# Patient Record
Sex: FEMALE | Race: WHITE | Employment: OTHER | URBAN - METROPOLITAN AREA
[De-identification: names, ages, dates, MRNs, and addresses within clinical notes are randomized per-mention and may not be internally consistent; named-entity substitution may affect disease eponyms.]

---

## 2017-11-01 ENCOUNTER — HOSPITAL ENCOUNTER (OUTPATIENT)
Dept: OTHER | Age: 72
Discharge: HOME | End: 2017-11-30
Attending: ORTHOPAEDIC SURGERY | Admitting: ORTHOPAEDIC SURGERY

## 2017-12-01 ENCOUNTER — HOSPITAL ENCOUNTER (OUTPATIENT)
Dept: OTHER | Age: 72
Discharge: HOME | End: 2017-12-30
Attending: ORTHOPAEDIC SURGERY | Admitting: ORTHOPAEDIC SURGERY

## 2019-09-06 ENCOUNTER — HOSPITAL ENCOUNTER (OUTPATIENT)
Dept: PHYSICAL THERAPY | Age: 74
Setting detail: THERAPIES SERIES
Discharge: HOME | End: 2019-09-06
Payer: MEDICARE

## 2019-09-06 PROCEDURE — 97161 PT EVAL LOW COMPLEX 20 MIN: CPT

## 2019-09-06 PROCEDURE — 97110 THERAPEUTIC EXERCISES: CPT

## 2019-09-06 PROCEDURE — 97530 THERAPEUTIC ACTIVITIES: CPT

## 2019-09-06 NOTE — PLAN OF CARE
strength in B hips and put off replacement. 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     OBJECTIVE:   Posture: good    Functional Mobility/Transfers: independent    Inspection: unremarkable     Palpation: +1 L glute med    Bandages/Dressings/Incisions:     Gait: (include devices/WB status) increased L toe out, trendelenburg B, decreased R hip extension in terminal stance    Dermatomes Normal Abnormal Comments   inguinal area (L1)  X     anterior mid-thigh (L2) X     distal ant thigh/med knee (L3) X     medial lower leg and foot (L4) X     lateral lower leg and foot (L5) X     posterior calf (S1) X     medial calcaneus (S2) X       Myotomes Normal Abnormal Comments   Hip flexion (L1-L2) X     Knee extension (L2-L4) X     Dorsiflexion (L4-L5) X     Great Toe Ext (L5) X     Ankle Eversion (S1-S2) X     Ankle PF(S1-S2) X       Reflexes Normal Abnormal Comments   S1-2 Seated achilles X     S1-2 Prone knee bend      L3-4 Patellar tendon      Clonus      Babinski         PROM AROM    L R L R   Hip Flexion FULL FULL FULL FULL *mild pinch   Hip Abduction FULL FULL FULL FULL (65DEG)   Hip ER WNL WNL WNL WNL   Hip IR 35 35 40 30   Knee Flexion [facility]   Knee Extension [facility] WFL   Dorsiflexion     decreased   Plantarflexion     decreased   Inversion     decreased   Eversion     decreased     Strength (0-5) Left Right   Hip Flexion - supine     Hip Flexion - seated 4+ 4   Hip in sport/recreation activities. Classification :    []Signs/symptoms consistent with post-surgical status including decreased ROM, strength and function. []Signs/symptoms consistent with joint sprain/strain   []Signs/symptoms consistent with patella-femoral syndrome   [x]Signs/symptoms consistent with knee OA/hip OA   []Signs/symptoms consistent with internal derangement of knee/Hip   []Signs/symptoms consistent with functional hip weakness/NMR control      []Signs/symptoms consistent with tendinitis/tendinosis    []signs/symptoms consistent with pathology which may benefit from Dry needling      []other:      Prognosis/Rehab Potential:      [x]Excellent   []Good    []Fair   []Poor    Tolerance of evaluation/treatment:    [x]Excellent   []Good    []Fair   []Poor    Physical Therapy Evaluation Complexity Justification  [x] A history of present problem with:  [] no personal factors and/or comorbidities that impact the plan of care;  [x]1-2 personal factors and/or comorbidities that impact the plan of care  []3 personal factors and/or comorbidities that impact the plan of care  [x] An examination of body systems using standardized tests and measures addressing any of the following: body structures and functions (impairments), activity limitations, and/or participation restrictions;:  [] a total of 1-2 or more elements   [] a total of 3 or more elements   [] a total of 4 or more elements   [x] A clinical presentation with:  [x] stable and/or uncomplicated characteristics   [] evolving clinical presentation with changing characteristics  [] unstable and unpredictable characteristics;   [x] Clinical decision making of [x] low , [] moderate, [] high complexity using standardized patient assessment instrument and/or measurable assessment of functional outcome.     [x] EVAL (LOW) 62392 (typically 15 minutes face-to-face)  [] EVAL (MOD) 68160 (typically 30 minutes face-to-face)  [] EVAL (HIGH) 80412 (typically 45 minutes face-to-face)  [] RE-EVAL     PLAN:   Frequency/Duration:  1 days per week for 3 Weeks:  Interventions:  [x]  Therapeutic exercise including: strength training, ROM, for Lower extremity and core   [x]  NMR activation and proprioception for LE, Glutes and Core   [x]  Manual therapy as indicated for LE, Hip and spine to include: Dry Needling/IASTM, STM, PROM, Gr I-IV mobilizations, manipulation. [x] Modalities as needed that may include: thermal agents, E-stim, Biofeedback, US, iontophoresis as indicated  [x] Patient education on joint protection, postural re-education, activity modification, progression of HEP. HEP instruction: The following exercises were performed and added to the patient's home exercise program. (SLR abd, SLR flex in sidelying, prone quad strap stretch, standing HSS, lateral band walk.) Additionally, the patient was educated on appropriate frequency, intensity and duration. Yellow band provided. GOALS:  Patient stated goal: to have less pain  [] Progressing: [] Met: [] Not Met: [] Adjusted    Therapist goals for Patient:   Short Term Goals: To be achieved in: 2 weeks  1. Independent in HEP and progression per patient tolerance, in order to prevent re-injury. [] Progressing: [] Met: [] Not Met: [] Adjusted  2. Patient will have a decrease in pain to 1/10 on average to facilitate improvement in movement, function, and ADLs as indicated by Functional Deficits. [] Progressing: [] Met: [] Not Met: [] Adjusted    Long Term Goals: To be achieved in: 3 weeks  1. Disability index score of 23%% or less for the LEFS to assist with reaching prior level of function. [] Progressing: [] Met: [] Not Met: [] Adjusted  3. Patient will demonstrate an increase in Strength to at least 4+/5 L hip as well as good proximal hip strength and control to allow for proper functional mobility as indicated by patients Functional Deficits. [] Progressing: [] Met: [] Not Met: [] Adjusted  4.  Patient will return to

## 2019-09-09 ENCOUNTER — HOSPITAL ENCOUNTER (OUTPATIENT)
Dept: PHYSICAL THERAPY | Age: 74
Setting detail: THERAPIES SERIES
Discharge: HOME | End: 2019-09-09
Payer: MEDICARE

## 2019-09-09 PROCEDURE — 97110 THERAPEUTIC EXERCISES: CPT

## 2019-09-09 PROCEDURE — 97112 NEUROMUSCULAR REEDUCATION: CPT

## 2019-09-09 NOTE — FLOWSHEET NOTE
Total Treatment Minutes: 30     [] EVAL - LOW (49730)   [] EVAL - MOD (25325)  [] EVAL - HIGH (98983)  [] RE-EVAL (10462)  [x] TE (62216) x 1      [] Ionto  [x] NMR (27640) x 1      [] Vaso  [] Manual (98123) x      [] Ultrasound  [] TA x      [] Mech Traction (55058)  [] Gait Training x     [] ES (un) (51117):   [] Aquatic therapy x   [] Other:   [] Group:     GOALS: Patient stated goal: to have less pain  [] Progressing: [] Met: [] Not Met: [] Adjusted     Therapist goals for Patient:   Short Term Goals: To be achieved in: 2 weeks  1. Independent in HEP and progression per patient tolerance, in order to prevent re-injury. [] Progressing: [] Met: [] Not Met: [] Adjusted  2. Patient will have a decrease in pain to 1/10 on average to facilitate improvement in movement, function, and ADLs as indicated by Functional Deficits. [] Progressing: [] Met: [] Not Met: [] Adjusted     Long Term Goals: To be achieved in: 3 weeks  1. Disability index score of 23%% or less for the LEFS to assist with reaching prior level of function. [] Progressing: [] Met: [] Not Met: [] Adjusted  3. Patient will demonstrate an increase in Strength to at least 4+/5 L hip as well as good proximal hip strength and control to allow for proper functional mobility as indicated by patients Functional Deficits. [] Progressing: [] Met: [] Not Met: [] Adjusted  4. Patient will return to functional activities including prolonged walking, squatting >60deg 15x without increased symptoms or restriction. [] Progressing: [] Met: [] Not Met: [] Adjusted  5. The patient will transfer up into [facility] without R hip pain. [] Progressing: [] Met: [] Not Met: [] Adjusted             Progression Towards Functional goals:  [x] Patient is progressing as expected towards functional goals listed. [] Progression is slowed due to complexities listed. [] Progression has been slowed due to co-morbidities.   [] Plan just implemented, too soon to assess goals

## 2019-09-11 ENCOUNTER — HOSPITAL ENCOUNTER (EMERGENCY)
Age: 74
Discharge: HOME | End: 2019-09-11
Payer: MEDICARE

## 2019-09-11 ENCOUNTER — APPOINTMENT (OUTPATIENT)
Dept: GENERAL RADIOLOGY | Age: 74
End: 2019-09-11
Payer: MEDICARE

## 2019-09-11 VITALS
RESPIRATION RATE: 16 BRPM | TEMPERATURE: 98.2 F | HEART RATE: 62 BPM | OXYGEN SATURATION: 97 % | BODY MASS INDEX: 24.25 KG/M2 | DIASTOLIC BLOOD PRESSURE: 72 MMHG | SYSTOLIC BLOOD PRESSURE: 132 MMHG | WEIGHT: 148 LBS

## 2019-09-11 DIAGNOSIS — S60.459A FOREIGN BODY OF FINGER OF LEFT HAND, INITIAL ENCOUNTER: Primary | ICD-10-CM

## 2019-09-11 PROCEDURE — 6360000002 HC RX W HCPCS: Performed by: PHYSICIAN ASSISTANT

## 2019-09-11 PROCEDURE — 73140 X-RAY EXAM OF FINGER(S): CPT

## 2019-09-11 PROCEDURE — 99283 EMERGENCY DEPT VISIT LOW MDM: CPT

## 2019-09-11 PROCEDURE — 90471 IMMUNIZATION ADMIN: CPT | Performed by: PHYSICIAN ASSISTANT

## 2019-09-11 PROCEDURE — 90715 TDAP VACCINE 7 YRS/> IM: CPT | Performed by: PHYSICIAN ASSISTANT

## 2019-09-11 RX ORDER — BACITRACIN ZINC AND POLYMYXIN B SULFATE 500; 10000 [USP'U]/G; [USP'U]/G
OINTMENT OPHTHALMIC
Status: DISCONTINUED
Start: 2019-09-11 | End: 2019-09-11 | Stop reason: HOSPADM

## 2019-09-11 RX ORDER — BUPIVACAINE HYDROCHLORIDE 5 MG/ML
INJECTION, SOLUTION EPIDURAL; INTRACAUDAL
Status: DISCONTINUED
Start: 2019-09-11 | End: 2019-09-11 | Stop reason: WASHOUT

## 2019-09-11 RX ORDER — BACITRACIN ZINC AND POLYMYXIN B SULFATE 500; 1000 [USP'U]/G; [USP'U]/G
OINTMENT TOPICAL
Status: DISCONTINUED
Start: 2019-09-11 | End: 2019-09-11 | Stop reason: HOSPADM

## 2019-09-11 RX ORDER — CEPHALEXIN 500 MG/1
500 CAPSULE ORAL 4 TIMES DAILY
Qty: 28 CAPSULE | Refills: 0 | Status: SHIPPED | OUTPATIENT
Start: 2019-09-11 | End: 2019-09-18

## 2019-09-11 RX ADMIN — TETANUS TOXOID, REDUCED DIPHTHERIA TOXOID AND ACELLULAR PERTUSSIS VACCINE, ADSORBED 0.5 ML: 5; 2.5; 8; 8; 2.5 SUSPENSION INTRAMUSCULAR at 18:10

## 2019-09-11 ASSESSMENT — PAIN DESCRIPTION - LOCATION: LOCATION: FINGER (COMMENT WHICH ONE)

## 2019-09-11 ASSESSMENT — PAIN DESCRIPTION - PAIN TYPE: TYPE: ACUTE PAIN

## 2019-09-11 ASSESSMENT — ENCOUNTER SYMPTOMS
CONSTIPATION: 0
ABDOMINAL DISTENTION: 0
SHORTNESS OF BREATH: 0
COUGH: 0
ABDOMINAL PAIN: 0
STRIDOR: 0
WHEEZING: 0
VOMITING: 0
BACK PAIN: 0
DIARRHEA: 0
COLOR CHANGE: 0
NAUSEA: 0

## 2019-09-11 ASSESSMENT — PAIN SCALES - GENERAL: PAINLEVEL_OUTOF10: 2

## 2019-09-11 NOTE — ED NOTES
Bed: 22  Expected date:   Expected time:   Means of arrival:   Comments:  [place] Shirl Sever, RN  09/11/19 6732

## 2019-09-11 NOTE — ED PROVIDER NOTES
evaluated by independent SHAHEEN       [facility]        Pt Name: Amaya Swanson  MRN: [id number]  [place] 1945  Date of evaluation: 9/11/2019  Provider: Barbara Ruiz PA-C  PCP: Braulio Bay MD          CHIEF COMPLAINT       Chief Complaint   Patient presents with    Other     patient with part of sewing needle in left ring finger. HISTORY OF PRESENT ILLNESS   (Location/Symptom, Timing/Onset, Context/Setting, Quality, Duration, Modifying Factors, Severity)  Note limiting factors. Amaya Swanson is a 68 y.o. female who presents to the emergency department with foreign body in left fourth finger status post injury which occurred this evening. She was using her sewing machine and a part of a sewing needle broke off into her left distal finger. She is unsure of her tetanus status. She rates her pain to be a 2 out of 10 on pain scale. She took some Advil prior to arrival and states that her pain is significantly improved. Denies any active bleeding or drainage. Nursing Notes were all reviewed and agreed with or any disagreements were addressed  in the HPI. REVIEW OF SYSTEMS    (2-9 systems for level 4, 10 or more for level 5)     Review of Systems   Constitutional: Negative for chills and fever. HENT: Negative. Eyes: Negative for visual disturbance. Respiratory: Negative for cough, shortness of breath, wheezing and stridor. Cardiovascular: Negative for chest pain, palpitations and leg swelling. Gastrointestinal: Negative for abdominal distention, abdominal pain, constipation, diarrhea, nausea and vomiting. Genitourinary: Negative. Musculoskeletal: Positive for myalgias. Negative for back pain, neck pain and neck stiffness. Skin: Positive for wound. Negative for color change, pallor and rash.    Neurological: Negative for dizziness, tremors, seizures, syncope, facial asymmetry, speech orthopedist.  Mya Prudent stable here, and patient is stable for discharge. My suspicion is low for subungual hematoma, paronychia, eponychia, felon, flexor tenosynovitis, ACS, PE, thoracic aortic dissection, thoracic outlet obstruction, SVC syndrome, tendon rupture, compartment syndrome, acute fracture, dislocation, DVT, arterial compromise or occlusion, limb ischemia, gout, septic joint, abscess, cellulitis, osteomyelitis, or other concerning pathology. We have addressed concerns and expectations. FINAL IMPRESSION      1. Foreign body of finger of left hand, initial encounter          DISPOSITION/PLAN   DISPOSITION Decision To Discharge 09/11/2019 06:23:36 PM      PATIENT REFERREDTO:  Nader Lees MD  [address] [place] [address], [address]  [address] [place], [place] [id number]      come to his office at 9 am. do not eat or drink anything after midnight.     [facility] Emergency Department  [address] [facility]  [phone]    If symptoms worsen      DISCHARGE MEDICATIONS:  New Prescriptions    CEPHALEXIN (KEFLEX) 500 MG CAPSULE    Take 1 capsule by mouth 4 times daily for 7 days       DISCONTINUED MEDICATIONS:  Discontinued Medications    No medications on file              (Please note that portions ofthis note were completed with a voice recognition program.  Efforts were made to edit the dictations but occasionally words are mis-transcribed.)    Dhara Talamantes PA-C (electronically signed)          Dhara Talamantes PA-C  09/11/19 6979

## 2019-09-12 ENCOUNTER — HOSPITAL ENCOUNTER (OUTPATIENT)
Age: 74
Setting detail: OUTPATIENT SURGERY
Discharge: HOME | End: 2019-09-12
Attending: ORTHOPAEDIC SURGERY | Admitting: ORTHOPAEDIC SURGERY
Payer: MEDICARE

## 2019-09-12 VITALS
DIASTOLIC BLOOD PRESSURE: 55 MMHG | RESPIRATION RATE: 16 BRPM | HEART RATE: 54 BPM | SYSTOLIC BLOOD PRESSURE: 114 MMHG | TEMPERATURE: 97.7 F | BODY MASS INDEX: 23.78 KG/M2 | OXYGEN SATURATION: 100 % | HEIGHT: 66 IN | WEIGHT: 148 LBS

## 2019-09-12 PROCEDURE — 99204 OFFICE O/P NEW MOD 45 MIN: CPT | Performed by: ORTHOPAEDIC SURGERY

## 2019-09-12 PROCEDURE — 3600000015 HC SURGERY LEVEL 5 ADDTL 15MIN: Performed by: ORTHOPAEDIC SURGERY

## 2019-09-12 PROCEDURE — 20525 RMVL FB MUSC/TDN DEEP/COMP: CPT | Performed by: ORTHOPAEDIC SURGERY

## 2019-09-12 PROCEDURE — 7100000011 HC PHASE II RECOVERY - ADDTL 15 MIN: Performed by: ORTHOPAEDIC SURGERY

## 2019-09-12 PROCEDURE — 3600000005 HC SURGERY LEVEL 5 BASE: Performed by: ORTHOPAEDIC SURGERY

## 2019-09-12 PROCEDURE — 2500000003 HC RX 250 WO HCPCS: Performed by: ORTHOPAEDIC SURGERY

## 2019-09-12 PROCEDURE — 7100000010 HC PHASE II RECOVERY - FIRST 15 MIN: Performed by: ORTHOPAEDIC SURGERY

## 2019-09-12 PROCEDURE — 2709999900 HC NON-CHARGEABLE SUPPLY: Performed by: ORTHOPAEDIC SURGERY

## 2019-09-12 RX ORDER — LIDOCAINE HYDROCHLORIDE 10 MG/ML
INJECTION, SOLUTION EPIDURAL; INFILTRATION; INTRACAUDAL; PERINEURAL
Status: COMPLETED | OUTPATIENT
Start: 2019-09-12 | End: 2019-09-12

## 2019-09-12 RX ORDER — MAGNESIUM OXIDE 400 MG/1
400 TABLET ORAL DAILY
COMMUNITY
End: 2020-05-11

## 2019-09-12 ASSESSMENT — PAIN - FUNCTIONAL ASSESSMENT
PAIN_FUNCTIONAL_ASSESSMENT: PREVENTS OR INTERFERES SOME ACTIVE ACTIVITIES AND ADLS
PAIN_FUNCTIONAL_ASSESSMENT: 0-10

## 2019-09-12 ASSESSMENT — PAIN DESCRIPTION - DESCRIPTORS: DESCRIPTORS: SORE

## 2019-09-12 ASSESSMENT — PAIN SCALES - GENERAL: PAINLEVEL_OUTOF10: 0

## 2019-09-12 NOTE — H&P
Preoperative H&P Update    The patient's History and Physical in the medical record from 9/12/2019 was reviewed by me today. Past Medical History:   Diagnosis Date    Arthritis     Blood transfusion 2002    AUTOLOGOUS    Blood transfusion 1968    Diverticulosis     Hyperlipidemia     Osteopenia     Restless leg syndrome     Seasonal allergies      Past Surgical History:   Procedure Laterality Date    APPENDECTOMY      CARPAL TUNNEL RELEASE      LEFT    COLONOSCOPY      FINGER TRIGGER RELEASE      RIGHT - 2 FINGERS    FINGER TRIGGER RELEASE  6-    trigger finger release left long and ring fingers    FRACTURE SURGERY      BILATERAL FEMURS     FRACTURE SURGERY      RIGHT ANKLE/TALUS/TIBIA    HARDWARE REMOVAL      HEMORRHOID SURGERY      JOINT REPLACEMENT      RIGHT KNEE    KNEE ARTHROSCOPY      RIGHT AND LEFT MULTIPLE     No current facility-administered medications on file prior to encounter. Current Outpatient Medications on File Prior to Encounter   Medication Sig Dispense Refill    magnesium oxide (MAG-OX) 400 MG tablet Take 400 mg by mouth daily      cephALEXin (KEFLEX) 500 MG capsule Take 1 capsule by mouth 4 times daily for 7 days 28 capsule 0    simvastatin (ZOCOR) 20 MG tablet Take 20 mg by mouth nightly.  ropinirole (REQUIP) 0.5 MG tablet Take 0.5 mg by mouth daily. 1-2 TABS       diclofenac (VOLTAREN) 75 MG EC tablet Take 75 mg by mouth 2 times daily as needed Takes 1-2 per week, only takes on really \"achy\" days      Calcium Carbonate-Vitamin D (CALCIUM PLUS VITAMIN D) 600-100 MG-UNIT CAPS Take 1 tablet by mouth daily.  Omega-3 Fatty Acids (FISH OIL) 1200 MG CAPS Take 1 capsule by mouth daily.  Multiple Vitamin (MULTIVITAMIN PO) Take 1 tablet by mouth daily.            Allergies   Allergen Reactions    Sulfa Antibiotics      Face red,flushed      I reviewed the HPI, medications, allergies, reason for surgery, diagnosis and treatment plan and

## 2019-09-12 NOTE — PROGRESS NOTES
Admitted from [address] [place] [address] . Awake and alert. Respirations easy . Dressing dry and intact.

## 2019-09-18 ENCOUNTER — HOSPITAL ENCOUNTER (OUTPATIENT)
Dept: PHYSICAL THERAPY | Age: 74
Setting detail: THERAPIES SERIES
Discharge: HOME | End: 2019-09-18
Payer: MEDICARE

## 2019-09-18 PROCEDURE — 97110 THERAPEUTIC EXERCISES: CPT

## 2019-09-18 PROCEDURE — 97112 NEUROMUSCULAR REEDUCATION: CPT

## 2019-09-18 NOTE — FLOWSHEET NOTE
[facility] - Outpatient Physical Therapy    Physical Therapy Daily Treatment Note  Date:  2019    Patient Name:  Mary Pascual    :  1945  MRN: [id number]  Medical/Treatment Diagnosis Information:  · Diagnosis: M70.61 (ICD-10-CM) - Trochanteric bursitis, right hip  · Treatment Diagnosis: difficulty walking, hip pain  Insurance/Certification information:  PT Insurance Information: [address] - visits PMN - 96/4%   Physician Information:  Referring Practitioner: Hank Worthington MD  Plan of care signed (Y/N): N    Date of Patient follow up with Physician: JOSELINE     Progress Note: []  Yes  [x]  No  Next due by: Visit #4       Latex Allergy:  [x]NO      []YES  Preferred Language for Healthcare:   [x]English       []other:    Visit # Insurance Allowable Date Range (if applicable)   3 PMN 0868     Pain level:  0/10 @ rest     SUBJECTIVE:  Lateral band walk is the only HEP exercise that reproduces sxs. She finds that therapy routine, HEP and compliance are facilitating results.       OBJECTIVE:     RESTRICTIONS/PRECAUTIONS: NA    Exercises/Interventions:     Therapeutic Exercises  Resistance Sets / Reps Notes   SLR abd     SLR Flex/ext in S/L     HSS on step  Cued heavily for neutral spine   Lateral Band WALK   laces   Prone Quad Strap S      R Bike    SLR Ext  Hip Ext w/ Bent Knee 2#  2# 2 x 10 B  2 x 10 B   TC for proper knee flexion position   Reverse Clamshells    Leg Press 100# 2 x 10 B L4, B2   Piriformis S  2 x 20'' B          Neuromuscular Re-ed / Therapeutic Activities      Lateral Heel Taps    SLS Airex R (intervals of 10'')   SLR Ext & Abd lime 1 x 10 each R/L each, cued to avoid touching floor for moving leg (stability exercise)   TRX Squats: Neutral, Staggered R/L   10x each Cued for depth, heel crush at bottom of motion, foot position   Staggered Stance HR  6'' step 12x R/L each          Manual Intervention                                            Patient Education & HEP:   9/18 - The following exercises were performed and added to the patient's home exercise program. (squats, SLR ext/abd in standing, SLR ext w/ knee bent, piriformis S). Additionally, the patient was educated on appropriate frequency, intensity and duration. Handout provided. 9/9 - The following exercises were performed and added to the patient's home exercise program. (SLR ext, SLS, clams, reverse clams). Additionally, the patient was educated on appropriate frequency, intensity and duration. Lime loop provided. 9/6 - - Patient educated on the focus of skilled physical therapy services and plan of care, including: diagnosis, prognosis, treatment goals & options. The patient was scheduled with PT by PT. - 12'  9/6 - The following exercises were performed and added to the patient's home exercise program. (SLR abd, SLR flex in sidelying, prone quad strap stretch, standing HSS, lateral band walk.) Additionally, the patient was educated on appropriate frequency, intensity and duration. Yellow band provided. - All patient questions were answered    Therapeutic Exercise and NMR EXR  [x] (25117) Provided verbal/tactile cueing for activities related to strengthening, flexibility, endurance, ROM for improvements in  [x] LE / Lumbar: LE, proximal hip, and core control with self care, mobility, lifting, ambulation. [] UE / Cervical: cervical, postural, scapular, scapulothoracic and UE control with self care, reaching, carrying, lifting, house/yardwork, driving, computer work. [x] (16245) Provided verbal/tactile cueing for activities related to improving balance, coordination, kinesthetic sense, posture, motor skill, proprioception to assist with   [x] LE / lumbar: LE, proximal hip, and core control in self care, mobility, lifting, ambulation and eccentric single leg control.    [] UE / cervical: cervical, scapular, scapulothoracic and UE control with self care, reaching, carrying, lifting, house/yardwork, Met: [] Adjusted  4. Patient will return to functional activities including prolonged walking, squatting >60deg 15x without increased symptoms or restriction. [] Progressing: [] Met: [] Not Met: [] Adjusted  5. The patient will transfer up into [facility] without R hip pain. [] Progressing: [] Met: [] Not Met: [] Adjusted             Progression Towards Functional goals:  [x] Patient is progressing as expected towards functional goals listed. [] Progression is slowed due to complexities listed. [] Progression has been slowed due to co-morbidities. [] Plan just implemented, too soon to assess goals progression  [] Other:     Persisting Functional Limitations/Impairments:  []Sleeping []Sitting               []Standing []Transfers        [x]Walking [x]Kneeling               [x]Stairs [x]Squatting / bending   []ADLs []Reaching  []Lifting  [x]Housework  []Driving [x]Job related tasks  []Sports/Recreation []Other:      ASSESSMENT:    Treatment/Activity Tolerance:  [x] Patient tolerated treatment well [] Patient limited by fatigue  [] Patient limited by pain  [] Patient limited by other medical complications  [x] Other: The patient continues to progress in strength, flexibility, activity tolerance and all with reducing symptoms. Prognosis: [x] Good [] Fair  [] Poor    Patient Requires Follow-up: [x] Yes  [] No    PLAN: POC: PT 1x / week for 3 weeks. Assess response to HEP.    [] Continue per plan of care [] Alter current plan (see comments)  [x] Plan of care initiated [] Hold pending MD visit [] Discharge    Electronically signed by: Aren Castillo PT, DPT

## 2019-09-25 ENCOUNTER — TELEPHONE (OUTPATIENT)
Dept: ORTHOPEDIC SURGERY | Age: 74
End: 2019-09-25

## 2019-09-25 ENCOUNTER — HOSPITAL ENCOUNTER (OUTPATIENT)
Dept: PHYSICAL THERAPY | Age: 74
Setting detail: THERAPIES SERIES
Discharge: HOME | End: 2019-09-25
Payer: MEDICARE

## 2019-09-25 PROCEDURE — 97112 NEUROMUSCULAR REEDUCATION: CPT

## 2019-09-25 PROCEDURE — 97110 THERAPEUTIC EXERCISES: CPT

## 2019-09-25 NOTE — TELEPHONE ENCOUNTER
Called and spoke to Blane Soto. Per SMA okay to cancel PO appt. Advised patient to continue working ROM of fingers and look for signs and symptoms of infection and to call if there is any questions or concerns to make follow up appt.  Patient in agreement to plan

## 2019-09-25 NOTE — FLOWSHEET NOTE
for proper ROM for normal function with   [] LE / lumbar: self care, mobility, lifting and ambulation. [] UE / Cervical: self care, reaching, carrying, lifting, house/yardwork, driving, computer work. Modalities:  [] (84424) Vasopneumatic compression: Utilized vasopneumatic compression to decrease edema / swelling for the purpose of improving mobility and quad tone / recruitment which will allow for increased overall function including but not limited to self-care, transfers, ambulation, and ascending / descending stairs. Modalities: Consider MOC    Charges:  Timed Code Treatment Minutes: 28   Total Treatment Minutes: 28     [] EVAL - LOW (70183)   [] EVAL - MOD (88609)  [] EVAL - HIGH (03688)  [] RE-EVAL (86829)  [x] TE (25185) x 1      [] Ionto  [x] NMR (00148) x 1      [] Vaso  [] Manual (11380) x      [] Ultrasound  [] TA x      [] Mech Traction (48452)  [] Gait Training x     [] ES (un) (75325):   [] Aquatic therapy x   [] Other:   [] Group:     GOALS: Patient stated goal: to have less pain  [x] Progressing: [] Met: [] Not Met: [] Adjusted     Therapist goals for Patient:   Short Term Goals: To be achieved in: 2 weeks  1. Independent in HEP and progression per patient tolerance, in order to prevent re-injury. [x] Progressing: [] Met: [] Not Met: [] Adjusted  2. Patient will have a decrease in pain to 1/10 on average to facilitate improvement in movement, function, and ADLs as indicated by Functional Deficits. [x] Progressing: [] Met: [] Not Met: [] Adjusted     Long Term Goals: To be achieved in: 3 weeks  1. Disability index score of 23%% or less for the LEFS to assist with reaching prior level of function. [x] Progressing: [] Met: [] Not Met: [] Adjusted  3. Patient will demonstrate an increase in Strength to at least 4+/5 L hip as well as good proximal hip strength and control to allow for proper functional mobility as indicated by patients Functional Deficits.    [x] Progressing: [] Met: []

## 2019-10-02 ENCOUNTER — HOSPITAL ENCOUNTER (OUTPATIENT)
Dept: PHYSICAL THERAPY | Age: 74
Setting detail: THERAPIES SERIES
Discharge: HOME | End: 2019-10-02
Payer: MEDICARE

## 2019-10-02 PROCEDURE — 97530 THERAPEUTIC ACTIVITIES: CPT

## 2020-05-11 ENCOUNTER — APPOINTMENT (OUTPATIENT)
Dept: CT IMAGING | Age: 75
End: 2020-05-11
Payer: MEDICARE

## 2020-05-11 ENCOUNTER — HOSPITAL ENCOUNTER (EMERGENCY)
Age: 75
Discharge: HOME | End: 2020-05-11
Attending: EMERGENCY MEDICINE
Payer: MEDICARE

## 2020-05-11 VITALS
BODY MASS INDEX: 24.49 KG/M2 | HEIGHT: 65 IN | SYSTOLIC BLOOD PRESSURE: 115 MMHG | DIASTOLIC BLOOD PRESSURE: 72 MMHG | WEIGHT: 147 LBS | RESPIRATION RATE: 16 BRPM | OXYGEN SATURATION: 99 % | HEART RATE: 55 BPM | TEMPERATURE: 98.4 F

## 2020-05-11 PROCEDURE — 99284 EMERGENCY DEPT VISIT MOD MDM: CPT

## 2020-05-11 PROCEDURE — 73700 CT LOWER EXTREMITY W/O DYE: CPT

## 2020-05-11 PROCEDURE — 6370000000 HC RX 637 (ALT 250 FOR IP): Performed by: EMERGENCY MEDICINE

## 2020-05-11 RX ORDER — MULTIVIT-MIN/IRON/FOLIC ACID/K 18-600-40
1 CAPSULE ORAL DAILY
COMMUNITY

## 2020-05-11 RX ORDER — OXYCODONE HYDROCHLORIDE AND ACETAMINOPHEN 5; 325 MG/1; MG/1
1 TABLET ORAL EVERY 6 HOURS PRN
Qty: 12 TABLET | Refills: 0 | Status: SHIPPED | OUTPATIENT
Start: 2020-05-11 | End: 2020-05-14

## 2020-05-11 RX ORDER — OXYCODONE HYDROCHLORIDE AND ACETAMINOPHEN 5; 325 MG/1; MG/1
2 TABLET ORAL ONCE
Status: COMPLETED | OUTPATIENT
Start: 2020-05-11 | End: 2020-05-11

## 2020-05-11 RX ORDER — ROPINIROLE 0.5 MG/1
0.5 TABLET, FILM COATED ORAL 3 TIMES DAILY PRN
Status: ON HOLD | COMMUNITY
End: 2022-05-03

## 2020-05-11 RX ADMIN — OXYCODONE HYDROCHLORIDE AND ACETAMINOPHEN 2 TABLET: 5; 325 TABLET ORAL at 02:03

## 2020-05-11 ASSESSMENT — PAIN SCALES - GENERAL
PAINLEVEL_OUTOF10: 9
PAINLEVEL_OUTOF10: 9

## 2020-05-11 ASSESSMENT — PAIN DESCRIPTION - ORIENTATION: ORIENTATION: LEFT

## 2020-05-11 ASSESSMENT — PAIN DESCRIPTION - LOCATION: LOCATION: HIP

## 2020-05-11 NOTE — ED PROVIDER NOTES
eMERGENCY dEPARTMENT eNCOUnter      [facility]    Chief Complaint   Patient presents with    Hip Pain     c/o falling onto left hip after tripping over ironing board and caught foot in the cord tonight @ [phone]; has been told she needs both hips replaced due to arthritis       HPI    Lasha Cheema is a 69 y.o. female who presents with left hip pain after falling prior to arrival.  He states that she is having difficulty walking secondary to pain but she took a pain medicine before leaving the house. Moving it makes it worse and no other associated signs or symptoms. She did not strike her head.     PAST MEDICAL HISTORY    Past Medical History:   Diagnosis Date    Arthritis     Blood transfusion 2002    AUTOLOGOUS    Blood transfusion 1968    Diverticulosis     H/O irritable bowel syndrome     Hyperlipidemia     Osteopenia     Restless leg syndrome     Seasonal allergies        SURGICAL HISTORY    Past Surgical History:   Procedure Laterality Date    APPENDECTOMY      CARPAL TUNNEL RELEASE      LEFT    COLONOSCOPY      FINGER TRIGGER RELEASE      RIGHT - 2 FINGERS    FINGER TRIGGER RELEASE  6-    trigger finger release left long and ring fingers    FRACTURE SURGERY      BILATERAL FEMURS     FRACTURE SURGERY      RIGHT ANKLE/TALUS/TIBIA    HAND SURGERY Left 9/12/2019    LEFT RING FINGER  DEBRIDEMENT INCISION AND DRAINAGE, REMOVAL OF FOREIGN BODY performed by Antoni Espino MD at [facility]      JOINT REPLACEMENT      RIGHT KNEE    KNEE ARTHROSCOPY      RIGHT AND LEFT MULTIPLE       CURRENT MEDICATIONS    Current Outpatient Rx   Medication Sig Dispense Refill    rOPINIRole (REQUIP) 0.5 MG tablet Take 0.5 mg by mouth 3 times daily as needed      Cholecalciferol (VITAMIN D) 50 MCG (2000 UT) CAPS capsule Take 1 capsule by mouth daily      Probiotic Product (PROBIOTIC-10 PO) Take 1 capsule by mouth daily      Melatonin 5 MG CAPS Take 1 capsule by mouth nightly      simvastatin (ZOCOR) 20 MG tablet Take 20 mg by mouth nightly.  ropinirole (REQUIP) 0.5 MG tablet Take 1.5 mg by mouth nightly       diclofenac (VOLTAREN) 75 MG EC tablet Take 75 mg by mouth 2 times daily as needed Takes 1-2 per week, only takes on really \"achy\" days      Calcium Carbonate-Vitamin D (CALCIUM PLUS VITAMIN D) 600-100 MG-UNIT CAPS Take 1 tablet by mouth nightly       Multiple Vitamin (MULTIVITAMIN PO) Take 1 tablet by mouth daily.            ALLERGIES    Allergies   Allergen Reactions    Sulfa Antibiotics      Face red,flushed       FAMILY HISTORY    Family History   Problem Relation Age of Onset    Cancer Father         PROSTATE    Heart Disease Mother         CHF       SOCIAL HISTORY    Social History     Socioeconomic History    Marital status:      Spouse name: None    Number of children: None    Years of education: None    Highest education level: None   Occupational History    None   Social Needs    Financial resource strain: None    Food insecurity     Worry: None     Inability: None    Transportation needs     Medical: None     Non-medical: None   Tobacco Use    Smoking status: Never Smoker    Smokeless tobacco: Never Used   Substance and Sexual Activity    Alcohol use: Yes     Comment: occasional glass of wine with dinner    Drug use: No    Sexual activity: None   Lifestyle    Physical activity     Days per week: None     Minutes per session: None    Stress: None   Relationships    Social connections     Talks on phone: None     Gets together: None     Attends Judaism service: None     Active member of club or organization: None     Attends meetings of clubs or organizations: None     Relationship status: None    Intimate partner violence     Fear of current or ex partner: None     Emotionally abused: None     Physically abused: None     Forced sexual activity: None   Other Topics Concern    None   Social History Narrative

## 2020-06-17 ENCOUNTER — HOSPITAL ENCOUNTER (OUTPATIENT)
Dept: PHYSICAL THERAPY | Age: 75
Setting detail: THERAPIES SERIES
Discharge: HOME | End: 2020-06-17
Payer: MEDICARE

## 2020-06-17 PROCEDURE — 97110 THERAPEUTIC EXERCISES: CPT

## 2020-06-17 PROCEDURE — 97161 PT EVAL LOW COMPLEX 20 MIN: CPT

## 2020-06-17 NOTE — FLOWSHEET NOTE
goals & options. Includes HEP edu & instruction.  - HEP instruction: Access Code: [email or web address]   URL: [email or web address]   Date: 06/17/2020   Prepared by: Pravin Dolin     Exercises   · Supine Pelvic Tilt - 10 reps - 1 sets - 1 hold - 2x daily - 7x weekly   · Supine Bridge - 15 reps - 2 sets - 2 hold - 1x daily - 5x weekly   · Supine Active Straight Leg Raise - 10 reps - 2 sets - 1x daily - 5x weekly   · Supine Hip Adduction Isometric with Ball - 10 reps - 2 sets - 5-10 hold - 2x daily - 5x weekly   · Hooklying Isometric Clamshell - 10 reps - 2 sets - 5-10 hold - 2x daily - 5x weekly   - All patient questions were answered    Therapeutic Exercise and NMR EXR  [x] (70637) Provided verbal/tactile cueing for activities related to strengthening, flexibility, endurance, ROM for improvements in  [x] LE / Lumbar: LE, proximal hip, and core control with self care, mobility, lifting, ambulation. [] UE / Cervical: cervical, postural, scapular, scapulothoracic and UE control with self care, reaching, carrying, lifting, house/yardwork, driving, computer work.  [] (81067) Provided verbal/tactile cueing for activities related to improving balance, coordination, kinesthetic sense, posture, motor skill, proprioception to assist with   [] LE / lumbar: LE, proximal hip, and core control in self care, mobility, lifting, ambulation and eccentric single leg control. [] UE / cervical: cervical, scapular, scapulothoracic and UE control with self care, reaching, carrying, lifting, house/yardwork, driving, computer work.      NMR and Therapeutic Activities:    [] (78328 or 61392) Provided verbal/tactile cueing for activities related to improving balance, coordination, kinesthetic sense, posture, motor skill, proprioception and motor activation to allow for proper function of   [] LE: / Lumbar core, proximal hip and LE with self care and ADLs  [] UE / Cervical: cervical, postural, scapular, scapulothoracic and UE control with self Not Met: []? Adjusted  5. Don/doff pants in standing for independent self-care. []? Progressing: []? Met: []? Not Met: []? Adjusted         Overall Progression Towards Functional goals/ Treatment Progress Update:  [] Patient is progressing as expected towards functional goals listed. [] Progression is slowed due to complexities/Impairments listed. [] Progression has been slowed due to co-morbidities. [x] Plan just implemented, too soon to assess goals progression <30days   [] Goals require adjustment due to lack of progress  [] Patient is not progressing as expected and requires additional follow up with physician  [] Other:     Persisting Functional Limitations/Impairments:  [x]Sleeping [x]Sitting               [x]Standing [x]Transfers        [x]Walking [x]Kneeling               [x]Stairs [x]Squatting / bending   [x]ADLs []Reaching  [x]Lifting  [x]Housework  [x]Driving []Job related tasks  []Sports/Recreation []Other:      ASSESSMENT:  SEE EVAL  Treatment/Activity Tolerance:  [x] Patient tolerated treatment well [] Patient limited by fatigue  [] Patient limited by pain  [] Patient limited by other medical complications  [] Other:     Prognosis: [x] Good [] Fair  [] Poor    Patient Requires Follow-up: [x] Yes  [] No    PLAN: POC: PT 2x / week for 5 weeks. Assess response to HEP. Progress core activation exercises, simple SLRs, adductor/abductor activation in supine/sitting/standing, gait tx & progression with LRAD (SPC at eval), balance  [] Continue per plan of care [] Alter current plan (see comments)  [x] Plan of care initiated [] Hold pending MD visit [] Discharge    Electronically signed by: Tiffanie Mccabe PT, DPT    Note: If patient does not return for scheduled/ recommended follow up visits, this note will serve as a discharge from care along with most recent update on progress.

## 2020-06-17 NOTE — PLAN OF CARE
[place], [facility], [address]  Phone: [phone]   Fax: [phone]                                                       Physical Therapy Certification    Dear Referring Practitioner: Jerman Hernandez MD,    We had the pleasure of evaluating the following patient for physical therapy services at [address] [place] [address]. A summary of our findings can be found in the initial assessment below. This includes our plan of care. If you have any questions or concerns regarding these findings, please do not hesitate to contact me at the office phone number checked above. Thank you for the referral.       Physician Signature:_______________________________Date:__________________  By signing above (or electronic signature), therapists plan is approved by physician      Patient: Abby Mackey   : 1945   MRN: [id number]  Referring Physician: Referring Practitioner: Jerman Hernandez MD      Evaluation Date: 2020      Medical Diagnosis Information:  Diagnosis: E91.603B (ICD-10-CM) - Other specified fracture of left pubis, initial encounter for closed fracture   Treatment Diagnosis: difficulty walking, pelvic pain, imbalance, decreased LE strength, decreased ADL status                                         Insurance information: PT Insurance Information: [address] - visits PMN     Precautions/ Contra-indications: N/A  Latex Allergy:  [x]NO      []YES   Preferred Language for Healthcare:   [x]English       []other:    SUBJECTIVE: Patient stated complaint: On Mother's Day2020 - the patient was sewing on quilt block and stepped to left side put item up on wall, and her right foot got tangled and she fell hard onto her left hip/buttocks. She went to ED right away. MRI was taken.  Xray was at MD's office 2 days later and revealed multiple pubic fractures. No surgery recommended. Approximately, per MD, 4-6 weeks with pain and 3-6 months to heal. Sleep is disrupted by pain; and she has tried pillows under knees/legs. She had great difficulty getting in/out of bed, and this is improving slowly. She is using SW at this time, successfully, and demonstrates gait pattern that indicates she may be ready for cane. She later demonstrates fair gait with SPC in PT. She will have dexascan again this Friday. She uses calcium and vitamins regularly. Relevant Medical History: arthritis, Osteopenia; Diverticulosis; Seasonal allergies; H/O irritable bowel syndrome, hyperlipidemia  Functional Outcome: LEFS: raw score =  0; dysfunction = 100%    Pain Scale: 0-10/10 seated; 10/10 with prolonged standing  Easing factors: tylenol, sitting  Provocative factors: sleeping postures, walking, prolonged standing. Type: []Constant   [x]Intermittent  [x]Radiating (aching down left leg) [x]Localized []other:     Numbness/Tingling: reports numbness over SIJ    Occupation/School: retired teacher    Living Status/Prior Level of Function:Prior to this injury / incident, pt was independent with ADLs and IADLs, she cannot cook, carrying, driving or walk long distances at this time.  She is starting to shower, dress on her own and is much more confident    OBJECTIVE:   Palpation: pubic bone 0 TTP     Functional Mobility/Transfers: slow, guarded; squats w/o pain    Posture: good    Bandages/Dressings/Incisions: N/A    Gait: (include devices/WB status): decreased L hip extension, increased L toe out throughout gait cycle, capable of using SW & SPC    Dermatomes Normal Abnormal Comments   inguinal area (L1)  X     anterior mid-thigh (L2) X     distal ant thigh/med knee (L3) X     medial lower leg and foot (L4) X     lateral lower leg and foot (L5) X     posterior calf (S1) X     medial calcaneus (S2) X       Reflexes Normal Abnormal Comments   S1-2 Seated achilles X     S1-2 Prone knee bend (M81.8)  [x]Osteopenia (M85.8)   Endocrine conditions   []Hypothyroid (E03.9)  []Hyperthyroid Gastrointestinal conditions   []Constipation (U62.80)   Metabolic conditions   []Morbid obesity (E66.01)  []Diabetes type 1(E10.65) or 2 (E11.65)   []Neuropathy (G60.9)     Pulmonary conditions   []Asthma (J45)  []Coughing   []COPD (J44.9)   Psychological Disorders  []Anxiety (F41.9)  []Depression (F32.9)   []Other:   []Other:          Barriers to/and or personal factors that will affect rehab potential:              [x]Age  []Sex    []Smoker              [x]Motivation/Lack of Motivation                        []Co-Morbidities              []Cognitive Function, education/learning barriers              []Environmental, home barriers              []profession/work barriers  [x]past PT/medical experience  []other:  Justification: positive influence    Falls Risk Assessment (30 days): DESPITE HALLIE (IN MAY)  [x] Falls Risk assessed and no intervention required. [] Falls Risk assessed and Patient requires intervention due to being higher risk   TUG score (>12s at risk):     [] Falls education provided, including      ASSESSMENT: The patient is a 69year old female who presents with signs and symptoms consistent with multiple left pubic bone fractures. She has decreased hip strength, hip ROM, activity tolerance, balance and endurance to ADLs/iADLs at this time. The patient will benefit from skilled PT services to restore return to PLOF with normal pelvic, hip mobility and full functional strength.     Functional Impairments:     []Noted lumbar/proximal hip/LE joint hypomobility   [x]Decreased LE functional ROM   [x]Decreased core/proximal hip strength and neuromuscular control   [x]Decreased LE functional strength   [x]Reduced balance/proprioceptive control   []other:      Functional Activity Limitations (from functional questionnaire and intake)   [x]Reduced ability to tolerate prolonged functional positions   [x]Reduced ability

## 2020-06-22 ENCOUNTER — HOSPITAL ENCOUNTER (OUTPATIENT)
Dept: PHYSICAL THERAPY | Age: 75
Setting detail: THERAPIES SERIES
Discharge: HOME | End: 2020-06-22
Payer: MEDICARE

## 2020-06-22 PROCEDURE — 97112 NEUROMUSCULAR REEDUCATION: CPT

## 2020-06-22 PROCEDURE — 97530 THERAPEUTIC ACTIVITIES: CPT

## 2020-06-22 PROCEDURE — 97110 THERAPEUTIC EXERCISES: CPT

## 2020-06-22 NOTE — FLOWSHEET NOTE
Clam shells  2 x 10 R/L Added 6/22   Reverse Clamshells  2 x 10 R/L Added 6/22   LAQ 3# / 4# x10 / 2x10 Added 6/22   Standing hip abd NO WEIGHT X 10 R/L Added 6/22   Standing hip ext NO WEIGHT X 10 R/L Added 6/22                           Neuromuscular Re-ed (14776) Therapeutic Activities (89084)      Side stepping at plinth (NO UE A)  X 4 lengths Added 6/22   Airex neutral ARCELIA  20\" no UE A Added 6/22   Airex Narrow ARCELIA  20\" no UE A Added 6/22   Airex staggered stance  20\" each foot position NO UE A Added 6/22   Tandem stance on ground  15\" each foot position x 2    Forward step ups lead with L 4 inch x10 Added 6/22   Lateral step ups 4 inch X 10 Added 6/22                           Manual Intervention (42707)                                            Patient Education & HEP:  - Patient educated on the focus of skilled physical therapy services and plan of care, including: diagnosis, prognosis, treatment goals & options. Includes HEP edu & instruction. Access Code: [email or web address]   URL: [email or web address]   Date: 06/22/2020   Prepared by: Sarai Thomson     Exercises  Clamshell - 10 reps - 3 sets - 1x daily - 7x weekly  Sidelying Reverse Clamshell - 10 reps - 3 sets - 1x daily - 7x weekly  Standing Hip Abduction with Counter Support - 10 reps - 3 sets - 1x daily - 7x weekly  Standing Hip Extension with Counter Support - 10 reps - 3 sets - 1x daily - 7x weekly    - HEP instruction: Access Code: [email or web address]   URL: [email or web address]   Date: 06/17/2020   Prepared by: Stacey Greenwood     Exercises   · Supine Pelvic Tilt - 10 reps - 1 sets - 1 hold - 2x daily - 7x weekly   · Supine Bridge - 15 reps - 2 sets - 2 hold - 1x daily - 5x weekly   · Supine Active Straight Leg Raise - 10 reps - 2 sets - 1x daily - 5x weekly   · Supine Hip Adduction Isometric with Ball - 10 reps - 2 sets - 5-10 hold - 2x daily - 5x weekly   · Hooklying Isometric Clamshell - 10 reps - 2 sets - 5-10 hold - 2x daily - 5x weekly   - All and UE control with self care, reaching, carrying, lifting, house/yardwork, driving, computer work  [] (41754)Reviewed/Progressed HEP activities related to improving balance, coordination, kinesthetic sense, posture, motor skill, proprioception of   [] LE: core, proximal hip and LE for self care, mobility, lifting, and ambulation/stair navigation    [] UE / Cervical: cervical, postural,  scapular, scapulothoracic and UE control with self care, reaching, carrying, lifting, house/yardwork, driving, computer work    Manual Treatments:  PROM / STM / Oscillations-Mobs:  G-I, II, III, IV (PA's, Inf., Post.)  [] (80982) Provided manual therapy to mobilize LE, proximal hip and/or LS spine soft tissue/joints for the purpose of modulating pain, promoting relaxation,  increasing ROM, reducing/eliminating soft tissue swelling/inflammation/restriction, improving soft tissue extensibility and allowing for proper ROM for normal function with   [] LE / lumbar: self care, mobility, lifting and ambulation. [] UE / Cervical: self care, reaching, carrying, lifting, house/yardwork, driving, computer work. Modalities:  [] (64698) Vasopneumatic compression: Utilized vasopneumatic compression to decrease edema / swelling for the purpose of improving mobility and quad tone / recruitment which will allow for increased overall function including but not limited to self-care, transfers, ambulation, and ascending / descending stairs.      Modalities: 6/17 - Consider [facility]    Charges:  Timed Code Treatment Minutes: 57   Total Treatment Minutes: 57     [] EVAL - LOW (61600)   [] EVAL - MOD (92056)  [] EVAL - HIGH (99579)  [] RE-EVAL (31710)  [x] TE (07312) x 2     [] Ionto  [x] NMR (19528) x 1     [] Vaso  [] Manual (61060) x      [] Ultrasound  [x] TA x   1    [] Mech Traction (69605)  [] Gait Training x     [] ES (un) (97755):   [] Aquatic therapy x   [] Other:   [] Group:     GOALS: Patient stated goal: to don/doff pants while standing; return to \"normal\" function & walking  []? Progressing: []? Met: []? Not Met: []? Adjusted     Therapist goals for Patient:   Short Term Goals: To be achieved in: 2 weeks  1. Independent in HEP and progression per patient tolerance, in order to prevent re-injury. []? Progressing: []? Met: []? Not Met: []? Adjusted  2. Patient will have a decrease in pain to <1/10 on average facilitate improvement in movement, function, and ADLs as indicated by Functional Deficits. []? Progressing: []? Met: []? Not Met: []? Adjusted     Long Term Goals: To be achieved in: 5 weeks  1. Disability index score of 30% or less for the LEFS to assist with reaching prior level of function. []? Progressing: []? Met: []? Not Met: []? Adjusted  2. Patient will demonstrate increased AROM to symmetric L hip to allow for proper joint functioning as indicated by patients Functional Deficits. []? Progressing: []? Met: []? Not Met: []? Adjusted  3. Patient will demonstrate an increase in Strength to at least 4+/5 as well as good proximal hip strength and control to allow for proper functional mobility as indicated by patients Functional Deficits. []? Progressing: []? Met: []? Not Met: []? Adjusted  4. Patient will return to functional activities including community walking without increased symptoms or restriction. []? Progressing: []? Met: []? Not Met: []? Adjusted  5. Don/doff pants in standing for independent self-care. []? Progressing: []? Met: []? Not Met: []? Adjusted         Overall Progression Towards Functional goals/ Treatment Progress Update:  [] Patient is progressing as expected towards functional goals listed. [] Progression is slowed due to complexities/Impairments listed. [] Progression has been slowed due to co-morbidities.   [x] Plan just implemented, too soon to assess goals progression <30days   [] Goals require adjustment due to lack of progress  [] Patient is not progressing as expected and requires additional

## 2020-06-24 ENCOUNTER — HOSPITAL ENCOUNTER (OUTPATIENT)
Dept: PHYSICAL THERAPY | Age: 75
Setting detail: THERAPIES SERIES
Discharge: HOME | End: 2020-06-24
Payer: MEDICARE

## 2020-06-24 PROCEDURE — 97110 THERAPEUTIC EXERCISES: CPT

## 2020-06-24 PROCEDURE — 97112 NEUROMUSCULAR REEDUCATION: CPT

## 2020-06-24 NOTE — FLOWSHEET NOTE
[place] - Outpatient Physical Therapy    Physical Therapy Daily Treatment Note  Date:  2020    Patient Name:  Mandeep Plaza    :  1945  MRN: [id number]  Medical/Treatment Diagnosis Information:  · Diagnosis: S32.592A (ICD-10-CM) - Other specified fracture of left pubis, initial encounter for closed fracture  · Treatment Diagnosis: difficulty walking, pelvic pain, imbalance, decreased LE strength, decreased ADL status  Insurance/Certification information:  PT Insurance Information: [address] - visits PMN  Physician Information:  Referring Practitioner: Lj Sheets MD  Plan of care signed (Y/N): []  Yes [x]  No     Progress Report: []  Yes  [x]  No     Date Range for reporting period:  Beginning 20  Ending TBD    Progress report due (10 Rx/or 30 days whichever is less): 10     Recertification due (POC duration/ or 90 days whichever is less): POC     Visit # Insurance Allowable Date Range (if applicable)    PMN 7800     Latex Allergy:  [x]NO      []YES  Preferred Language for Healthcare:   [x]English       []other:    Functional Scale: LEFS: 0 = 100% DIS  Date assessed: 20    Pain level:  1-2/10     SUBJECTIVE:  Pt reports after last visit she was really sore that day however the next day and into today, not really having much issues with pain and overall moving well.      OBJECTIVE:     RESTRICTIONS/PRECAUTIONS: OA, low bone density; 2 left pubic fractures    Exercises/Interventions:     Therapeutic Exercises (06059) - 23'  Resistance Sets / Reps Notes   SLR Flexion  Hooklying isometrics:  Abduction  adduction  bridges  AP Pelvic tilts  Hooklying hip abd with band Lime 3 x 10 Added  pain free ROM   Bridge with band on knees Lime 2 x 10 Added    PVT with march  2 x 10 Added    PVT with low ab progressions   2 x 10 Added    Clam shells  2 x 10 R/L Added    Reverse Clamshells  2 x 10 R/L Added    LAQ 5# 3x10 Added ; ^ and UE control with self care, reaching, carrying, lifting, house/yardwork, driving, computer work  [] (32331)Reviewed/Progressed HEP activities related to improving balance, coordination, kinesthetic sense, posture, motor skill, proprioception of   [] LE: core, proximal hip and LE for self care, mobility, lifting, and ambulation/stair navigation    [] UE / Cervical: cervical, postural,  scapular, scapulothoracic and UE control with self care, reaching, carrying, lifting, house/yardwork, driving, computer work    Manual Treatments:  PROM / STM / Oscillations-Mobs:  G-I, II, III, IV (PA's, Inf., Post.)  [] (66270) Provided manual therapy to mobilize LE, proximal hip and/or LS spine soft tissue/joints for the purpose of modulating pain, promoting relaxation,  increasing ROM, reducing/eliminating soft tissue swelling/inflammation/restriction, improving soft tissue extensibility and allowing for proper ROM for normal function with   [] LE / lumbar: self care, mobility, lifting and ambulation. [] UE / Cervical: self care, reaching, carrying, lifting, house/yardwork, driving, computer work. Modalities:  [] (78636) Vasopneumatic compression: Utilized vasopneumatic compression to decrease edema / swelling for the purpose of improving mobility and quad tone / recruitment which will allow for increased overall function including but not limited to self-care, transfers, ambulation, and ascending / descending stairs.      Modalities: 6/17 - Consider [place]    Charges:  Timed Code Treatment Minutes: 48   Total Treatment Minutes: 48     [] EVAL - LOW (60692)   [] EVAL - MOD (60523)  [] EVAL - HIGH (48106)  [] RE-EVAL (08265)  [x] TE (23628) x 2     [] Ionto  [x] NMR (03129) x 1     [] Vaso  [] Manual (05857) x      [] Ultrasound  [] TA x       [] Mech Traction (37941)  [] Gait Training x     [] ES (un) (91647):   [] Aquatic therapy x   [] Other:   [] Group:     GOALS: Patient stated goal: to don/doff pants while standing; return to \"normal\" function & walking  []? Progressing: []? Met: []? Not Met: []? Adjusted     Therapist goals for Patient:   Short Term Goals: To be achieved in: 2 weeks  1. Independent in HEP and progression per patient tolerance, in order to prevent re-injury. []? Progressing: []? Met: []? Not Met: []? Adjusted  2. Patient will have a decrease in pain to <1/10 on average facilitate improvement in movement, function, and ADLs as indicated by Functional Deficits. []? Progressing: []? Met: []? Not Met: []? Adjusted     Long Term Goals: To be achieved in: 5 weeks  1. Disability index score of 30% or less for the LEFS to assist with reaching prior level of function. []? Progressing: []? Met: []? Not Met: []? Adjusted  2. Patient will demonstrate increased AROM to symmetric L hip to allow for proper joint functioning as indicated by patients Functional Deficits. []? Progressing: []? Met: []? Not Met: []? Adjusted  3. Patient will demonstrate an increase in Strength to at least 4+/5 as well as good proximal hip strength and control to allow for proper functional mobility as indicated by patients Functional Deficits. []? Progressing: []? Met: []? Not Met: []? Adjusted  4. Patient will return to functional activities including community walking without increased symptoms or restriction. []? Progressing: []? Met: []? Not Met: []? Adjusted  5. Don/doff pants in standing for independent self-care. []? Progressing: []? Met: []? Not Met: []? Adjusted         Overall Progression Towards Functional goals/ Treatment Progress Update:  [] Patient is progressing as expected towards functional goals listed. [] Progression is slowed due to complexities/Impairments listed. [] Progression has been slowed due to co-morbidities.   [x] Plan just implemented, too soon to assess goals progression <30days   [] Goals require adjustment due to lack of progress  [] Patient is not progressing as expected and requires additional

## 2020-06-30 ENCOUNTER — HOSPITAL ENCOUNTER (OUTPATIENT)
Dept: PHYSICAL THERAPY | Age: 75
Setting detail: THERAPIES SERIES
Discharge: HOME | End: 2020-06-30
Payer: MEDICARE

## 2020-06-30 PROCEDURE — 97530 THERAPEUTIC ACTIVITIES: CPT

## 2020-06-30 PROCEDURE — 97110 THERAPEUTIC EXERCISES: CPT

## 2020-06-30 NOTE — FLOWSHEET NOTE
[facility] - Outpatient Physical Therapy    Physical Therapy Daily Treatment Note  Date:  2020    Patient Name:  Scott Cruz    :  1945  MRN: [id number]  Medical/Treatment Diagnosis Information:  · Diagnosis: S32.592A (ICD-10-CM) - Other specified fracture of left pubis, initial encounter for closed fracture  · Treatment Diagnosis: difficulty walking, pelvic pain, imbalance, decreased LE strength, decreased ADL status  Insurance/Certification information:  PT Insurance Information: [address] [place] - visits PMN  Physician Information:  Referring Practitioner:  / Rayne Gallo MD  Plan of care signed (Y/N): []  Yes [x]  No     Progress Report: []  Yes  [x]  No     Date Range for reporting period:  Beginning 20  Ending TBD    Progress report due (10 Rx/or 30 days whichever is less): 10     Recertification due (POC duration/ or 90 days whichever is less): POC     Visit # Insurance Allowable Date Range (if applicable)    PMN 8833     Latex Allergy:  [x]NO      []YES  Preferred Language for Healthcare:   [x]English       []other:    Functional Scale: LEFS: 0 = 100% DIS  Date assessed: 20    Pain level:  0.5/10     SUBJECTIVE:  The patient reports since last visit she has confirmed osteopenia, and will be trying to get injection of Prolia (pending insurance approval). She reports less groin pain overall. Walking/standing for too long increases this pelvic pain.     OBJECTIVE:  - patient able to balance 20 seconds, EC, narrowed ARCELIA     RESTRICTIONS/PRECAUTIONS: OA, low bone density; 2 left pubic fractures    Exercises/Interventions:     Therapeutic Exercises (80724) - 30'  Resistance Sets / Reps Notes   SLR Flexion  Hooklying isometrics:  Abduction  adduction  bridges  20x 6/30 - added repsAP Pelvic tilts  Hooklying hip abd with band Added  pain free ROM   Bridge with band on knees PVT with march/ walk out  1 x 10 ;  - added LE walk out to march   PVT with low ab progressions   Clam shells  Reverse Clamshells  LAQ 7.5# 3 x 10 Added 6/22; ^6/24 weight and sets; ^6/30 - added resistance   Standing hip abd lime 2 x 10 on L/R Added 6/22, ^6/24 sets; 6/30 - added resistance & sets bilaterally   Standing hip ext lime 2 x 10 on R/L Added 6/22; ^6/24 sets; 6/30 - added resistance & sets bilaterally   Upright bike L2 5 min Added 6/24; 6/30 - added resistance   Seated Bilateral Hip IR + Adductor Squeeze lime 15x 6/30 - added   Prone Hip Extension  2 x 10, B 6/30 - added         Neuromuscular Re-ed (16428) Therapeutic Activities (20053) - 25'      Side stepping at plinth (NO UE A)  Airex neutral ARCELIA  Head Turns/Pitches  20\" no UE A  10x ea  6/30 - increased   Airex Narrow ARCELIA  EC  20\" no UE A  20'' EC  6/30 - increased   Airex staggered stance  Added 6/22   Tandem stance on ground     Forward step ups lead with L Lateral step ups  \"L\" Step Ups 4''X 10 6/30 - added to combine step up movements   Biodex Postural stabiltiy Biodex limits of Stability L 11 X 1 Added 6/24; 6/30 - LUE only   Biodex Random control L 11 2 min Added 6/24   TRX Squats  3 x 10 6/30 - added   Table Top Position: Jaime Hip Abd  2 x 10 6/30 - added         Manual Intervention (35726)                                            Patient Education & HEP:  - Patient educated on the focus of skilled physical therapy services and plan of care, including: diagnosis, prognosis, treatment goals & options. Includes HEP edu & instruction. Access Code: [email or web address]   URL: [employer]/   Date: 06/22/2020   Prepared by: Craige Grave     Exercises  Clamshell - 10 reps - 3 sets - 1x daily - 7x weekly  Sidelying Reverse Clamshell - 10 reps - 3 sets - 1x daily - 7x weekly  Standing Hip Abduction with Counter Support - 10 reps - 3 sets - 1x daily - 7x weekly  Standing Hip Extension with Counter Support - 10 reps - 3 sets - 1x daily - 7x weekly    - HEP instruction: Access Code: [email or web address]   URL: [email or web address]   Date: 06/17/2020   Prepared by: Rebecca Culver     Exercises   · Supine Pelvic Tilt - 10 reps - 1 sets - 1 hold - 2x daily - 7x weekly   · Supine Bridge - 15 reps - 2 sets - 2 hold - 1x daily - 5x weekly   · Supine Active Straight Leg Raise - 10 reps - 2 sets - 1x daily - 5x weekly   · Supine Hip Adduction Isometric with Ball - 10 reps - 2 sets - 5-10 hold - 2x daily - 5x weekly   · Hooklying Isometric Clamshell - 10 reps - 2 sets - 5-10 hold - 2x daily - 5x weekly   - All patient questions were answered    Therapeutic Exercise and NMR EXR  [x] (76360) Provided verbal/tactile cueing for activities related to strengthening, flexibility, endurance, ROM for improvements in  [x] LE / Lumbar: LE, proximal hip, and core control with self care, mobility, lifting, ambulation. [] UE / Cervical: cervical, postural, scapular, scapulothoracic and UE control with self care, reaching, carrying, lifting, house/yardwork, driving, computer work.  [] (39477) Provided verbal/tactile cueing for activities related to improving balance, coordination, kinesthetic sense, posture, motor skill, proprioception to assist with   [] LE / lumbar: LE, proximal hip, and core control in self care, mobility, lifting, ambulation and eccentric single leg control. [] UE / cervical: cervical, scapular, scapulothoracic and UE control with self care, reaching, carrying, lifting, house/yardwork, driving, computer work.      NMR and Therapeutic Activities:    [x] (97507 or 43764) Provided verbal/tactile cueing for activities related to improving balance, coordination, kinesthetic sense, posture, motor skill, proprioception and motor activation to allow for proper function of   [x] LE: / Lumbar core, proximal hip and LE with self care and ADLs  [] UE / Cervical: cervical, postural, scapular, scapulothoracic and UE control with self care, carrying, lifting, driving, computer work.   [] (96581) Gait Re-education- Provided training Modalities: 6/17 - Consider [facility] [place]    Charges:  Timed Code Treatment Minutes: 55   Total Treatment Minutes: 55     [] EVAL - LOW (99026)   [] EVAL - MOD (08258)  [] EVAL - HIGH (44959)  [] RE-EVAL (44511)  [x] TE (33002) x 2     [] Ionto  [] NMR (40328) x      [] Vaso  [] Manual (26742) x      [] Ultrasound  [x] TA x 2       [] Mech Traction (44836)  [] Gait Training x     [] ES (un) (46404):   [] Aquatic therapy x   [] Other:   [] Group:     GOALS: Patient stated goal: to don/doff pants while standing; return to \"normal\" function & walking  [x]? Progressing: []? Met: []? Not Met: []? Adjusted     Therapist goals for Patient:   Short Term Goals: To be achieved in: 2 weeks  1. Independent in HEP and progression per patient tolerance, in order to prevent re-injury. [x]? Progressing: []? Met: []? Not Met: []? Adjusted  2. Patient will have a decrease in pain to <1/10 on average facilitate improvement in movement, function, and ADLs as indicated by Functional Deficits. []? Progressing: [x]? Met: []? Not Met: []? Adjusted     Long Term Goals: To be achieved in: 5 weeks  1. Disability index score of 30% or less for the LEFS to assist with reaching prior level of function. []? Progressing: []? Met: []? Not Met: []? Adjusted  2. Patient will demonstrate increased AROM to symmetric L hip to allow for proper joint functioning as indicated by patients Functional Deficits. []? Progressing: []? Met: []? Not Met: []? Adjusted  3. Patient will demonstrate an increase in Strength to at least 4+/5 as well as good proximal hip strength and control to allow for proper functional mobility as indicated by patients Functional Deficits. []? Progressing: []? Met: []? Not Met: []? Adjusted  4. Patient will return to functional activities including community walking without increased symptoms or restriction. [x]? Progressing: []? Met: []? Not Met: []? Adjusted  5. Don/doff pants in standing for independent self-care.   []? Progressing: []? Met: []? Not Met: []? Adjusted         Overall Progression Towards Functional goals/ Treatment Progress Update:  [x] Patient is progressing as expected towards functional goals listed. [] Progression is slowed due to complexities/Impairments listed. [] Progression has been slowed due to co-morbidities. [] Plan just implemented, too soon to assess goals progression <30days   [] Goals require adjustment due to lack of progress  [] Patient is not progressing as expected and requires additional follow up with physician  [] Other:     Persisting Functional Limitations/Impairments:  [x]Sleeping [x]Sitting               [x]Standing [x]Transfers        [x]Walking [x]Kneeling               [x]Stairs [x]Squatting / bending   [x]ADLs []Reaching  [x]Lifting  [x]Housework  [x]Driving []Job related tasks  []Sports/Recreation []Other:      ASSESSMENT:  No notable hip soreness or limitations to participation this date. The patient progressed with resistance in standing, as well as a few other assorted pelvic/abdominal exercises and functional movements with steps and squats. The patient is steadily progressing, but must be reminded not to overdo activities - and to be mindful of balance limitations. Treatment/Activity Tolerance:  [x] Patient tolerated treatment well [] Patient limited by fatigue  [] Patient limited by pain  [] Patient limited by other medical complications  [] Other:     Prognosis: [x] Good [] Fair  [] Poor    Patient Requires Follow-up: [x] Yes  [] No    PLAN: POC: PT 2x / week for 5 weeks. Assess response to HEP.  Progress core activation exercises, simple SLRs, adductor/abductor activation in supine/sitting/standing, gait tx & progression with LRAD ([facility] at [facility]), balance  [x] Continue per plan of care [] Alter current plan (see comments)  [] Plan of care initiated [] Hold pending MD visit [] Discharge    Electronically signed by: Ora Mcdaniel PT, DPT    Note: If patient does not return

## 2020-07-02 ENCOUNTER — HOSPITAL ENCOUNTER (OUTPATIENT)
Dept: PHYSICAL THERAPY | Age: 75
Setting detail: THERAPIES SERIES
Discharge: HOME | End: 2020-07-02
Payer: MEDICARE

## 2020-07-02 PROCEDURE — 97110 THERAPEUTIC EXERCISES: CPT

## 2020-07-02 PROCEDURE — 97530 THERAPEUTIC ACTIVITIES: CPT

## 2020-07-02 NOTE — FLOWSHEET NOTE
[facility] - Outpatient Physical Therapy    Physical Therapy Daily Treatment Note  Date:  2020    Patient Name:  Jean Paul Quijano    :  1945  MRN: [id number]  Medical/Treatment Diagnosis Information:  · Diagnosis: S32.592A (ICD-10-CM) - Other specified fracture of left pubis, initial encounter for closed fracture  · Treatment Diagnosis: difficulty walking, pelvic pain, imbalance, decreased LE strength, decreased ADL status  Insurance/Certification information:  PT Insurance Information: [address] - visits PMN  Physician Information:  Referring Practitioner:  / Jack Hathaway MD  Plan of care signed (Y/N): []  Yes [x]  No     Progress Report: []  Yes  [x]  No     Date Range for reporting period:  Beginning 20  Ending TBD    Progress report due (10 Rx/or 30 days whichever is less): 10     Recertification due (POC duration/ or 90 days whichever is less): POC     Visit # Insurance Allowable Date Range (if applicable)    PMN [id number]     Latex Allergy:  [x]NO      []YES  Preferred Language for Healthcare:   [x]English       []other:    Functional Scale: LEFS: 0 = 100% DIS  Date assessed: 20    Pain level:  2-3/10 With activity 0/10 at rest    SUBJECTIVE:  Pt reports yesterday she had a bone scan looking at her hip and knee and feels that lying completely flat in the position they wanted her in caused increased soreness and some pain in her hip and back area which today is still sore but not as bad    OBJECTIVE:  - patient able to balance 20 seconds, EC, narrowed ARCELIA     RESTRICTIONS/PRECAUTIONS: OA, low bone density; 2 left pubic fractures    Exercises/Interventions:     Therapeutic Exercises (70176) - 30'  Resistance Sets / Reps Notes   SLR Flexion  Hooklying isometrics:  Abduction  adduction  bridges  20x 6/30 - added repsAP Pelvic tilts  Hooklying hip abd with band Added  pain free ROM   Bridge with band on knees PVT with march/ walk out  2 x 10 ;  - added LE walk out to march   PVT with low ab progressions   Clam shells  Reverse Clamshells  LAQ 7.5# 3 x 10 Added 6/22; ^6/24 weight and sets; ^6/30 - added resistance   Standing hip abd lime 2 x 10 on L/R Added 6/22, ^6/24 sets; 6/30 - added resistance & sets bilaterally   Standing hip ext lime 2 x 10 on R/L Added 6/22; ^6/24 sets; 6/30 - added resistance & sets bilaterally   Upright bike L2 5 min Added 6/24; 6/30 - added resistance   Seated Bilateral Hip IR + Adductor Squeeze lime 15x 6/30 - added   Prone Hip Extension  2 x 10, B 6/30 - added         Neuromuscular Re-ed (97044) Therapeutic Activities (83488) - 25'      Side stepping at [facility] (NO UE A)  Airex neutral ARCELIA  Head Turns/Pitches  20\" no UE A  10x ea  6/30 - increased   Airex Narrow ARCELIA  EC  20\" no UE A  20'' EC  6/30 - increased   Airex staggered stance  Added 6/22   Tandem stance on ground     Forward step ups lead with L 4 inch 2 x10 Lateral step ups  4 inch 2 X 10 Added 6/22   \"L\" Step Ups Biodex Postural stabiltiy Biodex limits of Stability L 11; L10 X 1 each Added 6/24; 6/30 - LUE only   Biodex Random control L 11 2 min Added 6/24   TRX Squats  3 x 10 6/30 - added   Table Top Position: Jaime Hip Abd  L LE planted with R LE forward guru step overs  x10 Added 7/1 cues for NO UE A and stable pelvis   L LE planted with R LE lateral step overs  x10 Added 7/1 intermittent light UE A   SLS on L with R LE ball rolls  x10 CW/CCW Added 7/1                     Manual Intervention (53791)                                            Patient Education & HEP:  - Patient educated on the focus of skilled physical therapy services and plan of care, including: diagnosis, prognosis, treatment goals & options. Includes HEP edu & instruction. Access Code: [email or web address]   URL: [email or web address]   Date: 06/22/2020   Prepared by: Chance Cagle     Exercises  Clamshell - 10 reps - 3 sets - 1x daily - 7x weekly  Sidelying Reverse Clamshell - 10 reps - 3 sets - 1x daily - 7x weekly  Standing Hip Abduction with Counter Support - 10 reps - 3 sets - 1x daily - 7x weekly  Standing Hip Extension with Counter Support - 10 reps - 3 sets - 1x daily - 7x weekly    - HEP instruction: Access Code: [email or web address]   URL: [email or web address]   Date: 06/17/2020   Prepared by: Aleta Wilcox     Exercises   · Supine Pelvic Tilt - 10 reps - 1 sets - 1 hold - 2x daily - 7x weekly   · Supine Bridge - 15 reps - 2 sets - 2 hold - 1x daily - 5x weekly   · Supine Active Straight Leg Raise - 10 reps - 2 sets - 1x daily - 5x weekly   · Supine Hip Adduction Isometric with Ball - 10 reps - 2 sets - 5-10 hold - 2x daily - 5x weekly   · Hooklying Isometric Clamshell - 10 reps - 2 sets - 5-10 hold - 2x daily - 5x weekly   - All patient questions were answered    Therapeutic Exercise and NMR EXR  [x] (85981) Provided verbal/tactile cueing for activities related to strengthening, flexibility, endurance, ROM for improvements in  [x] LE / Lumbar: LE, proximal hip, and core control with self care, mobility, lifting, ambulation. [] UE / Cervical: cervical, postural, scapular, scapulothoracic and UE control with self care, reaching, carrying, lifting, house/yardwork, driving, computer work.  [] (23803) Provided verbal/tactile cueing for activities related to improving balance, coordination, kinesthetic sense, posture, motor skill, proprioception to assist with   [] LE / lumbar: LE, proximal hip, and core control in self care, mobility, lifting, ambulation and eccentric single leg control. [] UE / cervical: cervical, scapular, scapulothoracic and UE control with self care, reaching, carrying, lifting, house/yardwork, driving, computer work.      NMR and Therapeutic Activities:    [x] (29657 or 44069) Provided verbal/tactile cueing for activities related to improving balance, coordination, kinesthetic sense, posture, motor skill, proprioception and motor activation to allow for proper function of   [x] LE: / to decrease edema / swelling for the purpose of improving mobility and quad tone / recruitment which will allow for increased overall function including but not limited to self-care, transfers, ambulation, and ascending / descending stairs. Modalities: 6/17 - [place] [facility]    Charges:  Timed Code Treatment Minutes: 57   Total Treatment Minutes: 57     [] EVAL - LOW (82208)   [] EVAL - MOD (20024)  [] EVAL - HIGH (07682)  [] RE-EVAL (48285)  [x] TE (30342) x 2     [] Ionto  [] NMR (65028) x      [] Vaso  [] Manual (06419) x      [] Ultrasound  [x] TA x 2       [] Mech Traction (64303)  [] Gait Training x     [] ES (un) (65630):   [] Aquatic therapy x   [] Other:   [] Group:     GOALS: Patient stated goal: to don/doff pants while standing; return to \"normal\" function & walking  [x]? Progressing: []? Met: []? Not Met: []? Adjusted     Therapist goals for Patient:   Short Term Goals: To be achieved in: 2 weeks  1. Independent in HEP and progression per patient tolerance, in order to prevent re-injury. [x]? Progressing: []? Met: []? Not Met: []? Adjusted  2. Patient will have a decrease in pain to <1/10 on average facilitate improvement in movement, function, and ADLs as indicated by Functional Deficits. []? Progressing: [x]? Met: []? Not Met: []? Adjusted     Long Term Goals: To be achieved in: 5 weeks  1. Disability index score of 30% or less for the LEFS to assist with reaching prior level of function. []? Progressing: []? Met: []? Not Met: []? Adjusted  2. Patient will demonstrate increased AROM to symmetric L hip to allow for proper joint functioning as indicated by patients Functional Deficits. []? Progressing: []? Met: []? Not Met: []? Adjusted  3. Patient will demonstrate an increase in Strength to at least 4+/5 as well as good proximal hip strength and control to allow for proper functional mobility as indicated by patients Functional Deficits. []? Progressing: []? Met: []? Not Met: []? Adjusted  4. Patient will return to functional activities including community walking without increased symptoms or restriction. [x]? Progressing: []? Met: []? Not Met: []? Adjusted  5. Don/doff pants in standing for independent self-care. []? Progressing: []? Met: []? Not Met: []? Adjusted         Overall Progression Towards Functional goals/ Treatment Progress Update:  [x] Patient is progressing as expected towards functional goals listed. [] Progression is slowed due to complexities/Impairments listed. [] Progression has been slowed due to co-morbidities. [] Plan just implemented, too soon to assess goals progression <30days   [] Goals require adjustment due to lack of progress  [] Patient is not progressing as expected and requires additional follow up with physician  [] Other:     Persisting Functional Limitations/Impairments:  [x]Sleeping [x]Sitting               [x]Standing [x]Transfers        [x]Walking [x]Kneeling               [x]Stairs [x]Squatting / bending   [x]ADLs []Reaching  [x]Lifting  [x]Housework  [x]Driving []Job related tasks  []Sports/Recreation []Other:      ASSESSMENT:  Increased reports of pain/soreness today due to prolonged positioning from imaging study yesterday however pt did not report any increase in symptoms with any intervention as well as progressions today. Pt tolerating increased advancements with progressions in weightbearing with weight shifting and acceptance on L LE with less need for UE A and appropriate technique and muscle recruitment maintaining neutral pelvis and trunk position. Pt does however fatigue quicker and will benefit from continuation of PT to work on strength and activity tolerance as progressions continue.      Treatment/Activity Tolerance:  [x] Patient tolerated treatment well [] Patient limited by fatigue  [] Patient limited by pain  [] Patient limited by other medical complications  [] Other:     Prognosis: [x] Good [] Fair  [] Poor    Patient Requires Follow-up: [x] Yes  [] No    PLAN: POC: PT 2x / week for 5 weeks. Assess response to HEP. Progress core activation exercises, simple SLRs, adductor/abductor activation in supine/sitting/standing, gait tx & progression with LRAD (SPC at eval), balance  [x] Continue per plan of care [] Alter current plan (see comments)  [] Plan of care initiated [] Hold pending MD visit [] Discharge    Electronically signed by: Ervin Eller PT, DPT    Note: If patient does not return for scheduled/ recommended follow up visits, this note will serve as a discharge from care along with most recent update on progress.

## 2020-07-06 ENCOUNTER — HOSPITAL ENCOUNTER (OUTPATIENT)
Dept: PHYSICAL THERAPY | Age: 75
Setting detail: THERAPIES SERIES
Discharge: HOME | End: 2020-07-06
Payer: MEDICARE

## 2020-07-06 PROCEDURE — 97530 THERAPEUTIC ACTIVITIES: CPT

## 2020-07-06 PROCEDURE — 97110 THERAPEUTIC EXERCISES: CPT

## 2020-07-06 NOTE — FLOWSHEET NOTE
[facility] - Outpatient Physical Therapy    Physical Therapy Daily Treatment Note  Date:  2020    Patient Name:  Jean Paul Quijano    :  1945  MRN: [id number]  Medical/Treatment Diagnosis Information:  · Diagnosis: S32.592A (ICD-10-CM) - Other specified fracture of left pubis, initial encounter for closed fracture  · Treatment Diagnosis: difficulty walking, pelvic pain, imbalance, decreased LE strength, decreased ADL status  Insurance/Certification information:  PT Insurance Information: [address] - visits PMN  Physician Information:  Referring Practitioner:  / Jack Hathaway MD  Plan of care signed (Y/N): []  Yes [x]  No     Progress Report: []  Yes  [x]  No     Date Range for reporting period:  Beginning 20  Ending TBD    Progress report due (10 Rx/or 30 days whichever is less): 10     Recertification due (POC duration/ or 90 days whichever is less): POC     Visit # Insurance Allowable Date Range (if applicable)    PMN 5984     Latex Allergy:  [x]NO      []YES  Preferred Language for Healthcare:   [x]English       []other:    Functional Scale: LEFS: 0 = 100% DIS  Date assessed: 20    Pain level:  2-3/10 With activity 0/10 at rest    SUBJECTIVE:  Pt reports after last visit her L knee was really sore and painful for 2 days after her session. Yesterday and today the pain in the knee has resolved but feels at home with stairs she is moving more slowly due to her L knee pain and feels that is more of an issue as of recent. Would like to hold off on bike for today.      OBJECTIVE:  - patient able to balance 20 seconds, EC, narrowed ARCELIA     RESTRICTIONS/PRECAUTIONS: OA, low bone density; 2 left pubic fractures    Exercises/Interventions:     Therapeutic Exercises (82358) - 30'  Resistance Sets / Reps Notes   SLR Flexion  Hooklying isometrics:  Abduction  adduction  bridges  20x 6/30 - added repsAP Pelvic tilts  Hooklying hip abd with band Added  pain free ROM   Bridge with band on knees PVT with march/LE walk out  2 x 10 ; 6/30 - added LE walk out to march   PVT with low ab progressions   Clam shells  Reverse Clamshells  LAQ 7.5# 3 x 10 Added 6/22; ^6/24 weight and sets; ^6/30 - added resistance   Standing hip abd lime 2 x 10 on L/R Added 6/22, ^6/24 sets; 6/30 - added resistance & sets bilaterally   Standing hip ext lime 2 x 10 on R/L Added 6/22; ^6/24 sets; 6/30 - added resistance & sets bilaterally   Upright bike L2 5 min Added 6/24; 6/30 - added resistance   Seated Bilateral Hip IR + Adductor Squeeze lime 15x 6/30 - added   Prone Hip Extension  2 x 10, B 6/30 - added   HS curls on L  lime 3x10 Added 7/6   Hip ER with band lime 2x10 L Added 7/6                           Neuromuscular Re-ed (09195) Therapeutic Activities (21215) - 25'      Side stepping at [facility] (NO UE A)  Airex neutral ARCELIA  Head Turns/Pitches  20\" no UE A  10x ea  6/30 - increased   Airex Narrow ARCELIA  EC  20\" no UE A  20'' EC  6/30 - increased   Airex staggered stance  Added 6/22   Tandem stance on ground     Forward step ups lead with L 4 inch 2 x10 ; 7/6 heldLateral step ups  4 inch 2 X 10 Added 6/22; 7/6 held   \"L\" Step Ups Biodex Postural stabiltiy Biodex limits of Stability L 11; L10 X 1 each Added 6/24; 6/30 - LUE only   Biodex Random control L 10 2 min Added 6/24; ^7/6 decreased stability    Biodex Maze Control L 10 x1 Added 7/6   TRX Squats  3 x 10 6/30 - added   Table Top Position: Jaime Hip Abd  L LE planted with R LE forward guru step overs  x10 Added 7/1 cues for NO UE A and stable pelvis   L LE planted with R LE lateral step overs  x10 Added 7/1 intermittent light UE A   SLS on L with R LE ball rolls  x10 CW/CCW Added 7/1                     Manual Intervention (67640)      Hip flexor/quad stretch on L  20\" holds x 3 each on L    Piriformis stretch on L  20\" holds x 3 on L                              Patient Education & HEP:  - Patient educated on the focus of skilled physical therapy services and plan of care, including: diagnosis, prognosis, treatment goals & options. Includes HEP edu & instruction. Access Code: [email or web address]   URL: "[email or web address]/   Date: 06/22/2020   Prepared by: Javan Ghosh     Exercises  Clamshell - 10 reps - 3 sets - 1x daily - 7x weekly  Sidelying Reverse Clamshell - 10 reps - 3 sets - 1x daily - 7x weekly  Standing Hip Abduction with Counter Support - 10 reps - 3 sets - 1x daily - 7x weekly  Standing Hip Extension with Counter Support - 10 reps - 3 sets - 1x daily - 7x weekly    - HEP instruction: Access Code: [email or web address]   URL: [email or web address]   Date: 06/17/2020   Prepared by: Ellen Figures     Exercises   · Supine Pelvic Tilt - 10 reps - 1 sets - 1 hold - 2x daily - 7x weekly   · Supine Bridge - 15 reps - 2 sets - 2 hold - 1x daily - 5x weekly   · Supine Active Straight Leg Raise - 10 reps - 2 sets - 1x daily - 5x weekly   · Supine Hip Adduction Isometric with Ball - 10 reps - 2 sets - 5-10 hold - 2x daily - 5x weekly   · Hooklying Isometric Clamshell - 10 reps - 2 sets - 5-10 hold - 2x daily - 5x weekly   - All patient questions were answered    Therapeutic Exercise and NMR EXR  [x] (05976) Provided verbal/tactile cueing for activities related to strengthening, flexibility, endurance, ROM for improvements in  [x] LE / Lumbar: LE, proximal hip, and core control with self care, mobility, lifting, ambulation. [] UE / Cervical: cervical, postural, scapular, scapulothoracic and UE control with self care, reaching, carrying, lifting, house/yardwork, driving, computer work.  [] (74390) Provided verbal/tactile cueing for activities related to improving balance, coordination, kinesthetic sense, posture, motor skill, proprioception to assist with   [] LE / lumbar: LE, proximal hip, and core control in self care, mobility, lifting, ambulation and eccentric single leg control.    [] UE / cervical: cervical, scapular, scapulothoracic and UE control with self care, reaching, carrying, lifting, house/yardwork, driving, computer work.      NMR and Therapeutic Activities:    [x] (12790 or 56227) Provided verbal/tactile cueing for activities related to improving balance, coordination, kinesthetic sense, posture, motor skill, proprioception and motor activation to allow for proper function of   [x] LE: / Lumbar core, proximal hip and LE with self care and ADLs  [] UE / Cervical: cervical, postural, scapular, scapulothoracic and UE control with self care, carrying, lifting, driving, computer work.   [] (79634) Gait Re-education- Provided training and instruction to the patient for proper LE, core and proximal hip recruitment and positioning and eccentric body weight control with ambulation re-education including up and down stairs     Home Exercise Program:    [] (79094) Reviewed/Progressed HEP activities related to strengthening, flexibility, endurance, ROM of   [] LE / Lumbar: core, proximal hip and LE for functional self-care, mobility, lifting and ambulation/stair navigation   [] UE / Cervical: cervical, postural, scapular, scapulothoracic and UE control with self care, reaching, carrying, lifting, house/yardwork, driving, computer work  [] (54092)Reviewed/Progressed HEP activities related to improving balance, coordination, kinesthetic sense, posture, motor skill, proprioception of   [] LE: core, proximal hip and LE for self care, mobility, lifting, and ambulation/stair navigation    [] UE / Cervical: cervical, postural,  scapular, scapulothoracic and UE control with self care, reaching, carrying, lifting, house/yardwork, driving, computer work    Manual Treatments:  PROM / STM / Oscillations-Mobs:  G-I, II, III, IV (PA's, Inf., Post.)  [] (75891) Provided manual therapy to mobilize LE, proximal hip and/or LS spine soft tissue/joints for the purpose of modulating pain, promoting relaxation,  increasing ROM, reducing/eliminating soft tissue swelling/inflammation/restriction, improving soft tissue extensibility and allowing for proper ROM for normal function with   [] LE / lumbar: self care, mobility, lifting and ambulation. [] UE / Cervical: self care, reaching, carrying, lifting, house/yardwork, driving, computer work. Modalities:  [] (15306) Vasopneumatic compression: Utilized vasopneumatic compression to decrease edema / swelling for the purpose of improving mobility and quad tone / recruitment which will allow for increased overall function including but not limited to self-care, transfers, ambulation, and ascending / descending stairs. Modalities: 6/17 - Consider [facility]    Charges:  Timed Code Treatment Minutes: 55   Total Treatment Minutes: 55     [] EVAL - LOW (81596)   [] EVAL - MOD (35541)  [] EVAL - HIGH (83978)  [] RE-EVAL (05640)  [x] TE (29211) x 2     [] Ionto  [] NMR (19068) x      [] Vaso  [] Manual (09986) x      [] Ultrasound  [x] TA x 2       [] Mech Traction (49430)  [] Gait Training x     [] ES (un) (34028):   [] Aquatic therapy x   [] Other:   [] Group:     GOALS: Patient stated goal: to don/doff pants while standing; return to \"normal\" function & walking  [x]? Progressing: []? Met: []? Not Met: []? Adjusted     Therapist goals for Patient:   Short Term Goals: To be achieved in: 2 weeks  1. Independent in HEP and progression per patient tolerance, in order to prevent re-injury. [x]? Progressing: []? Met: []? Not Met: []? Adjusted  2. Patient will have a decrease in pain to <1/10 on average facilitate improvement in movement, function, and ADLs as indicated by Functional Deficits. []? Progressing: [x]? Met: []? Not Met: []? Adjusted     Long Term Goals: To be achieved in: 5 weeks  1. Disability index score of 30% or less for the LEFS to assist with reaching prior level of function. []? Progressing: []? Met: []? Not Met: []? Adjusted  2.  Patient will demonstrate increased AROM to symmetric L hip to allow for proper stability. Treatment/Activity Tolerance:  [x] Patient tolerated treatment well [] Patient limited by fatigue  [] Patient limited by pain  [] Patient limited by other medical complications  [] Other:     Prognosis: [x] Good [] Fair  [] Poor    Patient Requires Follow-up: [x] Yes  [] No    PLAN: POC: PT 2x / week for 5 weeks. Assess response to HEP. Progress core activation exercises, simple SLRs, adductor/abductor activation in supine/sitting/standing, gait tx & progression with LRAD (SPC at eval), balance  [x] Continue per plan of care [] Alter current plan (see comments)  [] Plan of care initiated [] Hold pending MD visit [] Discharge    Electronically signed by: Wolf Kurtz PT, DPT    Note: If patient does not return for scheduled/ recommended follow up visits, this note will serve as a discharge from care along with most recent update on progress.

## 2020-07-09 ENCOUNTER — HOSPITAL ENCOUNTER (OUTPATIENT)
Dept: PHYSICAL THERAPY | Age: 75
Setting detail: THERAPIES SERIES
Discharge: HOME | End: 2020-07-09
Payer: MEDICARE

## 2020-07-09 PROCEDURE — 97112 NEUROMUSCULAR REEDUCATION: CPT

## 2020-07-09 PROCEDURE — 97110 THERAPEUTIC EXERCISES: CPT

## 2020-07-09 PROCEDURE — 97530 THERAPEUTIC ACTIVITIES: CPT

## 2020-07-09 NOTE — FLOWSHEET NOTE
[facility] - Outpatient Physical Therapy    Physical Therapy Daily Treatment Note  Date:  2020    Patient Name:  Lei Cantrell    :  1945  MRN: [id number]  Medical/Treatment Diagnosis Information:  · Diagnosis: S32.592A (ICD-10-CM) - Other specified fracture of left pubis, initial encounter for closed fracture  · Treatment Diagnosis: difficulty walking, pelvic pain, imbalance, decreased LE strength, decreased ADL status  Insurance/Certification information:  PT Insurance Information: [address] - visits PMN  Physician Information:  Referring Practitioner:  / Manisha Hathaway MD  Plan of care signed (Y/N): []  Yes [x]  No     Progress Report: []  Yes  [x]  No     Date Range for reporting period:  Beginning 20  Ending TBD    Progress report due (10 Rx/or 30 days whichever is less): 10     Recertification due (POC duration/ or 90 days whichever is less): POC     Visit # Insurance Allowable Date Range (if applicable)   3/92 PMN 7972     Latex Allergy:  [x]NO      []YES  Preferred Language for Healthcare:   [x]English       []other:    Functional Scale: LEFS: 0 = 100% DIS  Date assessed: 20    Pain level:  0-2/10 With activity 0/10 at rest    SUBJECTIVE:   Pt reports after last visit with the new stretching she has been doing really well and not having any issues with her knee since.  Pt also reports trying to do more walking with NO AD and feels with short distances she is improving and pain is not really that much different compared to using the cane and she is able to walk with stability and no leaning or compensation    OBJECTIVE:  - patient able to balance 20 seconds, EC, narrowed ARCELIA     RESTRICTIONS/PRECAUTIONS: OA, low bone density; 2 left pubic fractures    Exercises/Interventions:     Therapeutic Exercises (24433) - 30'  Resistance Sets / Reps Notes   SLR Flexion  Hooklying isometrics:  Abduction  adduction  bridges  20x 6/30 - added repsAP Pelvic tilts Hooklying hip abd with band Added 6/22 pain free ROM   Bridge with band on knees PVT with march/LE walk out  2 x 10 ; 6/30 - added LE walk out to march   PVT with low ab progressions   Clam shells  Reverse Clamshells  LAQ 7.5# 3 x 10 Added 6/22; ^6/24 weight and sets; ^6/30 - added resistance   Standing hip abd lime 2 x 10 on L/R Added 6/22, ^6/24 sets; 6/30 - added resistance & sets bilaterally   Standing hip ext lime 2 x 10 on R/L Added 6/22; ^6/24 sets; 6/30 - added resistance & sets bilaterally   Upright bike L2 5 min Added 6/24; 6/30 - added resistance   Seated Bilateral Hip IR + Adductor Squeeze lime 15x 6/30 - added   Prone Hip Extension  HS curls on L  lime 3x10 Added 7/6   Hip ER with band lime 2x10 L Added 7/6                           Neuromuscular Re-ed (36395) Therapeutic Activities (24370) - 25'      Side stepping at [facility] (NO UE A)  Airex neutral ARCELIA  Head Turns/Pitches    6/30 - increased   Airex Narrow ARCELIA  EC    6/30 - increased   Airex staggered stance  Added 6/22   Tandem stance on airex  20\" each foot position x 2 7/9 Added airex today   Forward step ups lead with L 4 inch 2 x10 ; 7/6 held; 7/9 resumedLateral step ups  4 inch 2 X 10 Added 6/22; 7/6 held; 7/9 resumed   \"L\" Step Ups Biodex Postural stabiltiy Biodex limits of Stability L 11; L10 X 1 each Added 6/24; 6/30 - LUE only   Biodex Random control L 10 2 min Added 6/24; ^7/6 decreased stability    Biodex Maze Control L 10 x1 Added 7/6   TRX Squats  3 x 10 6/30 - added   TRX mini squat and hold with 3 point touch of R LE  [id number] 7/9Table Top Position: Jaime Hip Abd  SLS on L with R LE ball rolls  x10 CW/CCW Added 7/1; NO UE A 7/9   Forward step up to contralateral LE toe tap on step in front  4 inch to 8 inch x10 Added 7/9 with light intermittent UE A   Side stepping  15' x 3 laps Added 7/9 cues to keep pelvis stable and no compensation   Hip hikes 4 inch x15 Added 7/9 minimal cues needed for knee ext and pure hip motion Manual Intervention (53622)      Hip flexor/quad stretch on L  20\" holds x 3 each on L    Piriformis stretch on L  20\" holds x 3 on L                              Patient Education & HEP:  - Patient educated on the focus of skilled physical therapy services and plan of care, including: diagnosis, prognosis, treatment goals & options. Includes HEP edu & instruction. Access Code: [email or web address]   URL: [employer]/   Date: 06/22/2020   Prepared by: Scottie Murphy     Exercises  Clamshell - 10 reps - 3 sets - 1x daily - 7x weekly  Sidelying Reverse Clamshell - 10 reps - 3 sets - 1x daily - 7x weekly  Standing Hip Abduction with Counter Support - 10 reps - 3 sets - 1x daily - 7x weekly  Standing Hip Extension with Counter Support - 10 reps - 3 sets - 1x daily - 7x weekly    - HEP instruction: Access Code: [email or web address]   URL: [employer]/   Date: 06/17/2020   Prepared by: Daniel Pickett     Exercises   · Supine Pelvic Tilt - 10 reps - 1 sets - 1 hold - 2x daily - 7x weekly   · Supine Bridge - 15 reps - 2 sets - 2 hold - 1x daily - 5x weekly   · Supine Active Straight Leg Raise - 10 reps - 2 sets - 1x daily - 5x weekly   · Supine Hip Adduction Isometric with Ball - 10 reps - 2 sets - 5-10 hold - 2x daily - 5x weekly   · Hooklying Isometric Clamshell - 10 reps - 2 sets - 5-10 hold - 2x daily - 5x weekly   - All patient questions were answered    Therapeutic Exercise and NMR EXR  [x] (82445) Provided verbal/tactile cueing for activities related to strengthening, flexibility, endurance, ROM for improvements in  [x] LE / Lumbar: LE, proximal hip, and core control with self care, mobility, lifting, ambulation.   [] UE / Cervical: cervical, postural, scapular, scapulothoracic and UE control with self care, reaching, carrying, lifting, house/yardwork, driving, computer work.  [] (76207) Provided verbal/tactile cueing for activities related to improving balance, coordination, kinesthetic sense, posture, motor skill, proprioception to assist with   [] LE / lumbar: LE, proximal hip, and core control in self care, mobility, lifting, ambulation and eccentric single leg control. [] UE / cervical: cervical, scapular, scapulothoracic and UE control with self care, reaching, carrying, lifting, house/yardwork, driving, computer work.      NMR and Therapeutic Activities:    [x] (11200 or 51267) Provided verbal/tactile cueing for activities related to improving balance, coordination, kinesthetic sense, posture, motor skill, proprioception and motor activation to allow for proper function of   [x] LE: / Lumbar core, proximal hip and LE with self care and ADLs  [] UE / Cervical: cervical, postural, scapular, scapulothoracic and UE control with self care, carrying, lifting, driving, computer work.   [] (62048) Gait Re-education- Provided training and instruction to the patient for proper LE, core and proximal hip recruitment and positioning and eccentric body weight control with ambulation re-education including up and down stairs     Home Exercise Program:    [] (10962) Reviewed/Progressed HEP activities related to strengthening, flexibility, endurance, ROM of   [] LE / Lumbar: core, proximal hip and LE for functional self-care, mobility, lifting and ambulation/stair navigation   [] UE / Cervical: cervical, postural, scapular, scapulothoracic and UE control with self care, reaching, carrying, lifting, house/yardwork, driving, computer work  [] (67004)Reviewed/Progressed HEP activities related to improving balance, coordination, kinesthetic sense, posture, motor skill, proprioception of   [] LE: core, proximal hip and LE for self care, mobility, lifting, and ambulation/stair navigation    [] UE / Cervical: cervical, postural,  scapular, scapulothoracic and UE control with self care, reaching, carrying, lifting, house/yardwork, driving, computer work    Manual Treatments:  PROM / STM / Oscillations-Mobs:  G-I, II, III, IV (Sarina, Inf., Post.)  [] (14488) Provided manual therapy to mobilize LE, proximal hip and/or LS spine soft tissue/joints for the purpose of modulating pain, promoting relaxation,  increasing ROM, reducing/eliminating soft tissue swelling/inflammation/restriction, improving soft tissue extensibility and allowing for proper ROM for normal function with   [] LE / lumbar: self care, mobility, lifting and ambulation. [] UE / Cervical: self care, reaching, carrying, lifting, house/yardwork, driving, computer work. Modalities:  [] (01612) Vasopneumatic compression: Utilized vasopneumatic compression to decrease edema / swelling for the purpose of improving mobility and quad tone / recruitment which will allow for increased overall function including but not limited to self-care, transfers, ambulation, and ascending / descending stairs. Modalities: 6/17 - Consider [facility]    Charges:  Timed Code Treatment Minutes: 57   Total Treatment Minutes: 57     [] EVAL - LOW (05212)   [] EVAL - MOD (60510)  [] EVAL - HIGH (96922)  [] RE-EVAL (68143)  [x] TE (31524) x 2     [] Ionto  [x] NMR (65255) x 1     [] Vaso  [] Manual (32536) x      [] Ultrasound  [x] TA x 1       [] Mech Traction (65886)  [] Gait Training x     [] ES (un) (96984):   [] Aquatic therapy x   [] Other:   [] Group:     GOALS: Patient stated goal: to don/doff pants while standing; return to \"normal\" function & walking  [x]? Progressing: []? Met: []? Not Met: []? Adjusted     Therapist goals for Patient:   Short Term Goals: To be achieved in: 2 weeks  1. Independent in HEP and progression per patient tolerance, in order to prevent re-injury. [x]? Progressing: []? Met: []? Not Met: []? Adjusted  2. Patient will have a decrease in pain to <1/10 on average facilitate improvement in movement, function, and ADLs as indicated by Functional Deficits. []? Progressing: [x]? Met: []? Not Met: []? Adjusted     Long Term Goals: To be achieved in: 5 weeks  1. Disability index score of 30% or less for the LEFS to assist with reaching prior level of function. []? Progressing: []? Met: []? Not Met: []? Adjusted  2. Patient will demonstrate increased AROM to symmetric L hip to allow for proper joint functioning as indicated by patients Functional Deficits. []? Progressing: []? Met: []? Not Met: []? Adjusted  3. Patient will demonstrate an increase in Strength to at least 4+/5 as well as good proximal hip strength and control to allow for proper functional mobility as indicated by patients Functional Deficits. []? Progressing: []? Met: []? Not Met: []? Adjusted  4. Patient will return to functional activities including community walking without increased symptoms or restriction. [x]? Progressing: []? Met: []? Not Met: []? Adjusted  5. Don/doff pants in standing for independent self-care. []? Progressing: []? Met: []? Not Met: []? Adjusted         Overall Progression Towards Functional goals/ Treatment Progress Update:  [x] Patient is progressing as expected towards functional goals listed. [] Progression is slowed due to complexities/Impairments listed. [] Progression has been slowed due to co-morbidities.   [] Plan just implemented, too soon to assess goals progression <30days   [] Goals require adjustment due to lack of progress  [] Patient is not progressing as expected and requires additional follow up with physician  [] Other:     Persisting Functional Limitations/Impairments:  [x]Sleeping [x]Sitting               [x]Standing [x]Transfers        [x]Walking [x]Kneeling               [x]Stairs [x]Squatting / bending   [x]ADLs []Reaching  [x]Lifting  [x]Housework  [x]Driving []Job related tasks  []Sports/Recreation []Other:      ASSESSMENT:  Good carryover and relief of symptoms after last visit with no reports of any issues with L knee and has also significantly improved with ambulation tolerance with less reliance on [facility] and ability to demonstrate ambulation in clinic today with very minimal gait deviations that were more present towards session with increase in fatigue however still mild and easily corrected with verbal cues. Pt able to progress with increased L LE weight bearing demands with interventions with no significant change in symptoms and good technique and stability and overall progressing really well with PT POC. Treatment/Activity Tolerance:  [x] Patient tolerated treatment well [] Patient limited by fatigue  [] Patient limited by pain  [] Patient limited by other medical complications  [] Other:     Prognosis: [x] Good [] Fair  [] Poor    Patient Requires Follow-up: [x] Yes  [] No    PLAN: POC: PT 2x / week for 5 weeks. Assess response to HEP. Progress core activation exercises, simple SLRs, adductor/abductor activation in supine/sitting/standing, gait tx & progression with LRAD (SPC at [facility]), balance  [x] Continue per plan of care [] Alter current plan (see comments)  [] Plan of care initiated [] Hold pending MD visit [] Discharge    Electronically signed by: Arnel Altman PT, DPT    Note: If patient does not return for scheduled/ recommended follow up visits, this note will serve as a discharge from care along with most recent update on progress.

## 2020-07-13 ENCOUNTER — HOSPITAL ENCOUNTER (OUTPATIENT)
Dept: PHYSICAL THERAPY | Age: 75
Setting detail: THERAPIES SERIES
Discharge: HOME | End: 2020-07-13
Payer: MEDICARE

## 2020-07-13 PROCEDURE — 97110 THERAPEUTIC EXERCISES: CPT

## 2020-07-13 PROCEDURE — 97140 MANUAL THERAPY 1/> REGIONS: CPT

## 2020-07-13 NOTE — FLOWSHEET NOTE
[facility] - Outpatient Physical Therapy    Physical Therapy Daily Treatment Note  Date:  2020    Patient Name:  Shruthi Dalal    :  1945  MRN: [id number]  Medical/Treatment Diagnosis Information:  · Diagnosis: S32.592A (ICD-10-CM) - Other specified fracture of left pubis, initial encounter for closed fracture  · Treatment Diagnosis: difficulty walking, pelvic pain, imbalance, decreased LE strength, decreased ADL status  Insurance/Certification information:  PT Insurance Information: [employer] - visits PMN  Physician Information:  Referring Practitioner:  / Blaire Sotelo MD  Plan of care signed (Y/N): []  Yes [x]  No     Progress Report: []  Yes  [x]  No     Date Range for reporting period:   Beginning 20  Ending TBD    Progress report due (10 Rx/or 30 days whichever is less): 10     Recertification due (POC duration/ or 90 days whichever is less): POC     Visit # Insurance Allowable Date Range (if applicable)    PMN 9970     Latex Allergy:  [x]NO      []YES  Preferred Language for Healthcare:   [x]English       []other:    Functional Scale: LEFS: 0 = 100% DIS  Date assessed: 20     Pain level:  0/10, 1/10 w/ weight shift to L     SUBJECTIVE:   The patient denies any knee pain this date; hip flexor stretching provided relief. She reports when shifting weight to L leg alone, she feels slight discomfort in groin.     OBJECTIVE:    - supine to sit + R; prone knee flexion + L, gaenslen's (neg), Sacral spring + ; LLE stays \"short\" after manual interventions    - patient able to balance 20 seconds, EC, narrowed ARCELIA     RESTRICTIONS/PRECAUTIONS: OA, low bone density; 2 left pubic fractures    Exercises/Interventions:     Therapeutic Exercises (15081) - 20'  Resistance Sets / Reps Notes   SLR Flexion  Hooklying isometrics:  Abduction  adduction  bridges  AP Pelvic tilts  Hooklying hip abd with band Bridge with band on knees PVT with march/ walk out PVT with low ab progressions   Clam shells  Reverse Clamshells  LAQ - QUANTUM 25# 2 X 10 Added 6/22; ^6/24 weight and sets; ^6/30 - added resistance  7/13 - PROGRESSED TO MACHINE   Standing hip abd Added 6/22, ^6/24 sets; 6/30 - added resistance & sets bilaterally   Standing hip ext           0 resistance this date           2 x 10 Added 6/22; ^6/24 sets; 6/30 - added resistance & sets bilaterally    7/13 - PT hands on PSIS, encouraged maximal extension without lumbar extension   Upright bike Added 6/24; 6/30 - added resistance   Seated Bilateral Hip IR + Adductor Squeeze 6/30 - added   Prone Hip Extension  2 x 15, BHS curls on L  Added 7/6   Hip ER with band Added 7/6   H rocks  5x 7/13 - added   Qped Glute Kick  2 X 15 B 7/13 - added; cues for neutral spine   Standing RDL (DL) - w/ earthquake bar  2 x 15 7/13 - added; cues for soft knees, neutral spine, glute activation         Neuromuscular Re-ed (13534) Therapeutic Activities (20998)       Side stepping at [facility] (NO UE A)  Airex neutral ARCELIA  Head Turns/Pitches    6/30 - increased   Airex Narrow ARCELIA  EC    6/30 - increased   Airex staggered stance  Added 6/22   Tandem stance on airex  Forward step ups lead with L Lateral step ups  \"L\" Step Ups Biodex Postural stabiltiy Biodex limits of Stability Biodex Random control Biodex Maze Control TRX Squats TRX mini squat and hold with 3 point touch of R LE Table Top Position: Jaime Hip Abd  SLS on L with R LE ball rolls Forward step up to contralateral LE toe tap on step in front  Side stepping Hip hikes                               Manual Intervention (38494) - 24'      Hip flexor/quad stretch on L     Piriformis stretch on L     RLE SIJ LAD  G2-5 1x ea    HF Contract/Relax + Stretch  5 x 8'' ea 7/13 - added   Pelvic/Sacral Assessment & Check  8' ea    L Sacral Direct mobilization - Ext/SB/Rot L  3x total (25%/50%/75%)      Patient Education & HEP:  - Patient educated on the focus of skilled physical therapy services and sense, posture, motor skill, proprioception to assist with   [] LE / lumbar: LE, proximal hip, and core control in self care, mobility, lifting, ambulation and eccentric single leg control. [] UE / cervical: cervical, scapular, scapulothoracic and UE control with self care, reaching, carrying, lifting, house/yardwork, driving, computer work.      NMR and Therapeutic Activities:    [] (16041 or 42499) Provided verbal/tactile cueing for activities related to improving balance, coordination, kinesthetic sense, posture, motor skill, proprioception and motor activation to allow for proper function of   [] LE: / Lumbar core, proximal hip and LE with self care and ADLs  [] UE / Cervical: cervical, postural, scapular, scapulothoracic and UE control with self care, carrying, lifting, driving, computer work.   [] (96966) Gait Re-education- Provided training and instruction to the patient for proper LE, core and proximal hip recruitment and positioning and eccentric body weight control with ambulation re-education including up and down stairs     Home Exercise Program:    [] (96695) Reviewed/Progressed HEP activities related to strengthening, flexibility, endurance, ROM of   [] LE / Lumbar: core, proximal hip and LE for functional self-care, mobility, lifting and ambulation/stair navigation   [] UE / Cervical: cervical, postural, scapular, scapulothoracic and UE control with self care, reaching, carrying, lifting, house/yardwork, driving, computer work  [] (46814)Reviewed/Progressed HEP activities related to improving balance, coordination, kinesthetic sense, posture, motor skill, proprioception of   [] LE: core, proximal hip and LE for self care, mobility, lifting, and ambulation/stair navigation    [] UE / Cervical: cervical, postural,  scapular, scapulothoracic and UE control with self care, reaching, carrying, lifting, house/yardwork, driving, computer work    Manual Treatments:  PROM / STM / Oscillations-Mobs:  G-I, II, III, IV (Sarina, Inf., Post.)  [x] (48103) Provided manual therapy to mobilize LE, proximal hip and/or LS spine soft tissue/joints for the purpose of modulating pain, promoting relaxation,  increasing ROM, reducing/eliminating soft tissue swelling/inflammation/restriction, improving soft tissue extensibility and allowing for proper ROM for normal function with   [x] LE / lumbar: self care, mobility, lifting and ambulation. [] UE / Cervical: self care, reaching, carrying, lifting, house/yardwork, driving, computer work. Modalities:  [] (61097) Vasopneumatic compression: Utilized vasopneumatic compression to decrease edema / swelling for the purpose of improving mobility and quad tone / recruitment which will allow for increased overall function including but not limited to self-care, transfers, ambulation, and ascending / descending stairs. Modalities: 6/17 - Consider [facility]    Charges:  Timed Code Treatment Minutes: 45   Total Treatment Minutes: 45     [] EVAL - LOW (01627)   [] EVAL - MOD (85603)  [] EVAL - HIGH (37283)  [] RE-EVAL (45867)  [x] TE (46802) x 1     [] Ionto  [] NMR (59575) x    [] Vaso  [x] Manual (89205) x 2     [] Ultrasound  [] TA x       [] Mech Traction (39023)  [] Gait Training x     [] ES (un) (57867):   [] Aquatic therapy x   [] Other:   [] Group:     GOALS: Patient stated goal: to don/doff pants while standing; return to \"normal\" function & walking  [x]? Progressing: []? Met: []? Not Met: []? Adjusted     Therapist goals for Patient:   Short Term Goals: To be achieved in: 2 weeks  1. Independent in HEP and progression per patient tolerance, in order to prevent re-injury. []? Progressing: [x]? Met: []? Not Met: []? Adjusted  2. Patient will have a decrease in pain to <1/10 on average facilitate improvement in movement, function, and ADLs as indicated by Functional Deficits. []? Progressing: [x]? Met: []? Not Met: []? Adjusted     Long Term Goals: To be achieved in: 5 weeks  1. Disability index score of 30% or less for the LEFS to assist with reaching prior level of function. [x]? Progressing: []? Met: []? Not Met: []? Adjusted  2. Patient will demonstrate increased AROM to symmetric L hip to allow for proper joint functioning as indicated by patients Functional Deficits. [x]? Progressing: []? Met: []? Not Met: []? Adjusted  3. Patient will demonstrate an increase in Strength to at least 4+/5 as well as good proximal hip strength and control to allow for proper functional mobility as indicated by patients Functional Deficits. [x]? Progressing: []? Met: []? Not Met: []? Adjusted  4. Patient will return to functional activities including community walking without increased symptoms or restriction. [x]? Progressing: []? Met: []? Not Met: []? Adjusted  5. Don/doff pants in standing for independent self-care. [x]? Progressing: []? Met: []? Not Met: []? Adjusted         Overall Progression Towards Functional goals/ Treatment Progress Update:  [x] Patient is progressing as expected towards functional goals listed. [] Progression is slowed due to complexities/Impairments listed. [] Progression has been slowed due to co-morbidities. [] Plan just implemented, too soon to assess goals progression <30days   [] Goals require adjustment due to lack of progress  [] Patient is not progressing as expected and requires additional follow up with physician  [] Other:     Persisting Functional Limitations/Impairments:  [x]Sleeping [x]Sitting               [x]Standing [x]Transfers        [x]Walking [x]Kneeling               [x]Stairs [x]Squatting / bending   [x]ADLs []Reaching  [x]Lifting  [x]Housework  [x]Driving []Job related tasks  []Sports/Recreation []Other:      ASSESSMENT:  Hip flexor stretching provides relief from discomfort in anterior aspect of L hip, as well as improved active hip extension on LLE.  The patient is improving without complication through progressive strengthening of hips, gluteals at this time; posterior chain musculature (as well as quads) in need of improvement for strength. Treatment/Activity Tolerance:  [x] Patient tolerated treatment well [] Patient limited by fatigue  [] Patient limited by pain  [] Patient limited by other medical complications  [] Other:     Prognosis: [x] Good [] Fair  [] Poor    Patient Requires Follow-up: [x] Yes  [] No    PLAN: POC: PT 2x / week for 5 weeks. Assess response to HEP. Progress core/hip strengthening, adductor/abductor activation in supine/sitting/standing, gait tx & progression with LRAD (SPC at eval), balance  [x] Continue per plan of care [] Alter current plan (see comments)  [] Plan of care initiated [] Hold pending MD visit [] Discharge    Electronically signed by: Ifeoma Oshea PT, DPT    Note: If patient does not return for scheduled/ recommended follow up visits, this note will serve as a discharge from care along with most recent update on progress.

## 2020-07-16 ENCOUNTER — HOSPITAL ENCOUNTER (OUTPATIENT)
Dept: PHYSICAL THERAPY | Age: 75
Setting detail: THERAPIES SERIES
Discharge: HOME | End: 2020-07-16
Payer: MEDICARE

## 2020-07-16 PROCEDURE — 97140 MANUAL THERAPY 1/> REGIONS: CPT

## 2020-07-16 PROCEDURE — 97110 THERAPEUTIC EXERCISES: CPT

## 2020-07-16 NOTE — FLOWSHEET NOTE
[facility] - Outpatient Physical Therapy    Physical Therapy Daily Treatment Note  Date:  2020    Patient Name:  Helene Jason    :  1945  MRN: [id number]  Medical/Treatment Diagnosis Information:  · Diagnosis: S32.592A (ICD-10-CM) - Other specified fracture of left pubis, initial encounter for closed fracture  · Treatment Diagnosis: difficulty walking, pelvic pain, imbalance, decreased LE strength, decreased ADL status  Insurance/Certification information:  PT Insurance Information: [address] - visits PMN  Physician Information:  Referring Practitioner:  / Jerald Favre, MD  Plan of care signed (Y/N): []  Yes [x]  No     Progress Report: []  Yes  [x]  No     Date Range for reporting period:   Beginning 20  Ending TBD    Progress report due (10 Rx/or 30 days whichever is less): 10     Recertification due (POC duration/ or 90 days whichever is less): POC     Visit # Insurance Allowable Date Range (if applicable)    PMN 0887     Latex Allergy:  [x]NO      []YES  Preferred Language for Healthcare:   [x]English       []other:    Functional Scale: LEFS: 0 = 100% DIS  Date assessed: 20     Pain level:  0/10, 1/10 w/ weight shift to L     SUBJECTIVE:   Pt reports she was a little sore after the workout the other day but not bad, mostly just muscle soreness for new exercises. Pt reports today she is back to \"baseline\" feeling, slight tenderness in her L knee but not bad like it was several weeks ago.    OBJECTIVE:    - supine to sit + R; prone knee flexion + L, gaenslen's (neg), Sacral spring + ; LLE stays \"short\" after manual interventions    - patient able to balance 20 seconds, EC, narrowed ARCELIA     RESTRICTIONS/PRECAUTIONS: OA, low bone density; 2 left pubic fractures    Exercises/Interventions:     Therapeutic Exercises (79561) - 20'  Resistance Sets / Reps Notes   SLR Flexion  Hooklying isometrics:  Abduction  adduction  bridges  AP Pelvic tilts Hooklying hip abd with band Bridge with band on knees PVT with march/LE walk out  PVT with low ab progressions   Clam shells  Reverse Clamshells  Leg press Double leg 80# 3 x 10 Added 7/16   LAQ - QUANTUM 25# 3 X 10 Added 6/22; ^6/24 weight and sets; ^6/30 - added resistance  7/13 - PROGRESSED TO MACHINE   Standing hip abd Added 6/22, ^6/24 sets; 6/30 - added resistance & sets bilaterally   Standing hip ext           0 resistance this date           2 x 10 Added 6/22; ^6/24 sets; 6/30 - added resistance & sets bilaterally    7/13 - PT hands on PSIS, encouraged maximal extension without lumbar extension   Upright bike Added 6/24; 6/30 - added resistance   Seated Bilateral Hip IR + Adductor Squeeze 6/30 - added   Prone Hip Extension  2 x 15, BHS curls on L  Added 7/6   Hip ER with band Added 7/6   H rocks  5x 7/13 - added   Qped Glute Kick  2 X 15 B 7/13 - added; cues for neutral spine   Standing RDL (DL) - w/ earthquake bar  2 x 15 7/13 - added; cues for soft knees, neutral spine, glute activation         Neuromuscular Re-ed (00205) Therapeutic Activities (97504)       Side stepping at [facility] (NO UE A)  Airex neutral ARCELIA  Head Turns/Pitches    6/30 - increased   Airex Narrow ARCELIA  EC    6/30 - increased   Airex staggered stance  Added 6/22   Tandem stance on airex  Forward step ups lead with L Lateral step ups  \"L\" Step Ups Biodex Postural stabiltiy Biodex limits of Stability Biodex Random control Biodex Maze Control TRX Squats TRX mini squat and hold with 3 point touch of R LE Table Top Position: Jaime Hip Abd  SLS on L with R LE ball rolls Forward step up to contralateral LE toe tap on step in front  Side stepping with sport cord 1 cord 15' x 3 laps ^7/16 progression with sport cord.  Hip hikes Lateral step up with contralateral LE hip abd kick 4 inch x10 R/L (1 kick each rep)                            Manual Intervention (42350) - 24'      Hip flexor/quad stretch on L  20\" holds x 3 each on L   Piriformis stretch on L     RLE SIJ LAD  G2-5 1x ea    HF Contract/Relax + Stretch     L Sacral Direct mobilization - Ext/SB/Rot L  3x total (25%/50%/75%)      Patient Education & HEP:  - Patient educated on the focus of skilled physical therapy services and plan of care, including: diagnosis, prognosis, treatment goals & options. Includes HEP edu & instruction. Access Code: [email or web address]   URL: [employer]/   Date: 07/13/2020   Prepared by: Amy Paez     Exercises   Quadruped Alternating Leg Extensions - 10 reps - 3 sets - 1x daily - 5x weekly   Quadruped Rock Back into [employer] Up - 5 reps - 2 sets - 1x daily - 5x weekly     Access Code: [email or web address]   URL: [employer]/   Date: 06/22/2020   Prepared by: Ronni Patel     Exercises  Clamshell - 10 reps - 3 sets - 1x daily - 7x weekly  Sidelying Reverse Clamshell - 10 reps - 3 sets - 1x daily - 7x weekly  Standing Hip Abduction with Counter Support - 10 reps - 3 sets - 1x daily - 7x weekly  Standing Hip Extension with Counter Support - 10 reps - 3 sets - 1x daily - 7x weekly    - HEP instruction: Access Code: [email or web address]   URL: [employer]/   Date: 06/17/2020   Prepared by: Amy Paez     Exercises   · Supine Pelvic Tilt - 10 reps - 1 sets - 1 hold - 2x daily - 7x weekly   · Supine Bridge - 15 reps - 2 sets - 2 hold - 1x daily - 5x weekly   · Supine Active Straight Leg Raise - 10 reps - 2 sets - 1x daily - 5x weekly   · Supine Hip Adduction Isometric with Ball - 10 reps - 2 sets - 5-10 hold - 2x daily - 5x weekly   · Hooklying Isometric Clamshell - 10 reps - 2 sets - 5-10 hold - 2x daily - 5x weekly   - All patient questions were answered    Therapeutic Exercise and NMR EXR  [x] (62145) Provided verbal/tactile cueing for activities related to strengthening, flexibility, endurance, ROM for improvements in  [x] LE / Lumbar: LE, proximal hip, and core control with self care, mobility, lifting, ambulation.   [] UE / Cervical: cervical, postural, scapular, scapulothoracic and UE control with self care, reaching, carrying, lifting, house/yardwork, driving, computer work.  [] (96572) Provided verbal/tactile cueing for activities related to improving balance, coordination, kinesthetic sense, posture, motor skill, proprioception to assist with   [] LE / lumbar: LE, proximal hip, and core control in self care, mobility, lifting, ambulation and eccentric single leg control. [] UE / cervical: cervical, scapular, scapulothoracic and UE control with self care, reaching, carrying, lifting, house/yardwork, driving, computer work.      NMR and Therapeutic Activities:    [] (82930 or 55905) Provided verbal/tactile cueing for activities related to improving balance, coordination, kinesthetic sense, posture, motor skill, proprioception and motor activation to allow for proper function of   [] LE: / Lumbar core, proximal hip and LE with self care and ADLs  [] UE / Cervical: cervical, postural, scapular, scapulothoracic and UE control with self care, carrying, lifting, driving, computer work.   [] (38316) Gait Re-education- Provided training and instruction to the patient for proper LE, core and proximal hip recruitment and positioning and eccentric body weight control with ambulation re-education including up and down stairs     Home Exercise Program:    [] (72923) Reviewed/Progressed HEP activities related to strengthening, flexibility, endurance, ROM of   [] LE / Lumbar: core, proximal hip and LE for functional self-care, mobility, lifting and ambulation/stair navigation   [] UE / Cervical: cervical, postural, scapular, scapulothoracic and UE control with self care, reaching, carrying, lifting, house/yardwork, driving, computer work  [] (56979)Reviewed/Progressed HEP activities related to improving balance, coordination, kinesthetic sense, posture, motor skill, proprioception of   [] LE: core, proximal hip and LE for self care, mobility, lifting, and ambulation/stair navigation    [] UE / Cervical: cervical, postural,  scapular, scapulothoracic and UE control with self care, reaching, carrying, lifting, house/yardwork, driving, computer work    Manual Treatments:  PROM / STM / Oscillations-Mobs:  G-I, II, III, IV (PA's, Inf., Post.)  [x] (46318) Provided manual therapy to mobilize LE, proximal hip and/or LS spine soft tissue/joints for the purpose of modulating pain, promoting relaxation,  increasing ROM, reducing/eliminating soft tissue swelling/inflammation/restriction, improving soft tissue extensibility and allowing for proper ROM for normal function with   [x] LE / lumbar: self care, mobility, lifting and ambulation. [] UE / Cervical: self care, reaching, carrying, lifting, house/yardwork, driving, computer work. Modalities:  [] (03225) Vasopneumatic compression: Utilized vasopneumatic compression to decrease edema / swelling for the purpose of improving mobility and quad tone / recruitment which will allow for increased overall function including but not limited to self-care, transfers, ambulation, and ascending / descending stairs. Modalities: 6/17 - Consider MO    Charges:  Timed Code Treatment Minutes: 45   Total Treatment Minutes: 45     [] EVAL - LOW (27012)   [] EVAL - MOD (71919)  [] EVAL - HIGH (11772)  [] RE-EVAL (61699)  [x] TE (81022) x 2     [] Ionto  [] NMR (60023) x    [] Vaso  [x] Manual (70855) x 1     [] Ultrasound  [] TA x       [] Mech Traction (82640)  [] Gait Training x     [] ES (un) (16565):   [] Aquatic therapy x   [] Other:   [] Group:     GOALS: Patient stated goal: to don/doff pants while standing; return to \"normal\" function & walking  [x]? Progressing: []? Met: []? Not Met: []? Adjusted     Therapist goals for Patient:   Short Term Goals: To be achieved in: 2 weeks  1. Independent in HEP and progression per patient tolerance, in order to prevent re-injury. []? Progressing: [x]? Met: []? Not Met: []? Adjusted  2.  Patient will have a decrease in pain to <1/10 on average facilitate improvement in movement, function, and ADLs as indicated by Functional Deficits. []? Progressing: [x]? Met: []? Not Met: []? Adjusted     Long Term Goals: To be achieved in: 5 weeks  1. Disability index score of 30% or less for the LEFS to assist with reaching prior level of function. [x]? Progressing: []? Met: []? Not Met: []? Adjusted  2. Patient will demonstrate increased AROM to symmetric L hip to allow for proper joint functioning as indicated by patients Functional Deficits. [x]? Progressing: []? Met: []? Not Met: []? Adjusted  3. Patient will demonstrate an increase in Strength to at least 4+/5 as well as good proximal hip strength and control to allow for proper functional mobility as indicated by patients Functional Deficits. [x]? Progressing: []? Met: []? Not Met: []? Adjusted  4. Patient will return to functional activities including community walking without increased symptoms or restriction. [x]? Progressing: []? Met: []? Not Met: []? Adjusted  5. Don/doff pants in standing for independent self-care. [x]? Progressing: []? Met: []? Not Met: []? Adjusted         Overall Progression Towards Functional goals/ Treatment Progress Update:  [x] Patient is progressing as expected towards functional goals listed. [] Progression is slowed due to complexities/Impairments listed. [] Progression has been slowed due to co-morbidities.   [] Plan just implemented, too soon to assess goals progression <30days   [] Goals require adjustment due to lack of progress  [] Patient is not progressing as expected and requires additional follow up with physician  [] Other:     Persisting Functional Limitations/Impairments:  [x]Sleeping [x]Sitting               [x]Standing [x]Transfers        [x]Walking [x]Kneeling               [x]Stairs [x]Squatting / bending   [x]ADLs []Reaching  [x]Lifting  [x]Housework  [x]Driving []Job related tasks  []Sports/Recreation []Other:      ASSESSMENT:  Continues to progress well with PT POC, mild reports of soreness with new exercises as expected however nothing significant lasting. Pt was able to tolerate progressions per log with leg press machine and increased focus on lateral hip muscle strength. Pt does report of increased groin/anterior high intermittent tightness but NOT pain post session that pt reports is comparable to feeling at the end of the day when she is really tired. Will continue to benefit from PT to work on continued progressions of gluteal and quad strength for long term benefits. Treatment/Activity Tolerance:  [x] Patient tolerated treatment well [] Patient limited by fatigue  [] Patient limited by pain  [] Patient limited by other medical complications  [] Other:     Prognosis: [x] Good [] Fair  [] Poor    Patient Requires Follow-up: [x] Yes  [] No    PLAN: POC: PT 2x / week for 5 weeks. Assess response to HEP. Progress core/hip strengthening, adductor/abductor activation in supine/sitting/standing, gait tx & progression with LRAD ([facility] at [facility]), balance  [x] Continue per plan of care [] Alter current plan (see comments)  [] Plan of care initiated [] Hold pending MD visit [] Discharge    Electronically signed by: Hunter Chandler PT, DPT    Note: If patient does not return for scheduled/ recommended follow up visits, this note will serve as a discharge from care along with most recent update on progress.

## 2020-07-20 ENCOUNTER — HOSPITAL ENCOUNTER (OUTPATIENT)
Dept: PHYSICAL THERAPY | Age: 75
Setting detail: THERAPIES SERIES
Discharge: HOME | End: 2020-07-20
Payer: MEDICARE

## 2020-07-20 PROCEDURE — 97140 MANUAL THERAPY 1/> REGIONS: CPT

## 2020-07-20 PROCEDURE — 97530 THERAPEUTIC ACTIVITIES: CPT

## 2020-07-20 PROCEDURE — 97110 THERAPEUTIC EXERCISES: CPT

## 2020-07-20 NOTE — PLAN OF CARE
[facility] - Outpatient Physical Therapy   Physical Therapy Re-Certification Plan of Care    Dear  Gordon Gomez MD,    We had the pleasure of treating the following patient for physical therapy services at [address] [place] [address]. A summary of our findings can be found in the updated assessment below. This includes our plan of care. If you have any questions or concerns regarding these findings, please do not hesitate to contact me at the office phone number checked above. Thank you for the referral.     Physician Signature:________________________________Date:__________________  By signing above (or electronic signature), therapists plan is approved by physician    Functional Outcome: Functional Scale: LEFS: 61 = 24% DIS  Date assessed: 7/20/20    Overall Response to Treatment:   [x]Patient is responding well to treatment and improvement is noted with regards to goals and should continue with skilled services to normalize gait, facilitate strength and endurance gains. The patient's hip ROM (IR specific) has normalized compared to R. The patient's strength continues to improve at this time, and pain is reducing. The patient is limited in high level stair ambulation, single leg sit to stands and prolonged standing/walking, as well as dressing herself in standing.    []Patient should continue to improve in reasonable time if they continue HEP   []Patient has plateaued and is no longer responding to skilled PT intervention    []Patient is getting worse and would benefit from return to referring MD   []Patient unable to adhere to initial POC   []Other:     Date range of Visits: 6/17/20 - 7/20/20   Total Visits: 10    Recommendation:    [x]Continue PT 2x / wk for 4 weeks (8v)    []Hold PT, pending MD visit       Left MMT:  L PROM:  Hip Flexion:  4+  Hip aBduction: 4  Hip Extension: 4-  Hip IR:   4  32deg (R = L)  Hip ER:  4+  Knee Extension: 5  Knee Flexion:  4+    Special Testing:  BEATRICE + L (muscle)  Jeferson + L (muscle)  FADIR - L  supine to sit negative  standing flexion negative  prone knee flexion + L (long to short) --> corrected after L prone quad stretch, HF MET and anterior innominate mobs    Balance:   Tandem R = 15 sec  Tandem L = 15 sec  SLS R = 12 sec  SLS L = 12 sec    Gait: Improving/normalizing L hip extension (marginal decrease), L hip drop in RLE stance    GOALS: Patient stated goal: to don/doff pants while standing; return to \"normal\" function & walking  [x]? Progressing: []? Met: []? Not Met: []? Adjusted     Therapist goals for Patient:   Short Term Goals: To be achieved in: 2 weeks  1. Independent in HEP and progression per patient tolerance, in order to prevent re-injury. []? Progressing: [x]? Met: []? Not Met: []? Adjusted  2. Patient will have a decrease in pain to <1/10 on average facilitate improvement in movement, function, and ADLs as indicated by Functional Deficits. []? Progressing: [x]? Met: []? Not Met: []? Adjusted     Long Term Goals: To be achieved in: 5 weeks  1. Disability index score of 30% or less for the LEFS to assist with reaching prior level of function. []? Progressing: [x]? Met: []? Not Met: []? Adjusted  2. Patient will demonstrate increased AROM to symmetric L hip to allow for proper joint functioning as indicated by patients Functional Deficits. []? Progressing: [x]? Met: []? Not Met: []? Adjusted  3. Patient will demonstrate an increase in Strength to at least 4+/5 as well as good proximal hip strength and control to allow for proper functional mobility as indicated by patients Functional Deficits. [x]? Progressing: []? Met: []? Not Met: []? Adjusted  4. Patient will return to functional activities including community walking without increased symptoms or restriction. [x]? Progressing: []? Met: []? Not Met: []? Adjusted  5. Don/doff pants in standing for independent self-care. [x]? Progressing: []? Met: []?  Not Met: []? Adjusted         Physical Therapy Daily Treatment Note  Date:  2020    Patient Name:  Joanne Long    :  1945  MRN: [id number]  Medical/Treatment Diagnosis Information:  · Diagnosis: S32.592A (ICD-10-CM) - Other specified fracture of left pubis, initial encounter for closed fracture  · Treatment Diagnosis: difficulty walking, pelvic pain, imbalance, decreased LE strength, decreased ADL status  Insurance/Certification information:  PT Insurance Information: [address] - visits PMN  Physician Information:  Referring Practitioner:  / Anuradha Stuart MD  Plan of care signed (Y/N): []  Yes [x]  No     Progress Report: [x]  Yes  []  No     Date Range for reporting period:   Beginning 20   Ending TBD    Progress report due (10 Rx/or 30 days whichever is less): 10     Recertification due (POC duration/ or 90 days whichever is less): POC     Visit # Insurance Allowable Date Range (if applicable)        Latex Allergy:  [x]NO      []YES  Preferred Language for Healthcare:   [x]English       []other:    Functional Scale: LEFS: 0 = 100% DIS  Date assessed: 20  Functional Scale: LEFS: 61 = 24% DIS  Date assessed: 20     Pain level:  0/10      SUBJECTIVE: SEE PN    OBJECTIVE:    - SEE PN; supine to sit negative, standing flexion negative, prone knee flexion + L (long to short) --> corrected after L prone quad stretch, HF MET and anterior innominate mobs   - supine to sit + R; prone knee flexion + L, gaenslen's (neg), Sacral spring + ; LLE stays \"short\" after manual interventions    - patient able to balance 20 seconds, EC, narrowed ARCELIA     RESTRICTIONS/PRECAUTIONS: OA, low bone density; 2 left pubic fractures    Exercises/Interventions:     Therapeutic Exercises (22433)   Resistance Sets / Reps Notes   SLR Flexion  Hooklying isometrics:  Abduction  adduction  bridges  AP Pelvic tilts  Hooklying hip abd with band Bridge with band on knees PVT with march/ walk out computer work.  [] (47119) Provided verbal/tactile cueing for activities related to improving balance, coordination, kinesthetic sense, posture, motor skill, proprioception to assist with   [] LE / lumbar: LE, proximal hip, and core control in self care, mobility, lifting, ambulation and eccentric single leg control. [] UE / cervical: cervical, scapular, scapulothoracic and UE control with self care, reaching, carrying, lifting, house/yardwork, driving, computer work.      NMR and Therapeutic Activities:    [x] (31674 or 50626) Provided verbal/tactile cueing for activities related to improving balance, coordination, kinesthetic sense, posture, motor skill, proprioception and motor activation to allow for proper function of   [x] LE: / Lumbar core, proximal hip and LE with self care and ADLs  [] UE / Cervical: cervical, postural, scapular, scapulothoracic and UE control with self care, carrying, lifting, driving, computer work.   [] (89918) Gait Re-education- Provided training and instruction to the patient for proper LE, core and proximal hip recruitment and positioning and eccentric body weight control with ambulation re-education including up and down stairs     Home Exercise Program:    [] (75613) Reviewed/Progressed HEP activities related to strengthening, flexibility, endurance, ROM of   [] LE / Lumbar: core, proximal hip and LE for functional self-care, mobility, lifting and ambulation/stair navigation   [] UE / Cervical: cervical, postural, scapular, scapulothoracic and UE control with self care, reaching, carrying, lifting, house/yardwork, driving, computer work  [] (75464)Reviewed/Progressed HEP activities related to improving balance, coordination, kinesthetic sense, posture, motor skill, proprioception of   [] LE: core, proximal hip and LE for self care, mobility, lifting, and ambulation/stair navigation    [] UE / Cervical: cervical, postural,  scapular, scapulothoracic and UE control with self care, by Functional Deficits. []? Progressing: [x]? Met: []? Not Met: []? Adjusted     Long Term Goals: To be achieved in: 5 weeks  1. Disability index score of 30% or less for the LEFS to assist with reaching prior level of function. []? Progressing: [x]? Met: []? Not Met: []? Adjusted  2. Patient will demonstrate increased AROM to symmetric L hip to allow for proper joint functioning as indicated by patients Functional Deficits. []? Progressing: [x]? Met: []? Not Met: []? Adjusted  3. Patient will demonstrate an increase in Strength to at least 4+/5 as well as good proximal hip strength and control to allow for proper functional mobility as indicated by patients Functional Deficits. [x]? Progressing: []? Met: []? Not Met: []? Adjusted  4. Patient will return to functional activities including community walking without increased symptoms or restriction. [x]? Progressing: []? Met: []? Not Met: []? Adjusted  5. Don/doff pants in standing for independent self-care. [x]? Progressing: []? Met: []? Not Met: []? Adjusted         Overall Progression Towards Functional goals/ Treatment Progress Update:  [x] Patient is progressing as expected towards functional goals listed. [] Progression is slowed due to complexities/Impairments listed. [] Progression has been slowed due to co-morbidities.   [] Plan just implemented, too soon to assess goals progression <30days   [] Goals require adjustment due to lack of progress  [] Patient is not progressing as expected and requires additional follow up with physician  [] Other:     Persisting Functional Limitations/Impairments:  [x]Sleeping []Sitting               [x]Standing [x]Transfers        [x]Walking [x]Kneeling               [x]Stairs [x]Squatting / bending   [x]ADLs []Reaching  [x]Lifting  [x]Housework  [x]Driving []Job related tasks  []Sports/Recreation []Other:      ASSESSMENT:  SEE PN ABOVE    Treatment/Activity Tolerance:  [x] Patient tolerated treatment well [] Patient limited by fatigue  [] Patient limited by pain  [] Patient limited by other medical complications  [] Other:     Prognosis: [x] Good [] Fair  [] Poor    Patient Requires Follow-up: [x] Yes  [] No    PLAN: POC: PT 2x / week for 4 weeks. Progress core/hip strengthening (abd/ext especially), normalize gait w/o AD, balance  [x] Continue per plan of care [] Alter current plan (see comments)  [] Plan of care initiated [] Hold pending MD visit [] Discharge    Electronically signed by: Ifeoma Oshea PT, DPT    Note: If patient does not return for scheduled/ recommended follow up visits, this note will serve as a discharge from care along with most recent update on progress.

## 2020-07-24 ENCOUNTER — HOSPITAL ENCOUNTER (OUTPATIENT)
Dept: PHYSICAL THERAPY | Age: 75
Setting detail: THERAPIES SERIES
Discharge: HOME | End: 2020-07-24
Payer: MEDICARE

## 2020-07-24 PROCEDURE — 97140 MANUAL THERAPY 1/> REGIONS: CPT

## 2020-07-24 PROCEDURE — 97110 THERAPEUTIC EXERCISES: CPT

## 2020-07-24 PROCEDURE — 97112 NEUROMUSCULAR REEDUCATION: CPT

## 2020-07-24 NOTE — PLAN OF CARE
Therapeutic Exercises (70082)   Resistance Sets / Reps Notes   SLR Flexion  Hooklying isometrics:  Abduction  adduction  bridges  AP Pelvic tilts  Hooklying hip abd with band Bridge with band on knees PVT with march/LE walk out  PVT with low ab progressions   Clam shells  Reverse Clamshells  Leg press Double leg  80# 2 x 15 Added 7/16; 7/20 - B1, K4 - cued to allow plates to lower   LAQ - QUANTUM Added 6/22; ^6/24 weight and sets; ^6/30 - added resistance  7/13 - PROGRESSED TO MACHINE   Standing hip abd (4'' step w/ airex on top) Standing hip ext (4'' step w/ airex on top) Upright bike Added 6/24; 6/30 - added resistance   Seated Bilateral Hip IR + Adductor Squeeze 6/30 - added   Prone Hip Extension  2 x 15, BHS curls on L  Added 7/6   Hip ER with band Added 7/6   H rocks  5x 7/13 - added   Qped Glute Kick Standing RDL (DL) - w/ earthquake bar Sidelying SLR Flexion  2 x 10 7/24 - added   Sidelying Hamstring Curls  2 x 10 7/24 - added   Sidelying Clamhell Pulses  3 x 10 7/24 - added   Adductor S  6 x 10'' L 7/24 - added         Neuromuscular Re-ed (75307) Therapeutic Activities (22988)       Side stepping at [facility] (NO UE A)  Airex neutral ARCELIA  Head Turns/Pitches    6/30 - increased   Airex Narrow ARCELIA  EC    6/30 - increased   Airex staggered stance  Added 6/22   Tandem stance on airex  Forward step ups lead with L 10'' 1 x 12 7/24 - progressed depthLateral step ups  \"L\" Step Ups Biodex Postural stabiltiy Biodex limits of Stability Biodex Random control Biodex Maze Control TRX Squats TRX Single Leg Squat TRX mini squat and hold with 3 point touch of R LE Table Top Position: Jaime Hip Abd  SLS on L with R LE ball rolls Forward step up to contralateral LE toe tap on step in front  Side stepping with sport cord ^7/16 progression with sport cord.  Hip hikes Lateral step up with contralateral LE hip abd kick    SLS   4 x 10'' L 7/24 -                      Manual Intervention (45378)      Hip flexor/quad stretch on L  20\" holds x 4 each on L   Piriformis stretch on L  20\" holds x 4 on L   ITB L stretch  20'' hold, x 3 L          LLE Hip LAD  10 x 5''     Patient Education & HEP:  - Patient educated on the focus of skilled physical therapy services and plan of care, including: diagnosis, prognosis, treatment goals & options. Includes HEP edu & instruction. Access Code: [email or web address]    URL: [employer]/    Date: 07/13/2020    Prepared by: Della Hodges     Exercises   Quadruped Alternating Leg Extensions - 10 reps - 3 sets - 1x daily - 5x weekly   Quadruped Rock Back into [employer] Up - 5 reps - 2 sets - 1x daily - 5x weekly     Access Code: [email or web address]   URL: [employer]/   Date: 06/22/2020   Prepared by: Elesa Venanciots     Exercises  Clamshell - 10 reps - 3 sets - 1x daily - 7x weekly  Sidelying Reverse Clamshell - 10 reps - 3 sets - 1x daily - 7x weekly  Standing Hip Abduction with Counter Support - 10 reps - 3 sets - 1x daily - 7x weekly  Standing Hip Extension with Counter Support - 10 reps - 3 sets - 1x daily - 7x weekly    - HEP instruction: Access Code: [email or web address]   URL: [employer]/   Date: 06/17/2020   Prepared by: Della Hodges     Exercises   · Supine Pelvic Tilt - 10 reps - 1 sets - 1 hold - 2x daily - 7x weekly   · Supine Bridge - 15 reps - 2 sets - 2 hold - 1x daily - 5x weekly   · Supine Active Straight Leg Raise - 10 reps - 2 sets - 1x daily - 5x weekly   · Supine Hip Adduction Isometric with Ball - 10 reps - 2 sets - 5-10 hold - 2x daily - 5x weekly   · Hooklying Isometric Clamshell - 10 reps - 2 sets - 5-10 hold - 2x daily - 5x weekly   - All patient questions were answered    Therapeutic Exercise and NMR EXR  [x] (29755) Provided verbal/tactile cueing for activities related to strengthening, flexibility, endurance, ROM for improvements in  [x] LE / Lumbar: LE, proximal hip, and core control with self care, mobility, lifting, ambulation.   [] UE / Cervical: cervical, postural, scapular, scapulothoracic and UE control with self care, reaching, carrying, lifting, house/yardwork, driving, computer work. [x] (68520) Provided verbal/tactile cueing for activities related to improving balance, coordination, kinesthetic sense, posture, motor skill, proprioception to assist with   [x] LE / lumbar: LE, proximal hip, and core control in self care, mobility, lifting, ambulation and eccentric single leg control. [] UE / cervical: cervical, scapular, scapulothoracic and UE control with self care, reaching, carrying, lifting, house/yardwork, driving, computer work.      NMR and Therapeutic Activities:    [x] (88466 or 98654) Provided verbal/tactile cueing for activities related to improving balance, coordination, kinesthetic sense, posture, motor skill, proprioception and motor activation to allow for proper function of   [x] LE: / Lumbar core, proximal hip and LE with self care and ADLs  [] UE / Cervical: cervical, postural, scapular, scapulothoracic and UE control with self care, carrying, lifting, driving, computer work.   [] (22585) Gait Re-education- Provided training and instruction to the patient for proper LE, core and proximal hip recruitment and positioning and eccentric body weight control with ambulation re-education including up and down stairs     Home Exercise Program:    [] (89966) Reviewed/Progressed HEP activities related to strengthening, flexibility, endurance, ROM of   [] LE / Lumbar: core, proximal hip and LE for functional self-care, mobility, lifting and ambulation/stair navigation   [] UE / Cervical: cervical, postural, scapular, scapulothoracic and UE control with self care, reaching, carrying, lifting, house/yardwork, driving, computer work  [] (63110)Reviewed/Progressed HEP activities related to improving balance, coordination, kinesthetic sense, posture, motor skill, proprioception of   [] LE: core, proximal hip and LE for self care, mobility, lifting, and ambulation/stair navigation    [] UE / Cervical: cervical, postural,  scapular, scapulothoracic and UE control with self care, reaching, carrying, lifting, house/yardwork, driving, computer work    Manual Treatments:  PROM / STM / Oscillations-Mobs:  G-I, II, III, IV (PA's, Inf., Post.)  [x] (37536) Provided manual therapy to mobilize LE, proximal hip and/or LS spine soft tissue/joints for the purpose of modulating pain, promoting relaxation,  increasing ROM, reducing/eliminating soft tissue swelling/inflammation/restriction, improving soft tissue extensibility and allowing for proper ROM for normal function with   [x] LE / lumbar: self care, mobility, lifting and ambulation. [] UE / Cervical: self care, reaching, carrying, lifting, house/yardwork, driving, computer work. Modalities:  [] (13049) Vasopneumatic compression: Utilized vasopneumatic compression to decrease edema / swelling for the purpose of improving mobility and quad tone / recruitment which will allow for increased overall function including but not limited to self-care, transfers, ambulation, and ascending / descending stairs. Modalities: 6/17 - [place] [facility] [place]arges:  Timed Code Treatment Minutes: 42   Total Treatment Minutes: 42     [] EVAL - LOW (40237)   [] EVAL - MOD (17490)  [] EVAL - HIGH (40336)  [] RE-EVAL (65181)  [x] TE (13170) x 1     [] Ionto  [x] NMR (41193) x 1   [] Vaso  [x] Manual (18512) x 1     [] Ultrasound  [] TA x        [] Mech Traction (13680)  [] Gait Training x     [] ES (un) (79112):   [] Aquatic therapy x   [] Other:   [] Group:     GOALS: Patient stated goal: to don/doff pants while standing; return to \"normal\" function & walking  [x]? Progressing: []? Met: []? Not Met: []? Adjusted     Therapist goals for Patient:   Short Term Goals: To be achieved in: 2 weeks  1. Independent in HEP and progression per patient tolerance, in order to prevent re-injury. []? Progressing: [x]? Met: []? Not Met: []? Adjusted  2.  Patient will have a decrease in pain to <1/10 on average facilitate improvement in movement, function, and ADLs as indicated by Functional Deficits. []? Progressing: [x]? Met: []? Not Met: []? Adjusted     Long Term Goals: To be achieved in: 5 weeks  1. Disability index score of 30% or less for the LEFS to assist with reaching prior level of function. []? Progressing: [x]? Met: []? Not Met: []? Adjusted  2. Patient will demonstrate increased AROM to symmetric L hip to allow for proper joint functioning as indicated by patients Functional Deficits. []? Progressing: [x]? Met: []? Not Met: []? Adjusted  3. Patient will demonstrate an increase in Strength to at least 4+/5 as well as good proximal hip strength and control to allow for proper functional mobility as indicated by patients Functional Deficits. [x]? Progressing: []? Met: []? Not Met: []? Adjusted  4. Patient will return to functional activities including community walking without increased symptoms or restriction. [x]? Progressing: []? Met: []? Not Met: []? Adjusted  5. Don/doff pants in standing for independent self-care. [x]? Progressing: []? Met: []? Not Met: []? Adjusted         Overall Progression Towards Functional goals/ Treatment Progress Update:  [x] Patient is progressing as expected towards functional goals listed. [] Progression is slowed due to complexities/Impairments listed. [] Progression has been slowed due to co-morbidities.   [] Plan just implemented, too soon to assess goals progression <30days   [] Goals require adjustment due to lack of progress  [] Patient is not progressing as expected and requires additional follow up with physician  [] Other:     Persisting Functional Limitations/Impairments:  [x]Sleeping []Sitting               [x]Standing [x]Transfers        [x]Walking [x]Kneeling               [x]Stairs [x]Squatting / bending   [x]ADLs []Reaching  [x]Lifting  [x]Housework  [x]Driving []Job related tasks  []Sports/Recreation []Other:      ASSESSMENT:  The patient's hip was more uncomfortable this date and prevented progression of resisted exercises, so table exercises and increased manual were performed this date. THe patient was able to progress depth of step up with LLE, and indicated progression at home with donning pants more easily. The patient willbenefit from ongoing PT services to build strength, endurance, tolerance to prolonged weight bearing, and balance/support on LLE. Treatment/Activity Tolerance:  [x] Patient tolerated treatment well [] Patient limited by fatigue  [] Patient limited by pain  [] Patient limited by other medical complications  [] Other:     Prognosis: [x] Good [] Fair  [] Poor    Patient Requires Follow-up: [x] Yes  [] No    PLAN: POC: PT 2x / week for 4 weeks. Progress core/hip strengthening (abd/ext especially), normalize gait w/o AD, balance  [x] Continue per plan of care [] Alter current plan (see comments)  [] Plan of care initiated [] Hold pending MD visit [] Discharge    Electronically signed by: Ileana Tillman PT, DPT  Therapist was present, directed the patient's care, made skilled judgement, and was responsible for assessment and treatment of the patient. Concepcion Nolen, SPT    Note: If patient does not return for scheduled/ recommended follow up visits, this note will serve as a discharge from care along with most recent update on progress.

## 2020-07-27 ENCOUNTER — HOSPITAL ENCOUNTER (OUTPATIENT)
Dept: PHYSICAL THERAPY | Age: 75
Setting detail: THERAPIES SERIES
Discharge: HOME | End: 2020-07-27
Payer: MEDICARE

## 2020-07-27 PROCEDURE — 97112 NEUROMUSCULAR REEDUCATION: CPT

## 2020-07-27 PROCEDURE — 97110 THERAPEUTIC EXERCISES: CPT

## 2020-07-27 PROCEDURE — 97530 THERAPEUTIC ACTIVITIES: CPT

## 2020-07-27 NOTE — FLOWSHEET NOTE
[facility] - Outpatient Physical Therapy    Physical Therapy Daily Treatment Note  Date:  2020    Patient Name:  Koffi Sadler    :  1945  MRN: [id number]  Medical/Treatment Diagnosis Information:  · Diagnosis: S32.592A (ICD-10-CM) - Other specified fracture of left pubis, initial encounter for closed fracture  · Treatment Diagnosis: difficulty walking, pelvic pain, imbalance, decreased LE strength, decreased ADL status  Insurance/Certification information:  PT Insurance Information: [address] - visits PMN  Physician Information:  Referring Practitioner:  / Estrellita Amezquita MD  Plan of care signed (Y/N): [x]  Yes []  No (see below)    Telephone Encounter - Jerlene Angelucci., PA-C - 2020 12:56 PM EDT  \"Ok to have order for 4 more weeks of PT.\"    Electronically signed by Jerlene Angelucci., PA-C at 2020 12:56 PM EDT      Progress Report: []  Yes  [x]  No     Date Range for reporting period:   Beginning 20    Ending TBD    Progress report due (10 Rx/or 30 days whichever is less): 18     Recertification due (POC duration/ or 90 days whichever is less): POC     Visit # Insurance Allowable Date Range (if applicable)   15/15 +      Latex Allergy:  [x]NO      []YES  Preferred Language for Healthcare:   [x]English       []other:    Functional Scale: LEFS: 0 = 100% DIS  Date assessed: 20  Functional Scale: LEFS: 61 = 24% DIS  Date assessed: 20     Pain level:  0/10 @ rest, 1/10 achiness      SUBJECTIVE: The patient reports some discomfort in left ischial tuberosity, no sxs when at rest. Achiness may increase as day gets longer. Trying to stand after prolonged sitting is also stiff. She is not using AD at all. She reports \"I'm working up a sweat\" during PT session this date.     OBJECTIVE:    - TU sec (no hands)   - SEE PN; supine to sit negative, standing flexion negative, prone knee flexion + L (long to short) --> corrected after L prone quad stretch, HF MET and anterior innominate mobs  7/13 - supine to sit + R; prone knee flexion + L, gaenslen's (neg), Sacral spring + ; LLE stays \"short\" after manual interventions   6/30 - patient able to balance 20 seconds, EC, narrowed ARCELIA     RESTRICTIONS/PRECAUTIONS: OA, low bone density; 2 left pubic fractures    Exercises/Interventions:     Therapeutic Exercises (20102)   Resistance Sets / Reps Notes   IB - HSS  30'' R/L 7/27 - added   SLR Flexion  Hooklying isometrics:  Abduction  adduction  bridges  AP Pelvic tilts  Hooklying hip abd with band Bridge with band on knees PVT with march/LE walk out  PVT with low ab progressions   Clam shells  Reverse Clamshells  Leg press - Double leg (B1, K4) 80# 2 x 15 Added 7/16; 7/20 - B1, K4 - cued to allow plates to lower   HS Curls - QUANTUM (Knee 2, Ankle 3) 35# 2 x 15 7/27 - added   LAQ - QUANTUM (Knee D, Ankle 3) 35# 2 X 15   7/27 - progressed resistance   Standing hip abd (4'' step w/ airex on top) Standing hip ext (4'' step w/ airex on top) Upright bike Added 6/24; 6/30 - added resistance   Seated Bilateral Hip IR + Adductor Squeeze 6/30 - added   Prone Hip Extension  2 x 15, BHS curls on L  Added 7/6   Hip ER with band Added 7/6   H rocks  Qped Glute Kick Standing RDL (DL) - w/ earthquake bar Sidelying SLR Flexion  Sidelying Hamstring Curls  Sidelying Clamhell Pulses  Adductor S        Neuromuscular Re-ed (10501) Therapeutic Activities (19969)       Side stepping at [facility] (NO UE A)  Airex neutral ARCELIA  Head Turns/Pitches    6/30 - increased   Airex Narrow ARCELIA  EC    6/30 - increased   Airex staggered stance  Added 6/22   Tandem stance on airex  Forward step ups lead with L 10'' 1 x 12 Lateral step ups  \"L\" Step Ups Biodex Postural stabiltiy Biodex limits of Stability Biodex Random control Biodex Maze Control TRX Squats TRX Single Leg Squat TRX mini squat and hold with 3 point touch of R LE Table Top Position: Jaime Hip Abd  SLS on L with R LE ball rolls Fwd/Retro Step Up & Over 6'' 12x 7/27 - progressed; R foot never touches step. No cues for ankle/foot. Side stepping with sport cord ^7/16 progression with sport cord. Hip hikes 6 inch 2 x15 7/27- added 2nd setSLS (lifting front foot from step)  Tandem R/L      Airex Narrowed ARCELIA (head pitches/turns) airex  airex 3 x 10'' R/L  2 x 20'' R/L      2 x 10 ea 7/24 - progressed w/ airex / added    7/24 - added   Rockerboard AP/ML Taps/Balance  10x taps  60'' balance, EACH 7/27 - added no taps for A/P               Manual Intervention (63694)      Hip flexor/quad stretch on L     Piriformis stretch on L     ITB L stretch          LLE Hip LAD       Patient Education & HEP:  - Patient educated on the focus of skilled physical therapy services and plan of care, including: diagnosis, prognosis, treatment goals & options. Includes HEP edu & instruction. Access Code: [email or web address]    URL: [employer]/    Date: 07/13/2020    Prepared by: Juanita Rodriguez     Exercises   Quadruped Alternating Leg Extensions - 10 reps - 3 sets - 1x daily - 5x weekly   Quadruped Rock Back into [employer] Up - 5 reps - 2 sets - 1x daily - 5x weekly     Access Code: [email or web address]   URL: [employer]/   Date: 06/22/2020   Prepared by: Severa Copper     Exercises  Clamshell - 10 reps - 3 sets - 1x daily - 7x weekly  Sidelying Reverse Clamshell - 10 reps - 3 sets - 1x daily - 7x weekly  Standing Hip Abduction with Counter Support - 10 reps - 3 sets - 1x daily - 7x weekly  Standing Hip Extension with Counter Support - 10 reps - 3 sets - 1x daily - 7x weekly    - HEP instruction: Access Code: [email or web address]   URL: [employer]/   Date: 06/17/2020   Prepared by: Juanita Shake     Exercises   · Supine Pelvic Tilt - 10 reps - 1 sets - 1 hold - 2x daily - 7x weekly   · Supine Bridge - 15 reps - 2 sets - 2 hold - 1x daily - 5x weekly   · Supine Active Straight Leg Raise - 10 reps - 2 sets - 1x daily - 5x weekly   · Supine Hip [] LE / Lumbar: core, proximal hip and LE for functional self-care, mobility, lifting and ambulation/stair navigation   [] UE / Cervical: cervical, postural, scapular, scapulothoracic and UE control with self care, reaching, carrying, lifting, house/yardwork, driving, computer work  [] (97439)Reviewed/Progressed HEP activities related to improving balance, coordination, kinesthetic sense, posture, motor skill, proprioception of   [] LE: core, proximal hip and LE for self care, mobility, lifting, and ambulation/stair navigation    [] UE / Cervical: cervical, postural,  scapular, scapulothoracic and UE control with self care, reaching, carrying, lifting, house/yardwork, driving, computer work    Manual Treatments:  PROM / STM / Oscillations-Mobs:  G-I, II, III, IV (PA's, Inf., Post.)  [] (41624) Provided manual therapy to mobilize LE, proximal hip and/or LS spine soft tissue/joints for the purpose of modulating pain, promoting relaxation,  increasing ROM, reducing/eliminating soft tissue swelling/inflammation/restriction, improving soft tissue extensibility and allowing for proper ROM for normal function with   [] LE / lumbar: self care, mobility, lifting and ambulation. [] UE / Cervical: self care, reaching, carrying, lifting, house/yardwork, driving, computer work. Modalities:  [] (76501) Vasopneumatic compression: Utilized vasopneumatic compression to decrease edema / swelling for the purpose of improving mobility and quad tone / recruitment which will allow for increased overall function including but not limited to self-care, transfers, ambulation, and ascending / descending stairs.      Modalities: 6/17 - Consider MOC    Charges:  Timed Code Treatment Minutes: 45   Total Treatment Minutes: 45     [] EVAL - LOW (32692)   [] EVAL - MOD (71485)  [] EVAL - HIGH (80689)  [] RE-EVAL (20477)  [x] TE (68930) x 1     [] Ionto  [x] NMR (11760) x 1   [] Vaso  [] Manual (88827) x      [] Ultrasound  [x] TA x 1 [] [facility] Traction (73771)  [] Gait Training x     [] ES (un) (57815):   [] Aquatic therapy x   [] Other:   [] Group:     GOALS: Patient stated goal: to don/doff pants while standing; return to \"normal\" function & walking  [x]? Progressing: []? Met: []? Not Met: []? Adjusted     Therapist goals for Patient:   Short Term Goals: To be achieved in: 2 weeks  1. Independent in HEP and progression per patient tolerance, in order to prevent re-injury. []? Progressing: [x]? Met: []? Not Met: []? Adjusted  2. Patient will have a decrease in pain to <1/10 on average facilitate improvement in movement, function, and ADLs as indicated by Functional Deficits. []? Progressing: [x]? Met: []? Not Met: []? Adjusted     Long Term Goals: To be achieved in: 5 weeks  1. Disability index score of 30% or less for the LEFS to assist with reaching prior level of function. []? Progressing: [x]? Met: []? Not Met: []? Adjusted  2. Patient will demonstrate increased AROM to symmetric L hip to allow for proper joint functioning as indicated by patients Functional Deficits. []? Progressing: [x]? Met: []? Not Met: []? Adjusted  3. Patient will demonstrate an increase in Strength to at least 4+/5 as well as good proximal hip strength and control to allow for proper functional mobility as indicated by patients Functional Deficits. [x]? Progressing: []? Met: []? Not Met: []? Adjusted  4. Patient will return to functional activities including community walking without increased symptoms or restriction. [x]? Progressing: []? Met: []? Not Met: []? Adjusted  5. Don/doff pants in standing for independent self-care. [x]? Progressing: []? Met: []? Not Met: []? Adjusted         Overall Progression Towards Functional goals/ Treatment Progress Update:  [x] Patient is progressing as expected towards functional goals listed. [] Progression is slowed due to complexities/Impairments listed.   [] Progression has been slowed due to

## 2020-07-29 ENCOUNTER — HOSPITAL ENCOUNTER (OUTPATIENT)
Dept: PHYSICAL THERAPY | Age: 75
Setting detail: THERAPIES SERIES
Discharge: HOME | End: 2020-07-29
Payer: MEDICARE

## 2020-07-29 PROCEDURE — 97110 THERAPEUTIC EXERCISES: CPT

## 2020-07-29 PROCEDURE — 97112 NEUROMUSCULAR REEDUCATION: CPT

## 2020-07-29 NOTE — FLOWSHEET NOTE
[facility] - Outpatient Physical Therapy    Physical Therapy Daily Treatment Note  Date:  2020    Patient Name:  Alissa Medina    :  1945  MRN: [id number]  Medical/Treatment Diagnosis Information:  · Diagnosis: S32.592A (ICD-10-CM) - Other specified fracture of left pubis, initial encounter for closed fracture  · Treatment Diagnosis: difficulty walking, pelvic pain, imbalance, decreased LE strength, decreased ADL status  Insurance/Certification information:  PT Insurance Information: [address] - visits PMN  Physician Information:  Referring Practitioner:  / Venessa Alpers, MD  Plan of care signed (Y/N): [x]  Yes []  No (see below)    Progress Report: []  Yes  [x]  No     Date Range for reporting period:   Beginning 20    Ending TBD    Progress report due (10 Rx/or 30 days whichever is less):      Recertification due (POC duration/ or 90 days whichever is less): POC     Visit # Insurance Allowable Date Range (if applicable)    + 3/8 PMN 2020     Latex Allergy:  [x]NO      []YES  Preferred Language for Healthcare:   [x]English       []other:    Functional Scale: LEFS: 0 = 100% DIS  Date assessed: 20  Functional Scale: LEFS: 61 = 24% DIS  Date assessed: 20     Pain level:  0/10 @ rest, 1/10 achiness      SUBJECTIVE: The patient reports soreness in left sacral base region, especially in morning.      OBJECTIVE:    - TU sec (no hands)   - SEE PN; supine to sit negative, standing flexion negative, prone knee flexion + L (long to short) --> corrected after L prone quad stretch, HF MET and anterior innominate mobs   - supine to sit + R; prone knee flexion + L, gaenslen's (neg), Sacral spring + ; LLE stays \"short\" after manual interventions    - patient able to balance 20 seconds, EC, narrowed ARCELIA     RESTRICTIONS/PRECAUTIONS: OA, low bone density; 2 left pubic fractures    Exercises/Interventions:     Therapeutic Exercises (11206) balance,  7/27 - added no taps for A/P  7/29 - no A/P   Hip Hinges  10x 7/29 - added   Spine Neutral Squats  10x 7/29 - added         Manual Intervention (47749)      Hip flexor/quad stretch on L     Piriformis stretch on L     ITB L stretch          LLE Hip LAD       Patient Education & HEP:  - Patient educated on the focus of skilled physical therapy services and plan of care, including: diagnosis, prognosis, treatment goals & options. Includes HEP edu & instruction. Access Code: [email or web address]    URL: [employer]/    Date: 07/13/2020    Prepared by: Amalia Walls     Exercises   Quadruped Alternating Leg Extensions - 10 reps - 3 sets - 1x daily - 5x weekly   Quadruped Rock Back into [employer] Up - 5 reps - 2 sets - 1x daily - 5x weekly     Access Code: [email or web address]   URL: [employer]/   Date: 06/22/2020   Prepared by: Sowmya Lopez     Exercises  Clamshell - 10 reps - 3 sets - 1x daily - 7x weekly  Sidelying Reverse Clamshell - 10 reps - 3 sets - 1x daily - 7x weekly  Standing Hip Abduction with Counter Support - 10 reps - 3 sets - 1x daily - 7x weekly  Standing Hip Extension with Counter Support - 10 reps - 3 sets - 1x daily - 7x weekly    - HEP instruction: Access Code: [email or web address]   URL: [employer]/   Date: 06/17/2020   Prepared by: Amalia Walls     Exercises   · Supine Pelvic Tilt - 10 reps - 1 sets - 1 hold - 2x daily - 7x weekly   · Supine Bridge - 15 reps - 2 sets - 2 hold - 1x daily - 5x weekly   · Supine Active Straight Leg Raise - 10 reps - 2 sets - 1x daily - 5x weekly   · Supine Hip Adduction Isometric with Ball - 10 reps - 2 sets - 5-10 hold - 2x daily - 5x weekly   · Hooklying Isometric Clamshell - 10 reps - 2 sets - 5-10 hold - 2x daily - 5x weekly   - All patient questions were answered    Therapeutic Exercise and NMR EXR  [x] (64604) Provided verbal/tactile cueing for activities related to strengthening, flexibility, endurance, ROM for improvements in  [x] LE / Lumbar: LE, proximal hip, and core control with self care, mobility, lifting, ambulation. [] UE / Cervical: cervical, postural, scapular, scapulothoracic and UE control with self care, reaching, carrying, lifting, house/yardwork, driving, computer work. [x] (05450) Provided verbal/tactile cueing for activities related to improving balance, coordination, kinesthetic sense, posture, motor skill, proprioception to assist with   [x] LE / lumbar: LE, proximal hip, and core control in self care, mobility, lifting, ambulation and eccentric single leg control. [] UE / cervical: cervical, scapular, scapulothoracic and UE control with self care, reaching, carrying, lifting, house/yardwork, driving, computer work.      NMR and Therapeutic Activities:    [x] (57886 or 91178) Provided verbal/tactile cueing for activities related to improving balance, coordination, kinesthetic sense, posture, motor skill, proprioception and motor activation to allow for proper function of   [x] LE: / Lumbar core, proximal hip and LE with self care and ADLs  [] UE / Cervical: cervical, postural, scapular, scapulothoracic and UE control with self care, carrying, lifting, driving, computer work.   [] (36593) Gait Re-education- Provided training and instruction to the patient for proper LE, core and proximal hip recruitment and positioning and eccentric body weight control with ambulation re-education including up and down stairs     Home Exercise Program:    [] (92484) Reviewed/Progressed HEP activities related to strengthening, flexibility, endurance, ROM of   [] LE / Lumbar: core, proximal hip and LE for functional self-care, mobility, lifting and ambulation/stair navigation   [] UE / Cervical: cervical, postural, scapular, scapulothoracic and UE control with self care, reaching, carrying, lifting, house/yardwork, driving, computer work  [] (11358)Reviewed/Progressed HEP activities related to improving balance, coordination, kinesthetic sense, patient tolerance, in order to prevent re-injury. []? Progressing: [x]? Met: []? Not Met: []? Adjusted  2. Patient will have a decrease in pain to <1/10 on average facilitate improvement in movement, function, and ADLs as indicated by Functional Deficits. []? Progressing: [x]? Met: []? Not Met: []? Adjusted     Long Term Goals: To be achieved in: 5 weeks  1. Disability index score of 30% or less for the LEFS to assist with reaching prior level of function. []? Progressing: [x]? Met: []? Not Met: []? Adjusted  2. Patient will demonstrate increased AROM to symmetric L hip to allow for proper joint functioning as indicated by patients Functional Deficits. []? Progressing: [x]? Met: []? Not Met: []? Adjusted  3. Patient will demonstrate an increase in Strength to at least 4+/5 as well as good proximal hip strength and control to allow for proper functional mobility as indicated by patients Functional Deficits. [x]? Progressing: []? Met: []? Not Met: []? Adjusted  4. Patient will return to functional activities including community walking without increased symptoms or restriction. [x]? Progressing: []? Met: []? Not Met: []? Adjusted  5. Don/doff pants in standing for independent self-care. [x]? Progressing: []? Met: []? Not Met: []? Adjusted         Overall Progression Towards Functional goals/ Treatment Progress Update:  [x] Patient is progressing as expected towards functional goals listed. [] Progression is slowed due to complexities/Impairments listed. [] Progression has been slowed due to co-morbidities.   [] Plan just implemented, too soon to assess goals progression <30days   [] Goals require adjustment due to lack of progress  [] Patient is not progressing as expected and requires additional follow up with physician  [] Other:     Persisting Functional Limitations/Impairments:  [x]Sleeping []Sitting               [x]Standing [x]Transfers        [x]Walking [x]Kneeling (knees)               [x]Stairs []Squatting / bending   []ADLs []Reaching  [x]Lifting  []Housework  []Driving []Job related tasks  []Sports/Recreation []Other:      ASSESSMENT:  The patient performed all exercises with proper form and control, and without pain. The patient's strength is progressing at this time, as well as tolerance to eccentrics and stepping. The patient's gait is still limited by trunk compensation due to weak hip abductors/extensions and decreased sacral nutation. Treatment/Activity Tolerance:  [x] Patient tolerated treatment well [] Patient limited by fatigue  [] Patient limited by pain  [] Patient limited by other medical complications  [] Other:     Prognosis: [x] Good [] Fair  [] Poor    Patient Requires Follow-up: [x] Yes  [] No    PLAN: POC: PT 2x / week for 4 weeks. Progress core/hip strengthening (abd/ext especially), normalize gait w/o AD, balance  [x] Continue per plan of care [] Alter current plan (see comments)  [] Plan of care initiated [] Hold pending MD visit [] Discharge    Electronically signed by: Smith Velazco PT, DPT, OMT-C    Note: If patient does not return for scheduled/ recommended follow up visits, this note will serve as a discharge from care along with most recent update on progress.

## 2020-08-03 ENCOUNTER — HOSPITAL ENCOUNTER (OUTPATIENT)
Dept: PHYSICAL THERAPY | Age: 75
Setting detail: THERAPIES SERIES
Discharge: HOME | End: 2020-08-03
Payer: MEDICARE

## 2020-08-03 PROCEDURE — 97110 THERAPEUTIC EXERCISES: CPT

## 2020-08-03 PROCEDURE — 97530 THERAPEUTIC ACTIVITIES: CPT

## 2020-08-03 NOTE — FLOWSHEET NOTE
[facility] - Outpatient Physical Therapy    Physical Therapy Daily Treatment Note  Date:  8/3/2020    Patient Name:  Anitha Henry    :  1945  MRN: [id number]  Medical/Treatment Diagnosis Information:  · Diagnosis: S32.592A (ICD-10-CM) - Other specified fracture of left pubis, initial encounter for closed fracture  · Treatment Diagnosis: difficulty walking, pelvic pain, imbalance, decreased LE strength, decreased ADL status  Insurance/Certification information:  PT Insurance Information: [address] - visits PMN  Physician Information:  Referring Practitioner:  / Katarzyna Jenkins MD  Plan of care signed (Y/N): [x]  Yes []  No (see below)    Progress Report: []  Yes  [x]  No     Date Range for reporting period:   Beginning 20    Ending TBD    Progress report due (10 Rx/or 30 days whichever is less): 18     Recertification due (POC duration/ or 90 days whichever is less): POC     Visit # Insurance Allowable Date Range (if applicable)   65/53 +  PMN      Latex Allergy:  [x]NO      []YES  Preferred Language for Healthcare:   [x]English       []other:    Functional Scale: LEFS: 0 = 100% DIS  Date assessed: 20  Functional Scale: LEFS: 61 = 24% DIS  Date assessed: 20     Pain level:  0/10 @ rest, 1/10 achiness      SUBJECTIVE: The patient reports she tolerated ride up to [place] without symptoms after last PT visit. The patient reports some low level achiness still present, especially in gluteals on L. The patient reports improvement overall in regards to strength and HEP ease.      OBJECTIVE:    - TU sec (no hands)   - SEE PN; supine to sit negative, standing flexion negative, prone knee flexion + L (long to short) --> corrected after L prone quad stretch, HF MET and anterior innominate mobs   - supine to sit + R; prone knee flexion + L, gaenslen's (neg), Sacral spring + ; LLE stays \"short\" after manual interventions    - patient able to balance 20 seconds, EC, narrowed ARCELIA     RESTRICTIONS/PRECAUTIONS: OA, low bone density; 2 left pubic fractures    Exercises/Interventions:     Therapeutic Exercises (08347)   Resistance Sets / Reps Notes   Warm Up:  Dynamic HSS  Open [place]  Closed [place]  Standing Quad Stretch  Adductor Stretch     10x R/L  10x  10x  2 x 20'' R/L  3 x 10'' R/L     8/3 - added       IB - HSS  SLR Flexion  Hooklying isometrics:  Abduction  adduction  bridges  AP Pelvic tilts  Hooklying hip abd with band Bridge with band on knees PVT with march/LE walk out  PVT with low ab progressions   Clam shells  Reverse Clamshells  Leg press - Double leg (B1, K4) 95# 2 x 15   8/3 - added resistance   Leg Press - Single Leg (B1, K4) 50# 2 x 10, R/L 8/3 - added   HS Curls - QUANTUM (Knee 2, Ankle 3) 35# 3 x 15 7/27 - added  8/3 - progressed sets   LAQ - QUANTUM (Knee D, Ankle 3) 35# 1 x 30  1 x 15 8/3 - progressed sets/reps   Standing hip abd (4'' step w/ airex on top) Standing hip ext (4'' step w/ airex on top) Upright bike Seated Bilateral Hip IR + Adductor Squeeze 6/30 - added   Prone Hip Extension  HS curls on L  Added 7/6   Hip ER with band Added 7/6   H rocks  Qped Glute Kick Standing RDL (DL) - w/ earthquake bar Sidelying SLR Flexion  Sidelying Hamstring Curls  Sidelying Clamhell Pulses  Adductor S  Wall Sit Standing Hip Abd Wall Slide Standing SLR Abd/Ext Lateral Band Walk Green loop 2 laps 8/3 - added; 1 lap straight, 1 lap squatted   Neuromuscular Re-ed (44523) Therapeutic Activities (09346)       Side stepping at [facility] (NO UE A)  Airex neutral ARCELIA  Head Turns/Pitches    6/30 - increased   Airex Narrow ARCELIA  EC    6/30 - increased   Airex staggered stance  Added 6/22   Tandem stance on airex  Forward step ups / retro step down 13'' 2 x 5, R/L ea 8/3 - progressed depth, increased reps, added R   Fwd/Retro Walking Alternating Lunges (down retro ramp / up fwd ramp)  2x 8/3 - added; cues for depth of back knee   Lateral step ups  \"L\" Step Ups Biodex Postural stabiltiy Biodex limits of Stability Biodex Random control Biodex Maze Control TRX Squats TRX Single Leg Squat TRX mini squat and hold with 3 point touch of R LE Table Top Position: Jaime Hip Abd  SLS on L with R LE ball rolls Fwd/Retro Step Up & Over Side stepping with sport cord Hip hikes SLS (lifting front foot from step)  Tandem R/L      Airex Narrowed ARCELIA (head pitches/turns) Rockerboard /ML Taps/Balance  Hip Hinges  Spine Neutral Squats        Manual Intervention (05948)      Hip flexor/quad stretch on L     Piriformis stretch on L     ITB L stretch          LLE Hip LAD       Patient Education & HEP:  - Patient educated on the focus of skilled physical therapy services and plan of care, including: diagnosis, prognosis, treatment goals & options. Includes HEP edu & instruction. 8/3 - Access Code: [email or web address]   URL: "[employer]/   Date: 08/03/2020   Prepared by: Lilliana Tineo     Exercises   Standing Quadriceps Stretch - 3 reps - 1 sets - 20 hold - 1x daily - 5x weekly   Side Lunge Adductor Stretch - 3 reps - 1 sets - 10 hold - 1x daily - 5x weekly   Side Stepping with Resistance at Ankles - 3 reps - 1 sets - 10 feet - 1x daily - 5x weekly        Access Code: [email or web address]    URL: "[employer]/    Date: 07/13/2020    Prepared by: Lilliana Tineo     Exercises   Quadruped Alternating Leg Extensions - 10 reps - 3 sets - 1x daily - 5x weekly   Quadruped Rock Back into [employer] Up - 5 reps - 2 sets - 1x daily - 5x weekly     Access Code: [email or web address]   URL: "[employer]/   Date: 06/22/2020   Prepared by: Lucho Miller     Exercises  Clamshell - 10 reps - 3 sets - 1x daily - 7x weekly  Sidelying Reverse Clamshell - 10 reps - 3 sets - 1x daily - 7x weekly  Standing Hip Abduction with Counter Support - 10 reps - 3 sets - 1x daily - 7x weekly  Standing Hip Extension with Counter Support - 10 reps - 3 sets - 1x daily - 7x weekly    - HEP instruction: Access Code: Pietro Echevarria URL: [email or web address]/   Date: 06/17/2020   Prepared by: Melanie Barnes     Exercises   · Supine Pelvic Tilt - 10 reps - 1 sets - 1 hold - 2x daily - 7x weekly   · Supine Bridge - 15 reps - 2 sets - 2 hold - 1x daily - 5x weekly   · Supine Active Straight Leg Raise - 10 reps - 2 sets - 1x daily - 5x weekly   · Supine Hip Adduction Isometric with Ball - 10 reps - 2 sets - 5-10 hold - 2x daily - 5x weekly   · Hooklying Isometric Clamshell - 10 reps - 2 sets - 5-10 hold - 2x daily - 5x weekly   - All patient questions were answered    Therapeutic Exercise and NMR EXR  [x] (49403) Provided verbal/tactile cueing for activities related to strengthening, flexibility, endurance, ROM for improvements in  [x] LE / Lumbar: LE, proximal hip, and core control with self care, mobility, lifting, ambulation. [] UE / Cervical: cervical, postural, scapular, scapulothoracic and UE control with self care, reaching, carrying, lifting, house/yardwork, driving, computer work.  [] (80303) Provided verbal/tactile cueing for activities related to improving balance, coordination, kinesthetic sense, posture, motor skill, proprioception to assist with   [] LE / lumbar: LE, proximal hip, and core control in self care, mobility, lifting, ambulation and eccentric single leg control. [] UE / cervical: cervical, scapular, scapulothoracic and UE control with self care, reaching, carrying, lifting, house/yardwork, driving, computer work.      NMR and Therapeutic Activities:    [x] (27355 or 12081) Provided verbal/tactile cueing for activities related to improving balance, coordination, kinesthetic sense, posture, motor skill, proprioception and motor activation to allow for proper function of   [x] LE: / Lumbar core, proximal hip and LE with self care and ADLs  [] UE / Cervical: cervical, postural, scapular, scapulothoracic and UE control with self care, carrying, lifting, driving, computer work.   [] (45823) Gait Re-education- Provided stairs. Modalities: 6/17 - Consider [facility]    Charges:  Timed Code Treatment Minutes: 40   Total Treatment Minutes: 40     [] EVAL - LOW (72316)   [] EVAL - MOD (87709)  [] EVAL - HIGH (30337)  [] RE-EVAL (34575)  [x] TE (67343) x 2     [] Ionto  [] NMR (54287) x   [] Vaso  [] Manual (35645) x      [] Ultrasound  [x] TA x 1      [] Mech Traction (09130)  [] Gait Training x     [] ES (un) (72476):   [] Aquatic therapy x   [] Other:   [] Group:     GOALS: Patient stated goal: to don/doff pants while standing; return to \"normal\" function & walking  [x]? Progressing: []? Met: []? Not Met: []? Adjusted     Therapist goals for Patient:   Short Term Goals: To be achieved in: 2 weeks  1. Independent in HEP and progression per patient tolerance, in order to prevent re-injury. []? Progressing: [x]? Met: []? Not Met: []? Adjusted  2. Patient will have a decrease in pain to <1/10 on average facilitate improvement in movement, function, and ADLs as indicated by Functional Deficits. []? Progressing: [x]? Met: []? Not Met: []? Adjusted     Long Term Goals: To be achieved in: 5 weeks  1. Disability index score of 30% or less for the LEFS to assist with reaching prior level of function. []? Progressing: [x]? Met: []? Not Met: []? Adjusted  2. Patient will demonstrate increased AROM to symmetric L hip to allow for proper joint functioning as indicated by patients Functional Deficits. []? Progressing: [x]? Met: []? Not Met: []? Adjusted  3. Patient will demonstrate an increase in Strength to at least 4+/5 as well as good proximal hip strength and control to allow for proper functional mobility as indicated by patients Functional Deficits. [x]? Progressing: []? Met: []? Not Met: []? Adjusted  4. Patient will return to functional activities including community walking without increased symptoms or restriction. [x]? Progressing: []? Met: []? Not Met: []? Adjusted  5. Don/doff pants in standing for independent self-care. [x]? Progressing: []? Met: []? Not Met: []? Adjusted         Overall Progression Towards Functional goals/ Treatment Progress Update:  [x] Patient is progressing as expected towards functional goals listed. [] Progression is slowed due to complexities/Impairments listed. [] Progression has been slowed due to co-morbidities. [] Plan just implemented, too soon to assess goals progression <30days   [] Goals require adjustment due to lack of progress  [] Patient is not progressing as expected and requires additional follow up with physician  [] Other:     Persisting Functional Limitations/Impairments:  [x]Sleeping []Sitting               [x]Standing [x]Transfers        [x]Walking [x]Kneeling (knees)               [x]Stairs []Squatting / bending   []ADLs []Reaching  [x]Lifting  []Housework  []Driving []Job related tasks  []Sports/Recreation []Other:      ASSESSMENT:  The patient progressed reps with LE resisted machines this date, as well as TA with lunging - all without pain. The patient was challenged by intensity and pace of today's session. The patient still lacks gluteal and quad strength, but she is working towards LTG status with each week/visit. Treatment/Activity Tolerance:  [x] Patient tolerated treatment well [] Patient limited by fatigue  [] Patient limited by pain  [] Patient limited by other medical complications  [] Other:     Prognosis: [x] Good [] Fair  [] Poor    Patient Requires Follow-up: [x] Yes  [] No    PLAN: POC: PT 2x / week for 4 weeks. Progress core/hip strengthening (abd/ext especially), normalize gait w/o AD, balance  [x] Continue per plan of care [] Alter current plan (see comments)  [] Plan of care initiated [] Hold pending MD visit [] Discharge    Electronically signed by: Demi Vergara PT, DPT, OMT-C    Note: If patient does not return for scheduled/ recommended follow up visits, this note will serve as a discharge from care along with most recent update on progress.

## 2020-08-05 ENCOUNTER — HOSPITAL ENCOUNTER (OUTPATIENT)
Dept: PHYSICAL THERAPY | Age: 75
Setting detail: THERAPIES SERIES
Discharge: HOME | End: 2020-08-05
Payer: MEDICARE

## 2020-08-05 PROCEDURE — 97112 NEUROMUSCULAR REEDUCATION: CPT

## 2020-08-05 PROCEDURE — 97140 MANUAL THERAPY 1/> REGIONS: CPT

## 2020-08-05 PROCEDURE — 97110 THERAPEUTIC EXERCISES: CPT

## 2020-08-05 NOTE — FLOWSHEET NOTE
[facility] - Outpatient Physical Therapy    Physical Therapy Daily Treatment Note  Date:  2020    Patient Name:  Jean Paul Quijano    :  1945  MRN: [id number]  Medical/Treatment Diagnosis Information:  · Diagnosis: S32.592A (ICD-10-CM) - Other specified fracture of left pubis, initial encounter for closed fracture  · Treatment Diagnosis: difficulty walking, pelvic pain, imbalance, decreased LE strength, decreased ADL status  Insurance/Certification information:  PT Insurance Information: Jun[employer] Gipson - visits PMN  Physician Information:  Referring Practitioner:  / Jack Hathaway MD  Plan of care signed (Y/N): [x]  Yes []  No (see below)    Progress Report: []  Yes  [x]  No     Date Range for reporting period:   Beginning 20    Ending TBD    Progress report due (10 Rx/or 30 days whichever is less):      Recertification due (POC duration/ or 90 days whichever is less): POC     Visit # Insurance Allowable Date Range (if applicable)    +      Latex Allergy:  [x]NO      []YES  Preferred Language for Healthcare:   [x]English       []other:    Functional Scale: LEFS: 0 = 100% DIS  Date assessed: 20  Functional Scale: LEFS: 61 = 24% DIS  Date assessed: 20     Pain level:  0/10 @ rest, 2/10 achiness with movement      SUBJECTIVE: The patient reports soreness is most prevalent. She wishes she could use diclofenac, but sees MD Thursday about this. She cannot tolerate left sidesleeping and supine for too long due to discomfort.       OBJECTIVE:      - 30deg AROM IR R hip, 28deg AROM IR L hip   - TU sec (no hands)   - SEE PN; supine to sit negative, standing flexion negative, prone knee flexion + L (long to short) --> corrected after L prone quad stretch, HF MET and anterior innominate mobs   - supine to sit + R; prone knee flexion + L, gaenslen's (neg), Sacral spring + ; LLE stays \"short\" after manual interventions    - patient able to balance 20 seconds, EC, narrowed ARCELIA     RESTRICTIONS/PRECAUTIONS: OA, low bone density; 2 left pubic fractures    Exercises/Interventions:     Therapeutic Exercises (93512)   Resistance Sets / Reps Notes   Warm Up:  Dynamic HSS  Open [place]  Closed [place]  Standing Quad Stretch  Adductor Stretch     Deep Seated Lumbar Flexion  8 x 5'' 8/5 - added   IB - HSS  SLR Flexion  Hooklying isometrics:  Abduction  adduction  AP Pelvic tilts  Hooklying hip abd with band Bridge with band on knees PVT with march/LE walk out  PVT with low ab progressions   Clamshells blue 2 x 15 R/L 8/5 - progressed resistance   Reverse Clamshells 3# 2 x 15 R/L 8/5 - progressed resistance/reps   Leg press - Double leg (B1, K4) Leg Press - Single Leg (B1, K4) HS Curls - QUANTUM (Knee 2, Ankle 3) LAQ - QUANTUM (Knee D, Ankle 3) Standing hip abd (4'' step w/ airex on top) Standing hip ext (4'' step w/ airex on top) Upright bike Seated Bilateral Hip IR + Adductor Squeeze 6/30 - added   Prone Hip Extension  HS curls on L  Added 7/6   Hip ER with band Added 7/6   H rocks  Qped Glute Kick Standing RDL (DL) - w/ earthquake bar Sidelying SLR Flexion  Sidelying Hamstring Curls  Sidelying Clamhell Pulses  Adductor S  Wall Sit Standing Hip Abd Wall Slide Standing SLR Abd/Ext Lateral Band Walk (ankles) Green loop 3 laps 8/5 - length of table         Neuromuscular Re-ed (60571) Therapeutic Activities (48779)       SB Bridges:  Bent Knees  Straight Legs    2 x 10  2 x 10   8/5 - added  8/5 - added   SB Isometric Crunches  2 x 10 8/5 - added   Reverse Crunches w/ plyoball adductor squeeze 4# plyo 2 x 10 8/5 - added   Single Leg Bridge  1 x 10, R/L 8/5 - added   Bilateral Hip IR (seated @ EOT w/ plyoball between knees) 4# plyo 2 x 10 8/5 - added   Side stepping at plint (NO UE A)  Airex neutral ARCELIA  Head Turns/Pitches    6/30 - increased   Airex Narrow ARCELIA  EC    6/30 - increased   Airex staggered stance  Added 6/22   Tandem stance on airex  Forward step ups / retro step down Fwd/Retro Walking Alternating Lunges (down retro ramp / up fwd ramp) Lateral step ups  \"L\" Step Ups Biodex Postural stabiltiy Biodex limits of Stability Biodex Random control Biodex Maze Control TRX Squats TRX Single Leg Squat TRX mini squat and hold with 3 point touch of R LE Table Top Position: Jaime Hip Abd  SLS on L with R LE ball rolls Fwd/Retro Step Up & Over Side stepping with sport cord Hip hikes SLS (lifting front foot from step)  Tandem R/L      Airex Narrowed ARCELIA (head pitches/turns) Rockerboard /ML Taps/Balance  Hip Hinges  Spine Neutral Squats        Manual Intervention (80041)      Hip flexor/quad stretch on L     Piriformis stretch on L     ITB L stretch          LLE Hip LAD     Sticking R ITB, ITB stretch  8'    Patient Education & HEP:  - Patient educated on the focus of skilled physical therapy services and plan of care, including: diagnosis, prognosis, treatment goals & options. Includes HEP edu & instruction. 8/5 - Access Code: [email or web address]   URL: [email or web address]   Date: 08/05/2020   Prepared by: Marilyn Ross     Exercises   Seated Lumbar Flexion Stretch - 5-6 reps - 1 sets - 5-10 hold - 1-2x daily - 5x weekly     8/3 - Access Code: [email or web address]   URL: [employer]/   Date: 08/03/2020   Prepared by: Marilyn Ross     Exercises   Standing Quadriceps Stretch - 3 reps - 1 sets - 20 hold - 1x daily - 5x weekly   Side Lunge Adductor Stretch - 3 reps - 1 sets - 10 hold - 1x daily - 5x weekly   Side Stepping with Resistance at Ankles - 3 reps - 1 sets - 10 feet - 1x daily - 5x weekly        Access Code: [email or web address]    URL: [employer]/    Date: 07/13/2020    Prepared by: Marilyn Ross     Exercises   Quadruped Alternating Leg Extensions - 10 reps - 3 sets - 1x daily - 5x weekly   Quadruped Rock Back into [employer] Up - 5 reps - 2 sets - 1x daily - 5x weekly     Access Code: [email or web address]   URL: [employer]/   Date: 06/22/2020   Prepared by: Ulus Nipper     Exercises  Clamshell - 10 reps - 3 sets - 1x daily - 7x weekly  Sidelying Reverse Clamshell - 10 reps - 3 sets - 1x daily - 7x weekly  Standing Hip Abduction with Counter Support - 10 reps - 3 sets - 1x daily - 7x weekly  Standing Hip Extension with Counter Support - 10 reps - 3 sets - 1x daily - 7x weekly    - HEP instruction: Access Code: [email or web address]   URL: [employer]/   Date: 06/17/2020   Prepared by: Herminio Iniguez     Exercises   · Supine Pelvic Tilt - 10 reps - 1 sets - 1 hold - 2x daily - 7x weekly   · Supine Bridge - 15 reps - 2 sets - 2 hold - 1x daily - 5x weekly   · Supine Active Straight Leg Raise - 10 reps - 2 sets - 1x daily - 5x weekly   · Supine Hip Adduction Isometric with Ball - 10 reps - 2 sets - 5-10 hold - 2x daily - 5x weekly   · Hooklying Isometric Clamshell - 10 reps - 2 sets - 5-10 hold - 2x daily - 5x weekly   - All patient questions were answered    Therapeutic Exercise and NMR EXR  [x] (12443) Provided verbal/tactile cueing for activities related to strengthening, flexibility, endurance, ROM for improvements in  [x] LE / Lumbar: LE, proximal hip, and core control with self care, mobility, lifting, ambulation. [] UE / Cervical: cervical, postural, scapular, scapulothoracic and UE control with self care, reaching, carrying, lifting, house/yardwork, driving, computer work. [x] (16119) Provided verbal/tactile cueing for activities related to improving balance, coordination, kinesthetic sense, posture, motor skill, proprioception to assist with   [x] LE / lumbar: LE, proximal hip, and core control in self care, mobility, lifting, ambulation and eccentric single leg control. [] UE / cervical: cervical, scapular, scapulothoracic and UE control with self care, reaching, carrying, lifting, house/yardwork, driving, computer work.      NMR and Therapeutic Activities:    [x] (10469 or 74567) Provided verbal/tactile cueing for activities related to improving balance, allow for proper functional mobility as indicated by patients Functional Deficits. [x]? Progressing: []? Met: []? Not Met: []? Adjusted  4. Patient will return to functional activities including community walking without increased symptoms or restriction. [x]? Progressing: []? Met: []? Not Met: []? Adjusted  5. Don/doff pants in standing for independent self-care. [x]? Progressing: []? Met: []? Not Met: []? Adjusted         Overall Progression Towards Functional goals/ Treatment Progress Update:  [x] Patient is progressing as expected towards functional goals listed. [] Progression is slowed due to complexities/Impairments listed. [] Progression has been slowed due to co-morbidities. [] Plan just implemented, too soon to assess goals progression <30days   [] Goals require adjustment due to lack of progress  [] Patient is not progressing as expected and requires additional follow up with physician  [] Other:     Persisting Functional Limitations/Impairments:  [x]Sleeping []Sitting               [x]Standing [x]Transfers        [x]Walking [x]Kneeling (knees)               [x]Stairs []Squatting / bending   []ADLs []Reaching  [x]Lifting  []Housework  []Driving []Job related tasks  []Sports/Recreation []Other:      ASSESSMENT:  Patient was challenged by pelvic, core and hip strengthening this date, but performed well. The patient responded favorably to deep lumbar flexion with improved pain levels. Treatment/Activity Tolerance:  [x] Patient tolerated treatment well [] Patient limited by fatigue  [] Patient limited by pain  [] Patient limited by other medical complications   [] Other:     Prognosis: [x] Good [] Fair  [] Poor    Patient Requires Follow-up: [x] Yes  [] No    PLAN: POC: PT 2x / week for 4 weeks.  Progress core/hip strengthening (abd/ext especially), normalize gait w/o AD, balance  [x] Continue per plan of care [] Alter current plan (see comments)  [] Plan of care initiated [] Hold pending MD visit [] Discharge    Electronically signed by: Ashely Garcia PT, DPT, OMT-C    Note: If patient does not return for scheduled/ recommended follow up visits, this note will serve as a discharge from care along with most recent update on progress.

## 2020-08-10 ENCOUNTER — HOSPITAL ENCOUNTER (OUTPATIENT)
Dept: PHYSICAL THERAPY | Age: 75
Setting detail: THERAPIES SERIES
Discharge: HOME | End: 2020-08-10
Payer: MEDICARE

## 2020-08-10 PROCEDURE — 97110 THERAPEUTIC EXERCISES: CPT

## 2020-08-10 PROCEDURE — 97140 MANUAL THERAPY 1/> REGIONS: CPT

## 2020-08-10 NOTE — FLOWSHEET NOTE
[facility] - Outpatient Physical Therapy    Physical Therapy Daily Treatment Note  Date:  8/10/2020    Patient Name:  Shruthi Dalal    :  1945  MRN: [id number]  Medical/Treatment Diagnosis Information:  · Diagnosis: S32.592A (ICD-10-CM) - Other specified fracture of left pubis, initial encounter for closed fracture  · Treatment Diagnosis: difficulty walking, pelvic pain, imbalance, decreased LE strength, decreased ADL status  Insurance/Certification information:  PT Insurance Information: [address] - visits PMN  Physician Information:  Referring Practitioner:  / Blaire Sotelo MD  Plan of care signed (Y/N): [x]  Yes []  No (see below)    Progress Report: []  Yes  [x]  No     Date Range for reporting period:   Beginning 20    Ending TBD    Progress report due (10 Rx/or 30 days whichever is less):      Recertification due (POC duration/ or 90 days whichever is less): POC    Visit # Insurance Allowable Date Range (if applicable)    +      Latex Allergy:  [x]NO      []YES  Preferred Language for Healthcare:   [x]English       []other:    Functional Scale: LEFS: 0 = 100% DIS  Date assessed: 20  Functional Scale: LEFS: 61 = 24% DIS  Date assessed: 20     Pain level:  0/10 @ rest, 1-2/10 achiness with movement; 6-7/10 upon waking      SUBJECTIVE: The patient does not have to follow up with MD again at this time unless there are other issues. The patient was cleared and OK'ed to begin using diclofenac again, which should help her other aches and pains tremendously. She has been using it once a day, she is prescribed up to 2x/day. She began using the medication Thursday.      OBJECTIVE:      - 30deg AROM IR R hip, 28deg AROM IR L hip   - TU sec (no hands)   - SEE PN; supine to sit negative, standing flexion negative, prone knee flexion + L (long to short) --> corrected after L prone quad stretch, HF MET and anterior innominate mobs  7/13 - supine to sit + R; prone knee flexion + L, gaenslen's (neg), Sacral spring + ; LLE stays \"short\" after manual interventions   6/30 - patient able to balance 20 seconds, EC, narrowed ARCELIA     RESTRICTIONS/PRECAUTIONS: OA, low bone density; 2 left pubic fractures    Exercises/Interventions:     Therapeutic Exercises (62062)   Resistance Sets / Reps Notes   Warm Up:  Dynamic HSS  Open [place]  Closed [place]  Standing Quad Stretch  Adductor Stretch   H rocks  8 reps 8/10 - added   Deep Seated Lumbar Flexion  IB - HSS  SLR Flexion  Hooklying isometrics:  Abduction  adduction  AP Pelvic tilts  Hooklying hip abd with band Bridge with band on knees PVT with march/LE walk out  PVT with low ab progressions   Clamshells Reverse Clamshells Leg press - Double leg (B1, K4) 95# 2 x 15 Leg Press - Single Leg (B1, K4) HS Curls - QUANTUM (Knee 2, Ankle 3) LAQ - QUANTUM (Knee D, Ankle 3) Standing hip abd (4'' step w/ airex on top) Standing hip ext (4'' step w/ airex on top) Upright bike Seated Bilateral Hip IR + Adductor Squeeze 6/30 - added   Prone Hip Extension  Prone Hip Extension w/ Knee Bent  15x, R/L 8/10 - added   Qped [place]  2 x 10, R/L 8/10 - added   HS curls on L  Added 7/6   Hip ER with band Added 7/6   H rocks  Qped Glute Kick Standing RDL (DL) - w/ earthquake bar Sidelying SLR Flexion  Sidelying Hamstring Curls  Sidelying Clamhell Pulses  Adductor S  Wall Sit Standing Hip Abd Wall Slide Standing SLR Abd/Ext Lateral Band Walk (ankles) Resisted Walkout Double slastix Fwd/retro, 5x ea 8/10 - added         Neuromuscular Re-ed (12926) Therapeutic Activities (99407)       SB Bridges:  Bent Knees  Straight Legs SB Isometric Crunches Reverse Crunches w/ plyoball adductor squeeze Single Leg Bridge Bilateral Hip IR (seated @ EOT w/ plyoball between knees) Side stepping at plinth (NO UE A)  Airex neutral ARCELIA  Head Turns/Pitches    6/30 - increased   Airex Narrow ARCELIA  EC    6/30 - increased   Airex staggered stance  Added 6/22   Tandem stance on airex  Forward step ups / retro step down Fwd/Retro Walking Alternating Lunges (down retro ramp / up fwd ramp) Lateral step ups  \"L\" Step Ups Biodex Postural stabiltiy Biodex limits of Stability Biodex Random control Biodex Maze Control TRX Squats TRX Single Leg Squat TRX mini squat and hold with 3 point touch of R LE Table Top Position: Jaime Hip Abd  SLS on L with R LE ball rolls Fwd/Retro Step Up & Over Side stepping with sport cord Hip hikes SLS (lifting front foot from step)  Tandem R/L      Airex Narrowed ARCELIA (head pitches/turns) Rockerboard /ML Taps/Balance  Hip Hinges  Spine Neutral Squats        Manual Intervention (46153)      Hip flexor/quad stretch on L     Piriformis stretch on L     ITB L stretch          LLE Hip LAD     Sticking R ITB  ITB stretch  Prone L5 Unilateral Mob R  Prone L4 Unilateral Mob L  Prone Sacral Nutation Mobs G2-3  Sidelying HF Stretch  R ITB stretch  2'  2'  60''  60''  2'  1 min R/L  [address] R   Patient Education & HEP:  - Patient educated on the focus of skilled physical therapy services and plan of care, including: diagnosis, prognosis, treatment goals & options. Includes HEP edu & instruction. 8/5 - Access Code: [email or web address]   URL: [email or web address]   Date: 08/05/2020   Prepared by: Vernel Riff     Exercises   Seated Lumbar Flexion Stretch - 5-6 reps - 1 sets - 5-10 hold - 1-2x daily - 5x weekly     8/3 - Access Code: [email or web address]   URL: [email or web address]   Date: 08/03/2020   Prepared by: Vernel Riff     Exercises   Standing Quadriceps Stretch - 3 reps - 1 sets - 20 hold - 1x daily - 5x weekly   Side Lunge Adductor Stretch - 3 reps - 1 sets - 10 hold - 1x daily - 5x weekly   Side Stepping with Resistance at Ankles - 3 reps - 1 sets - 10 feet - 1x daily - 5x weekly        Access Code: [email or web address]    URL: [email or web address]    Date: 07/13/2020    Prepared by: Vernel Riff     Exercises   Quadruped Alternating Leg Extensions - 10 reps - 3 sets - 1x daily - 5x weekly   Quadruped Rock Back into [employer] Up - 5 reps - 2 sets - 1x daily - 5x weekly     Access Code: [email or web address]   URL: [email or web address]   Date: 06/22/2020   Prepared by: Ángela Side     Exercises  Clamshell - 10 reps - 3 sets - 1x daily - 7x weekly  Sidelying Reverse Clamshell - 10 reps - 3 sets - 1x daily - 7x weekly  Standing Hip Abduction with Counter Support - 10 reps - 3 sets - 1x daily - 7x weekly  Standing Hip Extension with Counter Support - 10 reps - 3 sets - 1x daily - 7x weekly    - HEP instruction: Access Code: [email or web address]   URL: [email or web address]   Date: 06/17/2020   Prepared by: Derek Milligan     Exercises   · Supine Pelvic Tilt - 10 reps - 1 sets - 1 hold - 2x daily - 7x weekly   · Supine Bridge - 15 reps - 2 sets - 2 hold - 1x daily - 5x weekly   · Supine Active Straight Leg Raise - 10 reps - 2 sets - 1x daily - 5x weekly   · Supine Hip Adduction Isometric with Ball - 10 reps - 2 sets - 5-10 hold - 2x daily - 5x weekly   · Hooklying Isometric Clamshell - 10 reps - 2 sets - 5-10 hold - 2x daily - 5x weekly   - All patient questions were answered    Therapeutic Exercise and NMR EXR  [x] (14519) Provided verbal/tactile cueing for activities related to strengthening, flexibility, endurance, ROM for improvements in  [x] LE / Lumbar: LE, proximal hip, and core control with self care, mobility, lifting, ambulation. [] UE / Cervical: cervical, postural, scapular, scapulothoracic and UE control with self care, reaching, carrying, lifting, house/yardwork, driving, computer work.  [] (18096) Provided verbal/tactile cueing for activities related to improving balance, coordination, kinesthetic sense, posture, motor skill, proprioception to assist with   [] LE / lumbar: LE, proximal hip, and core control in self care, mobility, lifting, ambulation and eccentric single leg control.    [] UE / cervical: cervical, scapular, scapulothoracic and UE control with self care, reaching, carrying, lifting, house/yardwork, driving, computer work.      NMR and Therapeutic Activities:    [] (44555 or 99722) Provided verbal/tactile cueing for activities related to improving balance, coordination, kinesthetic sense, posture, motor skill, proprioception and motor activation to allow for proper function of   [] LE: / Lumbar core, proximal hip and LE with self care and ADLs  [] UE / Cervical: cervical, postural, scapular, scapulothoracic and UE control with self care, carrying, lifting, driving, computer work.   [] (54965) Gait Re-education- Provided training and instruction to the patient for proper LE, core and proximal hip recruitment and positioning and eccentric body weight control with ambulation re-education including up and down stairs     Home Exercise Program:    [] (77929) Reviewed/Progressed HEP activities related to strengthening, flexibility, endurance, ROM of   [] LE / Lumbar: core, proximal hip and LE for functional self-care, mobility, lifting and ambulation/stair navigation   [] UE / Cervical: cervical, postural, scapular, scapulothoracic and UE control with self care, reaching, carrying, lifting, house/yardwork, driving, computer work  [] (84259)Reviewed/Progressed HEP activities related to improving balance, coordination, kinesthetic sense, posture, motor skill, proprioception of   [] LE: core, proximal hip and LE for self care, mobility, lifting, and ambulation/stair navigation    [] UE / Cervical: cervical, postural,  scapular, scapulothoracic and UE control with self care, reaching, carrying, lifting, house/yardwork, driving, computer work    Manual Treatments:  PROM / STM / Oscillations-Mobs:  G-I, II, III, IV (PA's, Inf., Post.)  [x] (72909) Provided manual therapy to mobilize LE, proximal hip and/or LS spine soft tissue/joints for the purpose of modulating pain, promoting relaxation,  increasing ROM, reducing/eliminating soft tissue swelling/inflammation/restriction, improving soft tissue extensibility and allowing for proper ROM for normal function with   [x] LE / lumbar: self care, mobility, lifting and ambulation. [] UE / Cervical: self care, reaching, carrying, lifting, house/yardwork, driving, computer work. Modalities:  [] (02262) Vasopneumatic compression: Utilized vasopneumatic compression to decrease edema / swelling for the purpose of improving mobility and quad tone / recruitment which will allow for increased overall function including but not limited to self-care, transfers, ambulation, and ascending / descending stairs. Modalities: 6/17 - Consider MO    Charges:  Timed Code Treatment Minutes: 40   Total Treatment Minutes: 40     [] EVAL - LOW (17103)   [] EVAL - MOD (78952)  [] EVAL - HIGH (00416)  [] RE-EVAL (20124)  [x] TE (78367) x 2     [] Ionto  [] NMR (42002) x    [] Vaso  [x] Manual (24553) x 1      [] Ultrasound  [] TA x       [] Mech Traction (76253)  [] Gait Training x     [] ES (un) (33578):   [] Aquatic therapy x   [] Other:   [] Group:     GOALS: Patient stated goal: to don/doff pants while standing; return to \"normal\" function & walking  [x]? Progressing: []? Met: []? Not Met: []? Adjusted     Therapist goals for Patient:   Short Term Goals: To be achieved in: 2 weeks  1. Independent in HEP and progression per patient tolerance, in order to prevent re-injury. []? Progressing: [x]? Met: []? Not Met: []? Adjusted  2. Patient will have a decrease in pain to <1/10 on average facilitate improvement in movement, function, and ADLs as indicated by Functional Deficits. []? Progressing: [x]? Met: []? Not Met: []? Adjusted     Long Term Goals: To be achieved in: 5 weeks  1. Disability index score of 30% or less for the LEFS to assist with reaching prior level of function. []? Progressing: [x]? Met: []? Not Met: []? Adjusted  2.  Patient will demonstrate increased AROM to symmetric L hip to allow for proper joint functioning as indicated by patients Functional Deficits. []? Progressing: [x]? Met: []? Not Met: []? Adjusted  3. Patient will demonstrate an increase in Strength to at least 4+/5 as well as good proximal hip strength and control to allow for proper functional mobility as indicated by patients Functional Deficits. [x]? Progressing: []? Met: []? Not Met: []? Adjusted  4. Patient will return to functional activities including community walking without increased symptoms or restriction. [x]? Progressing: []? Met: []? Not Met: []? Adjusted  5. Don/doff pants in standing for independent self-care. [x]? Progressing: []? Met: []? Not Met: []? Adjusted         Overall Progression Towards Functional goals/ Treatment Progress Update:  [x] Patient is progressing as expected towards functional goals listed. [] Progression is slowed due to complexities/Impairments listed. [] Progression has been slowed due to co-morbidities. [] Plan just implemented, too soon to assess goals progression <30days   [] Goals require adjustment due to lack of progress  [] Patient is not progressing as expected and requires additional follow up with physician  [] Other:     Persisting Functional Limitations/Impairments:  [x]Sleeping []Sitting               [x]Standing [x]Transfers        [x]Walking [x]Kneeling (knees)               [x]Stairs []Squatting / bending   []ADLs []Reaching  [x]Lifting  []Housework  []Driving []Job related tasks  []Sports/Recreation []Other:      ASSESSMENT:  The patient continues to progress resisted exercises, table exercises for hip, pelvis and leg strength/stability - all without significant increase in pain. The patient responds favorably to manual interventions with subjective reports of reduced stiffness, improved mobility, decreased pain levels. Continued therapy for strength and stability, as well as endurance, recommended at this time.     Treatment/Activity Tolerance:  [x] Patient tolerated treatment well [] Patient limited by fatigue  [] Patient limited by pain  [] Patient limited by other medical complications   [] Other:     Prognosis: [x] Good [] Fair  [] Poor    Patient Requires Follow-up: [x] Yes  [] No    PLAN: POC: PT 2x / week for 4 weeks. Progress core/hip strengthening (abd/ext especially)  [x] Continue per plan of care [] Alter current plan (see comments)  [] Plan of care initiated [] Hold pending MD visit [] Discharge    Electronically signed by: Jaynie Paget PT, DPT, OMT-C    Therapist was present, directed the patient's care, made skilled judgement, and was responsible for assessment and treatment of the patient. Nadja Lawler, SPT    Note: If patient does not return for scheduled/ recommended follow up visits, this note will serve as a discharge from care along with most recent update on progress.

## 2020-08-14 ENCOUNTER — HOSPITAL ENCOUNTER (OUTPATIENT)
Dept: PHYSICAL THERAPY | Age: 75
Setting detail: THERAPIES SERIES
Discharge: HOME | End: 2020-08-14
Payer: MEDICARE

## 2020-08-14 PROCEDURE — 97112 NEUROMUSCULAR REEDUCATION: CPT

## 2020-08-14 PROCEDURE — 97110 THERAPEUTIC EXERCISES: CPT

## 2020-08-14 NOTE — FLOWSHEET NOTE
[facility] - Outpatient Physical Therapy    Physical Therapy Daily Treatment Note  Date:  2020    Patient Name:  Jules Mills    :  1945  MRN: [id number]  Medical/Treatment Diagnosis Information:  · Diagnosis: S32.592A (ICD-10-CM) - Other specified fracture of left pubis, initial encounter for closed fracture  · Treatment Diagnosis: difficulty walking, pelvic pain, imbalance, decreased LE strength, decreased ADL status  Insurance/Certification information:  PT Insurance Information: [employer]ieves Hamlin - visits PMN  Physician Information:  Referring Practitioner:  / Francisco Quiñonez MD  Plan of care signed (Y/N): [x]  Yes []  No (see below)    Progress Report: []  Yes  [x]  No     Date Range for reporting period:   Beginning 20    Ending TBD    Progress report due (10 Rx/or 30 days whichever is less): 18     Recertification due (POC duration/ or 90 days whichever is less): POC    Visit # Insurance Allowable Date Range (if applicable)   81/50 +      Latex Allergy:  [x]NO      []YES  Preferred Language for Healthcare:   [x]English       []other:    Functional Scale: LEFS: 0 = 100% DIS  Date assessed: 20  Functional Scale: LEFS: 61 = 24% DIS  Date assessed: 20     Pain level:  0/10       SUBJECTIVE: The patient reports she feels stiffness in morning but once moving, she feels fine.  She indicates being challenged by several exercises this date through comments to therapist.     OBJECTIVE:      - difficulty/poor form with RDL   - 30deg AROM IR R hip, 28deg AROM IR L hip   - TU sec (no hands)   - SEE PN; supine to sit negative, standing flexion negative, prone knee flexion + L (long to short) --> corrected after L prone quad stretch, HF MET and anterior innominate mobs   - supine to sit + R; prone knee flexion + L, gaenslen's (neg), Sacral spring + ; LLE stays \"short\" after manual interventions    - patient able to balance 20 seconds, EC, narrowed ARCELIA     RESTRICTIONS/PRECAUTIONS: OA, low bone density; 2 left pubic fractures    Exercises/Interventions:     Therapeutic Exercises (00122)   Resistance Sets / Reps Notes   Warm Up:  Dynamic HSS  Open [place]  Closed [place]  Standing Quad Stretch  Adductor Stretch   H rocks  Deep Seated Lumbar Flexion  IB - HSS  SLR Flexion  Hooklying isometrics:  Abduction  adduction  AP Pelvic tilts  Hooklying hip abd with band Bridge with band on knees PVT with march/LE walk out  PVT with low ab progressions   Clamshells Reverse Clamshells Leg press - Double leg (B1, K4) Leg Press - Single Leg (B1, K4) HS Curls - QUANTUM (Knee 2, Ankle 3) 40# 2 x 15 8/14 - ^ Angelina Simmering - QUANTUM (Knee D, Ankle 3) 40# 2 x 15 8/14 - ^ weight   Standing hip abd (4'' step w/ airex on top) Standing hip ext (4'' step w/ airex on top) Upright bike Seated Bilateral Hip IR + Adductor Squeeze 6/30 - added   Prone Hip Extension  Prone Hip Extension w/ Knee Bent  Qped [place]  HS curls on L  Added 7/6   Hip ER with band Added 7/6   H rocks  Qped Glute Kick Standing RDL (DL) - w/ earthquake bar  2 x 10, R/L 8/14 - TC on hips for balance assistanceSidelying SLR Flexion  Sidelying Hamstring Curls  Sidelying Clamhell Pulses  Adductor S  Wall Sit Standing Hip Abd Wall Slide Standing SLR Abd/Ext Lateral Band Walk (ankles) Resisted Walkout Double slastix retro, 5x ea  Lateral, 5x each way 8/10 - added    8/14 - added    TG Squats  1 x 10 8/14 - added   TG SLR Abd  2 x 15 R/L 8/14 - added         Neuromuscular Re-ed (45863) Therapeutic Activities (22243)       BOSU Squats (ball down)  1 x 10  5 x 5'' 8/14 - added  8/14 - added  *pt hands on rail   SB Bridges:  Bent Knees  Straight Legs SB Isometric Crunches Reverse Crunches w/ plyoball adductor squeeze Single Leg Bridge Bilateral Hip IR (seated @ EOT w/ plyoball between knees) Side stepping at plinth (NO UE A)  Airex neutral ARCELIA  Head Turns/Pitches    6/30 - increased   Airex Narrow ARCELIA  EC    6/30 - increased   Airex staggered stance  Added 6/22   Tandem stance on airex  Forward step ups / retro step down 13'' 1 x 10, R/L ea 8/3 - progressed depth, increased reps, added R   Fwd/Retro Walking Alternating Lunges (down retro ramp / up fwd ramp) Lateral step ups  \"L\" Step Ups Biodex Postural stabiltiy Biodex limits of Stability Biodex Random control Biodex Maze Control TRX Squats TRX Single Leg Squat TRX mini squat and hold with 3 point touch of R LE Table Top Position: Jaime Hip Abd  SLS on L with R LE ball rolls Alt Lateral Toe Taps (off 6'' box)  2 x 10 R/L 8/14 - added   Fwd/Retro Step Up & Over Side stepping with sport cord Hip hikes SLS (lifting front foot from step)  Tandem R/L      Airex Narrowed ARCELIA (head pitches/turns) Rockerboard /ML Taps/Balance  Hip Hinges  Spine Neutral Squats        Manual Intervention (54892)      Hip flexor/quad stretch on L     Piriformis stretch on L     ITB L stretch          LLE Hip LAD     Sticking R ITB  ITB stretch  Prone L5 Unilateral Mob R  Prone L4 Unilateral Mob L  Prone Sacral Nutation Mobs G2-3  Sidelying HF Stretch  R ITB stretch     Patient Education & HEP:  - Patient educated on the focus of skilled physical therapy services and plan of care, including: diagnosis, prognosis, treatment goals & options. Includes HEP edu & instruction. 8/5 - Access Code: [email or web address]   URL: [email or web address]   Date: 08/05/2020   Prepared by: Santiago Clipper     Exercises   Seated Lumbar Flexion Stretch - 5-6 reps - 1 sets - 5-10 hold - 1-2x daily - 5x weekly     8/3 - Access Code: [email or web address]   URL: [email or web address]   Date: 08/03/2020   Prepared by: Santiago Clipper     Exercises   Standing Quadriceps Stretch - 3 reps - 1 sets - 20 hold - 1x daily - 5x weekly   Side Lunge Adductor Stretch - 3 reps - 1 sets - 10 hold - 1x daily - 5x weekly   Side Stepping with Resistance at Ankles - 3 reps - 1 sets - 10 feet - 1x daily - 5x weekly        Access Code: [email or web address]    URL: lifting, ambulation and eccentric single leg control. [] UE / cervical: cervical, scapular, scapulothoracic and UE control with self care, reaching, carrying, lifting, house/yardwork, driving, computer work.      NMR and Therapeutic Activities:    [x] (30708 or 35709) Provided verbal/tactile cueing for activities related to improving balance, coordination, kinesthetic sense, posture, motor skill, proprioception and motor activation to allow for proper function of   [x] LE: / Lumbar core, proximal hip and LE with self care and ADLs  [] UE / Cervical: cervical, postural, scapular, scapulothoracic and UE control with self care, carrying, lifting, driving, computer work.   [] (81378) Gait Re-education- Provided training and instruction to the patient for proper LE, core and proximal hip recruitment and positioning and eccentric body weight control with ambulation re-education including up and down stairs     Home Exercise Program:    [] (60571) Reviewed/Progressed HEP activities related to strengthening, flexibility, endurance, ROM of   [] LE / Lumbar: core, proximal hip and LE for functional self-care, mobility, lifting and ambulation/stair navigation   [] UE / Cervical: cervical, postural, scapular, scapulothoracic and UE control with self care, reaching, carrying, lifting, house/yardwork, driving, computer work  [] (08226)Reviewed/Progressed HEP activities related to improving balance, coordination, kinesthetic sense, posture, motor skill, proprioception of   [] LE: core, proximal hip and LE for self care, mobility, lifting, and ambulation/stair navigation    [] UE / Cervical: cervical, postural,  scapular, scapulothoracic and UE control with self care, reaching, carrying, lifting, house/yardwork, driving, computer work    Manual Treatments:  PROM / STM / Oscillations-Mobs:  G-I, II, III, IV (PA's, Inf., Post.)  [] (13082) Provided manual therapy to mobilize LE, proximal hip and/or LS spine soft tissue/joints for the purpose of modulating pain, promoting relaxation,  increasing ROM, reducing/eliminating soft tissue swelling/inflammation/restriction, improving soft tissue extensibility and allowing for proper ROM for normal function with   [] LE / lumbar: self care, mobility, lifting and ambulation. [] UE / Cervical: self care, reaching, carrying, lifting, house/yardwork, driving, computer work. Modalities:  [] (26289) Vasopneumatic compression: Utilized vasopneumatic compression to decrease edema / swelling for the purpose of improving mobility and quad tone / recruitment which will allow for increased overall function including but not limited to self-care, transfers, ambulation, and ascending / descending stairs. Modalities: 6/17 - Consider [facility]    Charges:  Timed Code Treatment Minutes: 42   Total Treatment Minutes: 42     [] EVAL - LOW (30086)   [] EVAL - MOD (98229)  [] EVAL - HIGH (25056)  [] RE-EVAL (83150)  [x] TE (13290) x 2     [] Ionto  [x] NMR (06058) x 1    [] Vaso  [] Manual (52075) x       [] Ultrasound  [] TA x       [] Mech Traction (24744)  [] Gait Training x     [] ES (un) (80627):   [] Aquatic therapy x   [] Other:   [] Group:     GOALS: Patient stated goal: to don/doff pants while standing; return to \"normal\" function & walking  [x]? Progressing: []? Met: []? Not Met: []? Adjusted     Therapist goals for Patient:   Short Term Goals: To be achieved in: 2 weeks  1. Independent in HEP and progression per patient tolerance, in order to prevent re-injury. []? Progressing: [x]? Met: []? Not Met: []? Adjusted  2. Patient will have a decrease in pain to <1/10 on average facilitate improvement in movement, function, and ADLs as indicated by Functional Deficits. []? Progressing: [x]? Met: []? Not Met: []? Adjusted     Long Term Goals: To be achieved in: 5 weeks  1. Disability index score of 30% or less for the LEFS to assist with reaching prior level of function. []? Progressing: [x]? Met: []?  Not Met: []? Adjusted  2. Patient will demonstrate increased AROM to symmetric L hip to allow for proper joint functioning as indicated by patients Functional Deficits. []? Progressing: [x]? Met: []? Not Met: []? Adjusted  3. Patient will demonstrate an increase in Strength to at least 4+/5 as well as good proximal hip strength and control to allow for proper functional mobility as indicated by patients Functional Deficits. [x]? Progressing: []? Met: []? Not Met: []? Adjusted  4. Patient will return to functional activities including community walking without increased symptoms or restriction. [x]? Progressing: []? Met: []? Not Met: []? Adjusted  5. Don/doff pants in standing for independent self-care. [x]? Progressing: []? Met: []? Not Met: []? Adjusted         Overall Progression Towards Functional goals/ Treatment Progress Update:  [x] Patient is progressing as expected towards functional goals listed. [] Progression is slowed due to complexities/Impairments listed. [] Progression has been slowed due to co-morbidities. [] Plan just implemented, too soon to assess goals progression <30days   [] Goals require adjustment due to lack of progress  [] Patient is not progressing as expected and requires additional follow up with physician  [] Other:     Persisting Functional Limitations/Impairments:  [x]Sleeping []Sitting               [x]Standing [x]Transfers        [x]Walking [x]Kneeling (knees)               [x]Stairs []Squatting / bending   []ADLs []Reaching  [x]Lifting  []Housework  []Driving []Job related tasks  []Sports/Recreation []Other:      ASSESSMENT:  The patient continues to progress resisted exercises with difficulty during RDLs and LLE step ups (onto 13'' step). The patient was able to correct valgus on BOSU squats after V.C. The patient was able to perform all exercises without increased pain levels.     Treatment/Activity Tolerance:  [x] Patient tolerated treatment well [] Patient limited by

## 2020-08-17 ENCOUNTER — HOSPITAL ENCOUNTER (OUTPATIENT)
Dept: PHYSICAL THERAPY | Age: 75
Setting detail: THERAPIES SERIES
Discharge: HOME | End: 2020-08-17
Payer: MEDICARE

## 2020-08-17 PROCEDURE — 97112 NEUROMUSCULAR REEDUCATION: CPT

## 2020-08-17 PROCEDURE — 97110 THERAPEUTIC EXERCISES: CPT

## 2020-08-17 NOTE — PROGRESS NOTES
[facility] - Outpatient Physical Therapy   Physical Therapy Re-Certification Plan of Care    Dear   Nichelle Armendariz MD,    We had the pleasure of treating the following patient for physical therapy services at [address] [place] [address]. A summary of our findings can be found in the updated assessment below. This includes our plan of care. If you have any questions or concerns regarding these findings, please do not hesitate to contact me at the office phone number checked above. Thank you for the referral.     Physician Signature:________________________________Date:__________________  By signing above (or electronic signature), therapists plan is approved by physician       Functional Outcome:  LEFS: 74/80 = 7.5% of dysfunction      PROM AROM     L R L R   Hip Flexion     135 135   Hip Abduction 48 49       Hip ER 25 35       Hip IR 28 34          Strength (0-5) / Myotomes Left Right   Hip Flexion - supine 3+ 4-   Hip Flexion - seated (L1-2) 4- 4-   Hip Abduction 3+ 4-   Hip Extension 3+ 4-   Hip ER 4- 4   Hip IR 3+ 4-   Quads (L2-4) 4+ 4+   Hamstrings 5 5   Ankle Dorsiflexion (L4-5) 5 5   Ankle Plantarflexion (S1-2) 5 5           Flexibility       Hamstrings (90/90) 80 90   Quads (Ely's) 92 88              Joint mobility: L Hip/pelvis              [x]? Normal                      []?Hypo               []?Hyper      Balance:   Narrowed ARCELIA = 15sec  Tandem R = 15 sec  Tandem L = 15 sec  SLS R = 14 sec, with maximal sway without LOS  SLS L = 15 sec, with moderate sway without LOS    Overall Response to Treatment:   [x]Patient is responding well to treatment and improvement is noted with regards to goals   [x]Patient should continue to improve in reasonable time if they continue HEP. The patient will likely DC after the next 2 visits with individualized HEP and follow up with PT as needed.    []Patient has plateaued and is no longer responding to skilled PT 20     Pain level:  0/10       SUBJECTIVE: The patient reports she feels stiffness in morning in her L Lumbar spine but once moving, she feels fine.       OBJECTIVE:      - difficulty/poor form with RDL   - 30deg AROM IR R hip, 28deg AROM IR L hip   - TU sec (no hands)   - SEE PN; supine to sit negative, standing flexion negative, prone knee flexion + L (long to short) --> corrected after L prone quad stretch, HF MET and anterior innominate mobs   - supine to sit + R; prone knee flexion + L, gaenslen's (neg), Sacral spring + ; LLE stays \"short\" after manual interventions    - patient able to balance 20 seconds, EC, narrowed ARCELIA     RESTRICTIONS/PRECAUTIONS: OA, low bone density; 2 left pubic fractures    Exercises/Interventions:     Therapeutic Exercises (38752)   Resistance Sets / Reps Notes   Warm Up:  Dynamic HSS  Open [place]  Closed [place]  Standing Quad Stretch  Adductor Stretch   H rocks  Deep Seated Lumbar Flexion  IB - HSS  SLR Flexion  Hooklying isometrics:  Abduction  adduction  AP Pelvic tilts  Hooklying hip abd with band Bridge with band on knees PVT with march/ walk out  PVT with low ab progressions   Clamshells Reverse Clamshells Leg press - Double leg (B1, K4) Leg Press - Single Leg (B1, K4) HS Curls - QUANTUM (Knee 2, Ankle 3) 40# 2 x 15  - ^ [place] - QUANTUM (Knee D, Ankle 3) 40# 2 x 15  - ^ weight   Standing hip abd (4'' step w/ airex on top) Standing hip ext (4'' step w/ airex on top) Upright bike Seated Bilateral Hip IR + Adductor Squeeze  - added   Prone Hip Extension  Prone Hip Extension w/ Knee Bent  Qped [place]  HS curls on L  Added    Hip ER with band Added    H rocks  Qped [employer] RDL (DL) - w/ earthquake bar   - TC on hips for balance assistanceSidelying SLR Flexion  Sidelying Hamstring Curls  Sidelying Clamhell Pulses  Adductor S  Wall Sit Standing Hip Abd Wall Slide Standing SLR Abd/Ext Lateral Band Walk (ankles) Resisted Walkout TG Squats  8/14 - added   TG SLR Abd  8/14 - added         Neuromuscular Re-ed (25661) Therapeutic Activities (07294)       BOSU Squats (ball down)  2 x 10   8/14 - added  8/17 - ^ sets, without railing AMAP for 2nd set  8/14 - added  *pt hands on rail   SB Bridges:  Bent Knees  Straight Legs SB Isometric Crunches Reverse Crunches w/ plyoball adductor squeeze Single Leg Bridge Bilateral Hip IR (seated @ EOT w/ plyoball between knees) Side stepping at plinth (NO UE A)  Airex neutral ARCELIA  Head Turns/Pitches    6/30 - increased   Airex Narrow ARCELIA  EC    6/30 - increased   Airex staggered stance  Added 6/22   Tandem stance on airex  Forward step ups / retro step down 8/3 - progressed depth, increased reps, added R   Fwd/Retro Walking Alternating Lunges (down retro ramp / up fwd ramp) Lateral step ups  \"L\" Step Ups Biodex Postural stabiltiy Biodex limits of Stability Biodex Random control Biodex Maze Control TRX Squats TRX Single Leg Squat TRX mini squat and hold with 3 point touch of R LE Table Top Position: Jaime Hip Abd  SLS on L with R LE ball rolls Alt Lateral Toe Taps (off 6'' box)  Fwd/Retro Step Up & Over Side stepping with sport cord Hip hikes SLS (lifting front foot from step)  Tandem R/L      Airex Narrowed ARCELIA (head pitches/turns) Rockerboard /ML Taps/Balance  Hip Hinges  Spine Neutral Squats        Manual Intervention (52260)      Hip flexor/quad stretch on L     Piriformis stretch on L     ITB L stretch          LLE Hip LAD     Sticking R ITB  ITB stretch  Prone L5 Unilateral Mob R  Prone L4 Unilateral Mob L  Prone Sacral Nutation Mobs G2-3  Sidelying HF Stretch  R ITB stretch     8/17 - The patient was provided an assessment including evaluative tests and measures, as well as documentation to track progress. This was included with TE times today; balance assessment included with NMR. Patient Education & HEP:  8/17 - Pt.  Given updated HEP to progress exercises, place focus towards strengthening the hip musculature, and increase Pt. Understanding of HEP.   - Patient educated on the focus of skilled physical therapy services and plan of care, including: diagnosis, prognosis, treatment goals & options. Includes HEP edu & instruction. 8/17 - Access Code: [email or web address]   URL: [employer]/   Date: 08/17/2020   Prepared by: Maggie Abdi     Exercises   Supine Bridge - 10 reps - 2 sets - 2 hold - 2x daily - 7x weekly   Clamshell - 10 reps - 2 sets - 2x daily - 7x weekly   Sidelying Hip Adduction - 10 reps - 2 sets - 2x daily - 7x weekly   Reverse Clamshell in Extension and Abduction - 10 reps - 2 sets - 2x daily - 7x weekly   Side Stepping with Resistance at Feet - 10 reps - 2 sets - 2x daily - 7x weekly   Standing Hip Flexion with Anchored Resistance and Chair Support - 10 reps - 2 sets - 2x daily - 7x weekly   Standing Repeated Hip Extension with Resistance - 10 reps - 2 sets - 2x daily - 7x weekly       8/5 - Access Code: [email or web address]   URL: [email or web address]   Date: 08/05/2020   Prepared by: Maggie Abdi     Exercises   Seated Lumbar Flexion Stretch - 5-6 reps - 1 sets - 5-10 hold - 1-2x daily - 5x weekly     8/3 - Access Code: [email or web address]   URL: [employer]/   Date: 08/03/2020   Prepared by: Maggie Abdi     Exercises   Standing Quadriceps Stretch - 3 reps - 1 sets - 20 hold - 1x daily - 5x weekly   Side Lunge Adductor Stretch - 3 reps - 1 sets - 10 hold - 1x daily - 5x weekly   Side Stepping with Resistance at Ankles - 3 reps - 1 sets - 10 feet - 1x daily - 5x weekly        Access Code: [email or web address]    URL: [employer]/    Date: 07/13/2020    Prepared by: Maggie Abdi     Exercises   Quadruped Alternating Leg Extensions - 10 reps - 3 sets - 1x daily - 5x weekly   Quadruped Rock Back into [employer] Up - 5 reps - 2 sets - 1x daily - 5x weekly     Access Code: [email or web address]   URL: [employer]/   Date: 06/22/2020    Prepared by: Colleen Michaels - 10 reps - 3 sets - 1x daily - 7x weekly  Sidelying Reverse Clamshell - 10 reps - 3 sets - 1x daily - 7x weekly  Standing Hip Abduction with Counter Support - 10 reps - 3 sets - 1x daily - 7x weekly  Standing Hip Extension with Counter Support - 10 reps - 3 sets - 1x daily - 7x weekly    - HEP instruction: Access Code: [email or web address]   URL: [email or web address]/   Date: 06/17/2020   Prepared by: Nadine Grant     Exercises   · Supine Pelvic Tilt - 10 reps - 1 sets - 1 hold - 2x daily - 7x weekly   · Supine Bridge - 15 reps - 2 sets - 2 hold - 1x daily - 5x weekly   · Supine Active Straight Leg Raise - 10 reps - 2 sets - 1x daily - 5x weekly   · Supine Hip Adduction Isometric with Ball - 10 reps - 2 sets - 5-10 hold - 2x daily - 5x weekly   · Hooklying Isometric Clamshell - 10 reps - 2 sets - 5-10 hold - 2x daily - 5x weekly   - All patient questions were answered    Therapeutic Exercise and NMR EXR  [x] (51492) Provided verbal/tactile cueing for activities related to strengthening, flexibility, endurance, ROM for improvements in  [x] LE / Lumbar: LE, proximal hip, and core control with self care, mobility, lifting, ambulation. [] UE / Cervical: cervical, postural, scapular, scapulothoracic and UE control with self care, reaching, carrying, lifting, house/yardwork, driving, computer work. [x] (17324) Provided verbal/tactile cueing for activities related to improving balance, coordination, kinesthetic sense, posture, motor skill, proprioception to assist with   [x] LE / lumbar: LE, proximal hip, and core control in self care, mobility, lifting, ambulation and eccentric single leg control. [] UE / cervical: cervical, scapular, scapulothoracic and UE control with self care, reaching, carrying, lifting, house/yardwork, driving, computer work.      NMR and Therapeutic Activities:    [x] (84853 or 91414) Provided verbal/tactile cueing for activities related to improving balance, coordination, kinesthetic sense, posture, motor skill, proprioception and motor activation to allow for proper function of   [x] LE: / Lumbar core, proximal hip and LE with self care and ADLs  [] UE / Cervical: cervical, postural, scapular, scapulothoracic and UE control with self care, carrying, lifting, driving, computer work.   [] (16285) Gait Re-education- Provided training and instruction to the patient for proper LE, core and proximal hip recruitment and positioning and eccentric body weight control with ambulation re-education including up and down stairs     Home Exercise Program:    [] (43347) Reviewed/Progressed HEP activities related to strengthening, flexibility, endurance, ROM of   [] LE / Lumbar: core, proximal hip and LE for functional self-care, mobility, lifting and ambulation/stair navigation   [] UE / Cervical: cervical, postural, scapular, scapulothoracic and UE control with self care, reaching, carrying, lifting, house/yardwork, driving, computer work  [] (17848)Reviewed/Progressed HEP activities related to improving balance, coordination, kinesthetic sense, posture, motor skill, proprioception of   [] LE: core, proximal hip and LE for self care, mobility, lifting, and ambulation/stair navigation    [] UE / Cervical: cervical, postural,  scapular, scapulothoracic and UE control with self care, reaching, carrying, lifting, house/yardwork, driving, computer work    Manual Treatments:  PROM / STM / Oscillations-Mobs:  G-I, II, III, IV (PA's, Inf., Post.)  [] (21916) Provided manual therapy to mobilize LE, proximal hip and/or LS spine soft tissue/joints for the purpose of modulating pain, promoting relaxation,  increasing ROM, reducing/eliminating soft tissue swelling/inflammation/restriction, improving soft tissue extensibility and allowing for proper ROM for normal function with   [] LE / lumbar: self care, mobility, lifting and ambulation.     [] UE / Cervical: self care, reaching, carrying, lifting, house/yardwork, driving, computer work.     Modalities:  [] (32786) Vasopneumatic compression: Utilized vasopneumatic compression to decrease edema / swelling for the purpose of improving mobility and quad tone / recruitment which will allow for increased overall function including but not limited to self-care, transfers, ambulation, and ascending / descending stairs. Modalities: 6/17 - Consider [facility]    Charges:  Timed Code Treatment Minutes: 46   Total Treatment Minutes: 46     [] EVAL - LOW (64816)   [] EVAL - MOD (98669)  [] EVAL - HIGH (93715)  [] RE-EVAL (89928)  [x] TE (81611) x 2     [] Ionto  [x] NMR (03053) x 1    [] Vaso  [] Manual (78721) x       [] Ultrasound  [] TA x       [] Mech Traction (36241)  [] Gait Training x     [] ES (un) (95172):   [] Aquatic therapy x   [] Other:   [] Group:     GOALS: Patient stated goal: to don/doff pants while standing; return to \"normal\" function & walking  [x]? Progressing: []? Met: []? Not Met: []? Adjusted     Therapist goals for Patient:   Short Term Goals: To be achieved in: 2 weeks  1. Independent in HEP and progression per patient tolerance, in order to prevent re-injury. []? Progressing: [x]? Met: []? Not Met: []? Adjusted  2. Patient will have a decrease in pain to <1/10 on average facilitate improvement in movement, function, and ADLs as indicated by Functional Deficits. []? Progressing: [x]? Met: []? Not Met: []? Adjusted     Long Term Goals: To be achieved in: 5 weeks  1. Disability index score of 30% or less for the LEFS to assist with reaching prior level of function. []? Progressing: [x]? Met: []? Not Met: []? Adjusted  2. Patient will demonstrate increased AROM to symmetric L hip to allow for proper joint functioning as indicated by patients Functional Deficits. []? Progressing: [x]? Met: []? Not Met: []? Adjusted  3.  Patient will demonstrate an increase in Strength to at least 4+/5 as well as good proximal hip strength and control to allow for proper functional mobility as indicated by patients Functional Deficits. [x]? Progressing: []? Met: []? Not Met: []? Adjusted  4. Patient will return to functional activities including community walking without increased symptoms or restriction. [x]? Progressing: []? Met: []? Not Met: []? Adjusted  5. Don/doff pants in standing for independent self-care. [x]? Progressing: []? Met: []? Not Met: []? Adjusted         Overall Progression Towards Functional goals/ Treatment Progress Update:  [x] Patient is progressing as expected towards functional goals listed. [] Progression is slowed due to complexities/Impairments listed. [] Progression has been slowed due to co-morbidities. [] Plan just implemented, too soon to assess goals progression <30days   [] Goals require adjustment due to lack of progress  [] Patient is not progressing as expected and requires additional follow up with physician  [] Other:     Persisting Functional Limitations/Impairments:  [x]Sleeping []Sitting               [x]Standing [x]Transfers        [x]Walking [x]Kneeling (knees)               [x]Stairs []Squatting / bending   []ADLs []Reaching  [x]Lifting  []Housework  []Driving []Job related tasks  []Sports/Recreation []Other:      ASSESSMENT: Pt. Shows significant progression with therapy demonstrated by improvement globally in hip ROM, jt mobility and strength as well as in balance exercises and hamstring flexibility as measure by 90/90 test. However, Pt. Continues to lack strength in hip musculature (especially in hip abd/ext/flex) and ability to stabilize hip without moderate sway in SLS balance activities. Therapy will continue to focus on these areas and work on transitioning Pt. Into indep. [facility] for d/c.     Treatment/Activity Tolerance:  [x] Patient tolerated treatment well [] Patient limited by fatigue  [] Patient limited by pain  [] Patient limited by other medical complications   [] Other:     Prognosis: [x] Good [] Fair  [] Poor    Patient Requires Follow-up: [x] Yes  [] No    PLAN: POC: PT 2x / week for 4 weeks. Therapy should focus on progression of core/hip strengthening (abd/ext/flex especially), increasing hip stability/proprioception, and improving balance (SLS especially). [x] Continue per plan of care [] Alter current plan (see comments)  [] Plan of care initiated [] Hold pending MD visit [] Discharge    Electronically signed by: Grisel Omalley PT, DPT, OMT-C    Therapist was present, directed the patient's care, made skilled judgement, and was responsible for assessment and treatment of the patient. Nadja Lawler, SPT    Note: If patient does not return for scheduled/ recommended follow up visits, this note will serve as a discharge from care along with most recent update on progress.

## 2020-08-21 ENCOUNTER — HOSPITAL ENCOUNTER (OUTPATIENT)
Dept: PHYSICAL THERAPY | Age: 75
Setting detail: THERAPIES SERIES
Discharge: HOME | End: 2020-08-21
Payer: MEDICARE

## 2020-08-21 PROCEDURE — 97112 NEUROMUSCULAR REEDUCATION: CPT

## 2020-08-21 PROCEDURE — 97110 THERAPEUTIC EXERCISES: CPT

## 2020-08-21 NOTE — PROGRESS NOTES
prognosis, treatment goals & options. Includes HEP edu & instruction. 8/17 - Access Code: [email or web address]   URL: [employer]/   Date: 08/17/2020   Prepared by: Andrew Gemma     Exercises   Supine Bridge - 10 reps - 2 sets - 2 hold - 2x daily - 7x weekly   Clamshell - 10 reps - 2 sets - 2x daily - 7x weekly   Sidelying Hip Adduction - 10 reps - 2 sets - 2x daily - 7x weekly   Reverse Clamshell in Extension and Abduction - 10 reps - 2 sets - 2x daily - 7x weekly   Side Stepping with Resistance at Feet - 10 reps - 2 sets - 2x daily - 7x weekly   Standing Hip Flexion with Anchored Resistance and Chair Support - 10 reps - 2 sets - 2x daily - 7x weekly   Standing Repeated Hip Extension with Resistance - 10 reps - 2 sets - 2x daily - 7x weekly     8/5 - Access Code: [email or web address]   URL: [email or web address]   Date: 08/05/2020   Prepared by: Andrew Fentonma     Exercises   Seated Lumbar Flexion Stretch - 5-6 reps - 1 sets - 5-10 hold - 1-2x daily - 5x weekly     8/3 - Access Code: [email or web address]   URL: [employer]/   Date: 08/03/2020   Prepared by: Andrew Gemma     Exercises   Standing Quadriceps Stretch - 3 reps - 1 sets - 20 hold - 1x daily - 5x weekly   Side Lunge Adductor Stretch - 3 reps - 1 sets - 10 hold - 1x daily - 5x weekly   Side Stepping with Resistance at Ankles - 3 reps - 1 sets - 10 feet - 1x daily - 5x weekly      Access Code: [email or web address]    URL: [employer]/    Date: 07/13/2020    Prepared by: Andrew Gemma     Exercises   Quadruped Alternating Leg Extensions - 10 reps - 3 sets - 1x daily - 5x weekly   Quadruped Rock Back into [employer] Up - 5 reps - 2 sets - 1x daily - 5x weekly     Access Code: [email or web address]   URL: [employer]/   Date: 06/22/2020    Prepared by: Venkat Ohs     Exercises  Clamshell - 10 reps - 3 sets - 1x daily - 7x weekly  Sidelying Reverse Clamshell - 10 reps - 3 sets - 1x daily - 7x weekly  Standing Hip Abduction with Counter Support - 10 reps - cervical, postural, scapular, scapulothoracic and UE control with self care, carrying, lifting, driving, computer work.   [] (75517) Gait Re-education- Provided training and instruction to the patient for proper LE, core and proximal hip recruitment and positioning and eccentric body weight control with ambulation re-education including up and down stairs     Home Exercise Program:    [] (53245) Reviewed/Progressed HEP activities related to strengthening, flexibility, endurance, ROM of   [] LE / Lumbar: core, proximal hip and LE for functional self-care, mobility, lifting and ambulation/stair navigation   [] UE / Cervical: cervical, postural, scapular, scapulothoracic and UE control with self care, reaching, carrying, lifting, house/yardwork, driving, computer work  [] (97619)Reviewed/Progressed HEP activities related to improving balance, coordination, kinesthetic sense, posture, motor skill, proprioception of   [] LE: core, proximal hip and LE for self care, mobility, lifting, and ambulation/stair navigation    [] UE / Cervical: cervical, postural,  scapular, scapulothoracic and UE control with self care, reaching, carrying, lifting, house/yardwork, driving, computer work    Manual Treatments:  PROM / STM / Oscillations-Mobs:  G-I, II, III, IV (PA's, Inf., Post.)  [x] (72998) Provided manual therapy to mobilize LE, proximal hip and/or LS spine soft tissue/joints for the purpose of modulating pain, promoting relaxation,  increasing ROM, reducing/eliminating soft tissue swelling/inflammation/restriction, improving soft tissue extensibility and allowing for proper ROM for normal function with   [x] LE / lumbar: self care, mobility, lifting and ambulation. [] UE / Cervical: self care, reaching, carrying, lifting, house/yardwork, driving, computer work.      Modalities:  [] (76028) Vasopneumatic compression: Utilized vasopneumatic compression to decrease edema / swelling for the purpose of improving mobility and quad without increased symptoms or restriction. []? Progressing: [x]? Met: []? Not Met: []? Adjusted  5. Don/doff pants in standing for independent self-care. [x]? Progressing: []? Met: []? Not Met: []? Adjusted         Overall Progression Towards Functional goals/ Treatment Progress Update:  [x] Patient is progressing as expected towards functional goals listed. [] Progression is slowed due to complexities/Impairments listed. [] Progression has been slowed due to co-morbidities. [] Plan just implemented, too soon to assess goals progression <30days   [] Goals require adjustment due to lack of progress  [] Patient is not progressing as expected and requires additional follow up with physician  [] Other:     Persisting Functional Limitations/Impairments:  [x]Sleeping []Sitting               [x]Standing [x]Transfers        [x]Walking [x]Kneeling (knees)               [x]Stairs []Squatting / bending   []ADLs []Reaching  [x]Lifting  []Housework  []Driving []Job related tasks  []Sports/Recreation []Other:      ASSESSMENT: Pt. Is on track to discharge next visit as demonstrated by her increased hip stability and strength throughout exercises this therapy session. Pt. Had an increase in L hip pain secondary to SL TRX squat exercises this date, addressed by therapist with manual stretching and medial glides of the patella. Pt. Demonstrates progression with leg press exercises, increasing resistance this therapy session. Pt. Continues to show progression in indep. with BOSU ball squats, with Pt. Giving VC to herself on positioning before beginning the exercise without therapists assistance. Treatment/Activity Tolerance:  [x] Patient tolerated treatment well [] Patient limited by fatigue  [] Patient limited by pain  [] Patient limited by other medical complications   [] Other:     Prognosis: [x] Good [] Fair  [] Poor    Patient Requires Follow-up: [x] Yes  [] No    PLAN: POC: PT 2x / week for 4 weeks.  DC NEXT VISIT  hip strengthening (abd/ext/flex especially) in functional positions, increasing hip stability/proprioception, and improving balance (SLS especially)  [x] Continue per plan of care [] Alter current plan (see comments)  [] Plan of care initiated [] Hold pending MD visit [] Discharge    Electronically signed by: Grazyna Chanel PT, DPT, OMT-C    Therapist was present, directed the patient's care, made skilled judgement, and was responsible for assessment and treatment of the patient. Nadja Lawler, SPT    Note: If patient does not return for scheduled/ recommended follow up visits, this note will serve as a discharge from care along with most recent update on progress.

## 2020-08-24 ENCOUNTER — HOSPITAL ENCOUNTER (OUTPATIENT)
Dept: PHYSICAL THERAPY | Age: 75
Setting detail: THERAPIES SERIES
Discharge: HOME | End: 2020-08-24
Payer: MEDICARE

## 2020-08-24 PROCEDURE — 97110 THERAPEUTIC EXERCISES: CPT

## 2020-08-24 NOTE — DISCHARGE SUMMARY
[facility] - Outpatient Physical Therapy   Physical Therapy Discharge Summary    Dear  Angie Underwood MD,    We had the pleasure of treating the following patient for physical therapy services at [address] [place] [address]. A summary of our findings can be found in the discharge summary below. If you have any questions or concerns regarding these findings, please do not hesitate to contact me at the office phone number checked above. Thank you for the referral.     Physician Signature:________________________________Date:__________________  By signing above (or electronic signature), therapists plan is approved by physician    Functional Outcome: Functional Scale: LEFS: 77 = 4% DIS  Date assessed: 8/24/20    LE MMT:   Right:  Left:   AROM:  R:  L:  Hip Flexion:   4+  5  Hip aBduction:   4+  4  Hip Extension:   4+  5  Hip IR:    4+  4+    38  38  Hip ER:   4+  4+    36  36  Knee Extension:  5  5  Knee Flexion:    5  5  Ankle DF:   5  5  Ankle PF (seated)  4  4  Ankle Inversion:  5  5  Ankle Eversion:  5  5    Ely's:   Right 130deg, Left 130deg  HS 90/90:  Left 78deg, Right 69deg     Balance:   Tandem R = 10 sec  Tandem L = 10 sec  SLS R = 10 sec  SLS L = 10 sec    Overall Response to Treatment:   [x]Patient is responding well to treatment and improvement is noted with regards to goals. The patient is back to normalized walking and demonstrates near-maximal strength. The patient was instructed to follow up with MD or PT in case of new or unexpected symptoms. The patient has restored her PLOF at this time.    []Patient should continue to improve in reasonable time if they continue HEP   []Patient has plateaued and is no longer responding to skilled PT intervention    []Patient is getting worse and would benefit from return to referring MD   []Patient unable to adhere to initial POC   []Other:     Date range of Visits: 6/17/20 - 8/24/20  Total Visits: 18    GOALS: Patient stated goal: to don/doff pants while standing; return to \"normal\" function & walking  []? Progressing: [x]? Met: []? Not Met: []? Adjusted     Therapist goals for Patient:   Short Term Goals: To be achieved in: 2 weeks  1. Independent in HEP and progression per patient tolerance, in order to prevent re-injury. []? Progressing: [x]? Met: []? Not Met: []? Adjusted  2. Patient will have a decrease in pain to <1/10 on average facilitate improvement in movement, function, and ADLs as indicated by Functional Deficits. []? Progressing: [x]? Met: []? Not Met: []? Adjusted     Long Term Goals: To be achieved in: 5 weeks  1. Disability index score of 30% or less for the LEFS to assist with reaching prior level of function. []? Progressing: [x]? Met: []? Not Met: []? Adjusted  2. Patient will demonstrate increased AROM to symmetric L hip to allow for proper joint functioning as indicated by patients Functional Deficits. []? Progressing: [x]? Met: []? Not Met: []? Adjusted  3. Patient will demonstrate an increase in Strength to at least 4+/5 as well as good proximal hip strength and control to allow for proper functional mobility as indicated by patients Functional Deficits. []? Progressing: [x]? Partially Met: []? Not Met: []? Adjusted  4. Patient will return to functional activities including community walking without increased symptoms or restriction. []? Progressing: [x]? Met: []? Not Met: []? Adjusted  5. Don/doff pants in standing for independent self-care. []? Progressing: [x]? Met: []? Not Met: []? Adjusted      Recommendation:  [x] Discharge to [facility]. Follow up with PT/MD in case of new or unexpected symptoms.      Physical Therapy Daily Treatment Note  Date:  2020    Patient Name:  Kelvin Barcenas    :  1945  MRN: [id number]  Medical/Treatment Diagnosis Information:  · Diagnosis: S32.592A (ICD-10-CM) - Other specified fracture of left pubis, initial encounter for closed fracture  · Treatment Clamshells Leg press - Double leg (B1, K4) 95# 2 x 15 Leg Press - Single Leg (B1, K4) HS Curls - QUANTUM (Knee 2, Ankle 3) 50# 2 x 10   LAQ - QUANTUM (Knee D, Ankle 3) 50# 2 x 10   Standing hip abd (4'' step w/ airex on top) Standing hip ext (4'' step w/ airex on top) Upright bike Seated Bilateral Hip IR + Adductor Squeeze 6/30 - added   Prone Hip Extension  Prone Hip Extension w/ Knee Bent  Qped [employer]  HS curls on L  Added 7/6   Hip ER with band Added 7/6   H rocks  Qped [employer] RDL (DL) - w/ earthquake bar  8/14 - TC on hips for balance assistanceSidelying SLR Flexion  Sidelying Hamstring Curls  Sidelying Clamhell Pulses  Adductor S  Wall Sit Standing Hip Abd Wall Slide Standing SLR Abd/Ext Lateral Band Walk (ankles) Resisted Walkout TG Squats 8/14 - added   TG SLR Abd 8/14 - added   Single Leg step up to 90/90 8/21 - added   DC Measures  10' 8/24 - performed   HEP Review  8' 8/24 - PT clarified low priority exercises vs mandatory   Neuromuscular Re-ed (16559) Therapeutic Activities (57099)     BOSU Squats (ball down) 8/14 - added  8/17 - ^ sets, without   railing AMAP for 2nd set  8/14 - added  *pt hands on rail  8/21 - inc sets this date    SB Bridges:  Bent Knees  Straight Legs SB Isometric Crunches Reverse Crunches w/ plyoball adductor squeeze Single Leg Bridge Bilateral Hip IR (seated @ EOT w/ plyoball between knees) Side stepping at plinth (NO UE A) Airex neutral ARCELIA  Head Turns/Pitches   6/30 - increased   Airex Narrow ARCELIA  EC   6/30 - increased   Airex staggered stance Added 6/22   Tandem stance on airex Forward step ups / retro step down 8/3 - progressed depth, increased reps, added R   Fwd/Retro Walking Alternating Lunges (down retro ramp / up fwd ramp) Lateral step ups  \"L\" Step Ups Biodex Postural stabiltiy Biodex limits of Stability Biodex Random control Biodex Maze Control TRX Squats TRX Single Leg Squat (R/L) 7/20 - added - cued to avoid valgus  8/21 - inc exercise dosage   TRX mini squat and hold with 3 point touch of R LE Table Top Position: Jaime Hip Abd SLS on L with R LE ball rolls Alt Lateral Toe Taps (off 6'' box) Fwd/Retro Step Up & Over Side stepping with sport cord Hip hikes SLS (lifting front foot from step)  Tandem R/L      Airex Narrowed ARCELIA (head pitches/turns) Rockerboard /ML Taps/Balance Hip Hinges Spine Neutral Squats     Manual Intervention (09995)    Quad & HF stretch in supine with medial patellar glide; prone quad stretch 8/21 - added to decrease knee pain secondary to exercises   Hip flexor/quad stretch on L    Piriformis stretch on L    ITB L stretch        LLE Hip LAD    Sticking R ITB  ITB stretch  Prone L5 Unilateral Mob R  Prone L4 Unilateral Mob L  Prone Sacral Nutation Mobs G2-3  Sidelying HF Stretch  R ITB stretch   8/17 - The patient was provided an assessment including evaluative tests and measures, as well as documentation to track progress. This was included with TE times today; balance assessment included with NMR. Patient Education & HEP:  8/24 - The patient was provided a final assessment including evaluative tests and measures, as well as discharge documentation to track progress, and was issued a 1-month [facility] Pass to continue HEP. The following exercises were performed and added to the patient's home exercise program. (leg press, knee ext, knee curls, hip abd). Additionally, the patient was educated on appropriate frequency, intensity and duration. Handout provided. 8/17 - Pt. Given updated HEP to progress exercises, place focus towards strengthening the hip musculature, and increase Pt. Understanding of HEP.   - Patient educated on the focus of skilled physical therapy services and plan of care, including: diagnosis, prognosis, treatment goals & options. Includes HEP edu & instruction. 8/17 - Access Code: [email or web address]   URL: [email or web address]   Date: 08/17/2020   Prepared by: Dallas Self     Exercises   Supine Bridge - 10 reps - 2 sets [employer]     Exercises   · Supine Pelvic Tilt - 10 reps - 1 sets - 1 hold - 2x daily - 7x weekly   · Supine Bridge - 15 reps - 2 sets - 2 hold - 1x daily - 5x weekly   · Supine Active Straight Leg Raise - 10 reps - 2 sets - 1x daily - 5x weekly   · Supine Hip Adduction Isometric with Ball - 10 reps - 2 sets - 5-10 hold - 2x daily - 5x weekly   · Hooklying Isometric Clamshell - 10 reps - 2 sets - 5-10 hold - 2x daily - 5x weekly   - All patient questions were answered    Therapeutic Exercise and NMR EXR  [x] (79254) Provided verbal/tactile cueing for activities related to strengthening, flexibility, endurance, ROM for improvements in  [x] LE / Lumbar: LE, proximal hip, and core control with self care, mobility, lifting, ambulation. [] UE / Cervical: cervical, postural, scapular, scapulothoracic and UE control with self care, reaching, carrying, lifting, house/yardwork, driving, computer work.  [] (67692) Provided verbal/tactile cueing for activities related to improving balance, coordination, kinesthetic sense, posture, motor skill, proprioception to assist with   [] LE / lumbar: LE, proximal hip, and core control in self care, mobility, lifting, ambulation and eccentric single leg control. [] UE / cervical: cervical, scapular, scapulothoracic and UE control with self care, reaching, carrying, lifting, house/yardwork, driving, computer work.      NMR and Therapeutic Activities:    [] (40061 or 85344) Provided verbal/tactile cueing for activities related to improving balance, coordination, kinesthetic sense, posture, motor skill, proprioception and motor activation to allow for proper function of   [] LE: / Lumbar core, proximal hip and LE with self care and ADLs  [] UE / Cervical: cervical, postural, scapular, scapulothoracic and UE control with self care, carrying, lifting, driving, computer work.   [] (39481) Gait Re-education- Provided training and instruction to the patient for proper LE, core and proximal responsible for assessment and treatment of the patient. Nadja Lawler, SPT    Note: If patient does not return for scheduled/ recommended follow up visits, this note will serve as a discharge from care along with most recent update on progress.

## 2021-03-17 ENCOUNTER — HOSPITAL ENCOUNTER (OUTPATIENT)
Dept: PHYSICAL THERAPY | Age: 76
Setting detail: THERAPIES SERIES
Discharge: HOME | End: 2021-03-17
Payer: MEDICARE

## 2021-03-17 PROCEDURE — 97110 THERAPEUTIC EXERCISES: CPT

## 2021-03-17 PROCEDURE — 97161 PT EVAL LOW COMPLEX 20 MIN: CPT

## 2021-03-17 PROCEDURE — 97112 NEUROMUSCULAR REEDUCATION: CPT

## 2021-03-17 NOTE — PLAN OF CARE
[place], [address] [facility], [address]  Phone: [phone]   Fax: [phone]     Physical Therapy Certification    Dear Referring Practitioner: Jamison Arcos MD,    We had the pleasure of evaluating the following patient for physical therapy services at [address] [place] [address]. A summary of our findings can be found in the initial assessment below. This includes our plan of care. If you have any questions or concerns regarding these findings, please do not hesitate to contact me at the office phone number checked above. Thank you for the referral.       Physician Signature:_______________________________Date:__________________  By signing above (or electronic signature), therapists plan is approved by physician      Patient: Chetna Segura   : 1945   MRN: [id number]  Referring Physician: Referring Practitioner: Jamison Arcos MD      Evaluation Date: 3/17/2021      Medical Diagnosis Information:  Diagnosis: M47.816 (ICD-10-CM) - Spondylosis without myelopathy or radiculopathy, lumbar region   Treatment Diagnosis: decreased activity tolerance, low back pain                                         Insurance information: PT Insurance Information: AET[employer] MEDICARE     Precautions/ Contra-indications: osteoporosis  Latex Allergy:  [x]NO      []YES  Preferred Language for Healthcare:   [x]English       []Other:    C-SSRS Triggered by Intake questionnaire (Past 2 wk assessment ):   [x] No, Questionnaire did not trigger screening.   [] Yes, Patient intake triggered C-SSRS Screening     [] Completed, no further action required. [] Completed, PCP notified via Epic    SUBJECTIVE: Patient stated complaint: the patient reports she started to notice her low back achiness was concurrent when doing therapy for pelvic fractures in . [place]  She received series of steroid facet injections 8 days ago, which provided for facets at L4-5. MRI reveals facet arthrosis in the lower lumbar spine bilaterally at L4-5 but worse on the left with reactive L4 pedicle edema. MD removed 1cc of fluid around L4. She states the injections have made a difference in her pain levels and stiffness when waking and moving. She hopes to improve standing tolerance (for prolonged periods) and to sleep with improved tolerance (in supine and sidelying). 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 Living Status/Prior Level of Function: Prior to this injury / incident, pt was independent with ADLs and IADLs, and maintains this independence. She avoids heavy lifting.     OBJECTIVE:   Palpation: 0 TTP; relief with STM    Functional Mobility/Transfers: WFL, supine <> sit, sit <> stand    Posture: good    Inspection: unremarkable    Gait: (include devices/WB status):WFL    Bandages/Dressings/Incisions: NA    Dermatomes Normal Abnormal Comments   inguinal area (L1)  x     anterior mid-thigh (L2) x     distal ant thigh/med knee (L3) x     medial lower leg and foot (L4) x     lateral lower leg and foot (L5) x     posterior calf (S1) x     medial calcaneus (S2) x       Reflexes Normal Abnormal Comments   S1-2 Seated achilles X  R reduced due to R ankle fusion   S1-2 Prone knee bend      L3-4 Patellar tendon   B TKA -   Clonus      Babinski        ROM  Comments   Lumbar Flex 25 Lumbar Ext 10      ROM LEFT RIGHT Comments   Lumbar Side Bend 48cm 46cm Fingertips to floor   Lumbar Rotation WNL WNL    Hip Flexion WFL WFL    Hip Abd [facility]    Hip ER 30 40    Hip IR 30 30    Hip Extension [facility] WFL    Knee Ext [facility] WFL    Knee Flex [facility] WFL    Hamstring Flex      Piriformis                    Joint mobility: MILD hypomobility   []Normal    [x]Hypo   []Hyper    Strength / Myotomes LEFT RIGHT Comments   Multifidus   4-/5   Rectus Abdominis   4-/5   Transverse Ab   See below   Hip Flexors (L1-2) 4 4    Hip Abductors 4- 4    Hip Extensors 4 4-    Hip Internal Rotators 5 4+    Hip External Rotators 5 5    Quads (L2-4) 5 5    Hamstrings  5 5    Ankle Plantarflexion (S1-2) 5 5    Ankle Dorsiflexion (L4-5) 5 5    Ankle Inversion 5 5    Ankle Eversion (S1-2) 5 5    Great Toe Extension (L5)        TA Muscle Contraction Scale    Criteria                                               Score  Quality of Contraction   Not Present      [] 0   Rapid, Superificial     [] 1   Just Perceptible     [x] 2     Gentle, Slow      [] 3    Substitution   Resting       [] 0   Moderate to Strong     [] 1    Subtle Perceptible     [x] 2   None       [] 3    Symmetry of Contraction   Unilateral       [] 0   Bilateral/Asymmetrical     [] 1   Symmetrical       [x] 2    Breathing     Inability/Difficulty Breathing during contraction [x] 0   Able to hold contraction while Breathing  [] 1    Holding   Able to Hold Contraction <10 s   [x] 0   Able to Hold Contraction >10 s   [] 1      _6_/10  Adapted from Master Patel al, Copyright 2009    Neural dynamic tension testing Normal Abnormal Comments   Slump Test  - Degree of knee flexion:  X     SLR       0-30 X     30-70 X     Femoral nerve (L2-4) X       Orthopedic Special Tests:    Normal Abnormal N/A Comments   Toe walk   X      Heel Walk X      Fwd Bend-aberrant or innominate mvmt) X      Standing Flexion test X      Trendelenburg       Kemps/Quadrant       Stork X      BEATRICE/Jeferson X Tightness in B anterior hip   FADIR  X  L   Hip scour       SLR X      Crossed SLR X      Supine to sit X      Hip thrust       SI distraction/compression X      PA/Spring X   Slight relief   Prone Instability test X      Prone knee bend X      Sacral Spring/thrust X               [x] Patient history, allergies, meds reviewed. Medical chart reviewed. See intake form. Review Of Systems (ROS):  [x]Performed Review of systems (Integumentary, CardioPulmonary, Neurological) by intake and observation. Intake form has been scanned into medical record. Patient has been instructed to contact their primary care physician regarding ROS issues if not already being addressed at this time.       Co-morbidities/Complexities (which will affect course of rehabilitation):   []None        []Hx of COVID   Arthritic conditions   []Rheumatoid arthritis (M05.9)  [x]Osteoarthritis (M19.91)  []Gout   Cardiovascular conditions   []Hypertension (I10)  [x]Hyperlipidemia (E78.5)  []Angina pectoris (I20)  []Atherosclerosis (I70)  []Pacemaker  []Hx of CABG/stent/  cardiac surgeries   Musculoskeletal conditions   []Disc pathology   []Congenital spine pathologies   [x]Osteoporosis (M81.8)  []Osteopenia (M85.8)  []Scoliosis       Endocrine conditions   []Hypothyroid (E03.9)  []Hyperthyroid Gastrointestinal conditions   []Constipation (M05.21)   Metabolic conditions   []Morbid obesity (E66.01)  []Diabetes type 1(E10.65) or 2 (E11.65)   []Neuropathy (G60.9)     Cardio/Pulmonary conditions   []Asthma (J45)  []Coughing   []COPD (J44.9)  []CHF  []A-fib   Psychological Disorders  []Anxiety (F41.9)  []Depression (F32.9)   []Other:   Developmental Disorders  []Autism (F84.0)  []CP (G80)  []Down Syndrome (Q90.9)  []Developmental delay     Neurological conditions  []Prior Stroke (I69.30)  []Parkinson's (G20)  []Encephalopathy (G93.40)  []MS (G35)  []Post-polio (G14)  []SCI  []TBI  []ALS Other conditions  []Fibromyalgia (M79.7)  []Vertigo  []Syncope []Kidney Failure  []Cancer      []currently undergoing                treatment  []Pregnancy  []Incontinence   Prior surgeries  []involved limb  []previous spinal surgery  [] section birth  []hysterectomy  []bowel / bladder surgery  []other relevant surgeries   []Other:               Barriers to/and or personal factors that will affect rehab potential:              []Age  []Sex    []Smoker              [x]Motivation/Lack of Motivation                        [x]Co-Morbidities (osteoporosis, Hx of fx)              []Cognitive Function, education/learning barriers              []Environmental, home barriers              []profession/work barriers  [x]past PT/medical experience  []other:  Justification: motivation and past PT experience will facilitate results    Falls Risk Assessment (30 days):   [x] Falls Risk assessed and no intervention required. [] Falls Risk assessed and Patient requires intervention due to being higher risk   TUG score (>12s at risk):     [] Falls education provided, including       ASSESSMENT: The patient is a 77year old female who presents with signs and symptoms consistent with low back pain secondary to facet dysfunction. At this time, she is demonstrating a favorable response to steroid facet injections, and will benefit most from core/hip strengthening and stabilization exercises in therapy. The patient may also benefit from occasional STM and manual mobilizations (at a low intensity) to reduce localized discomfort.     Functional Impairments:     [x]Noted lumbar/proximal hip hypomobility   []Noted lumbosacral and/or generalized hypermobility   []Decreased Lumbosacral/hip/LE functional ROM   [x]Decreased core/proximal hip strength and neuromuscular control    [x]Decreased LE functional strength    []Abnormal reflexes/sensation/myotomal/dermatomal deficits  []Reduced balance/proprioceptive control    []other:      Functional Activity Limitations (from functional questionnaire and intake)   [x]Reduced ability to tolerate prolonged functional positions   []Reduced ability or difficulty with changes of positions or transfers between positions   []Reduced ability to maintain good posture and demonstrate good body mechanics with sitting, bending, and lifting   [x]Reduced ability to sleep   [] Reduced ability or tolerance with driving and/or computer work   []Reduced ability to perform lifting, reaching, carrying tasks   []Reduced ability to squat   []Reduced ability to forward bend   [x]Reduced ability to ambulate prolonged functional periods/distances/surfaces   []Reduced ability to ascend/descend stairs   []other:       Participation Restrictions   []Reduced participation in self care activities   [x]Reduced participation in home management activities   [x]Reduced participation in work activities (volunteer)   []Reduced participation in social activities. []Reduced participation in sport/recreational activities. Classification:   []Signs/symptoms consistent with Lumbar instability/stabilization subgroup. []Signs/symptoms consistent with Lumbar mobilization/manipulation subgroup, myotomes and dermatomes intact. Meets manipulation criteria. []Signs/symptoms consistent with Lumbar direction specific/centralization subgroup   []Signs/symptoms consistent with Lumbar traction subgroup     [x]Signs/symptoms consistent with lumbar facet dysfunction   []Signs/symptoms consistent with lumbar stenosis type dysfunction   []Signs/symptoms consistent with nerve root involvement including myotome & dermatome dysfunction   []Signs/symptoms consistent with post-surgical status including: decreased ROM, strength and function.    []signs/symptoms consistent with pathology which may benefit from Dry needling     []other:      Prognosis/Rehab Potential:      [x]Excellent   []Good    []Fair   []Poor    Tolerance of evaluation/treatment:    [x]Excellent   []Good    []Fair   []Poor     Physical Therapy Evaluation Complexity Justification  [x] A history of present problem with:  [] no personal factors and/or comorbidities that impact the plan of care;  [x]1-2 personal factors and/or comorbidities that impact the plan of care  []3 personal factors and/or comorbidities that impact the plan of care  [x] An examination of body systems using standardized tests and measures addressing any of the following: body structures and functions (impairments), activity limitations, and/or participation restrictions;:  [] a total of 1-2 or more elements   [x] a total of 3 or more elements   [] a total of 4 or more elements   [x] A clinical presentation with:  [x] stable and/or uncomplicated characteristics   [] evolving clinical presentation with changing characteristics  [] unstable and unpredictable characteristics;   [x] Clinical decision making of [x] low, [] moderate, [] high complexity using standardized patient assessment instrument and/or measurable assessment of functional outcome. [x] EVAL (LOW) 54171 (typically 15 minutes face-to-face)  [] EVAL (MOD) 67413 (typically 30 minutes face-to-face)  [] EVAL (HIGH) 57192 (typically 45 minutes face-to-face)  [] RE-EVAL     PLAN:   Frequency/Duration:  2 days per week for 6 Weeks:  Interventions:  [x]  Therapeutic exercise including: strength training, ROM, for LE, Glutes and core   [x]  NMR activation and proprioception for glutes , LE and Core   [x]  Manual therapy as indicated for Hip complex, LE and spine to include: Dry Needling/IASTM, STM, PROM, Gr I-IV mobilizations, manipulation. [x]  Modalities as needed that may include: thermal agents, E-stim, Biofeedback, US, iontophoresis as indicated  [x]  Patient education on joint protection, postural re-education, activity modification, progression of HEP. HEP instruction:   Access Code: [email or web address]  URL: [email or web address]  Date: 03/17/2021  Prepared by: Manjeet Listen  Exercises   · Supine Bridge - 1 x daily - 5 x weekly - 3 sets - 10 reps   · Sidelying Hip Abduction - 1 x daily - 5 x weekly - 3 sets - 10 reps   · Prone Hip Extension - 1 x daily - 5 x weekly - 3 sets - 10 reps   · Tabletop - Alternating Foot Tap - 1 x daily - 5 x weekly - 3 sets - 10 reps   · Curl Up with Reach - 1 x daily - 5 x weekly - 3 sets - 10 reps    Additionally, the patient was educated on appropriate frequency, intensity and duration. A handout was provided  All patient questions were answered    GOALS:  Patient stated goal: to return to prolonged standing, sleeping with less discomfort  [] Progressing: [] Met: [] Not Met: [] Adjusted    Therapist goals for Patient:   Short Term Goals: To be achieved in: 2 weeks  1. Independent in HEP and progression per patient tolerance, in order to prevent re-injury. [] Progressing: [] Met: [] Not Met: [] Adjusted  2. Patient will have a decrease in pain to <2/10 on average facilitate improvement in movement, function, and ADLs as indicated by Functional Deficits. [] Progressing: [] Met: [] Not Met: [] Adjusted    Long Term Goals: To be achieved in: 6 weeks  1. Disability index score of 6% or less for the HECTOR to assist with reaching prior level of function. [] Progressing: [] Met: [] Not Met: [] Adjusted  2. Patient will demonstrate negative FADIR bilaterally to allow for proper joint functioning as indicated by patients Functional Deficits. [] Progressing: [] Met: [] Not Met: [] Adjusted  3. Patient will demonstrate an increase in Strength to 5/5 hip flex/abd/ext with good proximal hip and core activation to allow for proper functional mobility as indicated by patients Functional Deficits. [] Progressing: [] Met: [] Not Met: [] Adjusted  4. Patient will return to functional activities including standing >15 min without increased symptoms or restriction. [] Progressing: [] Met: [] Not Met: [] Adjusted  5. Patient will sleep without waking pain/discomfort in back for 2-3 days.   [] Progressing: [] Met: [] Not Met: [] Adjusted     Electronically signed by:  Darlene Mcdermott, PT, DPT, OMT-C

## 2021-03-17 NOTE — FLOWSHEET NOTE
[facility]  Outpatient Physical Therapy    Physical Therapy Daily Treatment Note  Date:  3/17/2021    Patient Name:  Maco Ahn    :  1945  MRN: [id number]  Medical/Treatment Diagnosis Information:  Diagnosis: M47.816 (ICD-10-CM) - Spondylosis without myelopathy or radiculopathy, lumbar region  Treatment Diagnosis: decreased activity tolerance, low back pain  Insurance/Certification information:  PT Insurance Information: [address] [place] [address]  Physician Information:  Referring Practitioner: Billy Leblanc MD  Plan of care signed (Y/N): []  Yes [x]  No     Progress Report: [x]  Yes  []  No     Date Range for reporting period:  Beginning 3/17/21  Ending TBD    Progress report due (10 Rx/or 30 days whichever is less): #05     Recertification due (POC duration/ or 90 days whichever is less): POC duration     Visit # Insurance Allowable Date Range (if applicable)    PMN 2825     Latex Allergy:  [x]NO      []YES  Preferred Language for Healthcare:   [x]English       []other:    Functional Scale: Oswestry: raw score = 6/45; dysfunction = 13% Date assessed: 3/17/21    Pain level:  1-6/10     SUBJECTIVE:  See eval    OBJECTIVE: See eval    RESTRICTIONS/PRECAUTIONS: osteoporosis    Exercises/Interventions:     Therapeutic Exercises (97039)  Resistance Sets / Reps Notes   SLR abd  1 x 10 R/L    SLR Ext  1 x 10 R/L                                                          Neuromuscular Re-ed (52985) Therapeutic Activities (75754)      Curl Ups  1 x 10    Tabletop Alt LE Taps  2 x 5 R/L    Bridges + TvAbd  1 x 10                      Manual Intervention (80724)                                            Patient Education & HEP:  - Patient educated on the focus of skilled physical therapy services and plan of care, including: diagnosis, prognosis, treatment goals & options.   - The following exercises were added to the patient's home exercise program.     Access Code: [email or web address] URL: [email or web address]  Date: 03/17/2021  Prepared by: Fran Prado  Exercises   Supine Bridge - 1 x daily - 5 x weekly - 3 sets - 10 reps   Sidelying Hip Abduction - 1 x daily - 5 x weekly - 3 sets - 10 reps   Prone Hip Extension - 1 x daily - 5 x weekly - 3 sets - 10 reps   Tabletop - Alternating Foot Tap - 1 x daily - 5 x weekly - 3 sets - 10 reps   Curl Up with Reach - 1 x daily - 5 x weekly - 3 sets - 10 reps  Additionally, the patient was educated on appropriate frequency, intensity and duration. A handout was provided  - All patient questions were answered    Therapeutic Exercise and NMR EXR  [x] (94322) Provided verbal/tactile cueing for activities related to strengthening, flexibility, endurance, ROM for improvements in  [x] LE / Lumbar: LE, proximal hip, and core control with self care, mobility, lifting, ambulation. [] UE / Cervical: cervical, postural, scapular, scapulothoracic and UE control with self care, reaching, carrying, lifting, house/yardwork, driving, computer work. [x] (05264) Provided verbal/tactile cueing for activities related to improving balance, coordination, kinesthetic sense, posture, motor skill, proprioception to assist with   [x] LE / lumbar: LE, proximal hip, and core control in self care, mobility, lifting, ambulation and eccentric single leg control. [] UE / cervical: cervical, scapular, scapulothoracic and UE control with self care, reaching, carrying, lifting, house/yardwork, driving, computer work. NMR and Therapeutic Activities:    [x] (53979 or 75445) Provided verbal/tactile cueing for activities related to improving balance, coordination, kinesthetic sense, posture, motor skill, proprioception and motor activation to allow for proper function of   [x] LE: / Lumbar core, proximal hip and LE with self care and ADLs  [] UE / Cervical: cervical, postural, scapular, scapulothoracic and UE control with self care, carrying, lifting, driving, computer work.    [] (62935) Gait Re-education- Provided training and instruction to the patient for proper LE, core and proximal hip recruitment and positioning and eccentric body weight control with ambulation re-education including up and down stairs     Home Exercise Program:    [x] (20285) Reviewed/Progressed HEP activities related to strengthening, flexibility, endurance, ROM of   [x] LE / Lumbar: core, proximal hip and LE for functional self-care, mobility, lifting and ambulation/stair navigation   [] UE / Cervical: cervical, postural, scapular, scapulothoracic and UE control with self care, reaching, carrying, lifting, house/yardwork, driving, computer work  [x] (33650)Reviewed/Progressed HEP activities related to improving balance, coordination, kinesthetic sense, posture, motor skill, proprioception of   [x] LE: core, proximal hip and LE for self care, mobility, lifting, and ambulation/stair navigation    [] UE / Cervical: cervical, postural,  scapular, scapulothoracic and UE control with self care, reaching, carrying, lifting, house/yardwork, driving, computer work    Manual Treatments:  PROM / STM / Oscillations-Mobs:  G-I, II, III, IV (PA's, Inf., Post.)  [] (38700) Provided manual therapy to mobilize LE, proximal hip and/or LS spine soft tissue/joints for the purpose of modulating pain, promoting relaxation,  increasing ROM, reducing/eliminating soft tissue swelling/inflammation/restriction, improving soft tissue extensibility and allowing for proper ROM for normal function with   [] LE / lumbar: self care, mobility, lifting and ambulation. [] UE / Cervical: self care, reaching, carrying, lifting, house/yardwork, driving, computer work.      Modalities:  [] (07237) Vasopneumatic compression: Utilized vasopneumatic compression to decrease edema / swelling for the purpose of improving mobility and quad tone / recruitment which will allow for increased overall function including but not limited to self-care, transfers, pain/discomfort in back for 2-3 days. []? Progressing: []? Met: []? Not Met: []? Adjusted         Overall Progression Towards Functional goals/ Treatment Progress Update:  [] Patient is progressing as expected towards functional goals listed. [] Progression is slowed due to complexities/Impairments listed. [] Progression has been slowed due to co-morbidities. [x] Plan just implemented, too soon to assess goals progression <30days   [] Goals require adjustment due to lack of progress  [] Patient is not progressing as expected and requires additional follow up with physician  [] Other:     Persisting Functional Limitations/Impairments:  [x]Sleeping []Sitting               [x]Standing []Transfers        []Walking []Kneeling               []Stairs []Squatting / bending   []ADLs []Reaching  [x]Lifting  []Housework  []Driving [x]Job related tasks (volunteering)  []Sports/Recreation []Other:      ASSESSMENT:  SEE EVAL  Treatment/Activity Tolerance:  [x] Patient tolerated treatment well [] Patient limited by fatigue  [] Patient limited by pain  [] Patient limited by other medical complications  [] Other:     Prognosis: [x] Good [] Fair  [] Poor    Patient Requires Follow-up: [x] Yes  [] No    PLAN: POC: PT 2x / week for 6 weeks. Assess response to HEP. Continue with the following:  Ab draw in Quad, standing, supine  Supine ab draw w/ heel slide, leg raise  Bridge (2 leg ? ?single leg)  Ab draw (supine 1 leg straight) w/ curl up progression  Side plank progression   Side plank w/ rotation  Front Plank  Quad alt LE  Quad opp UE/LE    [] Continue per plan of care [] Alter current plan (see comments)  [x] Plan of care initiated [] Hold pending MD visit [] Discharge    Electronically signed by: Yun Montgomery PT, DPT, OMT-C    Note: If patient does not return for scheduled/ recommended follow up visits, this note will serve as a discharge from care along with most recent update on progress.

## 2021-03-20 ENCOUNTER — HOSPITAL ENCOUNTER (OUTPATIENT)
Dept: PHYSICAL THERAPY | Age: 76
Setting detail: THERAPIES SERIES
Discharge: HOME | End: 2021-03-20
Payer: MEDICARE

## 2021-03-20 PROCEDURE — 97112 NEUROMUSCULAR REEDUCATION: CPT

## 2021-03-20 PROCEDURE — 97110 THERAPEUTIC EXERCISES: CPT

## 2021-03-20 NOTE — FLOWSHEET NOTE
[facility]  Outpatient Physical Therapy    Physical Therapy Daily Treatment Note  Date:  3/20/2021    Patient Name:  Urszula Rawls    :  1945  MRN: [id number]  Medical/Treatment Diagnosis Information:  Diagnosis: M47.816 (ICD-10-CM) - Spondylosis without myelopathy or radiculopathy, lumbar region  Treatment Diagnosis: decreased activity tolerance, low back pain  Insurance/Certification information:  PT Insurance Information: [employer]  Physician Information:  Referring Practitioner: Gaby Matson MD  Plan of care signed (Y/N): []  Yes [x]  No     Progress Report: [x]  Yes  []  No     Date Range for reporting period:  Beginning 3/17/21  Ending TBD    Progress report due (10 Rx/or 30 days whichever is less): #91     Recertification due (POC duration/ or 90 days whichever is less): POC duration     Visit # Insurance Allowable Date Range (if applicable)   0/58 PMN 0786     Latex Allergy:  [x]NO      []YES  Preferred Language for Healthcare:   [x]English       []other:    Functional Scale: Oswestry: raw score = 6/45; dysfunction = 13% Date assessed: 3/17/21    Pain level:  0/10     SUBJECTIVE:  The patient denies any pain this date. She reports compliance with HEP at this time, without complication.     OBJECTIVE: 3/20 - proper/visible/palpable tvabd activation with all exercises    RESTRICTIONS/PRECAUTIONS: osteoporosis    Exercises/Interventions:     Therapeutic Exercises (48021)  Resistance Sets / Reps Notes   SLR abd  2 x 10 R/L 3/20 - added set   SLR Ext  1 x 10 R/L    Hip Ext w/ knee bent  1 x 10 R/L 3/20 - added   SLR Flex + TvAbd  1 x 10 R/L 3/20 - added   EOT HSS  3 x 30'' R/L 3/20 - added                                             Neuromuscular Re-ed (10920) Therapeutic Activities (28416)      Curl Ups  Single Leg Curl Up  1 x 10  1 x 10 R/L   3/20 - added   Tabletop Alt LE Taps  2 x 10 R/L 3/20 - added reps   Bridges + TvAbd  Bridge + Alt LAQ  Bridge + BKFO     lime 1 x 10  1 x 10 R/L  2 x 10 R/L   3/20 - added  3/20 - added                     Manual Intervention (83070)                                            Patient Education & HEP:  - Patient educated on the focus of skilled physical therapy services and plan of care, including: diagnosis, prognosis, treatment goals & options. - The following exercises were added to the patient's home exercise program.     Access Code: [email or web address]  URL: [email or web address]  Date: 03/17/2021  Prepared by: Maryjo Roy  Exercises   Supine Bridge - 1 x daily - 5 x weekly - 3 sets - 10 reps   Sidelying Hip Abduction - 1 x daily - 5 x weekly - 3 sets - 10 reps   Prone Hip Extension - 1 x daily - 5 x weekly - 3 sets - 10 reps   Tabletop - Alternating Foot Tap - 1 x daily - 5 x weekly - 3 sets - 10 reps   Curl Up with Reach - 1 x daily - 5 x weekly - 3 sets - 10 reps  Additionally, the patient was educated on appropriate frequency, intensity and duration. A handout was provided  - All patient questions were answered    Therapeutic Exercise and NMR EXR  [x] (75517) Provided verbal/tactile cueing for activities related to strengthening, flexibility, endurance, ROM for improvements in  [x] LE / Lumbar: LE, proximal hip, and core control with self care, mobility, lifting, ambulation. [] UE / Cervical: cervical, postural, scapular, scapulothoracic and UE control with self care, reaching, carrying, lifting, house/yardwork, driving, computer work. [x] (70884) Provided verbal/tactile cueing for activities related to improving balance, coordination, kinesthetic sense, posture, motor skill, proprioception to assist with   [x] LE / lumbar: LE, proximal hip, and core control in self care, mobility, lifting, ambulation and eccentric single leg control. [] UE / cervical: cervical, scapular, scapulothoracic and UE control with self care, reaching, carrying, lifting, house/yardwork, driving, computer work.      NMR and Therapeutic Activities:    [x] (45787 or 29460) Provided verbal/tactile cueing for activities related to improving balance, coordination, kinesthetic sense, posture, motor skill, proprioception and motor activation to allow for proper function of   [x] LE: / Lumbar core, proximal hip and LE with self care and ADLs  [] UE / Cervical: cervical, postural, scapular, scapulothoracic and UE control with self care, carrying, lifting, driving, computer work.   [] (33399) Gait Re-education- Provided training and instruction to the patient for proper LE, core and proximal hip recruitment and positioning and eccentric body weight control with ambulation re-education including up and down stairs     Home Exercise Program:    [] (70460) Reviewed/Progressed HEP activities related to strengthening, flexibility, endurance, ROM of   [] LE / Lumbar: core, proximal hip and LE for functional self-care, mobility, lifting and ambulation/stair navigation   [] UE / Cervical: cervical, postural, scapular, scapulothoracic and UE control with self care, reaching, carrying, lifting, house/yardwork, driving, computer work  [] (73200)Reviewed/Progressed HEP activities related to improving balance, coordination, kinesthetic sense, posture, motor skill, proprioception of   [] LE: core, proximal hip and LE for self care, mobility, lifting, and ambulation/stair navigation    [] UE / Cervical: cervical, postural,  scapular, scapulothoracic and UE control with self care, reaching, carrying, lifting, house/yardwork, driving, computer work    Manual Treatments:  PROM / STM / Oscillations-Mobs:  G-I, II, III, IV (PA's, Inf., Post.)  [] (57689) Provided manual therapy to mobilize LE, proximal hip and/or LS spine soft tissue/joints for the purpose of modulating pain, promoting relaxation,  increasing ROM, reducing/eliminating soft tissue swelling/inflammation/restriction, improving soft tissue extensibility and allowing for proper ROM for normal function with   [] LE / lumbar: self care, mobility, lifting and ambulation. [] UE / Cervical: self care, reaching, carrying, lifting, house/yardwork, driving, computer work. Modalities:  [] (07974) Vasopneumatic compression: Utilized vasopneumatic compression to decrease edema / swelling for the purpose of improving mobility and quad tone / recruitment which will allow for increased overall function including but not limited to self-care, transfers, ambulation, and ascending / descending stairs. Modalities: Consider MOC    Charges:  Timed Code Treatment Minutes: 45   Total Treatment Minutes: 45     [] EVAL - LOW (67333)   [] EVAL - MOD (12934)  [] EVAL - HIGH (91815)  [] RE-EVAL (73795)  [x] TE (64704) x  1     [] Ionto  [x] NMR (94185) x 2      [] Vaso  [] Manual (82149) x      [] Ultrasound  [] TA x       [] Mech Traction (88133)  [] Gait Training x     [] ES (un) (19379):   [] Aquatic therapy x   [] Other:   [] Group:     GOALS:  Patient stated goal: to return to prolonged standing, sleeping with less discomfort  [x]? Progressing: []? Met: []? Not Met: []? Adjusted     Therapist goals for Patient:   Short Term Goals: To be achieved in: 2 weeks  1. Independent in HEP and progression per patient tolerance, in order to prevent re-injury. []? Progressing: [x]? Met: []? Not Met: []? Adjusted  2. Patient will have a decrease in pain to <2/10 on average facilitate improvement in movement, function, and ADLs as indicated by Functional Deficits. []? Progressing: [x]? Met: []? Not Met: []? Adjusted     Long Term Goals: To be achieved in: 6 weeks  1. Disability index score of 6% or less for the HECTOR to assist with reaching prior level of function. []? Progressing: []? Met: []? Not Met: []? Adjusted  2. Patient will demonstrate negative FADIR bilaterally to allow for proper joint functioning as indicated by patients Functional Deficits. []? Progressing: []? Met: []? Not Met: []? Adjusted  3.  Patient will demonstrate an increase response to HEP. Continue with the following:  Ab draw in Quad, standing, supine  Supine ab draw w/ heel slide, leg raise  Bridge (2 leg ? ?single leg)  Ab draw (supine 1 leg straight) w/ curl up progression  Side plank progression   Side plank w/ rotation  Front Plank  Quad alt LE  Quad opp UE/LE    [x] Continue per plan of care [] Alter current plan (see comments)   [] Plan of care initiated [] Hold pending MD visit [] Discharge    Electronically signed by: Kaylan Stephens PT, DPT, OMT-C    Note: If patient does not return for scheduled/ recommended follow up visits, this note will serve as a discharge from care along with most recent update on progress.

## 2021-03-22 ENCOUNTER — HOSPITAL ENCOUNTER (OUTPATIENT)
Dept: PHYSICAL THERAPY | Age: 76
Setting detail: THERAPIES SERIES
Discharge: HOME | End: 2021-03-22
Payer: MEDICARE

## 2021-03-22 PROCEDURE — 97112 NEUROMUSCULAR REEDUCATION: CPT

## 2021-03-22 PROCEDURE — 97110 THERAPEUTIC EXERCISES: CPT

## 2021-03-22 NOTE — FLOWSHEET NOTE
[facility]  Outpatient Physical Therapy    Physical Therapy Daily Treatment Note  Date:  3/22/2021    Patient Name:  Rodo Dial    :  1945  MRN: [id number]  Medical/Treatment Diagnosis Information:  Diagnosis: M47.816 (ICD-10-CM) - Spondylosis without myelopathy or radiculopathy, lumbar region  Treatment Diagnosis: decreased activity tolerance, low back pain  Insurance/Certification information:  PT Insurance Information: Mihai Cruz  Physician Information:  Referring Practitioner: Meredith Daily MD  Plan of care signed (Y/N): []  Yes [x]  No     Progress Report: [x]  Yes  []  No     Date Range for reporting period:  Beginning 3/17/21  Ending TBD    Progress report due (10 Rx/or 30 days whichever is less): #78     Recertification due (POC duration/ or 90 days whichever is less): POC duration     Visit # Insurance Allowable Date Range (if applicable)    PMN 7613     Latex Allergy:  [x]NO      []YES  Preferred Language for Healthcare:   [x]English       []other:    Functional Scale: Oswestry: raw score = 6/45; dysfunction = 13% Date assessed: 3/17/21    Pain level:  0/10     SUBJECTIVE: The patient denies pain, and reports readiness to progress exercises.     OBJECTIVE: 3/20 - proper/visible/palpable tvabd activation with all exercises    RESTRICTIONS/PRECAUTIONS: osteoporosis    Exercises/Interventions:     Therapeutic Exercises (31908)  Resistance Sets / Reps Notes   SLR abd  SLR Ext  Hip Ext w/ knee bent  SLR Flex + TvAbd  EOT HSS  Seated Reverse Crunch  2 x 10 3/22 - added   Anti-rotation Press Double blue 8 x 5'' 3/22 - added   Seated Lat Rows 45# 2 x 10 3/22 - added   Runner's Stretch  2 x 15'' R/L 3/22 - added   Soleus Stretch  2 x 15'' R/L 3/22 - added               Neuromuscular Re-ed (23597) Therapeutic Activities (74955)      Curl Ups  Single Leg Curl Up  Tabletop Alt LE Taps  1 x 10 R/L 3/20 - added reps   Tabletop Deadbugs:  Ipsilateral Contralateral    1 x 10 R/L  1 x 10 R/L   3/22 - added   Bridges + TvAbd  Bridge + Alt LAQ  Bridge + BKFO  Bridge + Supine Clamshell       Lime    1 x 10 R/L    5 x 5   3/20 - added  3/20 - added  3/22 - added   Bridge + LAQ + BKFO lime 8x R/L 3/22 - added   Prone Modified Plank (knees/elbows)  10 x 5'' 3/22 - added         Manual Intervention (53745)                                            Patient Education & HEP:  - Patient educated on the focus of skilled physical therapy services and plan of care, including: diagnosis, prognosis, treatment goals & options. - The following exercises were added to the patient's home exercise program.     Access Code: [email or web address]  URL: [email or web address]  Date: 03/22/2021  Prepared by: Debbiemer Hollow  Exercises   Sidelying Hip Abduction - 1 x daily - 5 x weekly - 3 sets - 10 reps   Prone Hip Extension - 1 x daily - 5 x weekly - 3 sets - 10 reps   Curl Up with Reach - 1 x daily - 5 x weekly - 3 sets - 10 reps   Alternating Single Leg Bridge - 1 x daily - 5 x weekly - 2-3 sets - 10 reps   Bridge with Hip Abduction and Resistance - 1 x daily - 5 x weekly - 2-3 sets - 10 reps   Plank on Knees - 1 x daily - 5 x weekly - 6 reps - 8'' hold   Dead Bug - Opposites & Same Sides - 1 x daily - 5 x weekly - 2 sets - 10 reps    Access Code: [email or web address]  URL: [email or web address]  Date: 03/17/2021  Prepared by: Chalmer Hollow  Exercises   Supine Bridge - 1 x daily - 5 x weekly - 3 sets - 10 reps   Sidelying Hip Abduction - 1 x daily - 5 x weekly - 3 sets - 10 reps   Prone Hip Extension - 1 x daily - 5 x weekly - 3 sets - 10 reps   Tabletop - Alternating Foot Tap - 1 x daily - 5 x weekly - 3 sets - 10 reps   Curl Up with Reach - 1 x daily - 5 x weekly - 3 sets - 10 reps  Additionally, the patient was educated on appropriate frequency, intensity and duration.  A handout was provided  - All patient questions were answered    Therapeutic Exercise and NMR EXR  [x] (43174) Provided verbal/tactile cueing for activities related to strengthening, flexibility, endurance, ROM for improvements in  [x] LE / Lumbar: LE, proximal hip, and core control with self care, mobility, lifting, ambulation. [] UE / Cervical: cervical, postural, scapular, scapulothoracic and UE control with self care, reaching, carrying, lifting, house/yardwork, driving, computer work. [x] (28593) Provided verbal/tactile cueing for activities related to improving balance, coordination, kinesthetic sense, posture, motor skill, proprioception to assist with   [x] LE / lumbar: LE, proximal hip, and core control in self care, mobility, lifting, ambulation and eccentric single leg control. [] UE / cervical: cervical, scapular, scapulothoracic and UE control with self care, reaching, carrying, lifting, house/yardwork, driving, computer work.      NMR and Therapeutic Activities:    [x] (53291 or 61146) Provided verbal/tactile cueing for activities related to improving balance, coordination, kinesthetic sense, posture, motor skill, proprioception and motor activation to allow for proper function of   [x] LE: / Lumbar core, proximal hip and LE with self care and ADLs  [] UE / Cervical: cervical, postural, scapular, scapulothoracic and UE control with self care, carrying, lifting, driving, computer work.   [] (10014) Gait Re-education- Provided training and instruction to the patient for proper LE, core and proximal hip recruitment and positioning and eccentric body weight control with ambulation re-education including up and down stairs     Home Exercise Program:    [] (28766) Reviewed/Progressed HEP activities related to strengthening, flexibility, endurance, ROM of   [] LE / Lumbar: core, proximal hip and LE for functional self-care, mobility, lifting and ambulation/stair navigation   [] UE / Cervical: cervical, postural, scapular, scapulothoracic and UE control with self care, reaching, carrying, lifting, house/yardwork, driving, computer work  [] (08020)Reviewed/Progressed HEP activities related to improving balance, coordination, kinesthetic sense, posture, motor skill, proprioception of   [] LE: core, proximal hip and LE for self care, mobility, lifting, and ambulation/stair navigation    [] UE / Cervical: cervical, postural,  scapular, scapulothoracic and UE control with self care, reaching, carrying, lifting, house/yardwork, driving, computer work    Manual Treatments:  PROM / STM / Oscillations-Mobs:  G-I, II, III, IV (PA's, Inf., Post.)  [] ([id number]) Provided manual therapy to mobilize LE, proximal hip and/or LS spine soft tissue/joints for the purpose of modulating pain, promoting relaxation,  increasing ROM, reducing/eliminating soft tissue swelling/inflammation/restriction, improving soft tissue extensibility and allowing for proper ROM for normal function with   [] LE / lumbar: self care, mobility, lifting and ambulation. [] UE / Cervical: self care, reaching, carrying, lifting, house/yardwork, driving, computer work. Modalities:  [] (48950) Vasopneumatic compression: Utilized vasopneumatic compression to decrease edema / swelling for the purpose of improving mobility and quad tone / recruitment which will allow for increased overall function including but not limited to self-care, transfers, ambulation, and ascending / descending stairs. Modalities: Consider [facility]    Charges:  Timed Code Treatment Minutes: 40   Total Treatment Minutes: 40     [] EVAL - LOW (63197)   [] EVAL - MOD (11090)  [] EVAL - HIGH (04665)  [] RE-EVAL (02187)  [x] TE (73671) x  1     [] Ionto  [x] NMR (81432) x 2      [] Vaso  [] Manual (06502) x      [] Ultrasound  [] TA x       [] Mech Traction (04602)  [] Gait Training x     [] ES (un) (32645):   [] Aquatic therapy x   [] Other:   [] Group:     GOALS:  Patient stated goal: to return to prolonged standing, sleeping with less discomfort  [x]? Progressing: []? Met: []? Not Met: []?  Adjusted    Therapist goals for Patient:   Short Term Goals: To be achieved in: 2 weeks  1. Independent in HEP and progression per patient tolerance, in order to prevent re-injury. []? Progressing: [x]? Met: []? Not Met: []? Adjusted  2. Patient will have a decrease in pain to <2/10 on average facilitate improvement in movement, function, and ADLs as indicated by Functional Deficits. []? Progressing: [x]? Met: []? Not Met: []? Adjusted     Long Term Goals: To be achieved in: 6 weeks  1. Disability index score of 6% or less for the HECTOR to assist with reaching prior level of function. []? Progressing: []? Met: []? Not Met: []? Adjusted  2. Patient will demonstrate negative FADIR bilaterally to allow for proper joint functioning as indicated by patients Functional Deficits. [x]? Progressing: []? Met: []? Not Met: []? Adjusted  3. Patient will demonstrate an increase in Strength to 5/5 hip flex/abd/ext with good proximal hip and core activation to allow for proper functional mobility as indicated by patients Functional Deficits. [x]? Progressing: []? Met: []? Not Met: []? Adjusted  4. Patient will return to functional activities including standing >15 min without increased symptoms or restriction. [x]? Progressing: []? Met: []? Not Met: []? Adjusted  5. Patient will sleep without waking pain/discomfort in back for 2-3 days. []? Progressing: []? Met: []? Not Met: []? Adjusted         Overall Progression Towards Functional goals/ Treatment Progress Update:  [x] Patient is progressing as expected towards functional goals listed. [] Progression is slowed due to complexities/Impairments listed. [] Progression has been slowed due to co-morbidities.   [] Plan just implemented, too soon to assess goals progression <30days   [] Goals require adjustment due to lack of progress  [] Patient is not progressing as expected and requires additional follow up with physician  [] Other:     Persisting Functional Limitations/Impairments: [x]Sleeping []Sitting               [x]Standing []Transfers        []Walking []Kneeling               []Stairs []Squatting / bending   []ADLs []Reaching  [x]Lifting  []Housework  []Driving [x]Job related tasks (volunteering)  []Sports/Recreation []Other:      ASSESSMENT: The patient's flow was progressed with strengthening, stabilization exercises - all without complication and with proper form again today. The patient's HEP was updated to include newly performed exercises and core activities. The patient appears to be responding favorably to PT at this time with improvement of activity tolerance, core strength, endurance. Treatment/Activity Tolerance:  [x] Patient tolerated treatment well [] Patient limited by fatigue  [] Patient limited by pain  [] Patient limited by other medical complications  [] Other:     Prognosis: [x] Good [] Fair  [] Poor    Patient Requires Follow-up: [x] Yes  [] No    PLAN: POC: PT 2x / week for 6 weeks. Assess response to HEP. Continue with the following:  Ab draw in Quad, standing, supine  Supine ab draw w/ heel slide, leg raise  Bridge (2 leg ? ?single leg)  Ab draw (supine 1 leg straight) w/ curl up progression  Side plank progression   Side plank w/ rotation  Front Plank  Quad alt LE  Quad opp UE/LE    [x] Continue per plan of care [] Alter current plan (see comments)   [] Plan of care initiated [] Hold pending MD visit [] Discharge    Electronically signed by: Davion Tenorio PT, DPT, OMT-C    Note: If patient does not return for scheduled/ recommended follow up visits, this note will serve as a discharge from care along with most recent update on progress.

## 2021-03-26 ENCOUNTER — HOSPITAL ENCOUNTER (OUTPATIENT)
Dept: PHYSICAL THERAPY | Age: 76
Setting detail: THERAPIES SERIES
Discharge: HOME | End: 2021-03-26
Payer: MEDICARE

## 2021-03-26 PROCEDURE — 97112 NEUROMUSCULAR REEDUCATION: CPT

## 2021-03-26 PROCEDURE — 97110 THERAPEUTIC EXERCISES: CPT

## 2021-03-26 NOTE — FLOWSHEET NOTE
[facility]  Outpatient Physical Therapy    Physical Therapy Daily Treatment Note  Date:  3/26/2021    Patient Name:  Selene Mackenzie    :  1945  MRN: [id number]  Medical/Treatment Diagnosis Information:  Diagnosis: M47.816 (ICD-10-CM) - Spondylosis without myelopathy or radiculopathy, lumbar region  Treatment Diagnosis: decreased activity tolerance, low back pain  Insurance/Certification information:  PT Insurance Information: [employer]  Physician Information:  Referring Practitioner: Angela Maurer MD  Plan of care signed (Y/N): []  Yes [x]  No     Progress Report: [x]  Yes  []  No     Date Range for reporting period:  Beginning 3/17/21  Ending TBD    Progress report due (10 Rx/or 30 days whichever is less): #13     Recertification due (POC duration/ or 90 days whichever is less): POC duration     Visit # Insurance Allowable Date Range (if applicable)    PMN [id number]     Latex Allergy:  [x]NO      []YES  Preferred Language for Healthcare:   [x]English       []other:    Functional Scale: Oswestry: raw score = 6/45; dysfunction = 13% Date assessed: 3/17/21    Pain level:  0/10     SUBJECTIVE: The patient denies pain, and reports noticing less achiness when rising from seated positions. The patient will see PA not next week, but the week after.     OBJECTIVE:   3/26 - fatigue noted with deadbugs exercise ipsilateral  3/20 - proper/visible/palpable tvabd activation with all exercises    RESTRICTIONS/PRECAUTIONS: osteoporosis    Exercises/Interventions:     Therapeutic Exercises (96387) - 12'   10:32 - 10:44 Resistance Sets / Reps Notes   SLR abd  SLR Ext  Hip Ext w/ knee bent  SLR Flex + TvAbd  EOT HSS  Seated Reverse Crunch  2 x 10 3/22 - added   Anti-rotation Press Seated Lat Rows 45# 3 x 10 3/26 - added set   Runner's Stretch  Soleus Stretch  Seated TB Diagonal Chops  blue 2 x 10 R/L 3/26 - added; high anchor         Neuromuscular Re-ed (39739) -   10:05 - 32 Therapeutic Activities (32018)      Curl Ups  Single Leg Curl Up  Tabletop Alt LE Taps Tabletop Deadbugs:  Ipsilateral  Contralateral    1 x 10 R/L  2 x 10 R/L   3/26 - added set   Sustained Bridge + Alt LAQ    3 x 5 R/L 3/26 - progressed   Hooklying TB BKFO + TvAbd lime 1 x 10 R/L 3/26 - added   Prone Modified Plank (knees/elbows)  10 x 5'' 3/22 - added   TB Alt SLR Abd + TvAbd + Kegel lime 2 x 10  3/26 - added   Seated TB Jaime Hip IR + TvAbd + Kegel lime 2 x 10 x 3'' 3/26 - added   Tabletop TB Pull Downs: BUE blue 2 x 10 3/26 - added         Manual Intervention (76769)                                            Patient Education & HEP:  - Patient educated on the focus of skilled physical therapy services and plan of care, including: diagnosis, prognosis, treatment goals & options. - The following exercises were added to the patient's home exercise program.     Access Code: [email or web address]  URL: [email or web address]  Date: 03/26/2021  Prepared by: Angeles Solano  Exercises   Sidelying Hip Abduction - 1 x daily - 5 x weekly - 3 sets - 10 reps   Prone Hip Extension - 1 x daily - 5 x weekly - 3 sets - 10 reps   Curl Up with Reach - 1 x daily - 3 x weekly - 3 sets - 10 reps   Alternating Single Leg Bridge - 1 x daily - 5 x weekly - 2-3 sets - 10 reps   Bridge with Hip Abduction and Resistance - 1 x daily - 5 x weekly - 2-3 sets - 10 reps   Plank on Knees - 1 x daily - 3 x weekly - 6 reps - 8'' hold   Dead Bug - Opposites & Same Sides - 1 x daily - 3 x weekly - 2 sets - 10 reps   Standing Hip Abduction with Resistance at Ankles and Unilateral Counter Support - 1 x daily - 5 x weekly - 2 sets - 10 reps   Seated Hip Internal Rotation with Ball and Resistance - 1 x daily - 5 x weekly - 2 sets - 10 reps - 3 hold    Access Code: [email or web address]  URL: [email or web address]  Date: 03/22/2021  Prepared by: Angeles Solano  Exercises   Sidelying Hip Abduction - 1 x daily - 5 x weekly - 3 sets - 10 reps   Prone Hip Extension - 1 x daily - 5 x weekly - 3 sets - 10 reps   Curl Up with Reach - 1 x daily - 5 x weekly - 3 sets - 10 reps   Alternating Single Leg Bridge - 1 x daily - 5 x weekly - 2-3 sets - 10 reps   Bridge with Hip Abduction and Resistance - 1 x daily - 5 x weekly - 2-3 sets - 10 reps   Plank on Knees - 1 x daily - 5 x weekly - 6 reps - 8'' hold   Dead Bug - Opposites & Same Sides - 1 x daily - 5 x weekly - 2 sets - 10 reps    Access Code: [email or web address]  URL: [email or web address]  Date: 03/17/2021  Prepared by: Oliver Mode  Exercises   Supine Bridge - 1 x daily - 5 x weekly - 3 sets - 10 reps   Sidelying Hip Abduction - 1 x daily - 5 x weekly - 3 sets - 10 reps   Prone Hip Extension - 1 x daily - 5 x weekly - 3 sets - 10 reps   Tabletop - Alternating Foot Tap - 1 x daily - 5 x weekly - 3 sets - 10 reps   Curl Up with Reach - 1 x daily - 5 x weekly - 3 sets - 10 reps  Additionally, the patient was educated on appropriate frequency, intensity and duration. A handout was provided  - All patient questions were answered    Therapeutic Exercise and NMR EXR  [x] (13373) Provided verbal/tactile cueing for activities related to strengthening, flexibility, endurance, ROM for improvements in  [x] LE / Lumbar: LE, proximal hip, and core control with self care, mobility, lifting, ambulation. [] UE / Cervical: cervical, postural, scapular, scapulothoracic and UE control with self care, reaching, carrying, lifting, house/yardwork, driving, computer work. [x] (01987) Provided verbal/tactile cueing for activities related to improving balance, coordination, kinesthetic sense, posture, motor skill, proprioception to assist with   [x] LE / lumbar: LE, proximal hip, and core control in self care, mobility, lifting, ambulation and eccentric single leg control. [] UE / cervical: cervical, scapular, scapulothoracic and UE control with self care, reaching, carrying, lifting, house/yardwork, driving, computer work.      NMR and Therapeutic Activities:    [x] (33291 or ) Provided verbal/tactile cueing for activities related to improving balance, coordination, kinesthetic sense, posture, motor skill, proprioception and motor activation to allow for proper function of   [x] LE: / Lumbar core, proximal hip and LE with self care and ADLs  [] UE / Cervical: cervical, postural, scapular, scapulothoracic and UE control with self care, carrying, lifting, driving, computer work.   [] (29773) Gait Re-education- Provided training and instruction to the patient for proper LE, core and proximal hip recruitment and positioning and eccentric body weight control with ambulation re-education including up and down stairs     Home Exercise Program:    [x] (03604) Reviewed/Progressed HEP activities related to strengthening, flexibility, endurance, ROM of   [x] LE / Lumbar: core, proximal hip and LE for functional self-care, mobility, lifting and ambulation/stair navigation   [] UE / Cervical: cervical, postural, scapular, scapulothoracic and UE control with self care, reaching, carrying, lifting, house/yardwork, driving, computer work  [x] (36144)Reviewed/Progressed HEP activities related to improving balance, coordination, kinesthetic sense, posture, motor skill, proprioception of   [x] LE: core, proximal hip and LE for self care, mobility, lifting, and ambulation/stair navigation    [] UE / Cervical: cervical, postural,  scapular, scapulothoracic and UE control with self care, reaching, carrying, lifting, house/yardwork, driving, computer work    Manual Treatments:  PROM / STM / Oscillations-Mobs:  G-I, II, III, IV (PA's, Inf., Post.)  [] (71493) Provided manual therapy to mobilize LE, proximal hip and/or LS spine soft tissue/joints for the purpose of modulating pain, promoting relaxation,  increasing ROM, reducing/eliminating soft tissue swelling/inflammation/restriction, improving soft tissue extensibility and allowing for proper ROM for normal function with   [] LE / lumbar: self 5/5 hip flex/abd/ext with good proximal hip and core activation to allow for proper functional mobility as indicated by patients Functional Deficits. [x]? Progressing: []? Met: []? Not Met: []? Adjusted  4. Patient will return to functional activities including standing >15 min without increased symptoms or restriction. []? Progressing: [x]? Met: []? Not Met: []? Adjusted   5. Patient will sleep without waking pain/discomfort in back for 2-3 days. []? Progressing: [x]? Met: []? Not Met: []? Adjusted         Overall Progression Towards Functional goals/ Treatment Progress Update:  [x] Patient is progressing as expected towards functional goals listed. [] Progression is slowed due to complexities/Impairments listed. [] Progression has been slowed due to co-morbidities. [] Plan just implemented, too soon to assess goals progression <30days   [] Goals require adjustment due to lack of progress  [] Patient is not progressing as expected and requires additional follow up with physician  [] Other:     Persisting Functional Limitations/Impairments:  [x]Sleeping []Sitting               [x]Standing []Transfers        []Walking []Kneeling               []Stairs []Squatting / bending   []ADLs []Reaching  [x]Lifting  []Housework  []Driving [x]Job related tasks (volunteering)  []Sports/Recreation []Other:      ASSESSMENT: The patient has achieved additional LTGs with improvement standing and sleep without pain/discomfort. Patient's flow was progressed with strengthening, stabilization exercises - all without complication and with proper form again today (some fatigue with deadbugs ipsilateral). The patient's HEP was re-printed with updated exercises; frequency for days per week was adjusted. The patient appears to be responding very favorably to PT at this time with improvement of activity tolerance, core strength, endurance.      Treatment/Activity Tolerance:  [x] Patient tolerated treatment well [] Patient limited by fatigue  [] Patient limited by pain  [] Patient limited by other medical complications  [] Other:     Prognosis: [x] Good [] Fair  [] Poor    Patient Requires Follow-up: [x] Yes  [] No    PLAN: POC: PT 2x / week for 6 weeks. Assess response to HEP. Continue with the following:  Ab draw in Quad, standing, supine  Supine ab draw w/ heel slide, leg raise  Bridge (2 leg ? ?single leg)  Ab draw (supine 1 leg straight) w/ curl up progression  Side plank progression   Side plank w/ rotation  Front Plank  Quad alt LE  Quad opp UE/LE    [x] Continue per plan of care [] Alter current plan (see comments)   [] Plan of care initiated [] Hold pending MD visit [] Discharge    Electronically signed by: Wright Brittle PT, DPT, OMT-C    Note: If patient does not return for scheduled/ recommended follow up visits, this note will serve as a discharge from care along with most recent update on progress.

## 2021-03-31 ENCOUNTER — HOSPITAL ENCOUNTER (OUTPATIENT)
Dept: PHYSICAL THERAPY | Age: 76
Setting detail: THERAPIES SERIES
Discharge: HOME | End: 2021-03-31
Payer: MEDICARE

## 2021-03-31 PROCEDURE — 97112 NEUROMUSCULAR REEDUCATION: CPT

## 2021-03-31 PROCEDURE — 97110 THERAPEUTIC EXERCISES: CPT

## 2021-03-31 NOTE — FLOWSHEET NOTE
[facility]  Outpatient Physical Therapy    Physical Therapy Daily Treatment Note  Date:  3/31/2021    Patient Name:  Radames Demarco    :  1945  MRN: [id number]  Medical/Treatment Diagnosis Information:  Diagnosis: M47.816 (ICD-10-CM) - Spondylosis without myelopathy or radiculopathy, lumbar region  Treatment Diagnosis: decreased activity tolerance, low back pain  Insurance/Certification information:  PT Insurance Information: [address] [place] [address]  Physician Information:  Referring Practitioner: Carlee Williamson MD  Plan of care signed (Y/N): []  Yes [x]  No     Progress Report: [x]  Yes  []  No     Date Range for reporting period:  Beginning 3/17/21  Ending TBD    Progress report due (10 Rx/or 30 days whichever is less): #17     Recertification due (POC duration/ or 90 days whichever is less): POC duration     Visit # Insurance Allowable Date Range (if applicable)    PMN 4458     Latex Allergy:  [x]NO      []YES  Preferred Language for Healthcare:   [x]English       []other:    Functional Scale: Oswestry: raw score = 6/45; dysfunction = 13% Date assessed: 3/17/21    Pain level:  0/10     SUBJECTIVE: The patient reports her ankle/foot was painful today, but used diclofenac which has helped. She reports compliance with HEP, and took  off.      OBJECTIVE:   3/31 - 38deg flex = lumbar  3/26 - fatigue noted with deadbugs exercise ipsilateral  3/20 - proper/visible/palpable tvabd activation with all exercises    RESTRICTIONS/PRECAUTIONS: osteoporosis    Exercises/Interventions:     Therapeutic Exercises (14974) -  Resistance Sets / Reps Notes   SLR abd  SLR Ext  Hip Ext w/ knee bent  SLR Flex + TvAbd  EOT HSS  Seated Reverse Crunch  Anti-rotation Press Seated Lat Rows Runner's Stretch  Soleus Stretch  Seated TB Diagonal Chops  Purple 2 x 10 R/L 3/31 - ^ resistance   Lateral Band Walk Lime / 2 sets 3 steps x 3 R/L 3/31 - added   Seated High Row purple 2 x 10 3/31 - added Seated Lat Pull Down purple 2 x 10 3/31 - added         Neuromuscular Re-ed (70437)   Therapeutic Activities (93635)      Curl Ups  Single Leg Curl Up  Tabletop Alt LE Taps Tabletop Deadbugs w/ foam hold  Ipsilateral  Contralateral    2 x 10 R/L  2 x 10 R/L   3/31 - added set   Sustained Bridge + Alt LAQ    2 x 10 R/L 3/31 - progressed   Hooklying TB BKFO + TvAbd Prone Plank (toes/elbows)  10 x 5'' 3/31 - progressed   TB Alt SLR Abd + TvAbd + Kegel lime 2 x 10  3/26 - added   Seated TB Jaime Hip IR + TvAbd + Kegel lime 2 x 10 x 3'' 3/26 - added   Tabletop TB Pull Downs: BUE       Manual Intervention (59415)                                            Patient Education & HEP:  - Patient educated on the focus of skilled physical therapy services and plan of care, including: diagnosis, prognosis, treatment goals & options. - The following exercises were added to the patient's home exercise program.     Access Code: [email or web address]  URL: [email or web address]  Date: 03/26/2021  Prepared by: Wewahitchka Bellow  Exercises   Sidelying Hip Abduction - 1 x daily - 5 x weekly - 3 sets - 10 reps   Prone Hip Extension - 1 x daily - 5 x weekly - 3 sets - 10 reps   Curl Up with Reach - 1 x daily - 3 x weekly - 3 sets - 10 reps   Alternating Single Leg Bridge - 1 x daily - 5 x weekly - 2-3 sets - 10 reps   Bridge with Hip Abduction and Resistance - 1 x daily - 5 x weekly - 2-3 sets - 10 reps   Plank on Knees - 1 x daily - 3 x weekly - 6 reps - 8'' hold   Dead Bug - Opposites & Same Sides - 1 x daily - 3 x weekly - 2 sets - 10 reps   Standing Hip Abduction with Resistance at Ankles and Unilateral Counter Support - 1 x daily - 5 x weekly - 2 sets - 10 reps   Seated Hip Internal Rotation with Ball and Resistance - 1 x daily - 5 x weekly - 2 sets - 10 reps - 3 hold    Access Code: [email or web address]  URL: [email or web address]  Date: 03/22/2021  Prepared by: Wewahitchka Bellow  Exercises   Sidelying Hip Abduction - 1 x daily - 5 x weekly - 3 sets - 10 reps   Prone Hip Extension - 1 x daily - 5 x weekly - 3 sets - 10 reps   Curl Up with Reach - 1 x daily - 5 x weekly - 3 sets - 10 reps   Alternating Single Leg Bridge - 1 x daily - 5 x weekly - 2-3 sets - 10 reps   Bridge with Hip Abduction and Resistance - 1 x daily - 5 x weekly - 2-3 sets - 10 reps   Plank on Knees - 1 x daily - 5 x weekly - 6 reps - 8'' hold   Dead Bug - Opposites & Same Sides - 1 x daily - 5 x weekly - 2 sets - 10 reps    Access Code: [email or web address]  URL: [email or web address]  Date: 03/17/2021  Prepared by: Cassie Greek  Exercises   Supine Bridge - 1 x daily - 5 x weekly - 3 sets - 10 reps   Sidelying Hip Abduction - 1 x daily - 5 x weekly - 3 sets - 10 reps   Prone Hip Extension - 1 x daily - 5 x weekly - 3 sets - 10 reps   Tabletop - Alternating Foot Tap - 1 x daily - 5 x weekly - 3 sets - 10 reps   Curl Up with Reach - 1 x daily - 5 x weekly - 3 sets - 10 reps  Additionally, the patient was educated on appropriate frequency, intensity and duration. A handout was provided  - All patient questions were answered    Therapeutic Exercise and NMR EXR  [x] (68653) Provided verbal/tactile cueing for activities related to strengthening, flexibility, endurance, ROM for improvements in  [x] LE / Lumbar: LE, proximal hip, and core control with self care, mobility, lifting, ambulation. [] UE / Cervical: cervical, postural, scapular, scapulothoracic and UE control with self care, reaching, carrying, lifting, house/yardwork, driving, computer work. [x] (72301) Provided verbal/tactile cueing for activities related to improving balance, coordination, kinesthetic sense, posture, motor skill, proprioception to assist with   [x] LE / lumbar: LE, proximal hip, and core control in self care, mobility, lifting, ambulation and eccentric single leg control. [] UE / cervical: cervical, scapular, scapulothoracic and UE control with self care, reaching, carrying, lifting, house/yardwork, driving, computer work. NMR and Therapeutic Activities:    [x] (30543 or 78122) Provided verbal/tactile cueing for activities related to improving balance, coordination, kinesthetic sense, posture, motor skill, proprioception and motor activation to allow for proper function of   [x] LE: / Lumbar core, proximal hip and LE with self care and ADLs  [] UE / Cervical: cervical, postural, scapular, scapulothoracic and UE control with self care, carrying, lifting, driving, computer work.   [] (99515) Gait Re-education- Provided training and instruction to the patient for proper LE, core and proximal hip recruitment and positioning and eccentric body weight control with ambulation re-education including up and down stairs     Home Exercise Program:    [] (90374) Reviewed/Progressed HEP activities related to strengthening, flexibility, endurance, ROM of   [] LE / Lumbar: core, proximal hip and LE for functional self-care, mobility, lifting and ambulation/stair navigation   [] UE / Cervical: cervical, postural, scapular, scapulothoracic and UE control with self care, reaching, carrying, lifting, house/yardwork, driving, computer work  [] (67741)Reviewed/Progressed HEP activities related to improving balance, coordination, kinesthetic sense, posture, motor skill, proprioception of   [] LE: core, proximal hip and LE for self care, mobility, lifting, and ambulation/stair navigation    [] UE / Cervical: cervical, postural,  scapular, scapulothoracic and UE control with self care, reaching, carrying, lifting, house/yardwork, driving, computer work    Manual Treatments:  PROM / STM / Oscillations-Mobs:  G-I, II, III, IV (PA's, Inf., Post.)  [] (36698) Provided manual therapy to mobilize LE, proximal hip and/or LS spine soft tissue/joints for the purpose of modulating pain, promoting relaxation,  increasing ROM, reducing/eliminating soft tissue swelling/inflammation/restriction, improving soft tissue extensibility and allowing for proper ROM for normal function with   [] LE / lumbar: self care, mobility, lifting and ambulation. [] UE / Cervical: self care, reaching, carrying, lifting, house/yardwork, driving, computer work. Modalities:  [] (87726) Vasopneumatic compression: Utilized vasopneumatic compression to decrease edema / swelling for the purpose of improving mobility and quad tone / recruitment which will allow for increased overall function including but not limited to self-care, transfers, ambulation, and ascending / descending stairs. Modalities: Consider MOC    Charges:  Timed Code Treatment Minutes: 38   Total Treatment Minutes: 38     [] EVAL - LOW (43165)   [] EVAL - MOD (00501)  [] EVAL - HIGH (02774)  [] RE-EVAL (53785)  [x] TE (15267) x  1     [] Ionto  [x] NMR (45474) x 2      [] Vaso  [] Manual (53852) x      [] Ultrasound  [] TA x       [] Mech Traction (06735)  [] Gait Training x     [] ES (un) (50078):   [] Aquatic therapy x   [] Other:   [] Group:     GOALS:  Patient stated goal: to return to prolonged standing, sleeping with less discomfort  [x]? Progressing: []? Met: []? Not Met: []? Adjusted     Therapist goals for Patient:   Short Term Goals: To be achieved in: 2 weeks  1. Independent in HEP and progression per patient tolerance, in order to prevent re-injury. []? Progressing: [x]? Met: []? Not Met: []? Adjusted  2. Patient will have a decrease in pain to <2/10 on average facilitate improvement in movement, function, and ADLs as indicated by Functional Deficits. []? Progressing: [x]? Met: []? Not Met: []? Adjusted     Long Term Goals: To be achieved in: 6 weeks  1. Disability index score of 6% or less for the HECTOR to assist with reaching prior level of function. []? Progressing: []? Met: []? Not Met: []? Adjusted  2. Patient will demonstrate negative FADIR bilaterally to allow for proper joint functioning as indicated by patients Functional Deficits. [x]? Progressing: []? Met: []? Not Met: []? Adjusted  3.  Patient will Follow-up: [x] Yes  [] No    PLAN: POC: PT 2x / week for 6 weeks. Assess response to HEP. Continue with the following:  Ab draw in Quad, standing, supine  Supine ab draw w/ heel slide, leg raise  Bridge (2 leg ? ?single leg)  Ab draw (supine 1 leg straight) w/ curl up progression  Side plank progression   Side plank w/ rotation  Front Plank  Quad alt LE  Quad opp UE/LE    [x] Continue per plan of care [] Alter current plan (see comments)   [] Plan of care initiated [] Hold pending MD visit [] Discharge    Electronically signed by: Nasra Frazier PT, DPT, OMT-C    Note: If patient does not return for scheduled/ recommended follow up visits, this note will serve as a discharge from care along with most recent update on progress.

## 2021-04-02 ENCOUNTER — HOSPITAL ENCOUNTER (OUTPATIENT)
Dept: PHYSICAL THERAPY | Age: 76
Setting detail: THERAPIES SERIES
Discharge: HOME | End: 2021-04-02
Payer: MEDICARE

## 2021-04-02 PROCEDURE — 97112 NEUROMUSCULAR REEDUCATION: CPT

## 2021-04-02 PROCEDURE — 97110 THERAPEUTIC EXERCISES: CPT

## 2021-04-02 NOTE — FLOWSHEET NOTE
[facility] - Outpatient Physical Therapy    Physical Therapy Daily Treatment Note  Date:  2021    Patient Name:  Padma Hill    :  1945  MRN: [id number]  Medical/Treatment Diagnosis Information:  Diagnosis: M47.816 (ICD-10-CM) - Spondylosis without myelopathy or radiculopathy, lumbar region  Treatment Diagnosis: decreased activity tolerance, low back pain  Insurance/Certification information:  PT Insurance Information: [facility]  Physician Information:  Referring Practitioner: Rico Short MD  Plan of care signed (Y/N): []  Yes [x]  No     Progress Report: [x]  Yes  []  No     Date Range for reporting period:  Beginning 3/17/21  Ending TBD    Progress report due (10 Rx/or 30 days whichever is less): #10     Recertification due (POC duration/ or 90 days whichever is less): POC duration     Visit # Insurance Allowable Date Range (if applicable)    PMN [id number]     Latex Allergy:  [x]NO      []YES  Preferred Language for Healthcare:   [x]English       []other:    Functional Scale: Oswestry: raw score = 6/45; dysfunction = 13% Date assessed: 3/17/21    Pain level:  1/10 achiness      SUBJECTIVE: The patient reports achiness is returning slightly across standing activities, volunteer activities; she believes her injection may be wearing off. The patient reports ability to rise more easily without stiffness.     OBJECTIVE:   3/31 - 38deg flex = lumbar  3/26 - fatigue noted with deadbugs exercise ipsilateral   3/20 - proper/visible/palpable tvabd activation with all exercises    RESTRICTIONS/PRECAUTIONS: osteoporosis    Exercises/Interventions:     Therapeutic Exercises (03642) -  Resistance Sets / Reps Notes   SLR abd  SLR Ext  Hip Ext w/ knee bent  SLR Flex + TvAbd  EOT HSS  Seated Reverse Crunch  Anti-rotation Press w/ CC Bar (vertical) 15# 8 x 5'' 4/2 - progressed   Seated Lat Rows 45# 3 x 10 4/2 - superset w/ high row   Runner's Stretch  Soleus Stretch Standing TB Diagonal Chops Purple 2 x 10 R/L 4/2 - standing   Standing MB Diagonal Lifts 7# 2 x 10 R/L 4/2 - added   Lateral Band Walk Seated High Row 45# 3 x 10 4/2 - superset w/ high row         Neuromuscular Re-ed (30351)   Therapeutic Activities (81673)      Curl Ups  Single Leg Curl Up  Curl Up with MB press     4# 2 x 104/2 - addedReverse Crunch 2 x 10 4/2 - added   Tabletop Alt LE Taps Tabletop Deadbugs w/ foam hold  Ipsilateral  Contralateral  Sustained Bridge + Alt LAQ    Hooklying TB BKFO + TvAbd Prone Plank (toes/elbows)  8 x 10'' 4/2 - progressed hold, reduced reps   TB Alt SLR Abd + TvAbd + Kegel lime 3 x 10  42 - added set   Seated TB Jaime Hip IR + TvAbd + Kegel Tabletop TB Pull Downs: BUE Landmine Rows 15kg bar 3 x 10 4/2 - added         Manual Intervention (69824)                                            Patient Education & HEP:  - Patient educated on the focus of skilled physical therapy services and plan of care, including: diagnosis, prognosis, treatment goals & options. - The following exercises were added to the patient's home exercise program.     Access Code: [email or web address]  URL: [email or web address]  Date: 03/26/2021  Prepared by: Chung Tang  Exercises   Sidelying Hip Abduction - 1 x daily - 5 x weekly - 3 sets - 10 reps   Prone Hip Extension - 1 x daily - 5 x weekly - 3 sets - 10 reps   Curl Up with Reach - 1 x daily - 3 x weekly - 3 sets - 10 reps   Alternating Single Leg Bridge - 1 x daily - 5 x weekly - 2-3 sets - 10 reps   Bridge with Hip Abduction and Resistance - 1 x daily - 5 x weekly - 2-3 sets - 10 reps   Plank on Knees - 1 x daily - 3 x weekly - 6 reps - 8'' hold   Dead Bug - Opposites & Same Sides - 1 x daily - 3 x weekly - 2 sets - 10 reps   Standing Hip Abduction with Resistance at Ankles and Unilateral Counter Support - 1 x daily - 5 x weekly - 2 sets - 10 reps   Seated Hip Internal Rotation with Ball and Resistance - 1 x daily - 5 x weekly - 2 sets - 10 reps - 3 hold    Access Code: [email or web address]  URL: [email or web address]  Date: 03/22/2021  Prepared by: Verle Harley  Exercises   Sidelying Hip Abduction - 1 x daily - 5 x weekly - 3 sets - 10 reps   Prone Hip Extension - 1 x daily - 5 x weekly - 3 sets - 10 reps   Curl Up with Reach - 1 x daily - 5 x weekly - 3 sets - 10 reps   Alternating Single Leg Bridge - 1 x daily - 5 x weekly - 2-3 sets - 10 reps   Bridge with Hip Abduction and Resistance - 1 x daily - 5 x weekly - 2-3 sets - 10 reps   Plank on Knees - 1 x daily - 5 x weekly - 6 reps - 8'' hold   Dead Bug - Opposites & Same Sides - 1 x daily - 5 x weekly - 2 sets - 10 reps    Access Code: [email or web address]  URL: [email or web address]  Date: 03/17/2021  Prepared by: Verle Harley  Exercises   Supine Bridge - 1 x daily - 5 x weekly - 3 sets - 10 reps   Sidelying Hip Abduction - 1 x daily - 5 x weekly - 3 sets - 10 reps   Prone Hip Extension - 1 x daily - 5 x weekly - 3 sets - 10 reps   Tabletop - Alternating Foot Tap - 1 x daily - 5 x weekly - 3 sets - 10 reps   Curl Up with Reach - 1 x daily - 5 x weekly - 3 sets - 10 reps  Additionally, the patient was educated on appropriate frequency, intensity and duration. A handout was provided  - All patient questions were answered    Therapeutic Exercise and NMR EXR  [x] (10295) Provided verbal/tactile cueing for activities related to strengthening, flexibility, endurance, ROM for improvements in  [x] LE / Lumbar: LE, proximal hip, and core control with self care, mobility, lifting, ambulation. [] UE / Cervical: cervical, postural, scapular, scapulothoracic and UE control with self care, reaching, carrying, lifting, house/yardwork, driving, computer work.   [x] (51097) Provided verbal/tactile cueing for activities related to improving balance, coordination, kinesthetic sense, posture, motor skill, proprioception to assist with   [x] LE / lumbar: LE, proximal hip, and core control in self care, mobility, lifting, ambulation and eccentric single leg control. [] UE / cervical: cervical, scapular, scapulothoracic and UE control with self care, reaching, carrying, lifting, house/yardwork, driving, computer work.      NMR and Therapeutic Activities:    [x] (41829 or 83662) Provided verbal/tactile cueing for activities related to improving balance, coordination, kinesthetic sense, posture, motor skill, proprioception and motor activation to allow for proper function of   [x] LE: / Lumbar core, proximal hip and LE with self care and ADLs  [] UE / Cervical: cervical, postural, scapular, scapulothoracic and UE control with self care, carrying, lifting, driving, computer work.   [] (02952) Gait Re-education- Provided training and instruction to the patient for proper LE, core and proximal hip recruitment and positioning and eccentric body weight control with ambulation re-education including up and down stairs     Home Exercise Program:    [] (05232) Reviewed/Progressed HEP activities related to strengthening, flexibility, endurance, ROM of   [] LE / Lumbar: core, proximal hip and LE for functional self-care, mobility, lifting and ambulation/stair navigation   [] UE / Cervical: cervical, postural, scapular, scapulothoracic and UE control with self care, reaching, carrying, lifting, house/yardwork, driving, computer work  [] (92255)Reviewed/Progressed HEP activities related to improving balance, coordination, kinesthetic sense, posture, motor skill, proprioception of   [] LE: core, proximal hip and LE for self care, mobility, lifting, and ambulation/stair navigation    [] UE / Cervical: cervical, postural,  scapular, scapulothoracic and UE control with self care, reaching, carrying, lifting, house/yardwork, driving, computer work    Manual Treatments:  PROM / STM / Oscillations-Mobs:  G-I, II, III, IV (PA's, Inf., Post.)  [] (14754) Provided manual therapy to mobilize LE, proximal hip and/or LS spine soft tissue/joints for the purpose of modulating pain, promoting relaxation,  increasing ROM, reducing/eliminating soft tissue swelling/inflammation/restriction, improving soft tissue extensibility and allowing for proper ROM for normal function with   [] LE / lumbar: self care, mobility, lifting and ambulation. [] UE / Cervical: self care, reaching, carrying, lifting, house/yardwork, driving, computer work. Modalities:  [] (65431) Vasopneumatic compression: Utilized vasopneumatic compression to decrease edema / swelling for the purpose of improving mobility and quad tone / recruitment which will allow for increased overall function including but not limited to self-care, transfers, ambulation, and ascending / descending stairs. Modalities: [place] [facility] [place]    Charges:  Timed Code Treatment Minutes: 40   Total Treatment Minutes: 40     [] EVAL - LOW (35609)   [] EVAL - MOD (37976)  [] EVAL - HIGH (46428)  [] RE-EVAL (91887)  [x] TE (33853) x  2     [] Ionto  [x] NMR (60309) x 1      [] Vaso  [] Manual (26518) x      [] Ultrasound  [] TA x       [] Mech Traction (79582)  [] Gait Training x     [] ES (un) (03252):   [] Aquatic therapy x   [] Other:   [] Group:     GOALS:  Patient stated goal: to return to prolonged standing, sleeping with less discomfort  [x]? Progressing: []? Met: []? Not Met: []? Adjusted     Therapist goals for Patient:   Short Term Goals: To be achieved in: 2 weeks  1. Independent in HEP and progression per patient tolerance, in order to prevent re-injury. []? Progressing: [x]? Met: []? Not Met: []? Adjusted  2. Patient will have a decrease in pain to <2/10 on average facilitate improvement in movement, function, and ADLs as indicated by Functional Deficits. []? Progressing: [x]? Met: []? Not Met: []? Adjusted     Long Term Goals: To be achieved in: 6 weeks  1. Disability index score of 6% or less for the HECTOR to assist with reaching prior level of function. []? Progressing: []? Met: []? Not Met: []? Adjusted  2.  Patient will Prognosis: [x] Good [] Fair  [] Poor    Patient Requires Follow-up: [x] Yes  [] No    PLAN: POC: PT 2x / week for 6 weeks. Assess response to HEP. Continue with the following:  Ab draw in Quad, standing, supine  Supine ab draw w/ heel slide, leg raise  Bridge (2 leg ? ?single leg)  Ab draw (supine 1 leg straight) w/ curl up progression  Side plank progression   Side plank w/ rotation  Front Plank  Quad alt LE  Quad opp UE/LE    [x] Continue per plan of care [] Alter current plan (see comments)   [] Plan of care initiated [] Hold pending MD visit [] Discharge    Electronically signed by: Catrina Adler PT, DPT, OMT-C    Note: If patient does not return for scheduled/ recommended follow up visits, this note will serve as a discharge from care along with most recent update on progress.

## 2021-04-07 ENCOUNTER — HOSPITAL ENCOUNTER (OUTPATIENT)
Dept: PHYSICAL THERAPY | Age: 76
Setting detail: THERAPIES SERIES
Discharge: HOME | End: 2021-04-07
Payer: MEDICARE

## 2021-04-09 ENCOUNTER — HOSPITAL ENCOUNTER (OUTPATIENT)
Dept: PHYSICAL THERAPY | Age: 76
Setting detail: THERAPIES SERIES
Discharge: HOME | End: 2021-04-09
Payer: MEDICARE

## 2021-04-09 PROCEDURE — 97112 NEUROMUSCULAR REEDUCATION: CPT

## 2021-04-09 PROCEDURE — 97110 THERAPEUTIC EXERCISES: CPT

## 2021-04-09 NOTE — FLOWSHEET NOTE
[facility] - Outpatient Physical Therapy    Physical Therapy Daily Treatment Note  Date:  2021    Patient Name:  Mercedes Marmolejo    :  1945  MRN: [id number]  Medical/Treatment Diagnosis Information:  Diagnosis: M47.816 (ICD-10-CM) - Spondylosis without myelopathy or radiculopathy, lumbar region  Treatment Diagnosis: decreased activity tolerance, low back pain  Insurance/Certification information:  PT Insurance Information: [employer]  Physician Information:  Referring Practitioner: Keke Briones MD  Plan of care signed (Y/N): []  Yes [x]  No     Progress Report: [x]  Yes  []  No     Date Range for reporting period:  Beginning 3/17/21  Ending TBD    Progress report due (10 Rx/or 30 days whichever is less): #27     Recertification due (POC duration/ or 90 days whichever is less): POC duration     Visit # Insurance Allowable Date Range (if applicable)    PMN 9581     Latex Allergy:  [x]NO      []YES  Preferred Language for Healthcare:   [x]English       []other:    Functional Scale: Oswestry: raw score = 6/45; dysfunction = 13% Date assessed: 3/17/21    Pain level:  2/10 achiness      SUBJECTIVE: The patient will have 2nd set of lumbar injections next week. This may result in RFA after 2nd set of injections. Walking and movement eases discomfort. Prolonged standing is still the worst on symptoms. She feels better today after adjusting amytripyline.     OBJECTIVE:   3/31 - 38deg flex = lumbar  3/26 - fatigue noted with deadbugs exercise ipsilateral   3/20 - proper/visible/palpable tvabd activation with all exercises    RESTRICTIONS/PRECAUTIONS: osteoporosis  * - standing    Exercises/Interventions:     Therapeutic Exercises (09173) -  Resistance Sets / Reps Notes   SLR abd  SLR Ext  Hip Ext w/ knee bent  SLR Flex + TvAbd  EOT HSS  Seated Reverse Crunch  Anti-rotation Press w/ CC Bar (vertical) Seated Lat Rows Runner's Stretch  Soleus Stretch  Standing TB Diagonal Chops Standing MB Diagonal Lifts Lateral Band Walk Blue (ankles) 10ft R/L x 4 4/9 - ^resistance, increased distance   Seated High Row Child's Pose  10'' x 4 4/9 - added; used between sets of glute kicks   DB Squats (tap chair) 10# db BUE 2 x 10 4/9 - added         Neuromuscular Re-ed (73324)   Therapeutic Activities (90598)      Curl Ups  Single Leg Curl Up  Curl Up with MB press Reverse Crunch Tabletop Alt LE Taps Tabletop Deadbugs w/ foam hold  Ipsilateral  Contralateral  Sustained Bridge + Alt LAQ    Hooklying TB BKFO + TvAbd Prone Plank (toes/elbows)  8 x 10'' 4/2 - progressed hold, reduced reps   TB Alt SLR Abd + TvAbd + Kegel Seated TB Jaime Hip IR + TvAbd + Kegel lime 2 x 10 x 3'' 3/26 - added   Tabletop TB Pull Downs: BUE Landmine Rows Qped:  Glute Kicks  Fire Hydrants    2 x 15 R/L  2 x 15 R/L   4/9 - added   Sustained Bridge + LE Walkouts  2 x 10 4/9 - added   Alt Swimmer's  2 x 5 x 3-5'' Hold 4/9 - added   Plank Pulses   2 x 10 standing incline on table  1 x 10 modified on knees/table 4/9 - added;          Manual Intervention (75320)                                            Patient Education & HEP:  - Patient educated on the focus of skilled physical therapy services and plan of care, including: diagnosis, prognosis, treatment goals & options. - The following exercises were added to the patient's home exercise program.     Access Code: [email or web address]  URL: [email or web address]  Date: 03/26/2021  Prepared by: Mirtha Correia  Exercises   Sidelying Hip Abduction - 1 x daily - 5 x weekly - 3 sets - 10 reps   Prone Hip Extension - 1 x daily - 5 x weekly - 3 sets - 10 reps   Curl Up with Reach - 1 x daily - 3 x weekly - 3 sets - 10 reps   Alternating Single Leg Bridge - 1 x daily - 5 x weekly - 2-3 sets - 10 reps   Bridge with Hip Abduction and Resistance - 1 x daily - 5 x weekly - 2-3 sets - 10 reps   Plank on Knees - 1 x daily - 3 x weekly - 6 reps - 8'' hold   Dead Bug - Opposites & Same Sides - 1 x daily - 3 x weekly - 2 sets - 10 reps   Standing Hip Abduction with Resistance at Ankles and Unilateral Counter Support - 1 x daily - 5 x weekly - 2 sets - 10 reps   Seated Hip Internal Rotation with Ball and Resistance - 1 x daily - 5 x weekly - 2 sets - 10 reps - 3 hold    Access Code: [email or web address]  URL: [email or web address]  Date: 03/22/2021  Prepared by: Gregoryon Chin  Exercises   Sidelying Hip Abduction - 1 x daily - 5 x weekly - 3 sets - 10 reps   Prone Hip Extension - 1 x daily - 5 x weekly - 3 sets - 10 reps   Curl Up with Reach - 1 x daily - 5 x weekly - 3 sets - 10 reps   Alternating Single Leg Bridge - 1 x daily - 5 x weekly - 2-3 sets - 10 reps   Bridge with Hip Abduction and Resistance - 1 x daily - 5 x weekly - 2-3 sets - 10 reps   Plank on Knees - 1 x daily - 5 x weekly - 6 reps - 8'' hold   Dead Bug - Opposites & Same Sides - 1 x daily - 5 x weekly - 2 sets - 10 reps    Access Code: [email or web address]  URL: [email or web address]  Date: 03/17/2021  Prepared by: Amanda Chin  Exercises   Supine Bridge - 1 x daily - 5 x weekly - 3 sets - 10 reps   Sidelying Hip Abduction - 1 x daily - 5 x weekly - 3 sets - 10 reps   Prone Hip Extension - 1 x daily - 5 x weekly - 3 sets - 10 reps   Tabletop - Alternating Foot Tap - 1 x daily - 5 x weekly - 3 sets - 10 reps   Curl Up with Reach - 1 x daily - 5 x weekly - 3 sets - 10 reps  Additionally, the patient was educated on appropriate frequency, intensity and duration. A handout was provided  - All patient questions were answered    Therapeutic Exercise and NMR EXR  [x] (10645) Provided verbal/tactile cueing for activities related to strengthening, flexibility, endurance, ROM for improvements in  [x] LE / Lumbar: LE, proximal hip, and core control with self care, mobility, lifting, ambulation. [] UE / Cervical: cervical, postural, scapular, scapulothoracic and UE control with self care, reaching, carrying, lifting, house/yardwork, driving, computer work.   [x] (53309) Provided verbal/tactile cueing for activities related to improving balance, coordination, kinesthetic sense, posture, motor skill, proprioception to assist with   [x] LE / lumbar: LE, proximal hip, and core control in self care, mobility, lifting, ambulation and eccentric single leg control. [] UE / cervical: cervical, scapular, scapulothoracic and UE control with self care, reaching, carrying, lifting, house/yardwork, driving, computer work.      NMR and Therapeutic Activities:    [x] (19522 or 73831) Provided verbal/tactile cueing for activities related to improving balance, coordination, kinesthetic sense, posture, motor skill, proprioception and motor activation to allow for proper function of   [x] LE: / Lumbar core, proximal hip and LE with self care and ADLs  [] UE / Cervical: cervical, postural, scapular, scapulothoracic and UE control with self care, carrying, lifting, driving, computer work.   [] (16406) Gait Re-education- Provided training and instruction to the patient for proper LE, core and proximal hip recruitment and positioning and eccentric body weight control with ambulation re-education including up and down stairs     Home Exercise Program:    [] (31455) Reviewed/Progressed HEP activities related to strengthening, flexibility, endurance, ROM of   [] LE / Lumbar: core, proximal hip and LE for functional self-care, mobility, lifting and ambulation/stair navigation   [] UE / Cervical: cervical, postural, scapular, scapulothoracic and UE control with self care, reaching, carrying, lifting, house/yardwork, driving, computer work  [] (74787)Reviewed/Progressed HEP activities related to improving balance, coordination, kinesthetic sense, posture, motor skill, proprioception of   [] LE: core, proximal hip and LE for self care, mobility, lifting, and ambulation/stair navigation    [] UE / Cervical: cervical, postural,  scapular, scapulothoracic and UE control with self care, reaching, carrying, lifting, house/yardwork, driving, computer work    Manual Treatments:  PROM / STM / Oscillations-Mobs:  G-I, II, III, IV (PA's, Inf., Post.)  [] (31893) Provided manual therapy to mobilize LE, proximal hip and/or LS spine soft tissue/joints for the purpose of modulating pain, promoting relaxation,  increasing ROM, reducing/eliminating soft tissue swelling/inflammation/restriction, improving soft tissue extensibility and allowing for proper ROM for normal function with   [] LE / lumbar: self care, mobility, lifting and ambulation. [] UE / Cervical: self care, reaching, carrying, lifting, house/yardwork, driving, computer work. Modalities:  [] (39267) Vasopneumatic compression: Utilized vasopneumatic compression to decrease edema / swelling for the purpose of improving mobility and quad tone / recruitment which will allow for increased overall function including but not limited to self-care, transfers, ambulation, and ascending / descending stairs. Modalities: [place] [facility] [place]    Charges:  Timed Code Treatment Minutes: 43   Total Treatment Minutes: 43     [] EVAL - LOW (52331)   [] EVAL - MOD (09318)  [] EVAL - HIGH (64294)  [] RE-EVAL (80209)  [x] TE (89400) x  1    [] Ionto  [x] NMR (70308) x 2      [] Vaso  [] Manual (32958) x      [] Ultrasound  [] TA x       [] Mech Traction (69084)  [] Gait Training x     [] ES (un) (75787):   [] Aquatic therapy x   [] Other:   [] Group:     GOALS:  Patient stated goal: to return to prolonged standing, sleeping with less discomfort  [x]? Progressing: []? Met: []? Not Met: []? Adjusted     Therapist goals for Patient:   Short Term Goals: To be achieved in: 2 weeks  1. Independent in HEP and progression per patient tolerance, in order to prevent re-injury. []? Progressing: [x]? Met: []? Not Met: []? Adjusted  2. Patient will have a decrease in pain to <2/10 on average facilitate improvement in movement, function, and ADLs as indicated by Functional Deficits. []? Progressing: [x]? Met: []? Not Met: []? Adjusted     Long Term Goals: To be achieved in: 6 weeks  1. Disability index score of 6% or less for the HECTOR to assist with reaching prior level of function. []? Progressing: []? Met: []? Not Met: []? Adjusted  2. Patient will demonstrate negative FADIR bilaterally to allow for proper joint functioning as indicated by patients Functional Deficits. [x]? Progressing: []? Met: []? Not Met: []? Adjusted  3. Patient will demonstrate an increase in Strength to 5/5 hip flex/abd/ext with good proximal hip and core activation to allow for proper functional mobility as indicated by patients Functional Deficits. [x]? Progressing: []? Met: []? Not Met: []? Adjusted  4. Patient will return to functional activities including standing >15 min without increased symptoms or restriction. []? Progressing: [x]? Met: []? Not Met: []? Adjusted   5. Patient will sleep without waking pain/discomfort in back for 2-3 days. []? Progressing: [x]? Met: []? Not Met: []? Adjusted         Overall Progression Towards Functional goals/ Treatment Progress Update:  [x] Patient is progressing as expected towards functional goals listed. [] Progression is slowed due to complexities/Impairments listed. [] Progression has been slowed due to co-morbidities. [] Plan just implemented, too soon to assess goals progression <30days   [] Goals require adjustment due to lack of progress  [] Patient is not progressing as expected and requires additional follow up with physician  [] Other:     Persisting Functional Limitations/Impairments:  [x]Sleeping []Sitting               [x]Standing []Transfers        []Walking []Kneeling               []Stairs []Squatting / bending   []ADLs []Reaching  [x]Lifting  []Housework  []Driving [x]Job related tasks (volunteering)  []Sports/Recreation []Other:      ASSESSMENT: The patient was challenged by qped exercises and progressions of flow, listed above.  The patient was able to apply any v.c.

## 2021-04-14 ENCOUNTER — HOSPITAL ENCOUNTER (OUTPATIENT)
Dept: PHYSICAL THERAPY | Age: 76
Setting detail: THERAPIES SERIES
Discharge: HOME | End: 2021-04-14
Payer: MEDICARE

## 2021-04-14 PROCEDURE — 97110 THERAPEUTIC EXERCISES: CPT

## 2021-04-14 PROCEDURE — 97112 NEUROMUSCULAR REEDUCATION: CPT

## 2021-04-14 PROCEDURE — 97140 MANUAL THERAPY 1/> REGIONS: CPT

## 2021-04-14 NOTE — FLOWSHEET NOTE
[facility] - Outpatient Physical Therapy    Physical Therapy Daily Treatment Note  Date:  2021    Patient Name:  Isidra Calderon    :  1945  MRN: [id number]  Medical/Treatment Diagnosis Information:  Diagnosis: M47.816 (ICD-10-CM) - Spondylosis without myelopathy or radiculopathy, lumbar region  Treatment Diagnosis: decreased activity tolerance, low back pain  Insurance/Certification information:  PT Insurance Information: [address] [place] [address]  Physician Information:  Referring Practitioner: Wilver Willis MD  Plan of care signed (Y/N): []  Yes [x]  No     Progress Report: [x]  Yes  []  No     Date Range for reporting period:  Beginning 3/17/21  Ending TBD    Progress report due (10 Rx/or 30 days whichever is less): #25     Recertification due (POC duration/ or 90 days whichever is less): POC duration     Visit # Insurance Allowable Date Range (if applicable)    PMN [id number]     Latex Allergy:  [x]NO      []YES  Preferred Language for Healthcare:   [x]English       []other:    Functional Scale: Oswestry: raw score = 6/45; dysfunction = 13% Date assessed: 3/17/21    Pain level: 3/10 achiness     SUBJECTIVE: The patient had 2nd injection to qualify for RFA. Relief was felt for one day, which should indicate patient is appropriate for RFA. She will be contacted about the RFA. Symptoms slightly higher today; pain is not in buttocks bilaterally. She was able to mop and vacuum at direction of MD to gauge effectiveness of 2nd injection. OBJECTIVE:     - FADIR L (negative);  Hip Abd MMT: L = 4/5, R = 4+/5; L/S Extension AROM = 10deg, L/S Flexion AROM = 30deg  3/31 - 38deg flex = lumbar  3/26 - fatigue noted with deadbugs exercise ipsilateral   3/20 - proper/visible/palpable tvabd activation with all exercises    RESTRICTIONS/PRECAUTIONS: osteoporosis  * - standing    Exercises/Interventions:     Therapeutic Exercises (40743) -  Resistance Sets / Reps Notes   SLR abd  SLR Ext  Hip Ext w/ knee bent  SLR Flex + TvAbd  EOT HSS  Seated Reverse Crunch  2 x 10 3/22 - added   Anti-rotation Press w/ CC Bar (vertical) Seated Lat Rows Runner's Stretch  Soleus Stretch  Standing TB Diagonal Chops Standing MB Diagonal Lifts Lateral Band Walk Blue (ankles) 10ft R/L x 4 4/9 - ^resistance, increased distance   Seated High Row H- [place]ocks (child's pose to [facility])  6 x 10'' 4/14 - added   DB Squats (tap chair) 10# db BUE 2 x 10 4/9 - added         Neuromuscular Re-ed (00601)   Therapeutic Activities (97767)      Curl Ups  Single Leg Curl Up  Curl Up with MB press Reverse Crunch Tabletop Alt LE Taps Tabletop Deadbugs w/ foam hold  Ipsilateral  Contralateral  Sustained Bridge + Alt LAQ    Hooklying TB BKFO + TvAbd Prone Plank (toes/elbows)  8 x 10'' 4/2 - progressed hold, reduced reps   TB Alt SLR Abd + TvAbd + Kegel Seated TB Jaime Hip IR + TvAbd + Kegel Tabletop TB Pull Downs: BUE Landmine Rows Qped:  Glute Kicks  Fire Hydrants    2 x 15 R/L  2 x 15 R/L   4/9 - added   Sustained Bridge + LE Walkouts  Alt Swimmer's  SB Plank Pulses  1 x 10 standing incline on table  2 x 10 modified on knees/table 4/9 - added   MB Figure 8s (between legs) in Bicycle Crunch Position 4# mb 2 x 8 4/14 - added         Manual Intervention (63977)      Prone L/S PA mobs L1-5 G2-4 5 passes, 10 mobs ea 4/14 - added; patient reports relief                                   Patient Education & HEP:  - Patient educated on the focus of skilled physical therapy services and plan of care, including: diagnosis, prognosis, treatment goals & options. - The following exercises were added to the patient's home exercise program.     Access Code: [email or web address]  URL: [email or web address]  Date: 03/26/2021  Prepared by: Amanda Dickerson  Exercises   Sidelying Hip Abduction - 1 x daily - 5 x weekly - 3 sets - 10 reps   Prone Hip Extension - 1 x daily - 5 x weekly - 3 sets - 10 reps   Curl Up with Reach - 1 x daily - 3 x weekly - 3 sets - 10 reps Alternating Single Leg Bridge - 1 x daily - 5 x weekly - 2-3 sets - 10 reps   Bridge with Hip Abduction and Resistance - 1 x daily - 5 x weekly - 2-3 sets - 10 reps   Plank on Knees - 1 x daily - 3 x weekly - 6 reps - 8'' hold   Dead Bug - Opposites & Same Sides - 1 x daily - 3 x weekly - 2 sets - 10 reps   Standing Hip Abduction with Resistance at Ankles and Unilateral Counter Support - 1 x daily - 5 x weekly - 2 sets - 10 reps   Seated Hip Internal Rotation with Ball and Resistance - 1 x daily - 5 x weekly - 2 sets - 10 reps - 3 hold    Access Code: [email or web address]  URL: [email or web address]  Date: 03/22/2021  Prepared by: Spot Coffee  Exercises   Sidelying Hip Abduction - 1 x daily - 5 x weekly - 3 sets - 10 reps   Prone Hip Extension - 1 x daily - 5 x weekly - 3 sets - 10 reps   Curl Up with Reach - 1 x daily - 5 x weekly - 3 sets - 10 reps   Alternating Single Leg Bridge - 1 x daily - 5 x weekly - 2-3 sets - 10 reps   Bridge with Hip Abduction and Resistance - 1 x daily - 5 x weekly - 2-3 sets - 10 reps   Plank on Knees - 1 x daily - 5 x weekly - 6 reps - 8'' hold   Dead Bug - Opposites & Same Sides - 1 x daily - 5 x weekly - 2 sets - 10 reps    Access Code: [email or web address]  URL: [email or web address]  Date: 03/17/2021  Prepared by: [email or web address]  Exercises   Supine Bridge - 1 x daily - 5 x weekly - 3 sets - 10 reps   Sidelying Hip Abduction - 1 x daily - 5 x weekly - 3 sets - 10 reps   Prone Hip Extension - 1 x daily - 5 x weekly - 3 sets - 10 reps   Tabletop - Alternating Foot Tap - 1 x daily - 5 x weekly - 3 sets - 10 reps   Curl Up with Reach - 1 x daily - 5 x weekly - 3 sets - 10 reps  Additionally, the patient was educated on appropriate frequency, intensity and duration.  A handout was provided  - All patient questions were answered    Therapeutic Exercise and NMR EXR  [x] (21857) Provided verbal/tactile cueing for activities related to strengthening, flexibility, endurance, ROM for improvements in  [x] LE / Lumbar: LE, proximal hip, and core control with self care, mobility, lifting, ambulation. [] UE / Cervical: cervical, postural, scapular, scapulothoracic and UE control with self care, reaching, carrying, lifting, house/yardwork, driving, computer work. [x] (01807) Provided verbal/tactile cueing for activities related to improving balance, coordination, kinesthetic sense, posture, motor skill, proprioception to assist with   [x] LE / lumbar: LE, proximal hip, and core control in self care, mobility, lifting, ambulation and eccentric single leg control. [] UE / cervical: cervical, scapular, scapulothoracic and UE control with self care, reaching, carrying, lifting, house/yardwork, driving, computer work.      NMR and Therapeutic Activities:    [x] (74500 or 64549) Provided verbal/tactile cueing for activities related to improving balance, coordination, kinesthetic sense, posture, motor skill, proprioception and motor activation to allow for proper function of   [x] LE: / Lumbar core, proximal hip and LE with self care and ADLs  [] UE / Cervical: cervical, postural, scapular, scapulothoracic and UE control with self care, carrying, lifting, driving, computer work.   [] (88970) Gait Re-education- Provided training and instruction to the patient for proper LE, core and proximal hip recruitment and positioning and eccentric body weight control with ambulation re-education including up and down stairs     Home Exercise Program:    [] (13875) Reviewed/Progressed HEP activities related to strengthening, flexibility, endurance, ROM of   [] LE / Lumbar: core, proximal hip and LE for functional self-care, mobility, lifting and ambulation/stair navigation   [] UE / Cervical: cervical, postural, scapular, scapulothoracic and UE control with self care, reaching, carrying, lifting, house/yardwork, driving, computer work  [] (81295)Reviewed/Progressed HEP activities related to improving balance, coordination, kinesthetic sense, posture, motor skill, proprioception of   [] LE: core, proximal hip and LE for self care, mobility, lifting, and ambulation/stair navigation    [] UE / Cervical: cervical, postural,  scapular, scapulothoracic and UE control with self care, reaching, carrying, lifting, house/yardwork, driving, computer work    Manual Treatments:  PROM / STM / Oscillations-Mobs:  G-I, II, III, IV (PA's, Inf., Post.)  [x] (17979) Provided manual therapy to mobilize LE, proximal hip and/or LS spine soft tissue/joints for the purpose of modulating pain, promoting relaxation,  increasing ROM, reducing/eliminating soft tissue swelling/inflammation/restriction, improving soft tissue extensibility and allowing for proper ROM for normal function with   [x] LE / lumbar: self care, mobility, lifting and ambulation. [] UE / Cervical: self care, reaching, carrying, lifting, house/yardwork, driving, computer work. Modalities:  [] (63160) Vasopneumatic compression: Utilized vasopneumatic compression to decrease edema / swelling for the purpose of improving mobility and quad tone / recruitment which will allow for increased overall function including but not limited to self-care, transfers, ambulation, and ascending / descending stairs. Modalities: Consider [facility]    Charges:  Timed Code Treatment Minutes: 42   Total Treatment Minutes: 42     [] EVAL - LOW (25775)   [] EVAL - MOD (98474)  [] EVAL - HIGH (71273)  [] RE-EVAL (33944)  [x] TE (87225) x  1    [] Ionto  [x] NMR (21602) x 1      [] Vaso  [x] Manual (33428) x 1     [] Ultrasound  [] TA x       [] Mech Traction (54258)  [] Gait Training x     [] ES (un) (42026):   [] Aquatic therapy x   [] Other:   [] Group:     GOALS:  Patient stated goal: to return to prolonged standing, sleeping with less discomfort  [x]? Progressing: []? Met: []? Not Met: []? Adjusted     Therapist goals for Patient:   Short Term Goals: To be achieved in: 2 weeks  1.  Independent in HEP and progression per Limitations/Impairments:  [x]Sleeping []Sitting               [x]Standing []Transfers        []Walking []Kneeling               []Stairs []Squatting / bending   []ADLs []Reaching  [x]Lifting  []Housework  []Driving [x]Job related tasks (volunteering)  []Sports/Recreation []Other:      ASSESSMENT: The patient met LTG of negative FADIR for proper L hip joint function. The patient also demonstrates improving spinal ROM as well as hip strength. The patient was able to perform exercises this date without pain, but with expected/appropriate levels of fatigue in hips and core musculature. Ongoing therapy to promote core/hip strength as well as activity tolerance recommended to promote greater standing tolerance. Treatment/Activity Tolerance:  [x] Patient tolerated treatment well [] Patient limited by fatigue  [] Patient limited by pain  [] Patient limited by other medical complications  [] Other:     Prognosis: [x] Good [] Fair  [] Poor    Patient Requires Follow-up: [x] Yes  [] No    PLAN: POC: PT 2x / week for 6 weeks. Assess response to HEP. Continue with the following:  Ab draw in Quad, standing, supine  Supine ab draw w/ heel slide, leg raise  Bridge (2 leg ? ?single leg)  Ab draw (supine 1 leg straight) w/ curl up progression  Side plank progression   Side plank w/ rotation  Front Plank  Quad alt LE  Quad opp UE/LE    [x] Continue per plan of care [] Alter current plan (see comments)   [] Plan of care initiated [] Hold pending MD visit [] Discharge    Electronically signed by: Roe Khan PT, DPT, OMT-C    Note: If patient does not return for scheduled/ recommended follow up visits, this note will serve as a discharge from care along with most recent update on progress.

## 2021-04-16 ENCOUNTER — HOSPITAL ENCOUNTER (OUTPATIENT)
Dept: PHYSICAL THERAPY | Age: 76
Setting detail: THERAPIES SERIES
Discharge: HOME | End: 2021-04-16
Payer: MEDICARE

## 2021-04-16 PROCEDURE — 97140 MANUAL THERAPY 1/> REGIONS: CPT

## 2021-04-16 PROCEDURE — 97110 THERAPEUTIC EXERCISES: CPT

## 2021-04-16 PROCEDURE — 97112 NEUROMUSCULAR REEDUCATION: CPT

## 2021-04-16 NOTE — FLOWSHEET NOTE
[facility] - Outpatient Physical Therapy    Physical Therapy Daily Treatment Note  Date:  2021    Patient Name:  Magali Jasmine    :  1945  MRN: [id number]  Medical/Treatment Diagnosis Information:  Diagnosis: M47.816 (ICD-10-CM) - Spondylosis without myelopathy or radiculopathy, lumbar region  Treatment Diagnosis: decreased activity tolerance, low back pain  Insurance/Certification information:  PT Insurance Information: [address] [place] [address]  Physician Information:  Referring Practitioner: Karli Silver MD  Plan of care signed (Y/N): []  Yes [x]  No     Progress Report: [x]  Yes  []  No     Date Range for reporting period:  Beginning 3/17/21  Ending TBD    Progress report due (10 Rx/or 30 days whichever is less): #89     Recertification due (POC duration/ or 90 days whichever is less): POC duration     Visit # Insurance Allowable Date Range (if applicable)    PMN 1512     Latex Allergy:  [x]NO      []YES  Preferred Language for Healthcare:   [x]English       []other:    Functional Scale: Oswestry: raw score = 6/45; dysfunction = 13% Date assessed: 3/17/21    Pain level: 0/10; 2/10 achiness with standing/volunteer tasks     SUBJECTIVE: The patient reports she felt good after last visit. 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 OBJECTIVE:     - FADIR L (negative);  Hip Abd MMT: L = 4/5, R = 4+/5; L/S Extension AROM = 10deg, L/S Flexion AROM = 30deg  3/31 - 38deg flex = lumbar  3/26 - fatigue noted with deadbugs exercise ipsilateral   3/20 - proper/visible/palpable tvabd activation with all exercises    RESTRICTIONS/PRECAUTIONS: osteoporosis  * - standing    Exercises/Interventions:     Therapeutic Exercises (69928) -  Resistance Sets / Reps Notes   SLR abd  SLR Ext  Hip Ext w/ knee bent  2 x 10 R/L  - resumed   SLR Flex + TvAbd  EOT HSS  Seated Reverse Crunch  3 x 10  - added set   Anti-rotation Press Double purple 2 x 10 R/L x 5'' H (last rep) 4/16 - progressed w/ finishing holdSeated Lat Rows Runner's Stretch  Soleus Stretch  Standing TB Diagonal Chops Standing MB Diagonal Lifts Lateral Band Walk Blue (laces) 15ft R/L x 2 laps 4/16 - increased distance   Seated High Row H- [place]ocks (child's pose to [facility])  6x 4/14 - added   DB Squats (tap chair) Standing Single Arm Lat Pull Down purple 2 x 10 R/L 4/16 - added         Neuromuscular Re-ed (42567)   Therapeutic Activities (16655)      Standing Trunk/Glute Extensions (thighs against table) 0#  4# mb 10x  10x 4/16 - added   Curl Ups  Single Leg Curl Up  Curl Up with MB press Reverse Crunch  Tabletop Alt LE Taps Tabletop Deadbugs w/ foam hold  Ipsilateral  Contralateral  Sustained Bridge + Alt LAQ    Hooklying TB BKFO + TvAbd Prone Plank (toes/elbows)  8 x 10'' 4/2 - progressed hold, reduced reps   TB Alt SLR Abd + TvAbd + Kegel Seated TB Jaime Hip IR + TvAbd + Kegel lime 2 x 15 3/26 - added   Prone TB Jaime Hip IR + TvAbd lime 2 x 15 4/16 - added   Tabletop TB Pull Downs: BUE Landmine Rows Qped:  Glute Kicks  Fire [employer] + LE Walkouts  Alt Swimmer's  SB Plank Pulses  MB Figure 8s (between legs) in Bicycle Crunch Position       Manual Intervention (24628) -8'      Prone L/S PA mobs L1-5 G2-4 5 passes, 10 mobs ea 4/16 - continued; patient reports relief                                   Patient Education & HEP:  - Patient educated on the focus of skilled physical therapy services and plan of care, including: diagnosis, prognosis, treatment goals & options. - The following exercises were added to the patient's home exercise program.     Access Code: [email or web address]  URL: [email or web address]  Date: 03/26/2021  Prepared by: Paul Long  Exercises   Sidelying Hip Abduction - 1 x daily - 5 x weekly - 3 sets - 10 reps   Prone Hip Extension - 1 x daily - 5 x weekly - 3 sets - 10 reps   Curl Up with Reach - 1 x daily - 3 x weekly - 3 sets - 10 reps   Alternating proximal hip, and core control with self care, mobility, lifting, ambulation. [] UE / Cervical: cervical, postural, scapular, scapulothoracic and UE control with self care, reaching, carrying, lifting, house/yardwork, driving, computer work. [x] (89450) Provided verbal/tactile cueing for activities related to improving balance, coordination, kinesthetic sense, posture, motor skill, proprioception to assist with   [x] LE / lumbar: LE, proximal hip, and core control in self care, mobility, lifting, ambulation and eccentric single leg control. [] UE / cervical: cervical, scapular, scapulothoracic and UE control with self care, reaching, carrying, lifting, house/yardwork, driving, computer work.      NMR and Therapeutic Activities:    [x] (69813 or 98743) Provided verbal/tactile cueing for activities related to improving balance, coordination, kinesthetic sense, posture, motor skill, proprioception and motor activation to allow for proper function of   [x] LE: / Lumbar core, proximal hip and LE with self care and ADLs  [] UE / Cervical: cervical, postural, scapular, scapulothoracic and UE control with self care, carrying, lifting, driving, computer work.   [] (82138) Gait Re-education- Provided training and instruction to the patient for proper LE, core and proximal hip recruitment and positioning and eccentric body weight control with ambulation re-education including up and down stairs     Home Exercise Program:    [] (26907) Reviewed/Progressed HEP activities related to strengthening, flexibility, endurance, ROM of   [] LE / Lumbar: core, proximal hip and LE for functional self-care, mobility, lifting and ambulation/stair navigation   [] UE / Cervical: cervical, postural, scapular, scapulothoracic and UE control with self care, reaching, carrying, lifting, house/yardwork, driving, computer work  [] (60887)Reviewed/Progressed HEP activities related to improving balance, coordination, kinesthetic sense, posture, motor skill, proprioception of   [] LE: core, proximal hip and LE for self care, mobility, lifting, and ambulation/stair navigation    [] UE / Cervical: cervical, postural,  scapular, scapulothoracic and UE control with self care, reaching, carrying, lifting, house/yardwork, driving, computer work    Manual Treatments:  PROM / STM / Oscillations-Mobs:  G-I, II, III, IV (PA's, Inf., Post.)  [x] (22086) Provided manual therapy to mobilize LE, proximal hip and/or LS spine soft tissue/joints for the purpose of modulating pain, promoting relaxation,  increasing ROM, reducing/eliminating soft tissue swelling/inflammation/restriction, improving soft tissue extensibility and allowing for proper ROM for normal function with   [x] LE / lumbar: self care, mobility, lifting and ambulation. [] UE / Cervical: self care, reaching, carrying, lifting, house/yardwork, driving, computer work. Modalities:  [] (79571) Vasopneumatic compression: Utilized vasopneumatic compression to decrease edema / swelling for the purpose of improving mobility and quad tone / recruitment which will allow for increased overall function including but not limited to self-care, transfers, ambulation, and ascending / descending stairs. Modalities: Consider [facility]    Charges:  Timed Code Treatment Minutes: 42   Total Treatment Minutes: 42     [] EVAL - LOW (65033)   [] EVAL - MOD (31201)  [] EVAL - HIGH (39067)  [] RE-EVAL (82443)  [x] TE (57665) x  1    [] Ionto  [x] NMR (75548) x 1      [] Vaso  [x] Manual (05308) x 1     [] Ultrasound  [] TA x       [] Mech Traction (77575)  [] Gait Training x     [] ES (un) (63298):   [] Aquatic therapy x   [] Other:   [] Group:     GOALS:  Patient stated goal: to return to prolonged standing, sleeping with less discomfort  [x]? Progressing: []? Met: []? Not Met: []? Adjusted     Therapist goals for Patient:   Short Term Goals: To be achieved in: 2 weeks  1.  Independent in HEP and progression per patient tolerance, in order to prevent re-injury. []? Progressing: [x]? Met: []? Not Met: []? Adjusted  2. Patient will have a decrease in pain to <2/10 on average facilitate improvement in movement, function, and ADLs as indicated by Functional Deficits. []? Progressing: [x]? Met: []? Not Met: []? Adjusted     Long Term Goals: To be achieved in: 6 weeks  1. Disability index score of 6% or less for the HECTOR to assist with reaching prior level of function. []? Progressing: []? Met: []? Not Met: []? Adjusted  2. Patient will demonstrate negative FADIR bilaterally to allow for proper joint functioning as indicated by patients Functional Deficits. []? Progressing: [x]? Met: []? Not Met: []? Adjusted  3. Patient will demonstrate an increase in Strength to 5/5 hip flex/abd/ext with good proximal hip and core activation to allow for proper functional mobility as indicated by patients Functional Deficits. [x]? Progressing: []? Met: []? Not Met: []? Adjusted  4. Patient will return to functional activities including standing >15 min without increased symptoms or restriction. []? Progressing: [x]? Met: []? Not Met: []? Adjusted   5. Patient will sleep without waking pain/discomfort in back for 2-3 days. []? Progressing: [x]? Met: []? Not Met: []? Adjusted         Overall Progression Towards Functional goals/ Treatment Progress Update:  [x] Patient is progressing as expected towards functional goals listed. [] Progression is slowed due to complexities/Impairments listed. [] Progression has been slowed due to co-morbidities.   [] Plan just implemented, too soon to assess goals progression <30days   [] Goals require adjustment due to lack of progress  [] Patient is not progressing as expected and requires additional follow up with physician  [] Other:     Persisting Functional Limitations/Impairments:  [x]Sleeping []Sitting               [x]Standing []Transfers        []Walking []Kneeling               []Stairs []Squatting / bending   []ADLs []Reaching  [x]Lifting  []Housework  []Driving [x]Job related tasks (volunteering)  []Sports/Recreation []Other:      ASSESSMENT: The patient performed well core, hip, gluteal and lat strengthening this date with proper form and control. The patient had no symptoms during session. She continues to respond favorably to manual PA mobilizations with reports of relief in low back, reports of improved spinal motion/mobility. Treatment/Activity Tolerance:  [x] Patient tolerated treatment well [] Patient limited by fatigue  [] Patient limited by pain  [] Patient limited by other medical complications  [] Other:     Prognosis: [x] Good [] Fair  [] Poor    Patient Requires Follow-up: [x] Yes  [] No    PLAN: POC: PT 2x / week for 6 weeks. Assess response to HEP. Continue with the following:  Ab draw in Quad, standing, supine  Supine ab draw w/ heel slide, leg raise  Bridge (2 leg ? ?single leg)  Ab draw (supine 1 leg straight) w/ curl up progression  Side plank progression   Side plank w/ rotation  Front Plank  Quad alt LE  Quad opp UE/LE    [x] Continue per plan of care [] Alter current plan (see comments)   [] Plan of care initiated [] Hold pending MD visit [] Discharge    Electronically signed by: Mary Jo Odell PT, DPT, OMT-C    Note: If patient does not return for scheduled/ recommended follow up visits, this note will serve as a discharge from care along with most recent update on progress.

## 2021-04-21 ENCOUNTER — HOSPITAL ENCOUNTER (OUTPATIENT)
Dept: PHYSICAL THERAPY | Age: 76
Setting detail: THERAPIES SERIES
Discharge: HOME | End: 2021-04-21
Payer: MEDICARE

## 2021-04-21 NOTE — FLOWSHEET NOTE
Physical Therapy  Cancellation/No-show Note  Patient Name:  Sunita Duarte  :  1945   Date:  2021  Cancelled visits to date: 2  No-shows to date: 0    Patient status for today's appointment patient:  [x]  Cancelled ,   []  Rescheduled appointment  []  No-show     Reason given by patient:  []  Patient ill  [x]  Conflicting appointment  []  No transportation    []  Conflict with work  []  No reason given  []  Other:     Comments: has conflicting plans this date    Phone call information:   []  Phone call made today to patient at _ time at number provided:      []  Patient answered, conversation as follows:    []  Patient did not answer, message left as follows:  [x]  Phone call not made today, as patient informed PT of cancel in person    Electronically signed by:  Margie Aviles, PT

## 2021-04-23 ENCOUNTER — HOSPITAL ENCOUNTER (OUTPATIENT)
Dept: PHYSICAL THERAPY | Age: 76
Setting detail: THERAPIES SERIES
Discharge: HOME | End: 2021-04-23
Payer: MEDICARE

## 2021-04-23 PROCEDURE — 97112 NEUROMUSCULAR REEDUCATION: CPT

## 2021-04-23 PROCEDURE — 97110 THERAPEUTIC EXERCISES: CPT

## 2021-04-23 NOTE — FLOWSHEET NOTE
[facility] - Outpatient Physical Therapy    Physical Therapy Daily Treatment Note  Date:  2021    Patient Name:  Malcom Santos    :  1945  MRN: [id number]  Medical/Treatment Diagnosis Information:  Diagnosis: M47.816 (ICD-10-CM) - Spondylosis without myelopathy or radiculopathy, lumbar region  Treatment Diagnosis: decreased activity tolerance, low back pain  Insurance/Certification information:  PT Insurance Information: [address] [place] [address]  Physician Information:  Referring Practitioner: Jonathan Garces MD  Plan of care signed (Y/N): [x]  Yes []  No     Progress Report: [x]  Yes  []  No     Date Range for reporting period:  Beginning 3/17/21  Ending TBD    Progress report due (10 Rx/or 30 days whichever is less): #47     Recertification due (POC duration/ or 90 days whichever is less): POC duration     Visit # Insurance Allowable Date Range (if applicable)   82/15 PMN 2021     Latex Allergy:  [x]NO      []YES  Preferred Language for Healthcare:   [x]English       []other:    Functional Scale: Oswestry: raw score = 6/45; dysfunction = 13% Date assessed: 3/17/21    Pain level: 0/10; 2/10 achiness with standing/volunteer tasks     SUBJECTIVE: The patient reports when she stands and works for about 20 minutes, she feels low back achy strain increases. \"It is rare for me to feel the pain in my hip. \"    OBJECTIVE:     - Hip Ext MMT L = 4+/5, R = 4/5; Hip Abd MMT L = 5/5, R = 4+/5; Rectus Abdominis MMT = 50deg   - FADIR L (negative);  Hip Abd MMT: L = 4/5, R = 4+/5; L/S Extension AROM = 10deg, L/S Flexion AROM = 30deg  3/31 - 38deg flex = lumbar  3/26 - fatigue noted with deadbugs exercise ipsilateral   3/20 - proper/visible/palpable tvabd activation with all exercises    RESTRICTIONS/PRECAUTIONS: osteoporosis  * - standing    Exercises/Interventions:     Therapeutic Exercises (41072) -  Resistance Sets / Reps Notes   SLR abd  SLR Ext  Hip Ext w/ knee bent  SLR Flex 03/26/2021  Prepared by: Luis Expose  Exercises   Sidelying Hip Abduction - 1 x daily - 5 x weekly - 3 sets - 10 reps   Prone Hip Extension - 1 x daily - 5 x weekly - 3 sets - 10 reps   Curl Up with Reach - 1 x daily - 3 x weekly - 3 sets - 10 reps   Alternating Single Leg Bridge - 1 x daily - 5 x weekly - 2-3 sets - 10 reps   Bridge with Hip Abduction and Resistance - 1 x daily - 5 x weekly - 2-3 sets - 10 reps   Plank on Knees - 1 x daily - 3 x weekly - 6 reps - 8'' hold   Dead Bug - Opposites & Same Sides - 1 x daily - 3 x weekly - 2 sets - 10 reps   Standing Hip Abduction with Resistance at Ankles and Unilateral Counter Support - 1 x daily - 5 x weekly - 2 sets - 10 reps   Seated Hip Internal Rotation with Ball and Resistance - 1 x daily - 5 x weekly - 2 sets - 10 reps - 3 hold    Access Code: [email or web address]  URL: [email or web address]  Date: 03/22/2021  Prepared by: Luis Expose  Exercises   Sidelying Hip Abduction - 1 x daily - 5 x weekly - 3 sets - 10 reps   Prone Hip Extension - 1 x daily - 5 x weekly - 3 sets - 10 reps   Curl Up with Reach - 1 x daily - 5 x weekly - 3 sets - 10 reps   Alternating Single Leg Bridge - 1 x daily - 5 x weekly - 2-3 sets - 10 reps   Bridge with Hip Abduction and Resistance - 1 x daily - 5 x weekly - 2-3 sets - 10 reps   Plank on Knees - 1 x daily - 5 x weekly - 6 reps - 8'' hold   Dead Bug - Opposites & Same Sides - 1 x daily - 5 x weekly - 2 sets - 10 reps    Access Code: [email or web address]  URL: [email or web address]  Date: 03/17/2021  Prepared by: Luis Expose  Exercises   Supine Bridge - 1 x daily - 5 x weekly - 3 sets - 10 reps   Sidelying Hip Abduction - 1 x daily - 5 x weekly - 3 sets - 10 reps   Prone Hip Extension - 1 x daily - 5 x weekly - 3 sets - 10 reps   Tabletop - Alternating Foot Tap - 1 x daily - 5 x weekly - 3 sets - 10 reps   Curl Up with Reach - 1 x daily - 5 x weekly - 3 sets - 10 reps  Additionally, the patient was educated on appropriate frequency, intensity and duration. A handout was provided  - All patient questions were answered    Therapeutic Exercise and NMR EXR  [x] (27520) Provided verbal/tactile cueing for activities related to strengthening, flexibility, endurance, ROM for improvements in  [x] LE / Lumbar: LE, proximal hip, and core control with self care, mobility, lifting, ambulation. [] UE / Cervical: cervical, postural, scapular, scapulothoracic and UE control with self care, reaching, carrying, lifting, house/yardwork, driving, computer work. [x] (02250) Provided verbal/tactile cueing for activities related to improving balance, coordination, kinesthetic sense, posture, motor skill, proprioception to assist with   [x] LE / lumbar: LE, proximal hip, and core control in self care, mobility, lifting, ambulation and eccentric single leg control. [] UE / cervical: cervical, scapular, scapulothoracic and UE control with self care, reaching, carrying, lifting, house/yardwork, driving, computer work.      NMR and Therapeutic Activities:    [x] (73322 or 15544) Provided verbal/tactile cueing for activities related to improving balance, coordination, kinesthetic sense, posture, motor skill, proprioception and motor activation to allow for proper function of   [x] LE: / Lumbar core, proximal hip and LE with self care and ADLs  [] UE / Cervical: cervical, postural, scapular, scapulothoracic and UE control with self care, carrying, lifting, driving, computer work.   [] (40999) Gait Re-education- Provided training and instruction to the patient for proper LE, core and proximal hip recruitment and positioning and eccentric body weight control with ambulation re-education including up and down stairs     Home Exercise Program:    [] (66609) Reviewed/Progressed HEP activities related to strengthening, flexibility, endurance, ROM of   [] LE / Lumbar: core, proximal hip and LE for functional self-care, mobility, lifting and ambulation/stair navigation   [] UE / Cervical: cervical, postural, scapular, scapulothoracic and UE control with self care, reaching, carrying, lifting, house/yardwork, driving, computer work  [] (50871)Reviewed/Progressed HEP activities related to improving balance, coordination, kinesthetic sense, posture, motor skill, proprioception of   [] LE: core, proximal hip and LE for self care, mobility, lifting, and ambulation/stair navigation    [] UE / Cervical: cervical, postural,  scapular, scapulothoracic and UE control with self care, reaching, carrying, lifting, house/yardwork, driving, computer work    Manual Treatments:  PROM / STM / Oscillations-Mobs:  G-I, II, III, IV (PA's, Inf., Post.)  [] (74962) Provided manual therapy to mobilize LE, proximal hip and/or LS spine soft tissue/joints for the purpose of modulating pain, promoting relaxation,  increasing ROM, reducing/eliminating soft tissue swelling/inflammation/restriction, improving soft tissue extensibility and allowing for proper ROM for normal function with   [] LE / lumbar: self care, mobility, lifting and ambulation. [] UE / Cervical: self care, reaching, carrying, lifting, house/yardwork, driving, computer work. Modalities:  [] (45410) Vasopneumatic compression: Utilized vasopneumatic compression to decrease edema / swelling for the purpose of improving mobility and quad tone / recruitment which will allow for increased overall function including but not limited to self-care, transfers, ambulation, and ascending / descending stairs.      Modalities: Consider MOC    Charges:  Timed Code Treatment Minutes: 43   Total Treatment Minutes: 43     [] EVAL - LOW (51582)   [] EVAL - MOD (89496)  [] EVAL - HIGH (70581)  [] RE-EVAL (28097)  [x] TE (93076) x 2    [] Ionto  [x] NMR (96071) x 1      [] Vaso  [] Manual (80904) x      [] Ultrasound  [] TA x       [] Mech Traction (19338)  [] Gait Training x     [] ES (un) (94137):   [] Aquatic therapy x   [] Other:   [] Group:     GOALS:  Patient stated goal: to return to prolonged standing, sleeping with less discomfort  [x]? Progressing: []? Met: []? Not Met: []? Adjusted     Therapist goals for Patient:   Short Term Goals: To be achieved in: 2 weeks  1. Independent in HEP and progression per patient tolerance, in order to prevent re-injury. []? Progressing: [x]? Met: []? Not Met: []? Adjusted  2. Patient will have a decrease in pain to <2/10 on average facilitate improvement in movement, function, and ADLs as indicated by Functional Deficits. []? Progressing: [x]? Met: []? Not Met: []? Adjusted     Long Term Goals: To be achieved in: 6 weeks  1. Disability index score of 6% or less for the HECTOR to assist with reaching prior level of function. []? Progressing: []? Met: []? Not Met: []? Adjusted  2. Patient will demonstrate negative FADIR bilaterally to allow for proper joint functioning as indicated by patients Functional Deficits. []? Progressing: [x]? Met: []? Not Met: []? Adjusted  3. Patient will demonstrate an increase in Strength to 5/5 hip flex/abd/ext with good proximal hip and core activation to allow for proper functional mobility as indicated by patients Functional Deficits. [x]? Progressing: []? Met: []? Not Met: []? Adjusted  4. Patient will return to functional activities including standing >15 min without increased symptoms or restriction. []? Progressing: [x]? Met: []? Not Met: []? Adjusted   5. Patient will sleep without waking pain/discomfort in back for 2-3 days. []? Progressing: [x]? Met: []? Not Met: []? Adjusted         Overall Progression Towards Functional goals/ Treatment Progress Update:  [x] Patient is progressing as expected towards functional goals listed. [] Progression is slowed due to complexities/Impairments listed. [] Progression has been slowed due to co-morbidities.   [] Plan just implemented, too soon to assess goals progression <30days   [] Goals require adjustment due to lack of progress  [] Patient is not progressing as expected and requires additional follow up with physician  [] Other:     Persisting Functional Limitations/Impairments:  [x]Sleeping []Sitting               [x]Standing []Transfers        []Walking []Kneeling               []Stairs []Squatting / bending   []ADLs []Reaching  [x]Lifting  []Housework  []Driving [x]Job related tasks (volunteering)  []Sports/Recreation []Other:      ASSESSMENT: The patient progressed and performed well with core, hip, gluteal and lat strengthening this date with proper form and control. The patient had no symptoms during session. She is progressing towards DC at next visit, where she will transition to [facility]. Treatment/Activity Tolerance:  [x] Patient tolerated treatment well [] Patient limited by fatigue  [] Patient limited by pain  [] Patient limited by other medical complications  [] Other:     Prognosis: [x] Good [] Fair  [] Poor    Patient Requires Follow-up: [x] Yes  [] No    PLAN: POC: PT 2x / week for 6 weeks. Assess response to HEP. Continue with the following:  Ab draw in Quad, standing, supine  Supine ab draw w/ heel slide, leg raise  Bridge (2 leg ? ?single leg)  Ab draw (supine 1 leg straight) w/ curl up progression  Side plank progression   Side plank w/ rotation  Front Plank  Quad alt LE  Quad opp UE/LE    [x] Continue per plan of care [] Alter current plan (see comments)   [] Plan of care initiated [] Hold pending MD visit [] Discharge    Electronically signed by: Mateo Marie PT, DPT, OMT-C    Note: If patient does not return for scheduled/ recommended follow up visits, this note will serve as a discharge from care along with most recent update on progress.

## 2021-04-28 ENCOUNTER — HOSPITAL ENCOUNTER (OUTPATIENT)
Dept: PHYSICAL THERAPY | Age: 76
Setting detail: THERAPIES SERIES
Discharge: HOME | End: 2021-04-28
Payer: MEDICARE

## 2021-04-28 PROCEDURE — 97530 THERAPEUTIC ACTIVITIES: CPT

## 2021-04-28 NOTE — DISCHARGE SUMMARY
[facility] - Outpatient Physical Therapy   Physical Therapy Discharge Summary    Dear  Keke Briones MD,    We had the pleasure of treating the following patient for physical therapy services at [facility]. A summary of our findings can be found in the discharge summary below. If you have any questions or concerns regarding these findings, please do not hesitate to contact me at the office phone number checked above.   Thank you for the referral.     Physician Signature:________________________________Date:__________________  By signing above (or electronic signature), therapists plan is approved by physician    Functional Outcome:   HECTOR =  3/50 = 6% dys    Lumbar Flexion = 30deg  Lumbar Exension = 15deg   SB R/L = WNL  Rotation R/L = WNL    FADIR (negative)    Rectus Abdominis = 4/5    LE MMT:   Right:  Left:  Hip Flexion:   5  5  Hip aBduction:   5  5  Hip Extension:   5  5  Hip IR:     5  4+  Hip ER:   5  5  Knee Extension:  5  5  Knee Flexion:    5  5    TA Muscle Contraction Scale     Criteria                                                                                  Score  Quality of Contraction              Not Present                                                                 []? 0              Rapid, Superificial                                                       []? 1              Just Perceptible                                                          []? 2                               Gentle, Slow                                                                [x]? 3     Substitution              Resting                                                                        []? 0              Moderate to Strong                                                     []? 1               Subtle Perceptible                                                       []? 2              None [x]? 3     Symmetry of Contraction              Unilateral                                                                     []? 0              Bilateral/Asymmetrical                                                []? 1              Symmetrical                                                                [x]? 2     Breathing              Inability/Difficulty Breathing during contraction          [x]? 0              Able to hold contraction while Breathing                    []? 1     Holding              Able to Hold Contraction <10 s                                  []? 0              Able to Hold Contraction >10 s                                  [x]? 1                             _9_/10  Adapted from [place], Copyright 2009    GOALS:  Patient stated goal: to return to prolonged standing, sleeping with less discomfort  []? Progressing: [x]? Met: []? Not Met: []? Adjusted     Therapist goals for Patient:   Short Term Goals: To be achieved in: 2 weeks  1. Independent in HEP and progression per patient tolerance, in order to prevent re-injury. []? Progressing: [x]? Met: []? Not Met: []? Adjusted  2. Patient will have a decrease in pain to <2/10 on average facilitate improvement in movement, function, and ADLs as indicated by Functional Deficits. []? Progressing: [x]? Met: []? Not Met: []? Adjusted     Long Term Goals: To be achieved in: 6 weeks  1. Disability index score of 6% or less for the HECTOR to assist with reaching prior level of function. []? Progressing: [x]? Met: []? Not Met: []? Adjusted  2. Patient will demonstrate negative FADIR bilaterally to allow for proper joint functioning as indicated by patients Functional Deficits. []? Progressing: [x]? Met: []? Not Met: []? Adjusted  3.  Patient will demonstrate an increase in Strength to 5/5 hip flex/abd/ext with good proximal hip and core activation to allow for proper functional mobility as indicated by patients Functional Deficits. []? Progressing: [x]? Met: []? Not Met: []? Adjusted  4. Patient will return to functional activities including standing >15 min without increased symptoms or restriction. []? Progressing: [x]? Met: []? Not Met: []? Adjusted   5. Patient will sleep without waking pain/discomfort in back for 2-3 days. []? Progressing: [x]? Met: []? Not Met: []? Adjusted         Overall Response to Treatment:   [x]Patient is responding well to treatment and improvement is noted with regards to goals. The patient has achieved LTGs and will DC with individualized HEP. The patient was instructed to follow up with MD or PT in case of new or unexpected symptoms. []Patient should continue to improve in reasonable time if they continue HEP   []Patient has plateaued and is no longer responding to skilled PT intervention    []Patient is getting worse and would benefit from return to referring MD   []Patient unable to adhere to initial POC   []Other:     Date range of Visits: 3/17/21 - 21  Total Visits: 11    Recommendation:  [x] Discharge to HEP. Follow up with PT or MD PRN.      Physical Therapy Daily Treatment Note  Date:  2021    Patient Name:  Paulette Burton    :  1945  MRN: [id number]  Medical/Treatment Diagnosis Information:  Diagnosis: M47.816 (ICD-10-CM) - Spondylosis without myelopathy or radiculopathy, lumbar region  Treatment Diagnosis: decreased activity tolerance, low back pain  Insurance/Certification information:  PT Insurance Information: Dheeraj Ruiz  Physician Information:  Referring Practitioner: Eusebio Limon MD  Plan of care signed (Y/N): [x]  Yes []  No     Progress Report: [x]  Yes  []  No     Date Range for reporting period:  Beginning 3/17/21  Ending 21    Progress report due (10 Rx/or 30 days whichever is less): DC     Recertification due (POC duration/ or 90 days whichever is less): POC duration     Visit # Insurance Allowable Date Range (if applicable)   74/54 PMN 2021     Latex Allergy:  [x]NO      []YES  Preferred Language for Healthcare:   [x]English       []other:    Functional Scale: Oswestry: raw score = 6/45; dysfunction = 13% Date assessed: 3/17/21  Oswestry: raw score = 6/45; dysfunction = 13% Date assessed: 4/28/21     Pain level: 0/10; 2/10 achiness with standing/volunteer tasks     SUBJECTIVE: SEE DC    OBJECTIVE:  SEE DC  4/23 - Hip Ext MMT L = 4+/5, R = 4/5; Hip Abd MMT L = 5/5, R = 4+/5; Rectus Abdominis MMT = 50deg  4/14 - FADIR L (negative); Hip Abd MMT: L = 4/5, R = 4+/5; L/S Extension AROM = 10deg, L/S Flexion AROM = 30deg  3/31 - 38deg flex = lumbar  3/26 - fatigue noted with deadbugs exercise ipsilateral   3/20 - proper/visible/palpable tvabd activation with all exercises    RESTRICTIONS/PRECAUTIONS: osteoporosis  * - standing    Exercises/Interventions:     Therapeutic Exercises (80343) -  Resistance Sets / Reps Notes   SLR abd SLR Ext Hip Ext w/ knee bent SLR Flex + TvAbd EOT HSS Seated Reverse Crunch Anti-rotation Press  Standing CC - \"Stir the Pot\" Seated Lat Rows Runner's Stretch Soleus Stretch Standing TB Diagonal Chops Standing MB Diagonal Lifts Lateral Band Walk Seated High Row H- [place] (child's pose to [facility]) DB Squats (tap chair) Standing Single Arm Lat Pull Down Standing KB Elevation (BUE) Banded Donkey Kicks (standing) Walking Marches + Sustained Crand[employer]'s Pride w/ Earthquake Bar  Neuromuscular Re-ed (07288)   Therapeutic Activities (89258) The patient was provided a final assessment including evaluative tests and measures, as well as discharge documentation to track progress.     25' 4/28   Standing Trunk/Glute Extensions (thighs against table) Curl Ups  Single Leg Curl Up  Curl Up with MB press Reverse Crunch Tabletop Alt LE Taps Tabletop Deadbugs w/ foam hold  Ipsilateral  Contralateral Sustained Bridge + Alt LAQ Hooklying TB BKFO + TvAbd Prone Plank (toes/elbows) TB Alt SLR Abd + TvAbd + Kegel Seated TB Jaime Hip IR + TvAbd 6 reps - 8'' hold   Dead [id number] Francisco Javier Pepper - 1 x daily - 5 x weekly - 2 sets - 10 reps    Access Code: [email or web address]  URL: [email or web address]  Date: 03/17/2021  Prepared by: Mirtha Simple  Exercises   Supine Bridge - 1 x daily - 5 x weekly - 3 sets - 10 reps   Sidelying Hip Abduction - 1 x daily - 5 x weekly - 3 sets - 10 reps   Prone Hip Extension - 1 x daily - 5 x weekly - 3 sets - 10 reps   Tabletop - Alternating Foot Tap - 1 x daily - 5 x weekly - 3 sets - 10 reps   Curl Up with Reach - 1 x daily - 5 x weekly - 3 sets - 10 reps  Additionally, the patient was educated on appropriate frequency, intensity and duration. A handout was provided  - All patient questions were answered    Therapeutic Exercise and NMR EXR  [] (66212) Provided verbal/tactile cueing for activities related to strengthening, flexibility, endurance, ROM for improvements in  [] LE / Lumbar: LE, proximal hip, and core control with self care, mobility, lifting, ambulation. [] UE / Cervical: cervical, postural, scapular, scapulothoracic and UE control with self care, reaching, carrying, lifting, house/yardwork, driving, computer work.  [] (69294) Provided verbal/tactile cueing for activities related to improving balance, coordination, kinesthetic sense, posture, motor skill, proprioception to assist with   [] LE / lumbar: LE, proximal hip, and core control in self care, mobility, lifting, ambulation and eccentric single leg control. [] UE / cervical: cervical, scapular, scapulothoracic and UE control with self care, reaching, carrying, lifting, house/yardwork, driving, computer work.      NMR and Therapeutic Activities:    [x] (80951 or 64483) Provided verbal/tactile cueing for activities related to improving balance, coordination, kinesthetic sense, posture, motor skill, proprioception and motor activation to allow for proper function of   [x] LE: / Lumbar core, proximal hip and LE with self care and ADLs  [] UE / Cervical: cervical, postural, scapular, scapulothoracic and UE control with self care, carrying, lifting, driving, computer work.   [] (85904) Gait Re-education- Provided training and instruction to the patient for proper LE, core and proximal hip recruitment and positioning and eccentric body weight control with ambulation re-education including up and down stairs     Home Exercise Program:    [] (65548) Reviewed/Progressed HEP activities related to strengthening, flexibility, endurance, ROM of   [] LE / Lumbar: core, proximal hip and LE for functional self-care, mobility, lifting and ambulation/stair navigation   [] UE / Cervical: cervical, postural, scapular, scapulothoracic and UE control with self care, reaching, carrying, lifting, house/yardwork, driving, computer work  [] (26990)Reviewed/Progressed HEP activities related to improving balance, coordination, kinesthetic sense, posture, motor skill, proprioception of   [] LE: core, proximal hip and LE for self care, mobility, lifting, and ambulation/stair navigation    [] UE / Cervical: cervical, postural,  scapular, scapulothoracic and UE control with self care, reaching, carrying, lifting, house/yardwork, driving, computer work    Manual Treatments:  PROM / STM / Oscillations-Mobs:  G-I, II, III, IV (PA's, Inf., Post.)  [] (47271) Provided manual therapy to mobilize LE, proximal hip and/or LS spine soft tissue/joints for the purpose of modulating pain, promoting relaxation,  increasing ROM, reducing/eliminating soft tissue swelling/inflammation/restriction, improving soft tissue extensibility and allowing for proper ROM for normal function with   [] LE / lumbar: self care, mobility, lifting and ambulation. [] UE / Cervical: self care, reaching, carrying, lifting, house/yardwork, driving, computer work.      Modalities:  [] (97930) Vasopneumatic compression: Utilized vasopneumatic compression to decrease edema / swelling for the purpose of improving mobility and quad tone / recruitment which will allow for increased overall function including but not limited to self-care, transfers, ambulation, and ascending / descending stairs. Modalities: [facility] [place] [facility]    Charges:  Timed Code Treatment Minutes: 25   Total Treatment Minutes: 25     [] EVAL - LOW (63190)   [] EVAL - MOD (31229)  [] EVAL - HIGH (31189)  [] RE-EVAL (34440)  [] TE (37244) x    [] Ionto  [] NMR (22964) x     [] Vaso  [] Manual (35583) x      [] Ultrasound  [x] TA x 2      [] Mech Traction (37342)  [] Gait Training x     [] ES (un) (74886):   [] Aquatic therapy x   [] Other:   [] Group:     Overall Progression Towards Functional goals/ Treatment Progress Update:  [x] Patient is progressing as expected towards functional goals listed. [] Progression is slowed due to complexities/Impairments listed. [] Progression has been slowed due to co-morbidities.   [] Plan just implemented, too soon to assess goals progression <30days   [] Goals require adjustment due to lack of progress  [] Patient is not progressing as expected and requires additional follow up with physician  [] Other:     Persisting Functional Limitations/Impairments:  [x]Sleeping []Sitting               [x]Standing []Transfers        []Walking []Kneeling               []Stairs []Squatting / bending   []ADLs []Reaching  []Lifting  []Housework  []Driving []Job related tasks (volunteering)  []Sports/Recreation []Other:      ASSESSMENT: SEE DC ABOVE     Treatment/Activity Tolerance:  [x] Patient tolerated treatment well [] Patient limited by fatigue  [] Patient limited by pain  [] Patient limited by other medical complications  [] Other:     Prognosis: [x] Good [] Fair  [] Poor    Patient Requires Follow-up: [] Yes  [x] No    PLAN:   [] Continue per plan of care [] Alter current plan (see comments)   [] Plan of care initiated [] Hold pending MD visit [x] Discharge    Electronically signed by: Filiberto Mendez PT, DPT, OMT-C    Note: If patient does not return for scheduled/ recommended follow up visits, this note will serve as a discharge from care along with most recent update on progress.

## 2022-01-04 ENCOUNTER — OFFICE VISIT (OUTPATIENT)
Dept: ORTHOPEDIC SURGERY | Age: 77
End: 2022-01-04
Payer: MEDICARE

## 2022-01-04 VITALS — WEIGHT: 169 LBS | BODY MASS INDEX: 28.16 KG/M2 | HEIGHT: 65 IN

## 2022-01-04 DIAGNOSIS — M75.82 ROTATOR CUFF TENDINITIS, LEFT: Primary | ICD-10-CM

## 2022-01-04 DIAGNOSIS — M25.512 LEFT SHOULDER PAIN, UNSPECIFIED CHRONICITY: ICD-10-CM

## 2022-01-04 PROCEDURE — 99203 OFFICE O/P NEW LOW 30 MIN: CPT | Performed by: ORTHOPAEDIC SURGERY

## 2022-01-04 PROCEDURE — 20610 DRAIN/INJ JOINT/BURSA W/O US: CPT | Performed by: ORTHOPAEDIC SURGERY

## 2022-01-04 RX ORDER — TRIAMCINOLONE ACETONIDE 40 MG/ML
40 INJECTION, SUSPENSION INTRA-ARTICULAR; INTRAMUSCULAR ONCE
Status: COMPLETED | OUTPATIENT
Start: 2022-01-04 | End: 2022-01-04

## 2022-01-04 RX ORDER — LIDOCAINE HYDROCHLORIDE 10 MG/ML
4 INJECTION, SOLUTION INFILTRATION; PERINEURAL ONCE
Status: COMPLETED | OUTPATIENT
Start: 2022-01-04 | End: 2022-01-04

## 2022-01-04 RX ORDER — CELECOXIB 100 MG/1
100 CAPSULE ORAL 2 TIMES DAILY
COMMUNITY
Start: 2021-10-26 | End: 2022-04-05

## 2022-01-04 RX ORDER — BUPIVACAINE HYDROCHLORIDE 2.5 MG/ML
4 INJECTION, SOLUTION INFILTRATION; PERINEURAL ONCE
Status: COMPLETED | OUTPATIENT
Start: 2022-01-04 | End: 2022-01-04

## 2022-01-04 RX ORDER — OMEPRAZOLE 10 MG/1
10 CAPSULE, DELAYED RELEASE ORAL EVERY OTHER DAY
COMMUNITY

## 2022-01-04 RX ORDER — AMOXICILLIN 500 MG/1
TABLET, FILM COATED ORAL
COMMUNITY
Start: 2022-01-03

## 2022-01-04 RX ORDER — CALCIUM POLYCARBOPHIL 625 MG
625 TABLET ORAL 2 TIMES DAILY
COMMUNITY

## 2022-01-04 RX ADMIN — LIDOCAINE HYDROCHLORIDE 4 ML: 10 INJECTION, SOLUTION INFILTRATION; PERINEURAL at 09:58

## 2022-01-04 RX ADMIN — BUPIVACAINE HYDROCHLORIDE 10 MG: 2.5 INJECTION, SOLUTION INFILTRATION; PERINEURAL at 09:58

## 2022-01-04 RX ADMIN — TRIAMCINOLONE ACETONIDE 40 MG: 40 INJECTION, SUSPENSION INTRA-ARTICULAR; INTRAMUSCULAR at 09:59

## 2022-01-04 NOTE — PROGRESS NOTES
CHIEF COMPLAINT: Left shoulder pain    History:    Bhakti Osullivan is a 77 y.o. right handed female self-referred for evaluation and treatment of Left shoulder pain. This is evaluated as a personal injury. The pain began a few months ago. Pain is rated as a 3/10. There was not an injury. She points the pain being located along her scapula and goes down her arm to her elbow. She denies radiating pain to her fingers. She denies any numbness or tingling in her fingers. Symptoms are worse with all movements. The patient has not had PT. The patient has not had an injection. She is on Celebrex, which provide some relief. She is on a biomed compound cream, which he states also helps. The patient has not tried ice. 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 Outside reports reviewed:  none.     Past Medical History:   Diagnosis Date    Arthritis     Blood transfusion 2002    AUTOLOGOUS    Blood transfusion 1968    Diverticulosis     H/O irritable bowel syndrome     Hyperlipidemia     Osteopenia     Restless leg syndrome     Seasonal allergies        Past Surgical History:   Procedure Laterality Date    APPENDECTOMY      CARPAL TUNNEL RELEASE      LEFT    COLONOSCOPY      FINGER TRIGGER RELEASE      RIGHT - 2 FINGERS    FINGER TRIGGER RELEASE  6-    trigger finger release left long and ring fingers    FRACTURE SURGERY      BILATERAL FEMURS     FRACTURE SURGERY      RIGHT ANKLE/TALUS/TIBIA    HAND SURGERY Left 9/12/2019    LEFT RING FINGER  DEBRIDEMENT INCISION AND DRAINAGE, REMOVAL OF FOREIGN BODY performed by Tuyet Castillo MD at [address] [place] [address] JOINT REPLACEMENT      RIGHT KNEE    KNEE ARTHROSCOPY      RIGHT AND LEFT MULTIPLE       Current Outpatient Medications on File Prior to Visit   Medication Sig Dispense Refill    rOPINIRole (REQUIP) 0.5 MG tablet Take 0.5 mg by mouth 3 times daily as needed      Cholecalciferol (VITAMIN D) 50 MCG (2000 UT) CAPS capsule Take 1 capsule by mouth daily      Probiotic Product (PROBIOTIC-10 PO) Take 1 capsule by mouth daily      Melatonin 5 MG CAPS Take 1 capsule by mouth nightly      simvastatin (ZOCOR) 20 MG tablet Take 20 mg by mouth nightly.  ropinirole (REQUIP) 0.5 MG tablet Take 1.5 mg by mouth nightly       diclofenac (VOLTAREN) 75 MG EC tablet Take 75 mg by mouth 2 times daily as needed Takes 1-2 per week, only takes on really \"achy\" days      Calcium Carbonate-Vitamin D (CALCIUM PLUS VITAMIN D) 600-100 MG-UNIT CAPS Take 1 tablet by mouth nightly       Multiple Vitamin (MULTIVITAMIN PO) Take 1 tablet by mouth daily. No current facility-administered medications on file prior to visit. Allergies   Allergen Reactions    Sulfa Antibiotics      Face red,flushed       Social History     Socioeconomic History    Marital status:      Spouse name: Not on file    Number of children: Not on file    Years of education: Not on file    Highest education level: Not on file   Occupational History    Not on file   Tobacco Use    Smoking status: Never Smoker    Smokeless tobacco: Never Used   Vaping Use    Vaping Use: Never used   Substance and Sexual Activity    Alcohol use: Yes     Comment: occasional glass of wine with dinner    Drug use: No    Sexual activity: Not on file   Other Topics Concern    Not on file   Social History Narrative    Not on file     Social Determinants of Health     Financial Resource Strain:     Difficulty of Paying Living Expenses: Not on file   Food Insecurity:     Worried About Running Out of Food in the Last Year: Not on file    Noelle of Food in the Last Year: Not on file   Transportation Needs:     Lack of Transportation (Medical): Not on file    Lack of Transportation (Non-Medical):  Not on file   Physical Activity:     Days of Exercise per Week: Not on file    Minutes of Exercise per Session: Not on file   Stress:     Feeling of Stress : Not on file   Social Connections:     Frequency of Communication with Friends and Family: Not on file    Frequency of Social Gatherings with Friends and Family: Not on file    Attends Hinduism Services: Not on file    Active Member of Clubs or Organizations: Not on file    Attends Club or Organization Meetings: Not on file    Marital Status: Not on file   Intimate Partner Violence:     Fear of Current or Ex-Partner: Not on file    Emotionally Abused: Not on file    Physically Abused: Not on file    Sexually Abused: Not on file   Housing Stability:     Unable to Pay for Housing in the Last Year: Not on file    Number of [place] in the Last Year: Not on file    Unstable Housing in the Last Year: Not on file       Family History   Problem Relation Age of Onset    Cancer Father         PROSTATE    Heart Disease Mother         CHF       Review of Systems:   I have reviewed the clinically relevant past medical history, medications, allergies, family history, social history, and 13 point Review of Systems from the patient's recent history form & documented any details relevant to today's presenting complaints in the history above. The patient's self-reported past medical history, medications, allergies, family history, social history, and Review of Systems form from 1/4/22 have been scanned into the chart under the \"Media\" tab. Physical Examination:      Vital signs:  Ht 5' 5\" (1.651 m)   Wt 169 lb (76.7 kg)   BMI 28.12 kg/m²     General:   alert, appears stated age, cooperative and no distress   Left Shoulder   Active ROM:   forward flexion 180, external rotation 80, internal rotation T10.     Bilateral shouldera   Joint Tenderness:   lateral acromial   Neer:   Mildly positive   Pérez:   negative   Strength:   5/5 Supraspinatus, External rotation    Bilateral shoulders   Drop-arm test:   negative   Belly-press test:   negative   Bear-hug test:   negative   Speed's test:   negative   Bicipital groove tenderness:  positive   Cazares's test:   negative   Cross-body adduction test:   negative    AC joint tenderness:   negative     There are no skin lesions, cellulitis, or extreme edema in the upper extremities. Sensation is grossly intact to light touch bilaterally upper extremity. The patient has warm and well-perfused Bilateral upper extremities with brisk capillary refill. Imaging   Left Shoulder X-Ray: 3 view x-rays of the shoulder including AP, scapular Y, and axillary    obtained and reviewed  AC Joint: narrowing moderate  Glenohumeral joint: no abnormalities noted  Elevation humeral head: absent        Assessment:      Left shoulder rotator cuff tendinitis        Plan:      Natural history and expected course discussed. Questions answered. Ice prn. Make sure ice does not directly contact skin to avoid risk of frostbite. Continue Celebrex. Continue compound cream.    PT referral.    The risks and benefits of an injection were discussed with the patient. The patient had full opportunity to ask questions and all were answered. The patient then provided verbal informed consent. The skin was then prepped with betadine solution and alcohol. Under aseptic conditions, the  left subacromial space was injected with 4cc of 1% xylocaine, 4cc of 0.25% marcaine, and 1cc of Kenalog (40mg/ml). There were no immediate complications following the injection. The patient was advised of the possibility of injection site reaction and instructed to apply ice to the area and take NSAIDs if able. Follow up in 2 months. Call IF NO improvement with PT after 4-6 weeks and I will order MRI prior to patient being seen back in office. Santos Myao. Jason Bautista MD  Orthopaedic Surgery and Sports Medicine     Disclaimer: This note was generated with use of a verbal recognition program and an attempt was made to check for errors.   It is possible that there are still dictated errors within this office note. If so, please bring any significant errors to my attention for an addendum. All efforts were made to ensure that this office note is accurate.

## 2022-01-11 ENCOUNTER — HOSPITAL ENCOUNTER (OUTPATIENT)
Dept: PHYSICAL THERAPY | Age: 77
Setting detail: THERAPIES SERIES
Discharge: HOME | End: 2022-01-11
Payer: MEDICARE

## 2022-01-11 PROCEDURE — 97161 PT EVAL LOW COMPLEX 20 MIN: CPT

## 2022-01-11 PROCEDURE — 97110 THERAPEUTIC EXERCISES: CPT

## 2022-01-11 NOTE — PLAN OF CARE
[address], [address] [facility], [address]  Phone: [phone]   Fax: [phone]                                                     Physical Therapy Certification    Dear Referring Practitioner: Kari Shen MD,    We had the pleasure of evaluating the following patient for physical therapy services at [address] [place] [address]. A summary of our findings can be found in the initial assessment below. This includes our plan of care. If you have any questions or concerns regarding these findings, please do not hesitate to contact me at the office phone number checked above. Thank you for the referral.       Physician Signature:_______________________________Date:__________________  By signing above (or electronic signature), therapists plan is approved by physician      Patient: Gaudencio Greenfield   : 1945   MRN: [id number]  Referring Physician: Referring Practitioner: Kari Shen MD      Evaluation Date: 2022      Medical Diagnosis Information:  Diagnosis: M75.82 (ICD-10-CM) - Rotator cuff tendinitis, left   Treatment Diagnosis: shoulder pain, impaired shoulder strength, impaired UE function                                         Insurance information: PT Insurance Information: [address] NOT MET, AUTH REQUIRED    Precautions/ Contra-indications:   Latex Allergy:  [x]NO      []YES  Preferred Language for Healthcare:   [x]English       []Other:    C-SSRS Triggered by Intake questionnaire (Past 2 wk assessment ):   [x] No, Questionnaire did not trigger screening.   [] Yes, Patient intake triggered C-SSRS Screening     [] Completed, no further action required. [] Completed, PCP notified via Epic    SUBJECTIVE: Patient stated complaint: the patient reports pain began about 3 months ago in left shoulder with no specific HALLIE. This is a personal injury.  She reports she saw orthopedic at recommendation of PT and she was given an injection in left shoulder. She reports only soreness since injection but pain relief has been considerable. This was on 1/4/22. The patient reported she previously had pain all the way down her lateral arm to elbow before injection. She does get occasional discomfort sleeping on L shoulder, but this is improved since injection. She reports some discomfort with pushing, pulling and reaching up with [facility] volunteers for [facility] PT department. Since injection, she reports function has improved. Her goal for therapy is to have no discomfort in shoulder with daily tasks. Relevant Medical History: OA, osteoporosis  Functional Outcome: Quick DASH: raw score = 20; dysfunction = 20%    Pain Scale: 1/10  Easing factors: injection  Provocative factors: pushing, pulling, reaching    Type: []Constant   [x]Intermittent  []Radiating []Localized []other:     Numbness/Tingling: denies    Occupation/School: retired; volunteers at [employer] Level of Function: Prior to this injury / incident, pt was independent with ADLs and IADLs - she is independent. She has some difficulty with cleaning and vacuuming. She does not push carts with arms extended fully.     OBJECTIVE:   Hand dominance: right    Palpation: + 1 TTP Left levator    Functional Mobility/Transfers: WFL    Posture: rounded scapulas bilaterally, increased thoracic kyphosis; scapular retraction in prone alleviates pain with horizontal abduction (thumbs up & down)    Bandages/Dressings/Incisions: N/A    Cervical AROM screen: [x] [facility]  [] abnormal:     PROM AROM    L R L R   Shoulder Flexion  180  180   Shoulder Abduction  180  180   Shoulder External Rotation  90 WFL 90 WFL   Shoulder Internal Rotation  90 WFL 90 WFL   Elbow Flexion    WFL WFL   Elbow Extension    [facility] WF       Strength (0-5) Left Right    Shoulder Shrug (C4) 5 5   Shoulder Flex 5 5   Shoulder Abd (C5) 4+* 5   Shoulder ER 4+* 4+   Shoulder IR 5 5   Biceps (C6) 5 5   Triceps (C7) 5 5   Lats     Middle Trap 3+ 4-   Rhomboids 3+ 4-   Lower Trap  3+ 4-          Joint mobility: L GHJ (inf & post)   [x]Normal    []Hypo   []Hyper      Orthopaedic Special Tests Positive  Negative  NT Comments    Shoulder        Neer   x     Aroldo-Pérez  x     Empty Can  x     Drop Arm       Lerona's       Speeds        Sulcus        Apprehension (Anterior / Posterior)       Load and Shift              Elbow        Cozen's       Varus Stress  x     Valgus Stress   x     Tinel's   x              [x] Patient history, allergies, meds reviewed. Medical chart reviewed. See intake form. Review Of Systems (ROS):  [x]Performed Review of systems (Integumentary, CardioPulmonary, Neurological) by intake and observation. Intake form has been scanned into medical record. Patient has been instructed to contact their primary care physician regarding ROS issues if not already being addressed at this time.       Co-morbidities/Complexities (which will affect course of rehabilitation):   []? None          []? Hx of COVID   Arthritic conditions   []? Rheumatoid arthritis (M05.9)  [x]? Osteoarthritis (M19.91)  []? Gout    Cardiovascular conditions   []? Hypertension (I10)  [x]? Hyperlipidemia (E78.5)  []? Angina pectoris (I20)  []? Atherosclerosis (I70)  []? Pacemaker  []? Hx of CABG/stent/  cardiac surgeries    Musculoskeletal conditions   []? Disc pathology   []? Congenital spine pathologies   [x]? Osteoporosis (M81.8)  []? Osteopenia (M85.8)  []? Scoliosis         Endocrine conditions   []? Hypothyroid (E03.9)  []? Hyperthyroid Gastrointestinal conditions   []? Constipation (W07.74)    Metabolic conditions   []? Morbid obesity (E66.01)  []? Diabetes type 1(E10.65) or 2 (E11.65)   []? Neuropathy (G60.9)      Cardio/Pulmonary conditions   []? Asthma (J45)  []? Coughing   []? COPD (J44.9)  []? CHF  []? A-fib    Psychological Disorders  []? Anxiety (F41.9)  []? Depression (F32.9)   []? Other:    Developmental Disorders  []? Autism (F84.0)  []?CP (G80)  []? Down Syndrome (Q90.9)  []? Developmental delay      Neurological conditions  []? Prior Stroke (I69.30)  []? Parkinson's (Verdis Salk)  []? Encephalopathy (G93.40)  []?MS (G35)  []? Post-polio (G14)  []? SCI  []? TBI  []? ALS Other conditions  []? Fibromyalgia (M79.7)  []? Vertigo  []? Syncope  []? Kidney Failure  []? Cancer      []? currently undergoing                treatment  []? Pregnancy  []? Incontinence    Prior surgeries  []? involved limb  []? previous spinal surgery  []?  section birth  []?hysterectomy  []? bowel / bladder surgery  []? other relevant surgeries   []? Other:                  Barriers to/and or personal factors that will affect rehab potential:              []Age  []Sex    []Smoker              []Motivation/Lack of Motivation                        []Co-Morbidities              []Cognitive Function, education/learning barriers              []Environmental, home barriers              []profession/work barriers  [x]past PT/medical experience  [x]other: response to injection  Justification: POSITIVE influence on potential     Falls Risk Assessment (30 days):   [x] Falls Risk assessed and no intervention required. [] Falls Risk assessed and Patient requires intervention due to being higher risk   TUG score (>12s at risk):     [] Falls education provided, including     ASSESSMENT: The patient is a 77year old female who presents with signs and symptoms consistent with left shoulder tendonitis. The patient's favorable response to MD's injection has helped reduced symptoms; the patient will benefit from PT services to address scapulohumeral mechanics, strength, stability and endurance and facilitate return to PLOF without restrictions. If the patient does not improve within 4 weeks, she will be referred back to MD for imaging.     Functional Impairments   []Noted spinal or UE joint hypomobility   []Noted spinal or UE joint hypermobility   []Decreased UE functional ROM   [x]Decreased UE functional strength   []Abnormal reflexes/sensation/myotomal/dermatomal deficits   [x]Decreased RC/scapular/core strength and neuromuscular control   []other:      Functional Activity Limitations (from functional questionnaire and intake)   []Reduced ability to tolerate prolonged functional positions   []Reduced ability or difficulty with changes of positions or transfers between positions   [x]Reduced ability to maintain good posture and demonstrate good body mechanics with sitting, bending, and lifting   [] Reduced ability or tolerance with driving and/or computer work   [x]Reduced ability to sleep   [x]Reduced ability to perform lifting, reaching, carrying tasks   []Reduced ability to tolerate impact through UE   []Reduced ability to reach behind back   []Reduced ability to  or hold objects   []Reduced ability to throw or toss an object   []other:    Participation Restrictions   []Reduced participation in self care activities   [x]Reduced participation in home management activities   []Reduced participation in work activities   [x]Reduced participation in social activities. []Reduced participation in sport/recreation activities. Classification:   []Signs/symptoms consistent with post-surgical status including decreased ROM, strength and function.   []Signs/symptoms consistent with joint sprain/strain   []Signs/symptoms consistent with shoulder impingement   [x]Signs/symptoms consistent with shoulder tendinopathy   []Signs/symptoms consistent with Rotator cuff tear   []Signs/symptoms consistent with labral tear   []Signs/symptoms consistent with postural dysfunction    []Signs/symptoms consistent with Glenohumeral IR Deficit - <45 degrees   []Signs/symptoms consistent with facet dysfunction of cervical/thoracic spine    []Signs/symptoms consistent with pathology which may benefit from Dry needling     []other:     Prognosis/Rehab Potential: [x]Excellent   []Good    []Fair   []Poor    Tolerance of evaluation/treatment:    [x]Excellent   []Good    []Fair   []Poor    Physical Therapy Evaluation Complexity Justification  [x] A history of present problem with:  [] no personal factors and/or comorbidities that impact the plan of care;  [x]1-2 personal factors and/or comorbidities that impact the plan of care  []3 personal factors and/or comorbidities that impact the plan of care  [x] An examination of body systems using standardized tests and measures addressing any of the following: body structures and functions (impairments), activity limitations, and/or participation restrictions;:  [] a total of 1-2 or more elements   [x] a total of 3 or more elements   [] a total of 4 or more elements   [x] A clinical presentation with:  [x] stable and/or uncomplicated characteristics   [] evolving clinical presentation with changing characteristics  [] unstable and unpredictable characteristics;   [x] Clinical decision making of [x] low, [] moderate, [] high complexity using standardized patient assessment instrument and/or measurable assessment of functional outcome. [x] EVAL (LOW) 58919 (typically 15 minutes face-to-face)  [] EVAL (MOD) 56207 (typically 30 minutes face-to-face)  [] EVAL (HIGH) 44921 (typically 45 minutes face-to-face)  [] RE-EVAL     PLAN:  Frequency/Duration:  1 days per week for 4 Weeks:  INTERVENTIONS:  [x] Therapeutic exercise including: strength training, ROM, for Upper extremity and core   [x]  NMR activation and proprioception for UE, scap and Core   [x] Manual therapy as indicated for shoulder, scapula and spine to include: Dry Needling/IASTM, STM, PROM, Gr I-IV mobilizations, manipulation. [x] Modalities as needed that may include: thermal agents, E-stim, Biofeedback, US, iontophoresis as indicated  [x] Patient education on joint protection, postural re-education, activity modification, progression of HEP.     HEP instruction: Access Code: [email or web address]  URL: [email or web address]  Date: 01/11/2022  Prepared by: Rudy Morse    Exercises  Shoulder External Rotation with Anchored Resistance - 1 x daily - 4 x weekly - 3 sets - 15 reps - blue resistance  Standing Shoulder Internal Rotation with Anchored Resistance - 1 x daily - 4 x weekly - 3 sets - 20 reps - blue resistance  Seated Levator Scapulae Stretch - 3 x daily - 6 x weekly - 1 sets - 3 reps - 10 hold  Seated Shoulder Horizontal Abduction with Resistance - 1 x daily - 4 x weekly - 2 sets - 10 reps - orange resistance  Seated Single Arm Shoulder Abduction with Elbow Bent and Dumbbell - 1 x daily - 4 x weekly - 3 sets - 10 reps - 3 lbs    GOALS:  Patient stated goal: to abolish pain; increase strength to tolerate all ADLs/iADLs  [] Progressing: [] Met: [] Not Met: [] Adjusted    Therapist goals for Patient:   Short Term Goals: To be achieved in: 2 weeks  1. Independent in HEP and progression per patient tolerance, in order to prevent re-injury. [] Progressing: [] Met: [] Not Met: [] Adjusted  2. Patient will have a decrease in pain to <2/10 on average to facilitate improvement in movement, function, and ADLs as indicated by Functional Deficits. [] Progressing: [] Met: [] Not Met: [] Adjusted    Long Term Goals: To be achieved in: 4 weeks  1. Disability index score of 10% or less for the UEFI or Quick DASH to assist with reaching prior level of function. [] Progressing: [] Met: [] Not Met: [] Adjusted  2. Patient will demonstrate an increase in Strength to 5/5 L shoulder flex, abd, ER, IR, 4+/5 MT, LT, rhomboids to allow for proper functional mobility as indicated by patients Functional Deficits. [] Progressing: [] Met: [] Not Met: [] Adjusted  3. Patient will return to functional activities including repeated OH reach 15x w/ 3.3# without increased symptoms or restriction. [] Progressing: [] Met: [] Not Met: [] Adjusted  4.  The patient will perform all iADLs and ADLs w/o increased symptoms in left shoulder for minimum of 3 days.   [] Progressing: [] Met: [] Not Met: [] Adjusted     Electronically signed by:  Bladimir Medellin PT, DPT, OMT-C

## 2022-01-11 NOTE — FLOWSHEET NOTE
[facility]  Outpatient Physical Therapy  Phone: [phone]   Fax: [phone]    Physical Therapy Daily Treatment Note  Date:  2022    Patient Name:  Juliano Wagner    :  1945  MRN: [id number]  Medical/Treatment Diagnosis Information:  · Diagnosis: M75.82 (ICD-10-CM) - Rotator cuff tendinitis, left  Treatment Diagnosis: shoulder pain, impaired shoulder strength, impaired UE function  Insurance/Certification information:  PT Insurance Information: [address] NOT MET, [address]  Physician Information:  Referring Practitioner: Debra Carter MD  Plan of care signed (Y/N): []  Yes [x]  No     Date of Patient follow up with Physician:      Progress Report: [x]  Yes  []  No     Date Range for reporting period:  Beginnin22  Ending: TBD    Progress report due (10 Rx/or 30 days whichever is less): visit #4 or 28    Recertification due (POC duration/ or 90 days whichever is less): visit #4 or 22 (date)     Visit # Insurance Allowable Auth required?  Date Range   1 TBD [x]  Yes  []  No TBD     Latex Allergy:  [x]NO      []YES  Preferred Language for Healthcare:   [x]English       []other:    Functional Scale:        Date assessed:  Quick DASH: raw score = 20; dysfunction = 20%  22    Pain level:  3/10     SUBJECTIVE:  See eval    OBJECTIVE: See eval    RESTRICTIONS/PRECAUTIONS:     Exercises/Interventions:     Therapeutic Exercises (67900) Resistance / level Sets/sec Reps Notes   TB ER  1 15 L    TB IR  1 20 L    TB HABD (palms up/down)  1 10 BUE    Levator scapula stretch  10'' 2                                       Therapeutic Activities (06771)                                          Neuromuscular Re-ed (78194)                                                 Manual Intervention ([id number])                                                   Modalities:     Pt. Education:  -patient educated on diagnosis, prognosis and expectations for rehab  -all patient questions were answered    Home Exercise Program:  Access Code: [email or web address]  URL: [email or web address]  Date: 01/11/2022  Prepared by: Carmen Patrick     Exercises  Shoulder External Rotation with Anchored Resistance - 1 x daily - 4 x weekly - 3 sets - 15 reps - blue resistance  Standing Shoulder Internal Rotation with Anchored Resistance - 1 x daily - 4 x weekly - 3 sets - 20 reps - blue resistance  Seated Levator Scapulae Stretch - 3 x daily - 6 x weekly - 1 sets - 3 reps - 10 hold  Seated Shoulder Horizontal Abduction with Resistance - 1 x daily - 4 x weekly - 2 sets - 10 reps - orange resistance  Seated Single Arm Shoulder Abduction with Elbow Bent and Dumbbell - 1 x daily - 4 x weekly - 3 sets - 10 reps - 3 lbs    Therapeutic Exercise and NMR EXR  [x] (80972) Provided verbal/tactile cueing for activities related to strengthening, flexibility, endurance, ROM for improvements in  [] LE / Lumbar: LE, proximal hip, and core control with self care, mobility, lifting, ambulation. [x] UE / Cervical: cervical, postural, scapular, scapulothoracic and UE control with self care, reaching, carrying, lifting, house/yardwork, driving, computer work.  [] (04457) Provided verbal/tactile cueing for activities related to improving balance, coordination, kinesthetic sense, posture, motor skill, proprioception to assist with   [] LE / lumbar: LE, proximal hip, and core control in self care, mobility, lifting, ambulation and eccentric single leg control.    [] UE / cervical: cervical, scapular, scapulothoracic and UE control with self care, reaching, carrying, lifting, house/yardwork, driving, computer work.   [] (78018) Therapist is in constant attendance of 2 or more patients providing skilled therapy interventions, but not providing any significant amount of measurable one-on-one time to either patient, for improvements in  [] LE / lumbar: LE, proximal hip, and core control in self care, mobility, lifting, ambulation and eccentric single leg control. [] UE / cervical: cervical, scapular, scapulothoracic and UE control with self care, reaching, carrying, lifting, house/yardwork, driving, computer work. NMR and Therapeutic Activities:    [] (44800 or 39679) Provided verbal/tactile cueing for activities related to improving balance, coordination, kinesthetic sense, posture, motor skill, proprioception and motor activation to allow for proper function of   [] LE: / Lumbar core, proximal hip and LE with self care and ADLs  [] UE / Cervical: cervical, postural, scapular, scapulothoracic and UE control with self care, carrying, lifting, driving, computer work.   [] (10609) Gait Re-education- Provided training and instruction to the patient for proper LE, core and proximal hip recruitment and positioning and eccentric body weight control with ambulation re-education including up and down stairs     Home Management Training / Self Care:  [] (97381) Provided self-care/home management training related to activities of daily living and compensatory training, and/or use of adaptive equipment for improvement with: ADLs and compensatory training, meal preparation, safety procedures and instruction in use of adaptive equipment, including bathing, grooming, dressing, personal hygiene, basic household cleaning and chores.      Home Exercise Program:    [x] (81351) Reviewed/Progressed HEP activities related to strengthening, flexibility, endurance, ROM of   [] LE / Lumbar: core, proximal hip and LE for functional self-care, mobility, lifting and ambulation/stair navigation   [x] UE / Cervical: cervical, postural, scapular, scapulothoracic and UE control with self care, reaching, carrying, lifting, house/yardwork, driving, computer work  [] (41550)Reviewed/Progressed HEP activities related to improving balance, coordination, kinesthetic sense, posture, motor skill, proprioception of   [] LE: core, proximal hip and LE for self care, mobility, lifting, and ambulation/stair navigation    [] UE / Cervical: cervical, postural,  scapular, scapulothoracic and UE control with self care, reaching, carrying, lifting, house/yardwork, driving, computer work    Manual Treatments:  PROM / STM / Oscillations-Mobs:  G-I, II, III, IV (PA's, Inf., Post.)  [] (78787) Provided manual therapy to mobilize LE, proximal hip and/or LS spine soft tissue/joints for the purpose of modulating pain, promoting relaxation,  increasing ROM, reducing/eliminating soft tissue swelling/inflammation/restriction, improving soft tissue extensibility and allowing for proper ROM for normal function with   [] LE / lumbar: self care, mobility, lifting and ambulation. [] UE / Cervical: self care, reaching, carrying, lifting, house/yardwork, driving, computer work. Modalities:  [] (31086) Vasopneumatic compression: Utilized vasopneumatic compression to decrease edema / swelling for the purpose of improving mobility and quad tone / recruitment which will allow for increased overall function including but not limited to self-care, transfers, ambulation, and ascending / descending stairs. Charges:  Timed Code Treatment Minutes: 10   Total Treatment Minutes: 40     [x] EVAL - LOW (28019)   [] EVAL - MOD (16105)  [] EVAL - HIGH (69930)  [] RE-EVAL (40974)  [x] OD(50060) x 1       [] Ionto  [] NMR (88066) x       [] Vaso  [] Manual (78491) x       [] Ultrasound  [] TA x        [] Mech Traction (95883)  [] Aquatic Therapy x     [] ES (un) (58529):   [] Home Management Training x  [] ES(attended) (99063)   [] Dry Needling 1-2 muscles (90897):  [] Dry Needling 3+ muscles (367802)  [] Group:      [] Other:     GOALS:  Patient stated goal: to abolish pain; increase strength to tolerate all ADLs/iADLs  []? Progressing: []? Met: []? Not Met: []? Adjusted     Therapist goals for Patient:   Short Term Goals: To be achieved in: 2 weeks  1.  Independent in HEP and progression per patient tolerance, in order to prevent re-injury. []? Progressing: []? Met: []? Not Met: []? Adjusted  2. Patient will have a decrease in pain to <2/10 on average to facilitate improvement in movement, function, and ADLs as indicated by Functional Deficits. []? Progressing: []? Met: []? Not Met: []? Adjusted     Long Term Goals: To be achieved in: 4 weeks  1. Disability index score of 10% or less for the UEFI or Quick DASH to assist with reaching prior level of function. []? Progressing: []? Met: []? Not Met: []? Adjusted  2. Patient will demonstrate an increase in Strength to 5/5 L shoulder flex, abd, ER, IR, 4+/5 MT, LT, rhomboids to allow for proper functional mobility as indicated by patients Functional Deficits. []? Progressing: []? Met: []? Not Met: []? Adjusted  3. Patient will return to functional activities including repeated OH reach 15x w/ 3.3# without increased symptoms or restriction. []? Progressing: []? Met: []? Not Met: []? Adjusted  4. The patient will perform all iADLs and ADLs w/o increased symptoms in left shoulder for minimum of 3 days. []? Progressing: []? Met: []? Not Met: []? Adjusted         Overall Progression Towards Functional goals/ Treatment Progress Update:  [] Patient is progressing as expected towards functional goals listed. [] Progression is slowed due to complexities/Impairments listed. [] Progression has been slowed due to co-morbidities.   [x] Plan just implemented, too soon to assess goals progression <30days   [] Goals require adjustment due to lack of progress  [] Patient is not progressing as expected and requires additional follow up with physician  [] Other    Persisting Functional Limitations/Impairments:  [x]Sleeping []Sitting               []Standing []Transfers        []Walking []Kneeling               []Stairs []Squatting / bending   []ADLs [x]Reaching  [x]Lifting  [x]Housework  []Driving [x]Job related tasks  []Sports/Recreation []Other:        ASSESSMENT:  See eval  Treatment/Activity Tolerance:  [x] Patient able to complete tx  [] Patient limited by fatigue  [] Patient limited by pain  [] Patient limited by other medical complications  [] Other:     Prognosis: [x] Good [] Fair  [] Poor    Patient Requires Follow-up: [x] Yes  [] No    Plan for next treatment session: S/L ER, prone rows, prone T's, prone Y's; postural setting, abductor strengthening without symptoms    PLAN: See eval. PT 1x / week for 4 weeks. [] Continue per plan of care [] Alter current plan (see comments)  [x] Plan of care initiated [] Hold pending MD visit [] Discharge    Electronically signed by: Alexsandra Restrepo, PT PT, DPT, OMT-C    Note: If patient does not return for scheduled/ recommended follow up visits, this note will serve as a discharge from care along with most recent update on progress.

## 2022-01-14 ENCOUNTER — HOSPITAL ENCOUNTER (OUTPATIENT)
Dept: PHYSICAL THERAPY | Age: 77
Setting detail: THERAPIES SERIES
Discharge: HOME | End: 2022-01-14
Payer: MEDICARE

## 2022-01-14 PROCEDURE — 97112 NEUROMUSCULAR REEDUCATION: CPT

## 2022-01-14 PROCEDURE — 97110 THERAPEUTIC EXERCISES: CPT

## 2022-01-14 NOTE — FLOWSHEET NOTE
[facility]  Outpatient Physical Therapy  Phone: [phone]   Fax: [phone]    Physical Therapy Daily Treatment Note  Date:  2022    Patient Name:  Mardee Severe    :  1945  MRN: [id number]  Medical/Treatment Diagnosis Information:  · Diagnosis: M75.82 (ICD-10-CM) - Rotator cuff tendinitis, left  Treatment Diagnosis: shoulder pain, impaired shoulder strength, impaired UE function  Insurance/Certification information:  PT Insurance Information: [address] NOT MET, [address]  Physician Information:  Referring Practitioner: Kwame Agee MD  Plan of care signed (Y/N): [x]  Yes []  No     Date of Patient follow up with Physician:      Progress Report: []  Yes  [x]  No     Date Range for reporting period:  Beginnin22  Ending: TBD     Progress report due (10 Rx/or 30 days whichever is less): visit #4 or     Recertification due (POC duration/ or 90 days whichever is less): visit #4 or 22 (date)     Visit # Insurance Allowable Auth required? Date Range   2 TBD [x]  Yes  []  No TBD     Latex Allergy:  [x]NO      []YES  Preferred Language for Healthcare:   [x]English       []other:    Functional Scale:        Date assessed:  Quick DASH: raw score = 20; dysfunction = 20%  22    Pain level:  10 sore     SUBJECTIVE:  The patient reports the exercises went well, she can feel muscle burn by the end, but only twice in full session - and states she could do more reps. She reports only slight soreness, but not pain.      OBJECTIVE:   - T.C. required for scapular depression and retraction with all scapular-related exercises    RESTRICTIONS/PRECAUTIONS:     Exercises/Interventions:     Therapeutic Exercises (85718) Resistance / level Sets/sec Reps Notes   TB ER blue TB IR blue TB HABD (palms up/down) orange Levator scapula stretch  Doorframe Pec Stretch  10'' 4 BUE     CC Mid-Row - underhand 35# 3 10    CC - single arm pull down - from 90deg abd 10# 2 10 1/14 - T.C. for scapular depression                 Therapeutic Activities (42069)                                          Neuromuscular Re-ed (75781)       S/L ER 3# 3 10 1/14 - added   Prone Row 3# 3 10 1/14 - T.C. and assist to facilitate scapular retraction NOT elevation   Bent-over Row 3# 1 10 1/14 - performed for HEP ease   Prone Horizontal Abduction:  Palm Flat  Thumbs Up   0# / 3#  0# / 3#   1  1   10, ea  10, ea   1/14 - added; heavy T.C. and facilitated scapular depression/retraction   \"Reverse Hugs\" - Large Pillar  3'' 15x 1/14          Manual Intervention (43010)                                                   Modalities:     Pt. Education:  -patient educated on diagnosis, prognosis and expectations for rehab  -all patient questions were answered    Home Exercise Program:  Access Code: [email or web address]  URL: [email or web address]  Date: 01/11/2022  Prepared by: Aleksey Police     Exercises  Shoulder External Rotation with Anchored Resistance - 1 x daily - 4 x weekly - 3 sets - 15 reps - blue resistance  Standing Shoulder Internal Rotation with Anchored Resistance - 1 x daily - 4 x weekly - 3 sets - 20 reps - blue resistance  Seated Levator Scapulae Stretch - 3 x daily - 6 x weekly - 1 sets - 3 reps - 10 hold  Seated Shoulder Horizontal Abduction with Resistance - 1 x daily - 4 x weekly - 2 sets - 10 reps - orange resistance  Seated Bilateral Arm Shoulder Abduction with Elbow Bent and Dumbbell - 1 x daily - 4 x weekly - 3 sets - 10 reps - 3 lbs    Therapeutic Exercise and NMR EXR  [x] (18647) Provided verbal/tactile cueing for activities related to strengthening, flexibility, endurance, ROM for improvements in  [] LE / Lumbar: LE, proximal hip, and core control with self care, mobility, lifting, ambulation.   [x] UE / Cervical: cervical, postural, scapular, scapulothoracic and UE control with self care, reaching, carrying, lifting, house/yardwork, driving, computer work.  [x] (76330) Provided verbal/tactile cueing for activities related to improving balance, coordination, kinesthetic sense, posture, motor skill, proprioception to assist with   [] LE / lumbar: LE, proximal hip, and core control in self care, mobility, lifting, ambulation and eccentric single leg control. [x] UE / cervical: cervical, scapular, scapulothoracic and UE control with self care, reaching, carrying, lifting, house/yardwork, driving, computer work.   [] (33271) Therapist is in constant attendance of 2 or more patients providing skilled therapy interventions, but not providing any significant amount of measurable one-on-one time to either patient, for improvements in  [] LE / lumbar: LE, proximal hip, and core control in self care, mobility, lifting, ambulation and eccentric single leg control. [] UE / cervical: cervical, scapular, scapulothoracic and UE control with self care, reaching, carrying, lifting, house/yardwork, driving, computer work.      NMR and Therapeutic Activities:    [x] (48901 or 48000) Provided verbal/tactile cueing for activities related to improving balance, coordination, kinesthetic sense, posture, motor skill, proprioception and motor activation to allow for proper function of   [] LE: / Lumbar core, proximal hip and LE with self care and ADLs  [x] UE / Cervical: cervical, postural, scapular, scapulothoracic and UE control with self care, carrying, lifting, driving, computer work.   [] (80268) Gait Re-education- Provided training and instruction to the patient for proper LE, core and proximal hip recruitment and positioning and eccentric body weight control with ambulation re-education including up and down stairs     Home Management Training / Self Care:  [] (31002) Provided self-care/home management training related to activities of daily living and compensatory training, and/or use of adaptive equipment for improvement with: ADLs and compensatory training, meal preparation, safety procedures and instruction in use of adaptive equipment, including bathing, grooming, dressing, personal hygiene, basic household cleaning and chores. Home Exercise Program:    [] (50479) Reviewed/Progressed HEP activities related to strengthening, flexibility, endurance, ROM of   [] LE / Lumbar: core, proximal hip and LE for functional self-care, mobility, lifting and ambulation/stair navigation   [] UE / Cervical: cervical, postural, scapular, scapulothoracic and UE control with self care, reaching, carrying, lifting, house/yardwork, driving, computer work  [] (89354)Reviewed/Progressed HEP activities related to improving balance, coordination, kinesthetic sense, posture, motor skill, proprioception of   [] LE: core, proximal hip and LE for self care, mobility, lifting, and ambulation/stair navigation    [] UE / Cervical: cervical, postural,  scapular, scapulothoracic and UE control with self care, reaching, carrying, lifting, house/yardwork, driving, computer work    Manual Treatments:  PROM / STM / Oscillations-Mobs:  G-I, II, III, IV (PA's, Inf., Post.)  [] (71167) Provided manual therapy to mobilize LE, proximal hip and/or LS spine soft tissue/joints for the purpose of modulating pain, promoting relaxation,  increasing ROM, reducing/eliminating soft tissue swelling/inflammation/restriction, improving soft tissue extensibility and allowing for proper ROM for normal function with   [] LE / lumbar: self care, mobility, lifting and ambulation. [] UE / Cervical: self care, reaching, carrying, lifting, house/yardwork, driving, computer work. Modalities:  [] (68830) Vasopneumatic compression: Utilized vasopneumatic compression to decrease edema / swelling for the purpose of improving mobility and quad tone / recruitment which will allow for increased overall function including but not limited to self-care, transfers, ambulation, and ascending / descending stairs.      Charges:  Timed Code Treatment Minutes: 40   Total Treatment Minutes: 40     [] EVAL - LOW (45667)   [] EVAL - MOD (20214)  [] EVAL - HIGH (20769)  [] RE-EVAL (46471)  [x] RC(20100) x 1       [] Ionto  [x] NMR (44980) x 2       [] Vaso  [] Manual (88918) x       [] Ultrasound  [] TA x        [] Mech Traction (40089)  [] Aquatic Therapy x      [] ES (un) (60976):   [] Home Management Training x  [] ES(attended) (01574)   [] Dry Needling 1-2 muscles (64443):  [] Dry Needling 3+ muscles (637898)  [] Group:      [] Other:     GOALS:  Patient stated goal: to abolish pain; increase strength to tolerate all ADLs/iADLs  []? Progressing: []? Met: []? Not Met: []? Adjusted     Therapist goals for Patient:   Short Term Goals: To be achieved in: 2 weeks  1. Independent in HEP and progression per patient tolerance, in order to prevent re-injury. []? Progressing: []? Met: []? Not Met: []? Adjusted  2. Patient will have a decrease in pain to <2/10 on average to facilitate improvement in movement, function, and ADLs as indicated by Functional Deficits. []? Progressing: []? Met: []? Not Met: []? Adjusted     Long Term Goals: To be achieved in: 4 weeks  1. Disability index score of 10% or less for the UEFI or Quick DASH to assist with reaching prior level of function. []? Progressing: []? Met: []? Not Met: []? Adjusted  2. Patient will demonstrate an increase in Strength to 5/5 L shoulder flex, abd, ER, IR, 4+/5 MT, LT, rhomboids to allow for proper functional mobility as indicated by patients Functional Deficits. []? Progressing: []? Met: []? Not Met: []? Adjusted  3. Patient will return to functional activities including repeated OH reach 15x w/ 3.3# without increased symptoms or restriction. []? Progressing: []? Met: []? Not Met: []? Adjusted  4. The patient will perform all iADLs and ADLs w/o increased symptoms in left shoulder for minimum of 3 days. []? Progressing: []? Met: []? Not Met: []?  Adjusted         Overall Progression Towards Functional goals/ Treatment Progress Update:  [x] Patient is progressing as expected towards functional goals listed. [] Progression is slowed due to complexities/Impairments listed. [] Progression has been slowed due to co-morbidities. [] Plan just implemented, too soon to assess goals progression <30days   [] Goals require adjustment due to lack of progress  [] Patient is not progressing as expected and requires additional follow up with physician  [] Other    Persisting Functional Limitations/Impairments:  [x]Sleeping []Sitting               []Standing []Transfers        []Walking []Kneeling               []Stairs []Squatting / bending   []ADLs [x]Reaching  [x]Lifting  [x]Housework  []Driving [x]Job related tasks  []Sports/Recreation []Other:      ASSESSMENT:  T.C. & facilitation for scapular retraction with prone rows, T's, standing mid-rows this date. The patient was able to perform all exercises with appropriate fatigue and without symptom increase. The patient required cues to slow down with reverse hug hold times and to increase pec stretch time. The patient was instructed to rest 20-30 seconds between sets with resisted strengthening. ROM appears unrestricted. Treatment/Activity Tolerance:  [x] Patient able to complete tx  [] Patient limited by fatigue  [] Patient limited by pain  [] Patient limited by other medical complications  [] Other:     Prognosis: [x] Good [] Fair  [] Poor    Patient Requires Follow-up: [x] Yes  [] No    Plan for next treatment session: prone Y's; postural setting, abductor strengthening without symptoms     PLAN:  PT 1x / week for 4 weeks.    [x] Continue per plan of care [] Alter current plan (see comments)  [] Plan of care initiated [] Hold pending MD visit [] Discharge    Electronically signed by: Quirino Fritz, PT PT, DPT, OMT-C    Note: If patient does not return for scheduled/ recommended follow up visits, this note will serve as a discharge from care along with most recent update on progress.

## 2022-01-18 ENCOUNTER — HOSPITAL ENCOUNTER (OUTPATIENT)
Dept: PHYSICAL THERAPY | Age: 77
Setting detail: THERAPIES SERIES
Discharge: HOME | End: 2022-01-18
Payer: MEDICARE

## 2022-01-18 PROCEDURE — 97110 THERAPEUTIC EXERCISES: CPT

## 2022-01-18 PROCEDURE — 97112 NEUROMUSCULAR REEDUCATION: CPT

## 2022-01-18 NOTE — FLOWSHEET NOTE
[facility]  Outpatient Physical Therapy  Phone: [phone]   Fax: [phone]    Physical Therapy Daily Treatment Note  Date:  2022    Patient Name:  Rommel Contreras    :  1945  MRN: [id number]  Medical/Treatment Diagnosis Information:  · Diagnosis: M75.82 (ICD-10-CM) - Rotator cuff tendinitis, left  Treatment Diagnosis: shoulder pain, impaired shoulder strength, impaired UE function  Insurance/Certification information:  PT Insurance Information: [address] NOT MET, [address]  Physician Information:  Referring Practitioner: Star Moctezuma MD  Plan of care signed (Y/N): [x]  Yes []  No     Date of Patient follow up with Physician:      Progress Report: []  Yes  [x]  No     Date Range for reporting period:  Beginnin22  Ending: TBD     Progress report due (10 Rx/or 30 days whichever is less): visit #4 or 54    Recertification due (POC duration/ or 90 days whichever is less): visit #4 or 22 (date)     Visit # Insurance Allowable Auth required? Date Range   3 TBD [x]  Yes  []  No TBD     Latex Allergy:  [x]NO      []YES  Preferred Language for Healthcare:   [x]English       []other:    Functional Scale:        Date assessed:  Quick DASH: raw score = 20; dysfunction = 20%  22    Pain level:  0/10; 1/10 ache    SUBJECTIVE:  She reports exercises are helping, as well as celebrex. She finds some aching intermittently, still daily.     OBJECTIVE:   - T.C. for scapular depression/retraction, assist with end range active motion but independent control w/ eccentrics   - T.C. required for scapular depression and retraction with all scapular-related exercises    RESTRICTIONS/PRECAUTIONS:     Exercises/Interventions:     Therapeutic Exercises (02504) Resistance / level Sets/sec Reps Notes   TB ER TB IR TB HABD (palms up/down) Levator scapula stretch Doorframe Pec Stretch CC Mid-Row - underhand CC - single arm pull down - from 90deg abd               Therapeutic Activities (79725)                                          Neuromuscular Re-ed (95618)       [place] 3# 2 10 1/18 - added   Supine Single Arm Serratus Press 3#  6# 1  2 10  10 1/18 - added   S/L Abd 3# 2 10 L 1/18 - added   S/L ER 3# 3 10 1/14 - added   Prone Row Bent-over Row Prone Horizontal Abduction:  Palm Flat  Thumbs Up    3#   3#   2  2   10, ea  10, ea   1/18 heavy T.C. and facilitated scapular depression/retraction   \"Reverse Hugs\" - Large Pillar  3'' 15x 1/14          Manual Intervention (41203) - 8'       Post/Inf GHJ Mobs   10x ea    PROM: flex/abd/ER/IR   1' ea    STM RTC & deltoid   2.5 min    PROM GHJ Circles CW/CCW   10x ea                    Modalities:     Pt. Education:  -patient educated on diagnosis, prognosis and expectations for rehab  -all patient questions were answered    Home Exercise Program:  Access Code: [email or web address]  URL: [email or web address]  Date: 01/18/2022  Prepared by: Felipe Guzman    Exercises  Sidelying Shoulder ER with Towel and Dumbbell - 1 x daily - 3-4 x weekly - 3 sets - 10 reps - 3-4 lbs  Sidelying Shoulder Abduction Full Range of Motion with Dumbbell - 1 x daily - 3-4 x weekly - 2 sets - 10 reps - 3 lbs  Supine Scapular Protraction in Flexion with Dumbbells - 1 x daily - 3-4 x weekly - 3 sets - 10 reps - 6 lbs  Prone Single Arm Shoulder Horizontal Abduction with Dumbbell - 1 x daily - 3-4 x weekly - 2 sets - 10 reps - 3 lbs  Seated Overhead Press with Dumbbells - 1 x daily - 3-4 x weekly - 3 sets - 10 reps - 3 lbs    Access Code: [email or web address]  URL: [email or web address]  Date: 01/11/2022  Prepared by: Felipe Guzman     Exercises  Shoulder External Rotation with Anchored Resistance - 1 x daily - 4 x weekly - 3 sets - 15 reps - blue resistance  Standing Shoulder Internal Rotation with Anchored Resistance - 1 x daily - 4 x weekly - 3 sets - 20 reps - blue resistance  Seated Levator Scapulae Stretch - 3 x daily - 6 x weekly - 1 sets - 3 reps - 10 hold  Seated Shoulder Horizontal Abduction with Resistance - 1 x daily - 4 x weekly - 2 sets - 10 reps - orange resistance  Seated Bilateral Arm Shoulder Abduction with Elbow Bent and Dumbbell - 1 x daily - 4 x weekly - 3 sets - 10 reps - 3 lbs    Therapeutic Exercise and NMR EXR  [] (87841) Provided verbal/tactile cueing for activities related to strengthening, flexibility, endurance, ROM for improvements in  [] LE / Lumbar: LE, proximal hip, and core control with self care, mobility, lifting, ambulation. [] UE / Cervical: cervical, postural, scapular, scapulothoracic and UE control with self care, reaching, carrying, lifting, house/yardwork, driving, computer work. [x] (98136) Provided verbal/tactile cueing for activities related to improving balance, coordination, kinesthetic sense, posture, motor skill, proprioception to assist with   [] LE / lumbar: LE, proximal hip, and core control in self care, mobility, lifting, ambulation and eccentric single leg control. [x] UE / cervical: cervical, scapular, scapulothoracic and UE control with self care, reaching, carrying, lifting, house/yardwork, driving, computer work.   [] (14383) Therapist is in constant attendance of 2 or more patients providing skilled therapy interventions, but not providing any significant amount of measurable one-on-one time to either patient, for improvements in  [] LE / lumbar: LE, proximal hip, and core control in self care, mobility, lifting, ambulation and eccentric single leg control. [] UE / cervical: cervical, scapular, scapulothoracic and UE control with self care, reaching, carrying, lifting, house/yardwork, driving, computer work.      NMR and Therapeutic Activities:    [x] (23458 or 98490) Provided verbal/tactile cueing for activities related to improving balance, coordination, kinesthetic sense, posture, motor skill, proprioception and motor activation to allow for proper function of   [] LE: / Lumbar core, proximal hip and LE with self care and ADLs  [x] UE / Cervical: cervical, postural, scapular, scapulothoracic and UE control with self care, carrying, lifting, driving, computer work.   [] (80579) Gait Re-education- Provided training and instruction to the patient for proper LE, core and proximal hip recruitment and positioning and eccentric body weight control with ambulation re-education including up and down stairs     Home Management Training / Self Care:  [] (98197) Provided self-care/home management training related to activities of daily living and compensatory training, and/or use of adaptive equipment for improvement with: ADLs and compensatory training, meal preparation, safety procedures and instruction in use of adaptive equipment, including bathing, grooming, dressing, personal hygiene, basic household cleaning and chores.      Home Exercise Program:    [] (92160) Reviewed/Progressed HEP activities related to strengthening, flexibility, endurance, ROM of   [] LE / Lumbar: core, proximal hip and LE for functional self-care, mobility, lifting and ambulation/stair navigation   [] UE / Cervical: cervical, postural, scapular, scapulothoracic and UE control with self care, reaching, carrying, lifting, house/yardwork, driving, computer work  [x] (80283)Reviewed/Progressed HEP activities related to improving balance, coordination, kinesthetic sense, posture, motor skill, proprioception of   [] LE: core, proximal hip and LE for self care, mobility, lifting, and ambulation/stair navigation    [x] UE / Cervical: cervical, postural,  scapular, scapulothoracic and UE control with self care, reaching, carrying, lifting, house/yardwork, driving, computer work    Manual Treatments:  PROM / STM / Oscillations-Mobs:  G-I, II, III, IV (PA's, Inf., Post.)  [] (73982) Provided manual therapy to mobilize LE, proximal hip and/or LS spine soft tissue/joints for the purpose of modulating pain, promoting relaxation, increasing ROM, reducing/eliminating soft tissue swelling/inflammation/restriction, improving soft tissue extensibility and allowing for proper ROM for normal function with   [] LE / lumbar: self care, mobility, lifting and ambulation. [] UE / Cervical: self care, reaching, carrying, lifting, house/yardwork, driving, computer work. Modalities:  [] (60375) Vasopneumatic compression: Utilized vasopneumatic compression to decrease edema / swelling for the purpose of improving mobility and quad tone / recruitment which will allow for increased overall function including but not limited to self-care, transfers, ambulation, and ascending / descending stairs. Charges:  Timed Code Treatment Minutes: 40   Total Treatment Minutes: 40     [] EVAL - LOW (72320)   [] EVAL - MOD (22671)  [] EVAL - HIGH (28328)  [] RE-EVAL (80480)  [] ZR(06004) x       [] Ionto  [x] NMR (58688) x 2       [] Vaso  [x] Manual (73356) x 1      [] Ultrasound  [] TA x        [] Mech Traction (95326)  [] Aquatic Therapy x      [] ES (un) (64744):   [] Home Management Training x  [] ES(attended) (21639)   [] Dry Needling 1-2 muscles (50162):  [] Dry Needling 3+ muscles (407355)  [] Group:      [] Other:     GOALS:  Patient stated goal: to abolish pain; increase strength to tolerate all ADLs/iADLs  [x]? Progressing: []? Met: []? Not Met: []? Adjusted     Therapist goals for Patient:   Short Term Goals: To be achieved in: 2 weeks  1. Independent in HEP and progression per patient tolerance, in order to prevent re-injury. []? Progressing: [x]? Met: []? Not Met: []? Adjusted  2. Patient will have a decrease in pain to <2/10 on average to facilitate improvement in movement, function, and ADLs as indicated by Functional Deficits. []? Progressing: [x]? Met: []? Not Met: []? Adjusted     Long Term Goals: To be achieved in: 4 weeks  1. Disability index score of 10% or less for the UEFI or Quick DASH to assist with reaching prior level of function. [x]? Progressing: []? Met: []? Not Met: []? Adjusted  2. Patient will demonstrate an increase in Strength to 5/5 L shoulder flex, abd, ER, IR, 4+/5 MT, LT, rhomboids to allow for proper functional mobility as indicated by patients Functional Deficits. [x]? Progressing: []? Met: []? Not Met: []? Adjusted  3. Patient will return to functional activities including repeated OH reach 15x w/ 3.3# without increased symptoms or restriction. [x]? Progressing: []? Met: []? Not Met: []? Adjusted  4. The patient will perform all iADLs and ADLs w/o increased symptoms in left shoulder for minimum of 3 days. [x]? Progressing: []? Met: []? Not Met: []? Adjusted         Overall Progression Towards Functional goals/ Treatment Progress Update:  [x] Patient is progressing as expected towards functional goals listed. [] Progression is slowed due to complexities/Impairments listed. [] Progression has been slowed due to co-morbidities. [] Plan just implemented, too soon to assess goals progression <30days   [] Goals require adjustment due to lack of progress  [] Patient is not progressing as expected and requires additional follow up with physician  [] Other    Persisting Functional Limitations/Impairments:  [x]Sleeping []Sitting               []Standing []Transfers        []Walking []Kneeling               []Stairs []Squatting / bending   []ADLs [x]Reaching  [x]Lifting  [x]Housework  []Driving [x]Job related tasks  []Sports/Recreation []Other:      ASSESSMENT:  T.C. & facilitation for scapular retraction with prone T's again this date, as well as increasing end-range mobility. The patient was able to hold arm position at end range, and was independently able to control eccentric portion of lift with proper scap position - without pain. The patient was able to perform all exercises with appropriate fatigue and without symptom increase.  HEP updated with dumbbell exercises to perform for alternating days with bandwork at home.    Treatment/Activity Tolerance:  [x] Patient able to complete tx  [] Patient limited by fatigue  [] Patient limited by pain  [] Patient limited by other medical complications  [] Other:     Prognosis: [x] Good [] Fair  [] Poor    Patient Requires Follow-up: [x] Yes  [] No    Plan for next treatment session: prone Y's; abductor strengthening without symptoms; cuff strengthening    PLAN:  PT 1x / week for 4 weeks. [x] Continue per plan of care [] Alter current plan (see comments)  [] Plan of care initiated [] Hold pending MD visit [] Discharge    Electronically signed by: Frank Espino, PT PT, DPT, OMT-C    Note: If patient does not return for scheduled/ recommended follow up visits, this note will serve as a discharge from care along with most recent update on progress.

## 2022-01-28 ENCOUNTER — HOSPITAL ENCOUNTER (OUTPATIENT)
Dept: PHYSICAL THERAPY | Age: 77
Setting detail: THERAPIES SERIES
Discharge: HOME | End: 2022-01-28
Payer: MEDICARE

## 2022-01-28 PROCEDURE — 97530 THERAPEUTIC ACTIVITIES: CPT

## 2022-01-28 PROCEDURE — 97112 NEUROMUSCULAR REEDUCATION: CPT

## 2022-01-28 NOTE — FLOWSHEET NOTE
[facility]  Outpatient Physical Therapy  Phone: [phone]   Fax: [phone]    Physical Therapy Daily Treatment Note  Date:  2022    Patient Name:  Paulette Burton    :  1945  MRN: [id number]  Medical/Treatment Diagnosis Information:  · Diagnosis: M75.82 (ICD-10-CM) - Rotator cuff tendinitis, left  Treatment Diagnosis: shoulder pain, impaired shoulder strength, impaired UE function  Insurance/Certification information:  PT Insurance Information: [address] NOT MET, [address]  Physician Information:  Referring Practitioner: Dallas Sevilla MD  Plan of care signed (Y/N): [x]  Yes []  No     Date of Patient follow up with Physician:      Progress Report: []  Yes  [x]  No     Date Range for reporting period:  Beginnin22  Ending: TBD     Progress report due (10 Rx/or 30 days whichever is less): visit #4 or 95    Recertification due (POC duration/ or 90 days whichever is less): visit #4 or 22 (date)     Visit # Insurance Allowable Auth required? Date Range   4 TBD [x]  Yes  []  No TBD     Latex Allergy:  [x]NO      []YES  Preferred Language for Healthcare:   [x]English       []other:    Functional Scale:        Date assessed:  Quick DASH: raw score = 20; dysfunction = 20%  22    Pain level:  0/10; 1/10 ache    SUBJECTIVE:  Mild aching in suprascapular region. She did aqua arthritis and more intermediate aquatic level course; she will continue every other Thursday and every Tuesday with aquatics. Need reprint of 2nd HEP.     OBJECTIVE:   - clicking with lateral row + ER; reduced with T.C and added scap stabilization   - T.C. for scapular depression/retraction, assist with end range active motion but independent control w/ eccentrics   - T.C. required for scapular depression and retraction with all scapular-related exercises    RESTRICTIONS/PRECAUTIONS:     Exercises/Interventions:     Therapeutic Exercises (09186) Resistance / level Sets/sec Reps Notes   TB ER TB IR TB HABD (palms up/down) Levator scapula stretch Doorframe Pec Stretch CC Mid-Row - underhand CC - single arm pull down - from 90deg abd               Therapeutic Activities (20533)       OH Shelf Reach - LUE 2#  4# 1  1 10  15 1/28 - added                               Neuromuscular Re-ed (57648)       Seated BUE OH Press 3# 2 10 1/18 - added   Supine Single Arm Serratus Press 6# 3 10 1/28 - progressed sets/reps   S/L ER 3# 1  1  1 13  10  11   1/28 - patient instructed >10 reps per set, as able, to fatigue   Prone Row Bent-over Row Prone Horizontal Abduction: (supersets)  [place]  Thumbs Up      2#   2#     4  4     5, ea  5, ea   1/18 - heavy T.C. and facilitated scapular depression/retraction  1/28 -    \"Reverse Hugs\" - Large Pillar  Prone Y's 2# 3 10 1/28 - added   Prone Lateral Row + ER 2# 2 10 1/28 - T. C. to facilitate scapular depression/retraction   Standing BUE Flexion/Abduction 3# 2 10, supersets 1/28 - added          Manual Intervention (16538) -       Post/Inf GHJ Mobs      PROM: flex/abd/ER/IR      STM RTC & deltoid      PROM GHJ Circles CW/CCW                      Modalities:     Pt. Education:  -patient educated on diagnosis, prognosis and expectations for rehab  -all patient questions were answered    Home Exercise Program:  Access Code: [email or web address]  URL: [email or web address]  Date: 01/18/2022  Prepared by: Carmen Patrick    Exercises  Sidelying Shoulder ER with Towel and Dumbbell - 1 x daily - 3-4 x weekly - 3 sets - 10 reps - 3-4 lbs  Sidelying Shoulder Abduction Full Range of Motion with Dumbbell - 1 x daily - 3-4 x weekly - 2 sets - 10 reps - 3 lbs  Supine Scapular Protraction in Flexion with Dumbbells - 1 x daily - 3-4 x weekly - 3 sets - 10 reps - 6 lbs  Prone Single Arm Shoulder Horizontal Abduction with Dumbbell - 1 x daily - 3-4 x weekly - 2 sets - 10 reps - 3 lbs  Seated Overhead Press with Dumbbells - 1 x daily - 3-4 x weekly - 3 sets - 10 reps - 3 lbs    Access Code: [email or web address]  URL: [email or web address]  Date: 01/11/2022  Prepared by: Aleksey Police     Exercises  Shoulder External Rotation with Anchored Resistance - 1 x daily - 4 x weekly - 3 sets - 15 reps - blue resistance  Standing Shoulder Internal Rotation with Anchored Resistance - 1 x daily - 4 x weekly - 3 sets - 20 reps - blue resistance  Seated Levator Scapulae Stretch - 3 x daily - 6 x weekly - 1 sets - 3 reps - 10 hold  Seated Shoulder Horizontal Abduction with Resistance - 1 x daily - 4 x weekly - 2 sets - 10 reps - orange resistance  Seated Bilateral Arm Shoulder Abduction with Elbow Bent and Dumbbell - 1 x daily - 4 x weekly - 3 sets - 10 reps - 3 lbs    Therapeutic Exercise and NMR EXR  [] (29785) Provided verbal/tactile cueing for activities related to strengthening, flexibility, endurance, ROM for improvements in  [] LE / Lumbar: LE, proximal hip, and core control with self care, mobility, lifting, ambulation. [] UE / Cervical: cervical, postural, scapular, scapulothoracic and UE control with self care, reaching, carrying, lifting, house/yardwork, driving, computer work. [x] (75130) Provided verbal/tactile cueing for activities related to improving balance, coordination, kinesthetic sense, posture, motor skill, proprioception to assist with   [] LE / lumbar: LE, proximal hip, and core control in self care, mobility, lifting, ambulation and eccentric single leg control.    [x] UE / cervical: cervical, scapular, scapulothoracic and UE control with self care, reaching, carrying, lifting, house/yardwork, driving, computer work.   [] (42918) Therapist is in constant attendance of 2 or more patients providing skilled therapy interventions, but not providing any significant amount of measurable one-on-one time to either patient, for improvements in  [] LE / lumbar: LE, proximal hip, and core control in self care, mobility, lifting, ambulation and eccentric single leg control. [] UE / cervical: cervical, scapular, scapulothoracic and UE control with self care, reaching, carrying, lifting, house/yardwork, driving, computer work. NMR and Therapeutic Activities:    [x] (58084 or 94714) Provided verbal/tactile cueing for activities related to improving balance, coordination, kinesthetic sense, posture, motor skill, proprioception and motor activation to allow for proper function of   [] LE: / Lumbar core, proximal hip and LE with self care and ADLs  [x] UE / Cervical: cervical, postural, scapular, scapulothoracic and UE control with self care, carrying, lifting, driving, computer work.   [] (28959) Gait Re-education- Provided training and instruction to the patient for proper LE, core and proximal hip recruitment and positioning and eccentric body weight control with ambulation re-education including up and down stairs     Home Management Training / Self Care:  [] (08208) Provided self-care/home management training related to activities of daily living and compensatory training, and/or use of adaptive equipment for improvement with: ADLs and compensatory training, meal preparation, safety procedures and instruction in use of adaptive equipment, including bathing, grooming, dressing, personal hygiene, basic household cleaning and chores.      Home Exercise Program:    [] (23324) Reviewed/Progressed HEP activities related to strengthening, flexibility, endurance, ROM of   [] LE / Lumbar: core, proximal hip and LE for functional self-care, mobility, lifting and ambulation/stair navigation   [] UE / Cervical: cervical, postural, scapular, scapulothoracic and UE control with self care, reaching, carrying, lifting, house/yardwork, driving, computer work  [] (48722)Reviewed/Progressed HEP activities related to improving balance, coordination, kinesthetic sense, posture, motor skill, proprioception of   [] LE: core, proximal hip and LE for self care, mobility, lifting, and ambulation/stair navigation    [x] UE / Cervical: cervical, postural,  scapular, scapulothoracic and UE control with self care, reaching, carrying, lifting, house/yardwork, driving, computer work    Manual Treatments:  PROM / STM / Oscillations-Mobs:  G-I, II, III, IV (PA's, Inf., Post.)  [] (99430) Provided manual therapy to mobilize LE, proximal hip and/or LS spine soft tissue/joints for the purpose of modulating pain, promoting relaxation,  increasing ROM, reducing/eliminating soft tissue swelling/inflammation/restriction, improving soft tissue extensibility and allowing for proper ROM for normal function with   [] LE / lumbar: self care, mobility, lifting and ambulation. [] UE / Cervical: self care, reaching, carrying, lifting, house/yardwork, driving, computer work. Modalities:  [] (78890) Vasopneumatic compression: Utilized vasopneumatic compression to decrease edema / swelling for the purpose of improving mobility and quad tone / recruitment which will allow for increased overall function including but not limited to self-care, transfers, ambulation, and ascending / descending stairs. Charges:  Timed Code Treatment Minutes: 44   Total Treatment Minutes: 44     [] EVAL - LOW (83055)   [] EVAL - MOD (32478)  [] EVAL - HIGH (40342)  [] RE-EVAL (03788)  [] VR(70062) x       [] Ionto  [x] NMR (15499) x 2       [] Vaso  [] Manual (31076) x       [] Ultrasound  [x] TA x 1       [] Mech Traction (36174)  [] Aquatic Therapy x      [] ES (un) (88415):   [] Home Management Training x  [] ES(attended) (63574)   [] Dry Needling 1-2 muscles (03235):  [] Dry Needling 3+ muscles (890882)  [] Group:      [] Other:     GOALS:  Patient stated goal: to abolish pain; increase strength to tolerate all ADLs/iADLs  [x]? Progressing: []? Met: []? Not Met: []? Adjusted     Therapist goals for Patient:   Short Term Goals: To be achieved in: 2 weeks  1.  Independent in HEP and progression per patient tolerance, in order to prevent re-injury. []? Progressing: [x]? Met: []? Not Met: []? Adjusted  2. Patient will have a decrease in pain to <2/10 on average to facilitate improvement in movement, function, and ADLs as indicated by Functional Deficits. []? Progressing: [x]? Met: []? Not Met: []? Adjusted     Long Term Goals: To be achieved in: 4 weeks  1. Disability index score of 10% or less for the UEFI or Quick DASH to assist with reaching prior level of function. [x]? Progressing: []? Met: []? Not Met: []? Adjusted  2. Patient will demonstrate an increase in Strength to 5/5 L shoulder flex, abd, ER, IR, 4+/5 MT, LT, rhomboids to allow for proper functional mobility as indicated by patients Functional Deficits. [x]? Progressing: []? Met: []? Not Met: []? Adjusted  3. Patient will return to functional activities including repeated OH reach 15x w/ 3.3# without increased symptoms or restriction. []? Progressing: [x]? Met: []? Not Met: []? Adjusted  4. The patient will perform all iADLs and ADLs w/o increased symptoms in left shoulder for minimum of 3 days. [x]? Progressing: []? Met: []? Not Met: []? Adjusted         Overall Progression Towards Functional goals/ Treatment Progress Update:  [x] Patient is progressing as expected towards functional goals listed. [] Progression is slowed due to complexities/Impairments listed. [] Progression has been slowed due to co-morbidities.   [] Plan just implemented, too soon to assess goals progression <30days   [] Goals require adjustment due to lack of progress  [] Patient is not progressing as expected and requires additional follow up with physician  [] Other    Persisting Functional Limitations/Impairments:  [x]Sleeping []Sitting               []Standing []Transfers        []Walking []Kneeling               []Stairs []Squatting / bending   []ADLs [x]Reaching  [x]Lifting  [x]Housework  []Driving [x]Job related tasks  []Sports/Recreation []Other:      ASSESSMENT:  The patient performed well with progressions and repeated exercises this date, marked by improved form for trunk/scapulas. The patient required extra t.c. to facilitate scapular retraction/depression with lateral row + ER, which reduced popping around [facility] [facility]. The patient achieved established LTG of OH reach this date. Treatment/Activity Tolerance:  [x] Patient able to complete tx  [] Patient limited by fatigue  [] Patient limited by pain  [] Patient limited by other medical complications  [] Other:     Prognosis: [x] Good [] Fair  [] Poor    Patient Requires Follow-up: [x] Yes  [] No    Plan for next treatment session: abductor strengthening without symptoms; cuff strengthening    PLAN:  PT 1x / week for 4 weeks. [x] Continue per plan of care [] Alter current plan (see comments)  [] Plan of care initiated [] Hold pending MD visit [] Discharge    Electronically signed by: Magy Rodrigues, PT PT, DPT, OMT-C    Note: If patient does not return for scheduled/ recommended follow up visits, this note will serve as a discharge from care along with most recent update on progress.

## 2022-02-02 ENCOUNTER — HOSPITAL ENCOUNTER (OUTPATIENT)
Dept: PHYSICAL THERAPY | Age: 77
Setting detail: THERAPIES SERIES
Discharge: HOME | End: 2022-02-02
Payer: MEDICARE

## 2022-02-02 PROCEDURE — 97110 THERAPEUTIC EXERCISES: CPT

## 2022-02-02 PROCEDURE — 20560 NDL INSJ W/O NJX 1 OR 2 MUSC: CPT

## 2022-02-02 PROCEDURE — 97530 THERAPEUTIC ACTIVITIES: CPT

## 2022-02-02 NOTE — PLAN OF CARE
[facility]  Outpatient Physical Therapy  Phone: [phone]   Fax: [phone]  Physical Therapy Re-Certification Plan of Care    Dear   Mayco Oliva MD,    We had the pleasure of treating the following patient for physical therapy services at [facility]. A summary of our findings can be found in the updated assessment below. This includes our plan of care. If you have any questions or concerns regarding these findings, please do not hesitate to contact me at the office phone number checked above. Thank you for the referral.     Physician Signature:________________________________Date:__________________  By signing above (or electronic signature), therapist's plan is approved by physician      Functional Outcome: Quick DASH: raw score = 17; dysfunction = 13.6%  22    162 flex  177 abd  96deg ER/IR    UE MMT:  Left:  [address] Flexion:  5  GH aBduction: 5  GH IR:   5  GH ER:   4+    Overall Response to Treatment:   [x]Patient is responding well to treatment and improvement is noted with regards to goals and will benefit from a few additional goals to progress HEP and perform tpDN to alleviate any remaining trigger points. []Patient should continue to improve in reasonable time if they continue HEP   []Patient has plateaued and is no longer responding to skilled PT intervention    []Patient is getting worse and would benefit from return to referring MD   []Patient unable to adhere to initial POC   []Other:     Date range of Visits: 22 - 22  Total Visits: 4    Recommendation:    [x]Continue PT 1x / wk for 2-3 weeks.                []Hold PT, pending MD visit        Physical Therapy Daily Treatment Note  Date:  2022    Patient Name:  Vandana Turner    :  1945  MRN: [id number]  Medical/Treatment Diagnosis Information:  · Diagnosis: M75.82 (ICD-10-CM) - Rotator cuff tendinitis, left  Treatment Diagnosis: shoulder pain, impaired shoulder strength, impaired UE function  Insurance/Certification information:  PT Insurance Information: [address] NOT MET, [address]  Physician Information:  Referring Practitioner: Joleen Milton MD  Plan of care signed (Y/N): [x]  Yes []  No     Date of Patient follow up with Physician:      Progress Report: [x]  Yes  []  No     Date Range for reporting period:  Beginnin22  POC: 22  Ending: TBD     Progress report due (10 Rx/or 30 days whichever is less): visit #4 or 33    Recertification due (POC duration/ or 90 days whichever is less): visit #4 or 22 (date)     Visit # Insurance Allowable Auth required? Date Range   4 TBD [x]  Yes  []  No TBD     Latex Allergy:  [x]NO      []YES  Preferred Language for Healthcare:   [x]English       []other:    Functional Scale:        Date assessed:  Quick DASH: raw score = 20; dysfunction = 20%  22  Quick DASH: raw score = 17; dysfunction = 13.6%  22     Pain level:  0/10; 1/10 ache    SUBJECTIVE:  Mild aching in suprascapular region only.     OBJECTIVE:   - SEE POC ABOVE   - clicking with lateral row + ER; reduced with T.C and added scap stabilization   - T.C. for scapular depression/retraction, assist with end range active motion but independent control w/ eccentrics   - T.C. required for scapular depression and retraction with all scapular-related exercises    RESTRICTIONS/PRECAUTIONS:     Exercises/Interventions:     Therapeutic Exercises (03244) Resistance / level Sets/sec Reps Notes   TB ER TB IR TB HABD (palms up/down) Levator scapula stretch Doorframe Pec Stretch CC Mid-Row - underhand CC - single arm pull down - from 90deg abd HEP Review: prone T's, Y's, rows   5'           Therapeutic Activities (92384)       OH Shelf Reach - LUE The patient was provided an assessment including evaluative tests and measures, as well as documentation to track progress     15' 2/2 Neuromuscular Re-ed (76288)       Seated BUE OH Press Supine Single Arm Serratus Press S/L ER 3# 3 15 2/2 - TE   Prone Row Bent-over Row Prone Horizontal Abduction: (supersets)  [place]m [place]  Thumbs Up   1/18 - heavy T.C. and facilitated scapular depression/retraction  1/28 -    \"Reverse Hugs\" - Large Pillar Prone Y's 1/28 - added   Prone Lateral Row + ER 1/28 - T. C. to facilitate scapular depression/retraction   Standing BUE Flexion/Abduction 1/28 - added          Manual Intervention (64544) -       Post/Inf GHJ Mobs      PROM: flex/abd/ER/IR      STM RTC & deltoid      PROM GHJ Circles CW/CCW               2/2 - Dry needling manual therapy: consisted on the placement of 2 needles in the following muscles:  L upper trap, L supraspinatus. A 50 mm needle was inserted, piston, rotated, and coned to produce intramuscular mobilization. These techniques were used to restore functional range of motion, reduce muscle spasm and induce healing in the corresponding musculature. (41319)  Clean Technique was utilized today while applying Dry needling treatment. The treatment sites where cleaned with 70% solution of  isopropyl alcohol . The PT washed their hands and utilized treatment gloves along with hand  prior to inserting the needles. All needles where removed and discarded in the appropriate sharps container. MD has given approval for this treatment, as the patient is non surgical and non-complex. - 15' w/ setup, consent, cleanup    Modalities:     Pt. Education:  -patient educated on diagnosis, prognosis and expectations for rehab  -all patient questions were answered    Home Exercise Program:  Access Code: [email or web address]  URL: [email or web address]  Date: 02/02/2022  Prepared by: Imelda Sandhu    Exercises  Prone Shoulder Horizontal Abduction - 1 x daily - 3 x weekly - 3 sets - 10 reps  Prone Shoulder Row - 1 x daily - 3 x weekly - 3 sets - 10 reps  Prone Shoulder Flexion with Dumbbell - 1 x daily - 3 x weekly - 3 sets - 10 reps    Access Code: [email or web address]  URL: [email or web address]  Date: 01/18/2022  Prepared by: Janie Christiansen    Exercises  Sidelying Shoulder ER with Towel and Dumbbell - 1 x daily - 3-4 x weekly - 3 sets - 10 reps - 3-4 lbs  Sidelying Shoulder Abduction Full Range of Motion with Dumbbell - 1 x daily - 3-4 x weekly - 2 sets - 10 reps - 3 lbs  Supine Scapular Protraction in Flexion with Dumbbells - 1 x daily - 3-4 x weekly - 3 sets - 10 reps - 6 lbs  Prone Single Arm Shoulder Horizontal Abduction with Dumbbell - 1 x daily - 3-4 x weekly - 2 sets - 10 reps - 3 lbs  Seated Overhead Press with Dumbbells - 1 x daily - 3-4 x weekly - 3 sets - 10 reps - 3 lbs    Access Code: [email or web address]  URL: [email or web address]  Date: 01/11/2022  Prepared by: Janie Christiansen     Exercises  Shoulder External Rotation with Anchored Resistance - 1 x daily - 4 x weekly - 3 sets - 15 reps - blue resistance  Standing Shoulder Internal Rotation with Anchored Resistance - 1 x daily - 4 x weekly - 3 sets - 20 reps - blue resistance  Seated Levator Scapulae Stretch - 3 x daily - 6 x weekly - 1 sets - 3 reps - 10 hold  Seated Shoulder Horizontal Abduction with Resistance - 1 x daily - 4 x weekly - 2 sets - 10 reps - orange resistance  Seated Bilateral Arm Shoulder Abduction with Elbow Bent and Dumbbell - 1 x daily - 4 x weekly - 3 sets - 10 reps - 3 lbs    Therapeutic Exercise and NMR EXR  [] (60615) Provided verbal/tactile cueing for activities related to strengthening, flexibility, endurance, ROM for improvements in  [] LE / Lumbar: LE, proximal hip, and core control with self care, mobility, lifting, ambulation. [] UE / Cervical: cervical, postural, scapular, scapulothoracic and UE control with self care, reaching, carrying, lifting, house/yardwork, driving, computer work.   [x] (52472) Provided verbal/tactile cueing for activities related to improving balance, coordination, kinesthetic sense, posture, motor skill, proprioception to assist with   [] LE / lumbar: LE, proximal hip, and core control in self care, mobility, lifting, ambulation and eccentric single leg control. [x] UE / cervical: cervical, scapular, scapulothoracic and UE control with self care, reaching, carrying, lifting, house/yardwork, driving, computer work.   [] (45703) Therapist is in constant attendance of 2 or more patients providing skilled therapy interventions, but not providing any significant amount of measurable one-on-one time to either patient, for improvements in  [] LE / lumbar: LE, proximal hip, and core control in self care, mobility, lifting, ambulation and eccentric single leg control. [] UE / cervical: cervical, scapular, scapulothoracic and UE control with self care, reaching, carrying, lifting, house/yardwork, driving, computer work.      NMR and Therapeutic Activities:    [x] (39188 or 52230) Provided verbal/tactile cueing for activities related to improving balance, coordination, kinesthetic sense, posture, motor skill, proprioception and motor activation to allow for proper function of   [] LE: / Lumbar core, proximal hip and LE with self care and ADLs  [x] UE / Cervical: cervical, postural, scapular, scapulothoracic and UE control with self care, carrying, lifting, driving, computer work.   [] (55662) Gait Re-education- Provided training and instruction to the patient for proper LE, core and proximal hip recruitment and positioning and eccentric body weight control with ambulation re-education including up and down stairs     Home Management Training / Self Care:  [] (68176) Provided self-care/home management training related to activities of daily living and compensatory training, and/or use of adaptive equipment for improvement with: ADLs and compensatory training, meal preparation, safety procedures and instruction in use of adaptive equipment, including bathing, grooming, dressing, personal hygiene, basic household cleaning and chores. Home Exercise Program:    [x] (72873) Reviewed/Progressed HEP activities related to strengthening, flexibility, endurance, ROM of   [] LE / Lumbar: core, proximal hip and LE for functional self-care, mobility, lifting and ambulation/stair navigation   [x] UE / Cervical: cervical, postural, scapular, scapulothoracic and UE control with self care, reaching, carrying, lifting, house/yardwork, driving, computer work  [x] (79951)Reviewed/Progressed HEP activities related to improving balance, coordination, kinesthetic sense, posture, motor skill, proprioception of   [] LE: core, proximal hip and LE for self care, mobility, lifting, and ambulation/stair navigation    [x] UE / Cervical: cervical, postural,  scapular, scapulothoracic and UE control with self care, reaching, carrying, lifting, house/yardwork, driving, computer work    Manual Treatments:  PROM / STM / Oscillations-Mobs:  G-I, II, III, IV (PA's, Inf., Post.)  [] (89509) Provided manual therapy to mobilize LE, proximal hip and/or LS spine soft tissue/joints for the purpose of modulating pain, promoting relaxation,  increasing ROM, reducing/eliminating soft tissue swelling/inflammation/restriction, improving soft tissue extensibility and allowing for proper ROM for normal function with   [] LE / lumbar: self care, mobility, lifting and ambulation. [] UE / Cervical: self care, reaching, carrying, lifting, house/yardwork, driving, computer work. Modalities:  [] (85368) Vasopneumatic compression: Utilized vasopneumatic compression to decrease edema / swelling for the purpose of improving mobility and quad tone / recruitment which will allow for increased overall function including but not limited to self-care, transfers, ambulation, and ascending / descending stairs.      Charges:  Timed Code Treatment Minutes: 40   Total Treatment Minutes: 40     [] EVAL - LOW (61473)   [] EVAL - MOD (61170)  [] EVAL - HIGH (86246)  [] Cameron Rash (59937)  [x] EA(89910) x 1      [] Ionto  [] NMR (26269) x       [] Vaso  [] Manual (91109) x       [] Ultrasound  [x] TA x 1       [] Mech Traction (94499)  [] Aquatic Therapy x      [] ES (un) (03939):   [] Home Management Training x  [] ES(attended) (35917)   [x] Dry Needling 1-2 muscles (24899):  [] Dry Needling 3+ muscles (446741)  [] Group:      [] Other:     GOALS:  Patient stated goal: to abolish pain; increase strength to tolerate all ADLs/iADLs  [x]? Progressing: []? Met: []? Not Met: []? Adjusted     Therapist goals for Patient:   Short Term Goals: To be achieved in: 2 weeks  1. Independent in HEP and progression per patient tolerance, in order to prevent re-injury. []? Progressing: [x]? Met: []? Not Met: []? Adjusted  2. Patient will have a decrease in pain to <2/10 on average to facilitate improvement in movement, function, and ADLs as indicated by Functional Deficits. []? Progressing: [x]? Met: []? Not Met: []? Adjusted     Long Term Goals: To be achieved in: 4 weeks  1. Disability index score of 10% or less for the UEFI or Quick DASH to assist with reaching prior level of function. [x]? Progressing: []? Met: []? Not Met: []? Adjusted  2. Patient will demonstrate an increase in Strength to 5/5 L shoulder flex, abd, ER, IR, 4+/5 MT, LT, rhomboids to allow for proper functional mobility as indicated by patients Functional Deficits. [x]? Progressing: []? Met: []? Not Met: []? Adjusted  3. Patient will return to functional activities including repeated OH reach 15x w/ 3.3# without increased symptoms or restriction. []? Progressing: [x]? Met: []? Not Met: []? Adjusted  4. The patient will perform all iADLs and ADLs w/o increased symptoms in left shoulder for minimum of 3 days. [x]? Progressing: []? Met: []? Not Met: []? Adjusted         Overall Progression Towards Functional goals/ Treatment Progress Update:  [x] Patient is progressing as expected towards functional goals listed.     [] Progression is slowed due to complexities/Impairments listed. [] Progression has been slowed due to co-morbidities. [] Plan just implemented, too soon to assess goals progression <30days   [] Goals require adjustment due to lack of progress  [] Patient is not progressing as expected and requires additional follow up with physician  [] Other    Persisting Functional Limitations/Impairments:  [x]Sleeping []Sitting               []Standing []Transfers        []Walking []Kneeling               []Stairs []Squatting / bending   []ADLs [x]Reaching  [x]Lifting  [x]Housework  []Driving [x]Job related tasks  []Sports/Recreation []Other:      ASSESSMENT:  SEE POC ABOVE    Treatment/Activity Tolerance:  [x] Patient able to complete tx  [] Patient limited by fatigue  [] Patient limited by pain  [] Patient limited by other medical complications  [] Other:     Prognosis: [x] Good [] Fair  [] Poor    Patient Requires Follow-up: [x] Yes  [] No    Plan for next treatment session: abductor strengthening without symptoms; cuff strengthening     PLAN:  PT 1x / week for 4 weeks. [x] Continue per plan of care [] Alter current plan (see comments)  [] Plan of care initiated [] Hold pending MD visit [] Discharge    Electronically signed by: Sylvain Hess, PT PT, DPT, OMT-C    Note: If patient does not return for scheduled/ recommended follow up visits, this note will serve as a discharge from care along with most recent update on progress.

## 2022-02-16 ENCOUNTER — HOSPITAL ENCOUNTER (OUTPATIENT)
Dept: PHYSICAL THERAPY | Age: 77
Setting detail: THERAPIES SERIES
Discharge: HOME | End: 2022-02-16
Payer: MEDICARE

## 2022-02-16 PROCEDURE — 20560 NDL INSJ W/O NJX 1 OR 2 MUSC: CPT

## 2022-02-16 PROCEDURE — 97112 NEUROMUSCULAR REEDUCATION: CPT

## 2022-02-16 PROCEDURE — 97110 THERAPEUTIC EXERCISES: CPT

## 2022-02-16 NOTE — FLOWSHEET NOTE
[facility]  Outpatient Physical Therapy  Phone: [phone]   Fax: [phone]    Physical Therapy Daily Treatment Note  Date:  2022    Patient Name:  Helena Mercado    :  1945  MRN: [id number]  Medical/Treatment Diagnosis Information:  · Diagnosis: M75.82 (ICD-10-CM) - Rotator cuff tendinitis, left  Treatment Diagnosis: shoulder pain, impaired shoulder strength, impaired UE function  Insurance/Certification information:  PT Insurance Information: [address] NOT MET, [address]  Physician Information:  Referring Practitioner: Ewelina Martin MD  Plan of care signed (Y/N): [x]  Yes []  No     Date of Patient follow up with Physician:      Progress Report: []  Yes  [x]  No     Date Range for reporting period:  Beginnin22  POC: 22  Ending: TBD     Progress report due (10 Rx/or 30 days whichever is less): visit #4 or     Recertification due (POC duration/ or 90 days whichever is less): visit #4 or 22 (date)     Visit # Insurance Allowable Auth required? Date Range   5 TBD [x]  Yes  []  No TBD     Latex Allergy:  [x]NO      []YES  Preferred Language for Healthcare:   [x]English       []other:    Functional Scale:        Date assessed:  Quick DASH: raw score = 20; dysfunction = 20%  22  Quick DASH: raw score = 17; dysfunction = 13.6%  22     Pain level:  0/10; 0.3/10 ache    SUBJECTIVE:  Mild aching in suprascapular region only, even more infrequent. This is largely activity dependent. She can carry her purse strap on her L shoulder now. She also mentions lateral shoulder ache is gone. She is taking her diclofenac once a day and celebrex daily. She states she would like dry needling again today if possible.     OBJECTIVE:   - SEE POC ABOVE   - clicking with lateral row + ER; reduced with T.C and added scap stabilization   - T.C. for scapular depression/retraction, assist with end range active motion but independent control w/ eccentrics  1/14 - T.C. required for scapular depression and retraction with all scapular-related exercises    RESTRICTIONS/PRECAUTIONS:     Exercises/Interventions:     Therapeutic Exercises (57986) Resistance / level Sets/sec Reps Notes   TB ER TB IR TB HABD (palms up/down) Levator scapula stretch Doorframe Pec Stretch CC Mid-Row - underhand CC - single arm pull down - from 90deg abd HEP Review: prone T's, Y's, rows   Front/Lat Raises 2#  3# 2  1 10  10 2/16 - added   Seated Lat High Row (2 straps) 35# 2 15 2/16   Standing Mid Row (2 straps) 45# 2 15 2/16   Sled Push/Pull 50# 3 30ft each 2/16          Therapeutic Activities (97251)       OH Shelf Reach - LUE               Neuromuscular Re-ed (97868)       Seated BUE OH Press 3# 3 10 2/16 - added set   Supine Single Arm Serratus Press S/L ER 3# 2  1 15  10 2/16 - NMR   Prone Row Bent-over Row Prone Horizontal Abduction: (supersets)  [place]  Thumbs Up   1/18 - heavy T.C. and facilitated scapular depression/retraction  1/28 -    \"Reverse Hugs\" - Large Pillar Prone Y's 1/28 - added   Prone Lateral Row + ER 1/28 - T. C. to facilitate scapular depression/retraction   Standing BUE Flexion/Abduction 1/28 - added          Manual Intervention (34636) -       Post/Inf GHJ Mobs      PROM: flex/abd/ER/IR      STM RTC & deltoid      PROM GHJ Circles CW/CCW               2/16 - Dry needling manual therapy: consisted on the placement of 3 needles in the following muscles:  L upper trap. A 50 mm needle was inserted, piston, rotated, and coned to produce intramuscular mobilization. These techniques were used to restore functional range of motion, reduce muscle spasm and induce healing in the corresponding musculature. (05581)  Clean Technique was utilized today while applying Dry needling treatment. The treatment sites where cleaned with 70% solution of  isopropyl alcohol .   The PT washed their hands and utilized treatment gloves along with hand  prior to inserting the needles. All needles where removed and discarded in the appropriate sharps container. MD has given approval for this treatment, as the patient is non surgical and non-complex. - 10' w/ setup/ cleanup    2/2 - Dry needling manual therapy: consisted on the placement of 2 needles in the following muscles:  L upper trap, L supraspinatus. A 50 mm needle was inserted, piston, rotated, and coned to produce intramuscular mobilization. These techniques were used to restore functional range of motion, reduce muscle spasm and induce healing in the corresponding musculature. (00367)  Clean Technique was utilized today while applying Dry needling treatment. The treatment sites where cleaned with 70% solution of  isopropyl alcohol . The PT washed their hands and utilized treatment gloves along with hand  prior to inserting the needles. All needles where removed and discarded in the appropriate sharps container. MD has given approval for this treatment, as the patient is non surgical and non-complex. - 15' w/ setup, consent, cleanup    Modalities:     Pt. Education:  -patient educated on diagnosis, prognosis and expectations for rehab  -all patient questions were answered    Home Exercise Program:  Access Code: [email or web address]  URL: [email or web address]  Date: 02/02/2022  Prepared by: Ward Deleon    Exercises  Prone Shoulder Horizontal Abduction - 1 x daily - 3 x weekly - 3 sets - 10 reps  Prone Shoulder Row - 1 x daily - 3 x weekly - 3 sets - 10 reps  Prone Shoulder Flexion with Dumbbell - 1 x daily - 3 x weekly - 3 sets - 10 reps    Access Code: [email or web address]  URL: [email or web address]  Date: 01/18/2022  Prepared by: Ward Deleon    Exercises  Sidelying Shoulder ER with Towel and Dumbbell - 1 x daily - 3-4 x weekly - 3 sets - 10 reps - 3-4 lbs  Sidelying Shoulder Abduction Full Range of Motion with Dumbbell - 1 x daily - 3-4 x weekly - 2 sets - 10 reps - 3 lbs  Supine Scapular providing skilled therapy interventions, but not providing any significant amount of measurable one-on-one time to either patient, for improvements in  [] LE / lumbar: LE, proximal hip, and core control in self care, mobility, lifting, ambulation and eccentric single leg control. [] UE / cervical: cervical, scapular, scapulothoracic and UE control with self care, reaching, carrying, lifting, house/yardwork, driving, computer work. NMR and Therapeutic Activities:    [x] (03585 or 07943) Provided verbal/tactile cueing for activities related to improving balance, coordination, kinesthetic sense, posture, motor skill, proprioception and motor activation to allow for proper function of   [] LE: / Lumbar core, proximal hip and LE with self care and ADLs  [x] UE / Cervical: cervical, postural, scapular, scapulothoracic and UE control with self care, carrying, lifting, driving, computer work.   [] (10193) Gait Re-education- Provided training and instruction to the patient for proper LE, core and proximal hip recruitment and positioning and eccentric body weight control with ambulation re-education including up and down stairs     Home Management Training / Self Care:  [] (59173) Provided self-care/home management training related to activities of daily living and compensatory training, and/or use of adaptive equipment for improvement with: ADLs and compensatory training, meal preparation, safety procedures and instruction in use of adaptive equipment, including bathing, grooming, dressing, personal hygiene, basic household cleaning and chores.      Home Exercise Program:    [] (67025) Reviewed/Progressed HEP activities related to strengthening, flexibility, endurance, ROM of   [] LE / Lumbar: core, proximal hip and LE for functional self-care, mobility, lifting and ambulation/stair navigation   [] UE / Cervical: cervical, postural, scapular, scapulothoracic and UE control with self care, reaching, carrying, lifting, house/yardwork, driving, computer work  [] (77906)Reviewed/Progressed HEP activities related to improving balance, coordination, kinesthetic sense, posture, motor skill, proprioception of   [] LE: core, proximal hip and LE for self care, mobility, lifting, and ambulation/stair navigation    [] UE / Cervical: cervical, postural,  scapular, scapulothoracic and UE control with self care, reaching, carrying, lifting, house/yardwork, driving, computer work    Manual Treatments:  PROM / STM / Oscillations-Mobs:  G-I, II, III, IV (PA's, Inf., Post.)  [] ([id number]) Provided manual therapy to mobilize LE, proximal hip and/or LS spine soft tissue/joints for the purpose of modulating pain, promoting relaxation,  increasing ROM, reducing/eliminating soft tissue swelling/inflammation/restriction, improving soft tissue extensibility and allowing for proper ROM for normal function with   [] LE / lumbar: self care, mobility, lifting and ambulation. [] UE / Cervical: self care, reaching, carrying, lifting, house/yardwork, driving, computer work. Modalities:  [] (20998) Vasopneumatic compression: Utilized vasopneumatic compression to decrease edema / swelling for the purpose of improving mobility and quad tone / recruitment which will allow for increased overall function including but not limited to self-care, transfers, ambulation, and ascending / descending stairs.      Charges:  Timed Code Treatment Minutes: 42   Total Treatment Minutes: 42     [] EVAL - LOW (60119)   [] EVAL - MOD (64550)  [] EVAL - HIGH (71743)  [] RE-EVAL (84229)  [x] HF(97963) x 1      [] Ionto  [x] NMR (03683) x 1       [] Vaso  [] Manual (58137) x       [] Ultrasound  [] TA x        [] Mech Traction (03754)  [] Aquatic Therapy x      [] ES (un) (65477):   [] Home Management Training x  [] ES(attended) (71333)   [x] Dry Needling 1-2 muscles (38677): 1  [] Dry Needling 3+ muscles (257794)  [] Group:      [] Other:     GOALS:  Patient stated goal: to abolish pain; increase strength to tolerate all ADLs/iADLs  [x]? Progressing: []? Met: []? Not Met: []? Adjusted     Therapist goals for Patient:   Short Term Goals: To be achieved in: 2 weeks  1. Independent in HEP and progression per patient tolerance, in order to prevent re-injury. []? Progressing: [x]? Met: []? Not Met: []? Adjusted  2. Patient will have a decrease in pain to <2/10 on average to facilitate improvement in movement, function, and ADLs as indicated by Functional Deficits. []? Progressing: [x]? Met: []? Not Met: []? Adjusted     Long Term Goals: To be achieved in: 4 weeks  1. Disability index score of 10% or less for the UEFI or Quick DASH to assist with reaching prior level of function. [x]? Progressing: []? Met: []? Not Met: []? Adjusted  2. Patient will demonstrate an increase in Strength to 5/5 L shoulder flex, abd, ER, IR, 4+/5 MT, LT, rhomboids to allow for proper functional mobility as indicated by patients Functional Deficits. [x]? Progressing: []? Met: []? Not Met: []? Adjusted  3. Patient will return to functional activities including repeated OH reach 15x w/ 3.3# without increased symptoms or restriction. []? Progressing: [x]? Met: []? Not Met: []? Adjusted  4. The patient will perform all iADLs and ADLs w/o increased symptoms in left shoulder for minimum of 3 days. []? Progressing: [x]? Met: []? Not Met: []? Adjusted         Overall Progression Towards Functional goals/ Treatment Progress Update:  [x] Patient is progressing as expected towards functional goals listed. [] Progression is slowed due to complexities/Impairments listed. [] Progression has been slowed due to co-morbidities.   [] Plan just implemented, too soon to assess goals progression <30days   [] Goals require adjustment due to lack of progress  [] Patient is not progressing as expected and requires additional follow up with physician  [] Other    Persisting Functional Limitations/Impairments:  [x]Sleeping []Sitting               []Standing []Transfers        []Walking []Kneeling               []Stairs []Squatting / bending   []ADLs [x]Reaching  [x]Lifting  [x]Housework  []Driving [x]Job related tasks  []Sports/Recreation []Other:      ASSESSMENT:  Good tolerance to tpDN this date again, plus great follow up with resisted UE exercises for scapulohumeral and RTC musculature. Good form and control. She was more fatigued with S/L ER this date. The patient may have one additional visit and consider DC at that time, as she has achieved several LTGs. Treatment/Activity Tolerance:  [x] Patient able to complete tx  [] Patient limited by fatigue  [] Patient limited by pain  [] Patient limited by other medical complications  [] Other:     Prognosis: [x] Good [] Fair  [] Poor    Patient Requires Follow-up: [x] Yes  [] No    Plan for next treatment session: consider DC     PLAN:  PT 1x / week for 4 weeks. [x] Continue per plan of care [] Alter current plan (see comments)  [] Plan of care initiated [] Hold pending MD visit [] Discharge    Electronically signed by: Frank Espino, PT PT, DPT, OMT-C    Note: If patient does not return for scheduled/ recommended follow up visits, this note will serve as a discharge from care along with most recent update on progress.

## 2022-02-25 ENCOUNTER — HOSPITAL ENCOUNTER (OUTPATIENT)
Dept: PHYSICAL THERAPY | Age: 77
Setting detail: THERAPIES SERIES
Discharge: HOME | End: 2022-02-25
Payer: MEDICARE

## 2022-02-25 PROCEDURE — 97530 THERAPEUTIC ACTIVITIES: CPT

## 2022-02-25 NOTE — DISCHARGE SUMMARY
[facility]  Outpatient Physical Therapy  Phone: [phone]   Fax: [phone]   Physical Therapy Discharge Summary    Dear  Dr. Quiana Skinner,    We had the pleasure of treating the following patient for physical therapy services at [facility]. A summary of our findings can be found in the discharge summary below. If you have any questions or concerns regarding these findings, please do not hesitate to contact me at the office phone number checked above. Thank you for the referral.     Physician Signature:________________________________Date:__________________  By signing above (or electronic signature), therapists plan is approved by physician    Functional Outcome: Quick DASH: raw score = 12; dysfunction = 2%  2/25/22    UE MMT:   ROM:  Left:  GH Flexion:   155  5  GH aBduction:   170  5  GH IR:    WFL  5  GH ER:    WFL  5  Elbow Flexion:     5  Elbow Extension:     5  MT:      4+  LT:      4+  Rhomboids:     4+    GOALS:  Patient stated goal: to abolish pain; increase strength to tolerate all ADLs/iADLs  []? Progressing: [x]? Met: []? Not Met: []? Adjusted     Therapist goals for Patient:   Short Term Goals: To be achieved in: 2 weeks  1. Independent in HEP and progression per patient tolerance, in order to prevent re-injury. []? Progressing: [x]? Met: []? Not Met: []? Adjusted  2. Patient will have a decrease in pain to <2/10 on average to facilitate improvement in movement, function, and ADLs as indicated by Functional Deficits. []? Progressing: [x]? Met: []? Not Met: []? Adjusted     Long Term Goals: To be achieved in: 4 weeks  1. Disability index score of 10% or less for the UEFI or Quick DASH to assist with reaching prior level of function. []? Progressing: [x]? Met: []? Not Met: []? Adjusted  2.  Patient will demonstrate an increase in Strength to 5/5 L shoulder flex, abd, ER, IR, 4+/5 MT, LT, rhomboids to allow for proper functional mobility as indicated by patients Functional Deficits. []? Progressing: [x]? Met: []? Not Met: []? Adjusted  3. Patient will return to functional activities including repeated OH reach 15x w/ 3.3# without increased symptoms or restriction. []? Progressing: [x]? Met: []? Not Met: []? Adjusted  4. The patient will perform all iADLs and ADLs w/o increased symptoms in left shoulder for minimum of 3 days. []? Progressing: [x]? Met: []? Not Met: []? Adjusted         Overall Response to Treatment:   [x]Patient is responding well to treatment and has achieved all LTGs; she will DC from PT. She was instructed to follow up with MD or PT in case of new or unexpected symptoms. []Patient should continue to improve in reasonable time if they continue HEP   []Patient has plateaued and is no longer responding to skilled PT intervention    []Patient is getting worse and would benefit from return to referring MD   []Patient unable to adhere to initial POC   []Other:    Date range of Visits: 22 - 22  Total Visits: 6    Recommendation:    [x] Discharge to HEP. Follow up with PT or MD PRN.        Physical Therapy Daily Treatment Note  Date:  2022    Patient Name:  Flavia Pallas    :  1945  MRN: [id number]  Medical/Treatment Diagnosis Information:  · Diagnosis: M75.82 (ICD-10-CM) - Rotator cuff tendinitis, left  Treatment Diagnosis: shoulder pain, impaired shoulder strength, impaired UE function  Insurance/Certification information:  PT Insurance Information: Joe  29. NOT MET, Cecilio 70  Physician Information:  Referring Practitioner: Graeme Allen MD  Plan of care signed (Y/N): [x]  Yes []  No     Date of Patient follow up with Physician:      Progress Report: [x]  Yes  []  No     Date Range for reporting period:  Beginnin22  POC: 22  Ending: TBD     Progress report due (10 Rx/or 30 days whichever is less): DC    Recertification due (POC duration/ or 90 days whichever is less): DC    Visit # Insurance Allowable Auth required? Date Range   6 TBD [x]  Yes  []  No TBD     Latex Allergy:  [x]NO      []YES  Preferred Language for Healthcare:   [x]English       []other:    Functional Scale:        Date assessed:  Quick DASH: raw score = 20; dysfunction = 20%  1/11/22  Quick DASH: raw score = 17; dysfunction = 13.6%  2/2/22  Quick DASH: raw score = 12; dysfunction = 2%  2/25/22    Pain level:  0/10; 0.3/10 ache    SUBJECTIVE:  Very infrequent mild aching in scapular region. She is ready for DC and independent management of HEP. OBJECTIVE:  2/25 - SEE DC ABOVE  2/2 - SEE POC ABOVE  8/44 - clicking with lateral row + ER; reduced with T.C and added scap stabilization  1/18 - T.C. for scapular depression/retraction, assist with end range active motion but independent control w/ eccentrics  1/14 - T.C. required for scapular depression and retraction with all scapular-related exercises    RESTRICTIONS/PRECAUTIONS:     Exercises/Interventions:     Therapeutic Exercises (72703) Resistance / level Sets/sec Reps Notes   TB ER TB IR TB HABD (palms up/down) Levator scapula stretch Doorframe Pec Stretch CC Mid-Row - underhand CC - single arm pull down - from 90deg abd HEP Review: prone T's, Y's, rows Front/Lat Raises Seated Lat High Row (2 straps) Standing Mid Row (2 straps) Sled Push/Pull  Therapeutic Activities (50691) OH Shelf Reach - LUE The patient was provided a final assessment including evaluative tests and measures, as well as discharge documentation to track progress. HEP reviewed.  Old HEP re-printed at patient request.     25' 2/25    Neuromuscular Re-ed (59936) Seated BUE OH Press Supine Single Arm Serratus Press S/L ER Prone Row Bent-over Row Prone Horizontal Abduction: (supersets)  Palm Flat  Thumbs Up \"Reverse Hugs\" - Large Pillar Prone Y's Prone Lateral Row + ER Standing BUE Flexion/Abduction        Manual Intervention (11395) -       Post/Inf GHJ Mobs      PROM: flex/abd/ER/IR      STM RTC & deltoid      PROM GHJ Circles CW/CCW               2/16 - Dry needling manual therapy: consisted on the placement of 3 needles in the following muscles:  L upper trap. A 50 mm needle was inserted, piston, rotated, and coned to produce intramuscular mobilization. These techniques were used to restore functional range of motion, reduce muscle spasm and induce healing in the corresponding musculature. (19190)  Clean Technique was utilized today while applying Dry needling treatment. The treatment sites where cleaned with 70% solution of  isopropyl alcohol . The PT washed their hands and utilized treatment gloves along with hand  prior to inserting the needles. All needles where removed and discarded in the appropriate sharps container. MD has given approval for this treatment, as the patient is non surgical and non-complex. - 10' w/ setup/ cleanup    2/2 - Dry needling manual therapy: consisted on the placement of 2 needles in the following muscles:  L upper trap, L supraspinatus. A 50 mm needle was inserted, piston, rotated, and coned to produce intramuscular mobilization. These techniques were used to restore functional range of motion, reduce muscle spasm and induce healing in the corresponding musculature. (52196)  Clean Technique was utilized today while applying Dry needling treatment. The treatment sites where cleaned with 70% solution of  isopropyl alcohol . The PT washed their hands and utilized treatment gloves along with hand  prior to inserting the needles. All needles where removed and discarded in the appropriate sharps container. MD has given approval for this treatment, as the patient is non surgical and non-complex.  - 15' w/ setup, consent, cleanup    Modalities:     Pt. Education:  -patient educated on diagnosis, prognosis and expectations for rehab  -all patient questions were answered    Home Exercise Program:  2/25 - per patient request from previous episode - reprint of HEP:  Access Code: [email or web address]  URL: [email or web address]  Date: 02/25/2022  Prepared by: Luis Expose    Exercises  Sidelying Hip Abduction - 1 x daily - 4 x weekly - 3 sets - 10 reps  Prone Quadriceps Stretch with Strap - 2 x daily - 6 x weekly - 1 sets - 4 reps - 20 hold  Standing Hamstring Stretch on Chair - 2 x daily - 6 x weekly - 1 sets - 4 reps - 20 hold  Side Stepping with Resistance at Feet - 1 x daily - 4 x weekly - 1 sets - 3 reps - 10 feet  Standing Hip Extension - 1 x daily - 4 x weekly - 3 sets - 10 reps  Single Leg Stance - 2-3 x daily - 4 x weekly - 1 sets - 5 reps - 10 hold  Clamshell - 1 x daily - 4 x weekly - 2 sets - 15 reps  Sidelying Reverse Clamshell - 1 x daily - 4 x weekly - 2 sets - 15 reps  Squat - 1 x daily - 4 x weekly - 2 sets - 15 reps  Supine Piriformis Stretch with Foot on Ground - 2 x daily - 6 x weekly - 1 sets - 4 reps - 20 hold    Access Code: [email or web address]  URL: [email or web address]  Date: 02/02/2022  Prepared by: Luis Expose    Exercises  Prone Shoulder Horizontal Abduction - 1 x daily - 3 x weekly - 3 sets - 10 reps  Prone Shoulder Row - 1 x daily - 3 x weekly - 3 sets - 10 reps  Prone Shoulder Flexion with Dumbbell - 1 x daily - 3 x weekly - 3 sets - 10 reps    Access Code: [email or web address]  URL: [email or web address]  Date: 01/18/2022  Prepared by: Luis Expose    Exercises  Sidelying Shoulder ER with Towel and Dumbbell - 1 x daily - 3-4 x weekly - 3 sets - 10 reps - 3-4 lbs  Sidelying Shoulder Abduction Full Range of Motion with Dumbbell - 1 x daily - 3-4 x weekly - 2 sets - 10 reps - 3 lbs  Supine Scapular Protraction in Flexion with Dumbbells - 1 x daily - 3-4 x weekly - 3 sets - 10 reps - 6 lbs  Prone Single Arm Shoulder Horizontal Abduction with Dumbbell - 1 x daily - 3-4 x weekly - 2 sets - 10 reps - 3 lbs  Seated Overhead Press with Dumbbells - 1 x daily - 3-4 x weekly - 3 sets - 10 reps - 3 lbs    Access Code: [email or web address]  URL: [email or web address]  Date: 01/11/2022  Prepared by: Kellie Schumacher     Exercises  Shoulder External Rotation with Anchored Resistance - 1 x daily - 4 x weekly - 3 sets - 15 reps - blue resistance  Standing Shoulder Internal Rotation with Anchored Resistance - 1 x daily - 4 x weekly - 3 sets - 20 reps - blue resistance  Seated Levator Scapulae Stretch - 3 x daily - 6 x weekly - 1 sets - 3 reps - 10 hold  Seated Shoulder Horizontal Abduction with Resistance - 1 x daily - 4 x weekly - 2 sets - 10 reps - orange resistance  Seated Bilateral Arm Shoulder Abduction with Elbow Bent and Dumbbell - 1 x daily - 4 x weekly - 3 sets - 10 reps - 3 lbs    Therapeutic Exercise and NMR EXR  [] (10533) Provided verbal/tactile cueing for activities related to strengthening, flexibility, endurance, ROM for improvements in  [] LE / Lumbar: LE, proximal hip, and core control with self care, mobility, lifting, ambulation. [] UE / Cervical: cervical, postural, scapular, scapulothoracic and UE control with self care, reaching, carrying, lifting, house/yardwork, driving, computer work.  [] (05685) Provided verbal/tactile cueing for activities related to improving balance, coordination, kinesthetic sense, posture, motor skill, proprioception to assist with   [] LE / lumbar: LE, proximal hip, and core control in self care, mobility, lifting, ambulation and eccentric single leg control. [] UE / cervical: cervical, scapular, scapulothoracic and UE control with self care, reaching, carrying, lifting, house/yardwork, driving, computer work.   [] (52404) Therapist is in constant attendance of 2 or more patients providing skilled therapy interventions, but not providing any significant amount of measurable one-on-one time to either patient, for improvements in  [] LE / lumbar: LE, proximal hip, and core control in self care, mobility, lifting, ambulation and eccentric single leg control.    [] UE / cervical: cervical, scapular, scapulothoracic and UE control with self care, reaching, carrying, lifting, house/yardwork, driving, computer work. NMR and Therapeutic Activities:    [x] (94332 or 19571) Provided verbal/tactile cueing for activities related to improving balance, coordination, kinesthetic sense, posture, motor skill, proprioception and motor activation to allow for proper function of   [] LE: / Lumbar core, proximal hip and LE with self care and ADLs  [x] UE / Cervical: cervical, postural, scapular, scapulothoracic and UE control with self care, carrying, lifting, driving, computer work.   [] (13312) Gait Re-education- Provided training and instruction to the patient for proper LE, core and proximal hip recruitment and positioning and eccentric body weight control with ambulation re-education including up and down stairs     Home Management Training / Self Care:  [] (78343) Provided self-care/home management training related to activities of daily living and compensatory training, and/or use of adaptive equipment for improvement with: ADLs and compensatory training, meal preparation, safety procedures and instruction in use of adaptive equipment, including bathing, grooming, dressing, personal hygiene, basic household cleaning and chores.      Home Exercise Program:    [x] (55112) Reviewed/Progressed HEP activities related to strengthening, flexibility, endurance, ROM of   [] LE / Lumbar: core, proximal hip and LE for functional self-care, mobility, lifting and ambulation/stair navigation   [x] UE / Cervical: cervical, postural, scapular, scapulothoracic and UE control with self care, reaching, carrying, lifting, house/yardwork, driving, computer work  [] (38622)Reviewed/Progressed HEP activities related to improving balance, coordination, kinesthetic sense, posture, motor skill, proprioception of   [] LE: core, proximal hip and LE for self care, mobility, lifting, and ambulation/stair navigation    [] UE / Cervical: cervical, postural,  scapular, scapulothoracic and UE control with self care, reaching, carrying, lifting, house/yardwork, driving, computer work    Manual Treatments:  PROM / STM / Oscillations-Mobs:  G-I, II, III, IV (PA's, Inf., Post.)  [] (01517) Provided manual therapy to mobilize LE, proximal hip and/or LS spine soft tissue/joints for the purpose of modulating pain, promoting relaxation,  increasing ROM, reducing/eliminating soft tissue swelling/inflammation/restriction, improving soft tissue extensibility and allowing for proper ROM for normal function with   [] LE / lumbar: self care, mobility, lifting and ambulation. [] UE / Cervical: self care, reaching, carrying, lifting, house/yardwork, driving, computer work. Modalities:  [] (49187) Vasopneumatic compression: Utilized vasopneumatic compression to decrease edema / swelling for the purpose of improving mobility and quad tone / recruitment which will allow for increased overall function including but not limited to self-care, transfers, ambulation, and ascending / descending stairs. Charges:  Timed Code Treatment Minutes: 25   Total Treatment Minutes: 25     [] EVAL - LOW (40938)   [] EVAL - MOD (41157)  [] EVAL - HIGH (20957)  [] RE-EVAL (40814)  [] KH(64400) x 1      [] Ionto  [] NMR (10763) x 1       [] Vaso  [] Manual (51031) x       [] Ultrasound  [x] TA x 2        [] Mech Traction (90455)  [] Aquatic Therapy x      [] ES (un) (88996):   [] Home Management Training x  [] ES(attended) (64078)   [] Dry Needling 1-2 muscles (30197): 1  [] Dry Needling 3+ muscles (010759)  [] Group:      [] Other:       Overall Progression Towards Functional goals/ Treatment Progress Update:  [x] Patient is progressing as expected towards functional goals listed. [] Progression is slowed due to complexities/Impairments listed. [] Progression has been slowed due to co-morbidities.   [] Plan just implemented, too soon to assess goals progression <30days   [] Goals require adjustment due to lack of progress  [] Patient is not progressing as expected and requires additional follow up with physician  [] Other    Persisting Functional Limitations/Impairments:  []Sleeping []Sitting               []Standing []Transfers        []Walking []Kneeling               []Stairs []Squatting / bending   []ADLs []Reaching  []Lifting  []Housework  []Driving []Job related tasks  []Sports/Recreation []Other:      ASSESSMENT:  SEE DC    Treatment/Activity Tolerance:  [x] Patient able to complete tx  [] Patient limited by fatigue  [] Patient limited by pain  [] Patient limited by other medical complications  [] Other:     Prognosis: [x] Good [] Fair  [] Poor    Patient Requires Follow-up: [] Yes  [x] No    Plan for next treatment session: consider DC     PLAN:  PT 1x / week for 4 weeks. [] Continue per plan of care [] Alter current plan (see comments)  [] Plan of care initiated [] Hold pending MD visit [x] Discharge    Electronically signed by: Colby Conroy, PT PT, DPT, OMT-C    Note: If patient does not return for scheduled/ recommended follow up visits, this note will serve as a discharge from care along with most recent update on progress.

## 2022-03-08 ENCOUNTER — OFFICE VISIT (OUTPATIENT)
Dept: ORTHOPEDIC SURGERY | Age: 77
End: 2022-03-08
Payer: MEDICARE

## 2022-03-08 VITALS — HEIGHT: 65 IN | BODY MASS INDEX: 28.56 KG/M2 | WEIGHT: 171.4 LBS

## 2022-03-08 DIAGNOSIS — M75.82 ROTATOR CUFF TENDINITIS, LEFT: Primary | ICD-10-CM

## 2022-03-08 PROCEDURE — 99213 OFFICE O/P EST LOW 20 MIN: CPT | Performed by: ORTHOPAEDIC SURGERY

## 2022-03-08 NOTE — PROGRESS NOTES
CHIEF COMPLAINT: Left shoulder pain    History:    Juliano Wagner is a 77 y.o. right handed female here for left shoulder follow-up. Initial history: self-referred for evaluation and treatment of Left shoulder pain. This is evaluated as a personal injury. The pain began a few months ago. Pain is rated as a 3/10. There was not an injury. She points the pain being located along her scapula and goes down her arm to her elbow. She denies radiating pain to her fingers. She denies any numbness or tingling in her fingers. Symptoms are worse with all movements. The patient has not had PT. The patient has not had an injection. She is on Celebrex, which provide some relief. She is on a biomed compound cream, which he states also helps. The patient has not tried ice. Patient's occupation is retired, but volunteers in [facility]. Interval History: Her shoulder is doing well. She states that the PT and injection did seem to help. She rates pain 01/10. It is more of an ache. She finished PT at [facility].         Past Medical History:   Diagnosis Date    Arthritis     Blood transfusion 2002    AUTOLOGOUS    Blood transfusion 1968    Diverticulosis     H/O irritable bowel syndrome     Hyperlipidemia     Osteopenia     Restless leg syndrome     Seasonal allergies        Past Surgical History:   Procedure Laterality Date    APPENDECTOMY      CARPAL TUNNEL RELEASE      LEFT    COLONOSCOPY      FINGER TRIGGER RELEASE      RIGHT - 2 FINGERS    FINGER TRIGGER RELEASE  6-    trigger finger release left long and ring fingers    FRACTURE SURGERY      BILATERAL FEMURS     FRACTURE SURGERY      RIGHT ANKLE/TALUS/TIBIA    HAND SURGERY Left 9/12/2019    LEFT RING FINGER  DEBRIDEMENT INCISION AND DRAINAGE, REMOVAL OF FOREIGN BODY performed by Brynn Huber MD at [address] REPLACEMENT      RIGHT KNEE    KNEE ARTHROSCOPY      RIGHT AND LEFT MULTIPLE    OTHER SURGICAL HISTORY      spinal ablations        Current Outpatient Medications on File Prior to Visit   Medication Sig Dispense Refill    Amoxicillin 500 MG TABS Take 4 (four) tablets 1 (one) hour prior to dental appointment.  Calcium Polycarbophil (FIBER) 625 MG TABS Take 625 mg by mouth 2 times daily      celecoxib (CELEBREX) 100 MG capsule Take 100 mg by mouth 2 times daily      omeprazole (PRILOSEC) 10 MG delayed release capsule Take 10 mg by mouth every other day (Patient not taking: Reported on 1/4/2022)      rOPINIRole (REQUIP) 0.5 MG tablet Take 0.5 mg by mouth 3 times daily as needed      Cholecalciferol (VITAMIN D) 50 MCG (2000 UT) CAPS capsule Take 1 capsule by mouth daily      Probiotic Product (PROBIOTIC-10 PO) Take 1 capsule by mouth daily      Melatonin 5 MG CAPS Take 1 capsule by mouth nightly      simvastatin (ZOCOR) 20 MG tablet Take 20 mg by mouth nightly.  ropinirole (REQUIP) 0.5 MG tablet Take 1.5 mg by mouth nightly       diclofenac (VOLTAREN) 75 MG EC tablet Take 75 mg by mouth 2 times daily as needed Takes 1-2 per week, only takes on really \"achy\" days (Patient not taking: Reported on 1/4/2022)      Calcium Carbonate-Vitamin D (CALCIUM PLUS VITAMIN D) 600-100 MG-UNIT CAPS Take 1 tablet by mouth nightly       Multiple Vitamin (MULTIVITAMIN PO) Take 1 tablet by mouth daily. No current facility-administered medications on file prior to visit.        Allergies   Allergen Reactions    Sulfa Antibiotics      Face red,flushed       Social History     Socioeconomic History    Marital status:      Spouse name: Not on file    Number of children: Not on file    Years of education: Not on file    Highest education level: Not on file   Occupational History    Not on file   Tobacco Use    Smoking status: Never Smoker    Smokeless tobacco: Never Used   Vaping Use    Vaping Use: Never used   Substance and Sexual Activity    Alcohol use: Yes     Comment: occasional glass of wine with dinner    Drug use: No    Sexual activity: Not on file   Other Topics Concern    Not on file   Social History Narrative    Not on file     Social Determinants of Health     Financial Resource Strain:     Difficulty of Paying Living Expenses: Not on file   Food Insecurity:     Worried About Running Out of Food in the Last Year: Not on file    Noelle of Food in the Last Year: Not on file   Transportation Needs:     Lack of Transportation (Medical): Not on file    Lack of Transportation (Non-Medical): Not on file   Physical Activity:     Days of Exercise per Week: Not on file    Minutes of Exercise per Session: Not on file   Stress:     Feeling of Stress : Not on file   Social Connections:     Frequency of Communication with Friends and Family: Not on file    Frequency of Social Gatherings with Friends and Family: Not on file    Attends Buddhist Services: Not on file    Active Member of [address] [place] [address] or Organizations: Not on file    Attends Club or Organization Meetings: Not on file    Marital Status: Not on file   Intimate Partner Violence:     Fear of Current or Ex-Partner: Not on file    Emotionally Abused: Not on file    Physically Abused: Not on file    Sexually Abused: Not on file   Housing Stability:     Unable to Pay for Housing in the Last Year: Not on file    Number of [place] in the Last Year: Not on file    Unstable Housing in the Last Year: Not on file       Family History   Problem Relation Age of Onset    Cancer Father         PROSTATE    Heart Disease Mother         CHF       Review of Systems:   I have reviewed the clinically relevant past medical history, medications, allergies, family history, social history, and 13 point Review of Systems from the patient's recent history form & documented any details relevant to today's presenting complaints in the history above.  The patient's self-reported past medical history, medications, allergies, family history, social history, and Review of Systems form from 1/4/22 have been scanned into the chart under the \"Media\" tab. Physical Examination:      Vital signs: There were no vitals taken for this visit. General:   alert, appears stated age, cooperative and no distress   Left Shoulder   Active ROM:   forward flexion 180, external rotation 80, internal rotation T10. Bilateral shouldera   Joint Tenderness:   minimal at lateral acromial   Neer:   negative   Pérez:   negative   Strength:   5/5 Supraspinatus, External rotation    Bilateral shoulders   Drop-arm test:   negative   Belly-press test:   negative   Bear-hug test:   negative   Speed's test:   negative   Bicipital groove tenderness:  positive   Cazares's test:   negative   Cross-body adduction test:   negative    AC joint tenderness:   negative     There are no skin lesions, cellulitis, or extreme edema in the upper extremities. Sensation is grossly intact to light touch bilaterally upper extremity. The patient has warm and well-perfused Bilateral upper extremities with brisk capillary refill. Imaging   Left Shoulder X-Ray 1/4/22:  AC Joint: narrowing moderate  Glenohumeral joint: no abnormalities noted  Elevation humeral head: absent        Assessment:      Left shoulder rotator cuff tendinitis      Plan: Ice prn. Make sure ice does not directly contact skin to avoid risk of frostbite. Continue NSAID. Continue compound cream.    Continue HEP. Follow up as needed. Shahrzad Castaneda. Sheila Miller MD  Orthopaedic Surgery and Sports Medicine     Disclaimer: This note was generated with use of a verbal recognition program and an attempt was made to check for errors. It is possible that there are still dictated errors within this office note. If so, please bring any significant errors to my attention for an addendum. All efforts were made to ensure that this office note is accurate.

## 2022-04-05 NOTE — PROGRESS NOTES
Name_______________________________________Printed:____________________  Date and time of surgery___5/3/22 @ 1145_____________________Arrival [facility]:_[facility]_______________   1. The instructions given regarding when and if a patient needs to stop oral intake prior to surgery varies. Follow the specific instructions you were given                  __x_Nothing to eat or to drink after Midnight the night before.                   ____Carbo loading or ERAS instructions will be given to select patients-if you have been given those instructions -please do the following                           The evening before your surgery after dinner before midnight drink 40 ounces of gatorade. If you are diabetic use sugar free. The morning of surgery drink 40 ounces of water. This needs to be finished 3 hours prior to your surgery start time. 2. Take the following pills with a small sip of water on the morning of surgery___________________________________________________                  Do not take blood pressure medications ending in pril or sartan the luiza prior to surgery or the morning of surgery_   3. Aspirin, Ibuprofen, Advil, Naproxen, Vitamin E and other Anti-inflammatory products and supplements should be stopped for 5 -7days before surgery or as directed by your physician. 4. Check with your Doctor regarding stopping Plavix, Coumadin,Eliquis, Lovenox,Effient,Pradaxa,Xarelto, Fragmin or other blood thinners and follow their instructions. 5. Do not smoke, and do not drink any alcoholic beverages 24 hours prior to surgery. This includes NA Beer. Refrain from the usage of any recreational drugs. 6. You may brush your teeth and gargle the morning of surgery. DO NOT SWALLOW WATER   7. You MUST make arrangements for a responsible adult to stay on site while you are here and take you home after your surgery. You will not be allowed to leave alone or drive yourself home.   It is strongly suggested someone stay with you the first 24 hrs. Your surgery will be cancelled if you do not have a ride home. 8. A parent/legal guardian must accompany a child scheduled for surgery and plan to stay at the hospital until the child is discharged. Please do not bring other children with you. 9. Please wear simple, loose fitting clothing to the hospital.  Poppy Bedolla not bring valuables (money, credit cards, checkbooks, etc.) Do not wear any makeup (including no eye makeup) or nail polish on your fingers or toes. 10. DO NOT wear any jewelry or piercings on day of surgery. All body piercing jewelry must be removed. 11. If you have ___dentures, they will be removed before going to the OR; we will provide you a container. If you wear ___contact lenses or __x_glasses, they will be removed; please bring a case for them. 12. Please see your family doctor/pediatrician for a history & physical and/or concerning medications. Bring any test results/reports from your physician's office. PCP___budke_______________Phone___________H&P Appt. Date__4/25______             13 If you  have a Living Will and Durable Power of  for Healthcare, please bring in a copy. 15. Notify your Surgeon if you develop any illness between now and surgery  time, cough, cold, fever, sore throat, nausea, vomiting, etc.  Please notify your surgeon if you experience dizziness, shortness of breath or blurred vision between now & the time of your surgery             15. DO NOT shave your operative site 96 hours prior to surgery. For face & neck surgery, men may use an electric razor 48 hours prior to surgery. 16. Shower the night before or morning of surgery using an antibacterial soap or as you have been instructed. 17. To provide excellent care visitors will be limited to one in the room at any given time. 18.  Please bring picture ID and insurance card.              19.  Visit our web site for additional information:  [email or web address]              20.During flu season no children under the age of 15 are permitted in the hospital for the safety of all patients. 21. If you take a long acting insulin in the evening only  take half of your usual  dose the night  before your procedure              22. If you use a c-pap please bring DOS if staying overnight,             23.For your convenience [facility] has a pharmacy on site to fill your prescriptions. 24. If you use oxygen and have a portable tank please bring it  with you the DOS             25. Bring a complete list of all your medications with name and dose include any supplements. 26. Other__________________________________________   *Please call pre admission testing if you any further questions   Ashley Oneal         [address]    [place] [phone]. [place]  [phone]   [address] [place] [address]       VISITOR POLICY(subject to change)    Current policy is 2 visitors per patient. No children. A mask is required. Visiting hours are 8a-8p. Overnight visitors will be at the discretion of the nurse. All above information reviewed with patient in person or by phone. Patient verbalizes understanding. All questions and concerns addressed.                                                                                                  Patient/Rep____pt_______________                                                                                                                                    PRE OP INSTRUCTIONS

## 2022-04-06 ENCOUNTER — HOSPITAL ENCOUNTER (OUTPATIENT)
Age: 77
Discharge: HOME | End: 2022-04-06
Payer: MEDICARE

## 2022-04-06 DIAGNOSIS — Z01.818 PREOP TESTING: ICD-10-CM

## 2022-04-06 LAB
ABO/RH: NORMAL
ANION GAP SERPL CALCULATED.3IONS-SCNC: 14 MMOL/L (ref 3–16)
ANTIBODY SCREEN: NORMAL
APTT: 33.9 SEC (ref 26.2–38.6)
BUN BLDV-MCNC: 12 MG/DL (ref 7–20)
CALCIUM SERPL-MCNC: 9.8 MG/DL (ref 8.3–10.6)
CHLORIDE BLD-SCNC: 104 MMOL/L (ref 99–110)
CO2: 24 MMOL/L (ref 21–32)
CREAT SERPL-MCNC: 0.7 MG/DL (ref 0.6–1.2)
EKG ATRIAL RATE: 68 BPM
EKG DIAGNOSIS: NORMAL
EKG P AXIS: 31 DEGREES
EKG P-R INTERVAL: 144 MS
EKG Q-T INTERVAL: 408 MS
EKG QRS DURATION: 94 MS
EKG QTC CALCULATION (BAZETT): 433 MS
EKG R AXIS: 40 DEGREES
EKG T AXIS: 51 DEGREES
EKG VENTRICULAR RATE: 68 BPM
GFR AFRICAN AMERICAN: >60
GFR NON-AFRICAN AMERICAN: >60
GLUCOSE BLD-MCNC: 154 MG/DL (ref 70–99)
HCT VFR BLD CALC: 38 % (ref 36–48)
HEMOGLOBIN: 12.7 G/DL (ref 12–16)
INR BLD: 1.03 (ref 0.88–1.12)
MCH RBC QN AUTO: 30.6 PG (ref 26–34)
MCHC RBC AUTO-ENTMCNC: 33.3 G/DL (ref 31–36)
MCV RBC AUTO: 91.9 FL (ref 80–100)
PDW BLD-RTO: 13.8 % (ref 12.4–15.4)
PLATELET # BLD: 224 K/UL (ref 135–450)
PMV BLD AUTO: 9.2 FL (ref 5–10.5)
POTASSIUM SERPL-SCNC: 3.9 MMOL/L (ref 3.5–5.1)
PROTHROMBIN TIME: 11.7 SEC (ref 9.9–12.7)
RBC # BLD: 4.13 M/UL (ref 4–5.2)
SODIUM BLD-SCNC: 142 MMOL/L (ref 136–145)
WBC # BLD: 5.7 K/UL (ref 4–11)

## 2022-04-06 PROCEDURE — 80048 BASIC METABOLIC PNL TOTAL CA: CPT

## 2022-04-06 PROCEDURE — 93005 ELECTROCARDIOGRAM TRACING: CPT | Performed by: ANESTHESIOLOGY

## 2022-04-06 PROCEDURE — 85027 COMPLETE CBC AUTOMATED: CPT

## 2022-04-06 PROCEDURE — 36415 COLL VENOUS BLD VENIPUNCTURE: CPT

## 2022-04-06 PROCEDURE — 86850 RBC ANTIBODY SCREEN: CPT

## 2022-04-06 PROCEDURE — 86901 BLOOD TYPING SEROLOGIC RH(D): CPT

## 2022-04-06 PROCEDURE — 85610 PROTHROMBIN TIME: CPT

## 2022-04-06 PROCEDURE — 85730 THROMBOPLASTIN TIME PARTIAL: CPT

## 2022-04-06 PROCEDURE — 93010 ELECTROCARDIOGRAM REPORT: CPT | Performed by: INTERNAL MEDICINE

## 2022-04-06 PROCEDURE — 86900 BLOOD TYPING SEROLOGIC ABO: CPT

## 2022-05-03 ENCOUNTER — APPOINTMENT (OUTPATIENT)
Dept: GENERAL RADIOLOGY | Age: 77
DRG: 516 | End: 2022-05-03
Attending: NEUROLOGICAL SURGERY
Payer: MEDICARE

## 2022-05-03 ENCOUNTER — HOSPITAL ENCOUNTER (INPATIENT)
Age: 77
LOS: 1 days | Discharge: HOME | DRG: 516 | End: 2022-05-04
Attending: NEUROLOGICAL SURGERY | Admitting: NEUROLOGICAL SURGERY
Payer: MEDICARE

## 2022-05-03 ENCOUNTER — ANESTHESIA (OUTPATIENT)
Dept: OPERATING ROOM | Age: 77
DRG: 516 | End: 2022-05-03
Payer: MEDICARE

## 2022-05-03 ENCOUNTER — ANESTHESIA EVENT (OUTPATIENT)
Dept: OPERATING ROOM | Age: 77
DRG: 516 | End: 2022-05-03
Payer: MEDICARE

## 2022-05-03 VITALS
OXYGEN SATURATION: 100 % | RESPIRATION RATE: 6 BRPM | TEMPERATURE: 96.3 F | SYSTOLIC BLOOD PRESSURE: 132 MMHG | DIASTOLIC BLOOD PRESSURE: 77 MMHG

## 2022-05-03 DIAGNOSIS — Z01.818 PREOP TESTING: Primary | ICD-10-CM

## 2022-05-03 DIAGNOSIS — M71.38 SYNOVIAL CYST OF LUMBAR SPINE: ICD-10-CM

## 2022-05-03 PROBLEM — E78.00 HIGH CHOLESTEROL: Status: ACTIVE | Noted: 2022-05-03

## 2022-05-03 PROBLEM — G25.81 RESTLESS LEGS: Status: ACTIVE | Noted: 2022-05-03

## 2022-05-03 PROBLEM — K21.9 GERD (GASTROESOPHAGEAL REFLUX DISEASE): Status: ACTIVE | Noted: 2022-05-03

## 2022-05-03 LAB
ABO/RH: NORMAL
ANTIBODY SCREEN: NORMAL

## 2022-05-03 PROCEDURE — 6360000002 HC RX W HCPCS: Performed by: NEUROLOGICAL SURGERY

## 2022-05-03 PROCEDURE — 2580000003 HC RX 258: Performed by: REGISTERED NURSE

## 2022-05-03 PROCEDURE — 72020 X-RAY EXAM OF SPINE 1 VIEW: CPT

## 2022-05-03 PROCEDURE — 6360000002 HC RX W HCPCS: Performed by: REGISTERED NURSE

## 2022-05-03 PROCEDURE — 1200000000 HC SEMI PRIVATE

## 2022-05-03 PROCEDURE — 6360000002 HC RX W HCPCS: Performed by: ANESTHESIOLOGY

## 2022-05-03 PROCEDURE — 2500000003 HC RX 250 WO HCPCS: Performed by: NEUROLOGICAL SURGERY

## 2022-05-03 PROCEDURE — 2500000003 HC RX 250 WO HCPCS: Performed by: REGISTERED NURSE

## 2022-05-03 PROCEDURE — 01NB0ZZ RELEASE LUMBAR NERVE, OPEN APPROACH: ICD-10-PCS | Performed by: NEUROLOGICAL SURGERY

## 2022-05-03 PROCEDURE — 3600000004 HC SURGERY LEVEL 4 BASE: Performed by: NEUROLOGICAL SURGERY

## 2022-05-03 PROCEDURE — A4217 STERILE WATER/SALINE, 500 ML: HCPCS | Performed by: NEUROLOGICAL SURGERY

## 2022-05-03 PROCEDURE — 94760 N-INVAS EAR/PLS OXIMETRY 1: CPT

## 2022-05-03 PROCEDURE — 2709999900 HC NON-CHARGEABLE SUPPLY: Performed by: NEUROLOGICAL SURGERY

## 2022-05-03 PROCEDURE — 36415 COLL VENOUS BLD VENIPUNCTURE: CPT

## 2022-05-03 PROCEDURE — 2580000003 HC RX 258: Performed by: NEUROLOGICAL SURGERY

## 2022-05-03 PROCEDURE — 6370000000 HC RX 637 (ALT 250 FOR IP): Performed by: NEUROLOGICAL SURGERY

## 2022-05-03 PROCEDURE — 7100000001 HC PACU RECOVERY - ADDTL 15 MIN: Performed by: NEUROLOGICAL SURGERY

## 2022-05-03 PROCEDURE — 3600000014 HC SURGERY LEVEL 4 ADDTL 15MIN: Performed by: NEUROLOGICAL SURGERY

## 2022-05-03 PROCEDURE — 86901 BLOOD TYPING SEROLOGIC RH(D): CPT

## 2022-05-03 PROCEDURE — 86850 RBC ANTIBODY SCREEN: CPT

## 2022-05-03 PROCEDURE — 94150 VITAL CAPACITY TEST: CPT

## 2022-05-03 PROCEDURE — 7100000000 HC PACU RECOVERY - FIRST 15 MIN: Performed by: NEUROLOGICAL SURGERY

## 2022-05-03 PROCEDURE — 6370000000 HC RX 637 (ALT 250 FOR IP)

## 2022-05-03 PROCEDURE — 86900 BLOOD TYPING SEROLOGIC ABO: CPT

## 2022-05-03 PROCEDURE — 3209999900 FLUORO FOR SURGICAL PROCEDURES

## 2022-05-03 PROCEDURE — 3700000000 HC ANESTHESIA ATTENDED CARE: Performed by: NEUROLOGICAL SURGERY

## 2022-05-03 PROCEDURE — 3700000001 HC ADD 15 MINUTES (ANESTHESIA): Performed by: NEUROLOGICAL SURGERY

## 2022-05-03 PROCEDURE — 2720000010 HC SURG SUPPLY STERILE: Performed by: NEUROLOGICAL SURGERY

## 2022-05-03 RX ORDER — HYDROMORPHONE HCL 110MG/55ML
PATIENT CONTROLLED ANALGESIA SYRINGE INTRAVENOUS PRN
Status: DISCONTINUED | OUTPATIENT
Start: 2022-05-03 | End: 2022-05-03 | Stop reason: SDUPTHER

## 2022-05-03 RX ORDER — MAGNESIUM HYDROXIDE 1200 MG/15ML
LIQUID ORAL CONTINUOUS PRN
Status: COMPLETED | OUTPATIENT
Start: 2022-05-03 | End: 2022-05-03

## 2022-05-03 RX ORDER — CYCLOBENZAPRINE HCL 10 MG
10 TABLET ORAL 3 TIMES DAILY PRN
Status: DISCONTINUED | OUTPATIENT
Start: 2022-05-03 | End: 2022-05-04 | Stop reason: HOSPADM

## 2022-05-03 RX ORDER — OYSTER SHELL CALCIUM WITH VITAMIN D 500; 200 MG/1; [IU]/1
1 TABLET, FILM COATED ORAL NIGHTLY
Status: DISCONTINUED | OUTPATIENT
Start: 2022-05-03 | End: 2022-05-04 | Stop reason: HOSPADM

## 2022-05-03 RX ORDER — OXYCODONE HYDROCHLORIDE 5 MG/1
5 TABLET ORAL EVERY 4 HOURS PRN
Status: DISCONTINUED | OUTPATIENT
Start: 2022-05-03 | End: 2022-05-04 | Stop reason: HOSPADM

## 2022-05-03 RX ORDER — SODIUM CHLORIDE, SODIUM LACTATE, POTASSIUM CHLORIDE, CALCIUM CHLORIDE 600; 310; 30; 20 MG/100ML; MG/100ML; MG/100ML; MG/100ML
INJECTION, SOLUTION INTRAVENOUS CONTINUOUS PRN
Status: DISCONTINUED | OUTPATIENT
Start: 2022-05-03 | End: 2022-05-03 | Stop reason: SDUPTHER

## 2022-05-03 RX ORDER — 0.9 % SODIUM CHLORIDE 0.9 %
500 INTRAVENOUS SOLUTION INTRAVENOUS PRN
Status: DISCONTINUED | OUTPATIENT
Start: 2022-05-03 | End: 2022-05-04 | Stop reason: HOSPADM

## 2022-05-03 RX ORDER — LANOLIN ALCOHOL/MO/W.PET/CERES
9 CREAM (GRAM) TOPICAL NIGHTLY
Status: DISCONTINUED | OUTPATIENT
Start: 2022-05-03 | End: 2022-05-04 | Stop reason: HOSPADM

## 2022-05-03 RX ORDER — HYDROMORPHONE HYDROCHLORIDE 1 MG/ML
0.5 INJECTION, SOLUTION INTRAMUSCULAR; INTRAVENOUS; SUBCUTANEOUS
Status: DISCONTINUED | OUTPATIENT
Start: 2022-05-03 | End: 2022-05-04 | Stop reason: HOSPADM

## 2022-05-03 RX ORDER — POTASSIUM CHLORIDE 20 MEQ/1
40 TABLET, EXTENDED RELEASE ORAL PRN
Status: DISCONTINUED | OUTPATIENT
Start: 2022-05-03 | End: 2022-05-04 | Stop reason: HOSPADM

## 2022-05-03 RX ORDER — DEXTROSE, SODIUM CHLORIDE, AND POTASSIUM CHLORIDE 5; .45; .15 G/100ML; G/100ML; G/100ML
1000 INJECTION INTRAVENOUS CONTINUOUS
Status: DISCONTINUED | OUTPATIENT
Start: 2022-05-03 | End: 2022-05-04

## 2022-05-03 RX ORDER — SODIUM CHLORIDE 9 MG/ML
INJECTION, SOLUTION INTRAVENOUS CONTINUOUS
Status: DISCONTINUED | OUTPATIENT
Start: 2022-05-03 | End: 2022-05-03 | Stop reason: HOSPADM

## 2022-05-03 RX ORDER — CALCIUM POLYCARBOPHIL 625 MG 625 MG/1
625 TABLET ORAL 2 TIMES DAILY
Status: DISCONTINUED | OUTPATIENT
Start: 2022-05-03 | End: 2022-05-04 | Stop reason: HOSPADM

## 2022-05-03 RX ORDER — OXYCODONE HYDROCHLORIDE 5 MG/1
5 TABLET ORAL
Status: DISCONTINUED | OUTPATIENT
Start: 2022-05-03 | End: 2022-05-03

## 2022-05-03 RX ORDER — HYDROMORPHONE HCL 110MG/55ML
0.5 PATIENT CONTROLLED ANALGESIA SYRINGE INTRAVENOUS EVERY 5 MIN PRN
Status: DISCONTINUED | OUTPATIENT
Start: 2022-05-03 | End: 2022-05-03

## 2022-05-03 RX ORDER — ONDANSETRON 2 MG/ML
4 INJECTION INTRAMUSCULAR; INTRAVENOUS
Status: DISCONTINUED | OUTPATIENT
Start: 2022-05-03 | End: 2022-05-03

## 2022-05-03 RX ORDER — OMEPRAZOLE 10 MG/1
10 CAPSULE, DELAYED RELEASE ORAL EVERY OTHER DAY
Status: DISCONTINUED | OUTPATIENT
Start: 2022-05-03 | End: 2022-05-03 | Stop reason: CLARIF

## 2022-05-03 RX ORDER — SODIUM CHLORIDE 9 MG/ML
INJECTION, SOLUTION INTRAVENOUS PRN
Status: DISCONTINUED | OUTPATIENT
Start: 2022-05-03 | End: 2022-05-04 | Stop reason: HOSPADM

## 2022-05-03 RX ORDER — BUPIVACAINE HYDROCHLORIDE 5 MG/ML
INJECTION, SOLUTION EPIDURAL; INTRACAUDAL
Status: COMPLETED | OUTPATIENT
Start: 2022-05-03 | End: 2022-05-03

## 2022-05-03 RX ORDER — FENTANYL CITRATE 50 UG/ML
INJECTION, SOLUTION INTRAMUSCULAR; INTRAVENOUS PRN
Status: DISCONTINUED | OUTPATIENT
Start: 2022-05-03 | End: 2022-05-03 | Stop reason: SDUPTHER

## 2022-05-03 RX ORDER — LIDOCAINE HYDROCHLORIDE 20 MG/ML
INJECTION, SOLUTION EPIDURAL; INFILTRATION; INTRACAUDAL; PERINEURAL PRN
Status: DISCONTINUED | OUTPATIENT
Start: 2022-05-03 | End: 2022-05-03 | Stop reason: SDUPTHER

## 2022-05-03 RX ORDER — HYDRALAZINE HYDROCHLORIDE 20 MG/ML
10 INJECTION INTRAMUSCULAR; INTRAVENOUS EVERY 6 HOURS PRN
Status: DISCONTINUED | OUTPATIENT
Start: 2022-05-03 | End: 2022-05-04 | Stop reason: HOSPADM

## 2022-05-03 RX ORDER — VITAMIN B COMPLEX
2000 TABLET ORAL DAILY
Status: DISCONTINUED | OUTPATIENT
Start: 2022-05-03 | End: 2022-05-04 | Stop reason: HOSPADM

## 2022-05-03 RX ORDER — SODIUM CHLORIDE 0.9 % (FLUSH) 0.9 %
5-40 SYRINGE (ML) INJECTION PRN
Status: DISCONTINUED | OUTPATIENT
Start: 2022-05-03 | End: 2022-05-04 | Stop reason: HOSPADM

## 2022-05-03 RX ORDER — POLYETHYLENE GLYCOL 3350 17 G/17G
17 POWDER, FOR SOLUTION ORAL DAILY
Status: DISCONTINUED | OUTPATIENT
Start: 2022-05-03 | End: 2022-05-04 | Stop reason: HOSPADM

## 2022-05-03 RX ORDER — DEXAMETHASONE SODIUM PHOSPHATE 4 MG/ML
INJECTION, SOLUTION INTRA-ARTICULAR; INTRALESIONAL; INTRAMUSCULAR; INTRAVENOUS; SOFT TISSUE PRN
Status: DISCONTINUED | OUTPATIENT
Start: 2022-05-03 | End: 2022-05-03 | Stop reason: SDUPTHER

## 2022-05-03 RX ORDER — ROCURONIUM BROMIDE 10 MG/ML
INJECTION, SOLUTION INTRAVENOUS PRN
Status: DISCONTINUED | OUTPATIENT
Start: 2022-05-03 | End: 2022-05-03 | Stop reason: SDUPTHER

## 2022-05-03 RX ORDER — ONDANSETRON 2 MG/ML
4 INJECTION INTRAMUSCULAR; INTRAVENOUS EVERY 6 HOURS PRN
Status: DISCONTINUED | OUTPATIENT
Start: 2022-05-03 | End: 2022-05-04 | Stop reason: HOSPADM

## 2022-05-03 RX ORDER — ONDANSETRON 2 MG/ML
INJECTION INTRAMUSCULAR; INTRAVENOUS PRN
Status: DISCONTINUED | OUTPATIENT
Start: 2022-05-03 | End: 2022-05-03 | Stop reason: SDUPTHER

## 2022-05-03 RX ORDER — IBUPROFEN 600 MG/1
600 TABLET ORAL EVERY 8 HOURS PRN
Status: DISCONTINUED | OUTPATIENT
Start: 2022-05-03 | End: 2022-05-04 | Stop reason: HOSPADM

## 2022-05-03 RX ORDER — LIDOCAINE HYDROCHLORIDE 10 MG/ML
1 INJECTION, SOLUTION EPIDURAL; INFILTRATION; INTRACAUDAL; PERINEURAL
Status: DISCONTINUED | OUTPATIENT
Start: 2022-05-03 | End: 2022-05-03 | Stop reason: HOSPADM

## 2022-05-03 RX ORDER — LIDOCAINE HYDROCHLORIDE 10 MG/ML
0.5 INJECTION, SOLUTION EPIDURAL; INFILTRATION; INTRACAUDAL; PERINEURAL ONCE
Status: DISCONTINUED | OUTPATIENT
Start: 2022-05-03 | End: 2022-05-03 | Stop reason: HOSPADM

## 2022-05-03 RX ORDER — PANTOPRAZOLE SODIUM 40 MG/1
40 TABLET, DELAYED RELEASE ORAL EVERY OTHER DAY
Status: DISCONTINUED | OUTPATIENT
Start: 2022-05-04 | End: 2022-05-04 | Stop reason: HOSPADM

## 2022-05-03 RX ORDER — SIMVASTATIN 10 MG
20 TABLET ORAL NIGHTLY
Status: DISCONTINUED | OUTPATIENT
Start: 2022-05-03 | End: 2022-05-03 | Stop reason: CLARIF

## 2022-05-03 RX ORDER — SODIUM CHLORIDE 0.9 % (FLUSH) 0.9 %
5-40 SYRINGE (ML) INJECTION EVERY 12 HOURS SCHEDULED
Status: DISCONTINUED | OUTPATIENT
Start: 2022-05-03 | End: 2022-05-04 | Stop reason: HOSPADM

## 2022-05-03 RX ORDER — LABETALOL HYDROCHLORIDE 5 MG/ML
5 INJECTION, SOLUTION INTRAVENOUS
Status: DISCONTINUED | OUTPATIENT
Start: 2022-05-03 | End: 2022-05-03

## 2022-05-03 RX ORDER — PROPOFOL 10 MG/ML
INJECTION, EMULSION INTRAVENOUS PRN
Status: DISCONTINUED | OUTPATIENT
Start: 2022-05-03 | End: 2022-05-03 | Stop reason: SDUPTHER

## 2022-05-03 RX ORDER — ACETAMINOPHEN 325 MG/1
650 TABLET ORAL EVERY 6 HOURS
Status: DISCONTINUED | OUTPATIENT
Start: 2022-05-03 | End: 2022-05-04 | Stop reason: HOSPADM

## 2022-05-03 RX ORDER — DICLOFENAC SODIUM 75 MG/1
75 TABLET, DELAYED RELEASE ORAL 2 TIMES DAILY PRN
Status: DISCONTINUED | OUTPATIENT
Start: 2022-05-03 | End: 2022-05-03 | Stop reason: CLARIF

## 2022-05-03 RX ORDER — POTASSIUM CHLORIDE 7.45 MG/ML
10 INJECTION INTRAVENOUS PRN
Status: DISCONTINUED | OUTPATIENT
Start: 2022-05-03 | End: 2022-05-04 | Stop reason: HOSPADM

## 2022-05-03 RX ORDER — ATORVASTATIN CALCIUM 10 MG/1
10 TABLET, FILM COATED ORAL NIGHTLY
Status: DISCONTINUED | OUTPATIENT
Start: 2022-05-03 | End: 2022-05-04 | Stop reason: HOSPADM

## 2022-05-03 RX ORDER — HYDROMORPHONE HCL 110MG/55ML
0.25 PATIENT CONTROLLED ANALGESIA SYRINGE INTRAVENOUS EVERY 5 MIN PRN
Status: DISCONTINUED | OUTPATIENT
Start: 2022-05-03 | End: 2022-05-03

## 2022-05-03 RX ORDER — ONDANSETRON 4 MG/1
4 TABLET, ORALLY DISINTEGRATING ORAL EVERY 8 HOURS PRN
Status: DISCONTINUED | OUTPATIENT
Start: 2022-05-03 | End: 2022-05-04 | Stop reason: HOSPADM

## 2022-05-03 RX ORDER — SODIUM CHLORIDE 9 MG/ML
INJECTION, SOLUTION INTRAVENOUS CONTINUOUS
Status: DISCONTINUED | OUTPATIENT
Start: 2022-05-03 | End: 2022-05-03

## 2022-05-03 RX ORDER — GLYCOPYRROLATE 1 MG/5 ML
SYRINGE (ML) INTRAVENOUS PRN
Status: DISCONTINUED | OUTPATIENT
Start: 2022-05-03 | End: 2022-05-03 | Stop reason: SDUPTHER

## 2022-05-03 RX ORDER — KETAMINE HCL IN NACL, ISO-OSM 100MG/10ML
SYRINGE (ML) INJECTION PRN
Status: DISCONTINUED | OUTPATIENT
Start: 2022-05-03 | End: 2022-05-03 | Stop reason: SDUPTHER

## 2022-05-03 RX ORDER — SODIUM CHLORIDE, SODIUM LACTATE, POTASSIUM CHLORIDE, CALCIUM CHLORIDE 600; 310; 30; 20 MG/100ML; MG/100ML; MG/100ML; MG/100ML
INJECTION, SOLUTION INTRAVENOUS CONTINUOUS
Status: DISCONTINUED | OUTPATIENT
Start: 2022-05-03 | End: 2022-05-03 | Stop reason: HOSPADM

## 2022-05-03 RX ORDER — OXYCODONE HYDROCHLORIDE 5 MG/1
10 TABLET ORAL EVERY 4 HOURS PRN
Status: DISCONTINUED | OUTPATIENT
Start: 2022-05-03 | End: 2022-05-04 | Stop reason: HOSPADM

## 2022-05-03 RX ORDER — SUCCINYLCHOLINE/SOD CL,ISO/PF 200MG/10ML
SYRINGE (ML) INTRAVENOUS PRN
Status: DISCONTINUED | OUTPATIENT
Start: 2022-05-03 | End: 2022-05-03 | Stop reason: SDUPTHER

## 2022-05-03 RX ORDER — OXYCODONE HYDROCHLORIDE 5 MG/1
TABLET ORAL
Status: COMPLETED
Start: 2022-05-03 | End: 2022-05-03

## 2022-05-03 RX ORDER — PHENYLEPHRINE HCL IN 0.9% NACL 1 MG/10 ML
SYRINGE (ML) INTRAVENOUS PRN
Status: DISCONTINUED | OUTPATIENT
Start: 2022-05-03 | End: 2022-05-03 | Stop reason: SDUPTHER

## 2022-05-03 RX ADMIN — ROPINIROLE HYDROCHLORIDE 1.5 MG: 1 TABLET, FILM COATED ORAL at 19:52

## 2022-05-03 RX ADMIN — HYDROMORPHONE HYDROCHLORIDE 0.5 MG: 2 INJECTION, SOLUTION INTRAMUSCULAR; INTRAVENOUS; SUBCUTANEOUS at 14:02

## 2022-05-03 RX ADMIN — ONDANSETRON 4 MG: 2 INJECTION INTRAMUSCULAR; INTRAVENOUS at 13:44

## 2022-05-03 RX ADMIN — ACETAMINOPHEN 650 MG: 325 TABLET ORAL at 22:14

## 2022-05-03 RX ADMIN — CALCIUM CARBONATE-VITAMIN D TAB 500 MG-200 UNIT 1 TABLET: 500-200 TAB at 22:14

## 2022-05-03 RX ADMIN — MELATONIN TAB 3 MG 9 MG: 3 TAB at 19:54

## 2022-05-03 RX ADMIN — SODIUM CHLORIDE, POTASSIUM CHLORIDE, SODIUM LACTATE AND CALCIUM CHLORIDE: 600; 310; 30; 20 INJECTION, SOLUTION INTRAVENOUS at 10:28

## 2022-05-03 RX ADMIN — LIDOCAINE HYDROCHLORIDE 100 MG: 20 INJECTION, SOLUTION EPIDURAL; INFILTRATION; INTRACAUDAL; PERINEURAL at 11:58

## 2022-05-03 RX ADMIN — Medication 0.2 MG: at 13:39

## 2022-05-03 RX ADMIN — Medication 100 MCG: at 12:39

## 2022-05-03 RX ADMIN — FENTANYL CITRATE 50 MCG: 50 INJECTION, SOLUTION INTRAMUSCULAR; INTRAVENOUS at 12:26

## 2022-05-03 RX ADMIN — CALCIUM POLYCARBOPHIL 625 MG: 625 TABLET, FILM COATED ORAL at 19:52

## 2022-05-03 RX ADMIN — PROPOFOL 150 MG: 10 INJECTION, EMULSION INTRAVENOUS at 11:58

## 2022-05-03 RX ADMIN — ATORVASTATIN CALCIUM 10 MG: 10 TABLET, FILM COATED ORAL at 19:53

## 2022-05-03 RX ADMIN — Medication 140 MG: at 11:59

## 2022-05-03 RX ADMIN — Medication 100 MCG: at 12:20

## 2022-05-03 RX ADMIN — HYDROMORPHONE HYDROCHLORIDE 0.5 MG: 2 INJECTION, SOLUTION INTRAMUSCULAR; INTRAVENOUS; SUBCUTANEOUS at 14:52

## 2022-05-03 RX ADMIN — VANCOMYCIN HYDROCHLORIDE 1000 MG: 1 INJECTION, POWDER, LYOPHILIZED, FOR SOLUTION INTRAVENOUS at 11:52

## 2022-05-03 RX ADMIN — ACETAMINOPHEN 650 MG: 325 TABLET ORAL at 16:47

## 2022-05-03 RX ADMIN — SODIUM CHLORIDE, POTASSIUM CHLORIDE, SODIUM LACTATE AND CALCIUM CHLORIDE: 600; 310; 30; 20 INJECTION, SOLUTION INTRAVENOUS at 11:54

## 2022-05-03 RX ADMIN — FENTANYL CITRATE 50 MCG: 50 INJECTION, SOLUTION INTRAMUSCULAR; INTRAVENOUS at 11:55

## 2022-05-03 RX ADMIN — Medication 100 MCG: at 13:16

## 2022-05-03 RX ADMIN — POTASSIUM CHLORIDE, DEXTROSE MONOHYDRATE AND SODIUM CHLORIDE 1000 ML: 150; 5; 450 INJECTION, SOLUTION INTRAVENOUS at 16:53

## 2022-05-03 RX ADMIN — OXYCODONE HYDROCHLORIDE 5 MG: 5 TABLET ORAL at 15:11

## 2022-05-03 RX ADMIN — Medication 100 MCG: at 13:05

## 2022-05-03 RX ADMIN — Medication 100 MCG: at 12:58

## 2022-05-03 RX ADMIN — Medication 30 MG: at 12:12

## 2022-05-03 RX ADMIN — Medication 100 MCG: at 12:47

## 2022-05-03 RX ADMIN — SODIUM CHLORIDE, POTASSIUM CHLORIDE, SODIUM LACTATE AND CALCIUM CHLORIDE: 600; 310; 30; 20 INJECTION, SOLUTION INTRAVENOUS at 13:45

## 2022-05-03 RX ADMIN — OXYCODONE 5 MG: 5 TABLET ORAL at 15:11

## 2022-05-03 RX ADMIN — Medication 100 MCG: at 13:39

## 2022-05-03 RX ADMIN — PROPOFOL 50 MG: 10 INJECTION, EMULSION INTRAVENOUS at 12:00

## 2022-05-03 RX ADMIN — SUGAMMADEX 200 MG: 100 INJECTION, SOLUTION INTRAVENOUS at 13:55

## 2022-05-03 RX ADMIN — DEXAMETHASONE SODIUM PHOSPHATE 4 MG: 4 INJECTION, SOLUTION INTRAMUSCULAR; INTRAVENOUS at 12:13

## 2022-05-03 RX ADMIN — Medication 2000 UNITS: at 16:47

## 2022-05-03 RX ADMIN — Medication 100 MCG: at 13:23

## 2022-05-03 RX ADMIN — ROCURONIUM BROMIDE 40 MG: 10 INJECTION, SOLUTION INTRAVENOUS at 12:10

## 2022-05-03 RX ADMIN — POLYETHYLENE GLYCOL 3350 17 G: 17 POWDER, FOR SOLUTION ORAL at 16:48

## 2022-05-03 RX ADMIN — ROCURONIUM BROMIDE 10 MG: 10 INJECTION, SOLUTION INTRAVENOUS at 13:31

## 2022-05-03 RX ADMIN — ROCURONIUM BROMIDE 10 MG: 10 INJECTION, SOLUTION INTRAVENOUS at 12:47

## 2022-05-03 RX ADMIN — Medication 0.2 MG: at 12:20

## 2022-05-03 RX ADMIN — CEFAZOLIN SODIUM 2000 MG: 10 INJECTION, POWDER, FOR SOLUTION INTRAVENOUS at 19:50

## 2022-05-03 ASSESSMENT — PULMONARY FUNCTION TESTS
PIF_VALUE: 19
PIF_VALUE: 19
PIF_VALUE: 2
PIF_VALUE: 19
PIF_VALUE: 2
PIF_VALUE: 18
PIF_VALUE: 1
PIF_VALUE: 20
PIF_VALUE: 20
PIF_VALUE: 1
PIF_VALUE: 20
PIF_VALUE: 2
PIF_VALUE: 20
PIF_VALUE: 19
PIF_VALUE: 18
PIF_VALUE: 19
PIF_VALUE: 17
PIF_VALUE: 22
PIF_VALUE: 12
PIF_VALUE: 17
PIF_VALUE: 19
PIF_VALUE: 18
PIF_VALUE: 0
PIF_VALUE: 18
PIF_VALUE: 18
PIF_VALUE: 2
PIF_VALUE: 16
PIF_VALUE: 20
PIF_VALUE: 19
PIF_VALUE: 3
PIF_VALUE: 20
PIF_VALUE: 19
PIF_VALUE: 19
PIF_VALUE: 18
PIF_VALUE: 19
PIF_VALUE: 35
PIF_VALUE: 18
PIF_VALUE: 19
PIF_VALUE: 0
PIF_VALUE: 19
PIF_VALUE: 19
PIF_VALUE: 16
PIF_VALUE: 16
PIF_VALUE: 1
PIF_VALUE: 19
PIF_VALUE: 18
PIF_VALUE: 3
PIF_VALUE: 20
PIF_VALUE: 10
PIF_VALUE: 20
PIF_VALUE: 19
PIF_VALUE: 1
PIF_VALUE: 19
PIF_VALUE: 19
PIF_VALUE: 2
PIF_VALUE: 20
PIF_VALUE: 16
PIF_VALUE: 18
PIF_VALUE: 20
PIF_VALUE: 19
PIF_VALUE: 5
PIF_VALUE: 19
PIF_VALUE: 19
PIF_VALUE: 18
PIF_VALUE: 19
PIF_VALUE: 19
PIF_VALUE: 5
PIF_VALUE: 3
PIF_VALUE: 19
PIF_VALUE: 20
PIF_VALUE: 19
PIF_VALUE: 20
PIF_VALUE: 19
PIF_VALUE: 2
PIF_VALUE: 19
PIF_VALUE: 8
PIF_VALUE: 20
PIF_VALUE: 17
PIF_VALUE: 19
PIF_VALUE: 16
PIF_VALUE: 19
PIF_VALUE: 20
PIF_VALUE: 16
PIF_VALUE: 20
PIF_VALUE: 18
PIF_VALUE: 19
PIF_VALUE: 15
PIF_VALUE: 19
PIF_VALUE: 18
PIF_VALUE: 18
PIF_VALUE: 9
PIF_VALUE: 19
PIF_VALUE: 0
PIF_VALUE: 19
PIF_VALUE: 20
PIF_VALUE: 19
PIF_VALUE: 18
PIF_VALUE: 18
PIF_VALUE: 4
PIF_VALUE: 18
PIF_VALUE: 17
PIF_VALUE: 19
PIF_VALUE: 20
PIF_VALUE: 19
PIF_VALUE: 17
PIF_VALUE: 17
PIF_VALUE: 19
PIF_VALUE: 5
PIF_VALUE: 0
PIF_VALUE: 2
PIF_VALUE: 2
PIF_VALUE: 19
PIF_VALUE: 2
PIF_VALUE: 19
PIF_VALUE: 20
PIF_VALUE: 19
PIF_VALUE: 20
PIF_VALUE: 21
PIF_VALUE: 19
PIF_VALUE: 20

## 2022-05-03 ASSESSMENT — PAIN DESCRIPTION - ONSET
ONSET: GRADUAL
ONSET: ON-GOING

## 2022-05-03 ASSESSMENT — PAIN SCALES - GENERAL
PAINLEVEL_OUTOF10: 2
PAINLEVEL_OUTOF10: 4
PAINLEVEL_OUTOF10: 5
PAINLEVEL_OUTOF10: 4

## 2022-05-03 ASSESSMENT — PAIN DESCRIPTION - PAIN TYPE
TYPE: SURGICAL PAIN
TYPE: SURGICAL PAIN

## 2022-05-03 ASSESSMENT — PAIN DESCRIPTION - FREQUENCY
FREQUENCY: INTERMITTENT
FREQUENCY: CONTINUOUS

## 2022-05-03 ASSESSMENT — PAIN DESCRIPTION - LOCATION
LOCATION: BACK

## 2022-05-03 ASSESSMENT — PAIN DESCRIPTION - ORIENTATION
ORIENTATION: LOWER

## 2022-05-03 ASSESSMENT — PAIN DESCRIPTION - DESCRIPTORS
DESCRIPTORS: SORE;ACHING
DESCRIPTORS: ACHING;DISCOMFORT

## 2022-05-03 ASSESSMENT — PAIN - FUNCTIONAL ASSESSMENT: PAIN_FUNCTIONAL_ASSESSMENT: NONE - DENIES PAIN

## 2022-05-03 NOTE — PROGRESS NOTES
Incentive Spirometry education and demonstration completed by Respiratory Therapy Yes      Response to education: Excellent     Teaching Time: 5 minutes    Minimum Predicted Vital Capacity - 570 mL. Patient's Actual Vital Capacity - 1200 mL. Turning over to Nursing for routine follow-up Yes.     Comments: IS goal met IS turned to nursing    Electronically signed by Laura Waterman on 5/3/2022 at 5:41 PM

## 2022-05-03 NOTE — CONSULTS
Consult -Internal Medicine  Dr. El Oliveros  5/3/2022      PCP: Zora Martinez MD  Referring Physician: Murali Mahajan MD    Code Status: Full Code  Current Diet: ADULT DIET; Regular      Cc: Lizeth  is a75 y.o. female who presents with Synovial cyst of lumbar spine. Problem list of hospitalization thus far: Active Hospital Problems    Diagnosis Date Noted    Synovial cyst of lumbar spine [M71.38] 05/03/2022     Priority: Medium    GERD (gastroesophageal reflux disease) [K21.9] 05/03/2022     Priority: Medium    High cholesterol [E78.00] 05/03/2022     Priority: Medium    Restless legs [G25.81] 05/03/2022     Priority: Medium     HPI: Lizeth  has been admitted by Murali Mahajan MD with synovial cyst.      Pt presents with with above complaint. Pt has has severe pain to the lower back  Duration - going on for at least 1 year(s)  It is described as a constant pain that is aching in quality. Severity rated as 8 out of 10 at its worst  Pain is so severe that it limits usual activities  No improvement with medications, therapy or injections  As it is not improved with conservative measures, surgery has been recommended    Pt has undergone LEFT LUMBAR4-LUMBAR5 MICRO HEMILAMINECTOMY AND EXCISION OF SYNOVIAL CYST  without any apparent complications. Pt is doing well post operatively. Pain is controlled at this time. I have been asked to see the patient for evaluation of her internal medicine issues:  has a past medical history of Arthritis, Blood transfusion, Blood transfusion, Diverticulosis, H/O irritable bowel syndrome, Hyperlipidemia, Osteopenia, Restless leg syndrome, Seasonal allergies, and Synovial cyst.. Home meds have been reveiwed and restartedas indicated. Pt denies having other complaints at this time.     Pt has been evaluated post operatively  Pain does appear to be controlled - on iv meds  Patient has not worked with therapy at this time      Electronic chart reviewed  Home meds reviewed and restarted as indicated  Op note, anesthesia flowsheet reviewed  Case d/w nursing       Doing ok  Seen in recovery  Pain appears controlled  No issues noted    Review of Systems: (1 system for EPF, 2-9 for detailed, 10+ for comprehensive)  Constitutional: Negative for chills and fever. HENT: Negative for ear pain and mouth sores. Eyes: Negative for pain, redness and visual disturbance. Respiratory: Negative for cough, shortness of breath and wheezing. Cardiovascular: Negative for chest pain and leg swelling. Gastrointestinal: Negative for diarrhea, nausea and vomiting. Endocrine: Negative for polydipsia and polyphagia. Genitourinary: Negative for frequency and urgency. Musculoskeletal: positive  for back pain and negative for neck pain. Skin: Negative for color change. Allergic/Immunologic: Negative for food allergies. Neurological: Negative for seizures and syncope. Hematological: Does not bruise/bleed easily. Psychiatric/Behavioral: Negative for confusion. The patient is not nervous/anxious.              Past Medical History:   Past Medical History:   Diagnosis Date    Arthritis     Blood transfusion 2002    AUTOLOGOUS    Blood transfusion 1968    Diverticulosis     H/O irritable bowel syndrome     Hyperlipidemia     Osteopenia     Restless leg syndrome     Seasonal allergies     Synovial cyst     back       Past Surgical History:   Past Surgical History:   Procedure Laterality Date    APPENDECTOMY      CARPAL TUNNEL RELEASE      LEFT    COLONOSCOPY      FINGER TRIGGER RELEASE      RIGHT - 2 FINGERS    FINGER TRIGGER RELEASE  6-    trigger finger release left long and ring fingers    FRACTURE SURGERY      BILATERAL FEMURS     FRACTURE SURGERY      RIGHT ANKLE/TALUS/TIBIA    HAND SURGERY Left 9/12/2019    LEFT RING FINGER  DEBRIDEMENT INCISION AND DRAINAGE, REMOVAL OF FOREIGN BODY performed [facility]  HARDWARE REMOVAL      HEMORRHOID SURGERY      JOINT REPLACEMENT      RIGHT KNEE    KNEE ARTHROSCOPY      RIGHT AND LEFT MULTIPLE    LUMBAR SPINE SURGERY N/A 5/3/2022    LEFT LUMBAR4-LUMBAR5 MICRO HEMILAMINECTOMY AND EXCISION OF SYNOVIAL CYST (53536, [address]345) performed by Nghia Snowden MD at [facility]      spinal ablations        Social History:   Social History     Tobacco History     Smoking Status  Never Smoker    Smokeless Tobacco Use  Never Used          Alcohol History     Alcohol Use Status  Yes Comment  occasional glass of wine with dinner          Drug Use     Drug Use Status  No          Sexual Activity     Sexually Active  Not Asked                Fam History:   Family History   Problem Relation Age of Onset    Cancer Father         PROSTATE    Heart Disease Mother         CHF       PFSH: The above PMHx, PSHx, SocHx, FamHx has been reviewed by myself. (1 area for detailed, 2-3 for comprehensive)      Code Status: Full Code    Meds - following list ofhome medications from electronic chart has been reviewed by myself  Prior to Admission medications    Medication Sig Start Date End Date Taking? Authorizing Provider   BIOTIN PO Take by mouth daily   Yes Historical Provider, MD   Amoxicillin 500 MG TABS Take 4 (four) tablets 1 (one) hour prior to dental appointment. 1/3/22   Historical Provider, MD   Calcium Polycarbophil (FIBER) 625 MG TABS Take 625 mg by mouth 2 times daily    Historical Provider, MD   omeprazole (PRILOSEC) 10 MG delayed release capsule Take 10 mg by mouth every other day     Historical Provider, MD   Cholecalciferol (VITAMIN D) 50 MCG (2000 UT) CAPS capsule Take 1 capsule by mouth daily    Historical Provider, MD   Probiotic Product (PROBIOTIC-10 PO) Take 1 capsule by mouth daily    Historical Provider, MD   Melatonin 5 MG CAPS Take 10 mg by mouth nightly     Historical Provider, MD   simvastatin (ZOCOR) 20 MG tablet Take 20 mg by mouth nightly. Historical Provider, MD   ropinirole (REQUIP) 0.5 MG tablet Take 1.5 mg by mouth nightly     Historical Provider, MD   diclofenac (VOLTAREN) 75 MG EC tablet Take 75 mg by mouth 2 times daily as needed Takes 1-2 per week, only takes on really \"achy\" days    Historical Provider, MD   Calcium Carbonate-Vitamin D (CALCIUM PLUS VITAMIN D) 600-100 MG-UNIT CAPS Take 1 tablet by mouth nightly     Historical Provider, MD   Multiple Vitamin (MULTIVITAMIN PO) Take 1 tablet by mouth daily. Historical Provider, MD         Allergies   Allergen Reactions    Sulfa Antibiotics Rash     Face red,flushed             EXAM: (2-7 system forEPF/Detailed, ?8 for Comprehensive)  BP (!) 149/76   Pulse 63   Temp 97.3 °F (36.3 °C) (Oral)   Resp 16   Ht 5' 5.5\" (1.664 m)   Wt 166 lb 12.8 oz (75.7 kg)   SpO2 95%   BMI 27.33 kg/m²   Constitutional: vitals as above: alert, appears stated age and cooperative    Psychiatric: normal insight and judgment, oriented to person, place, time, and general circumstances    Head: Normocephalic, without obvious abnormality, atraumatic    Eyes:lids and lashes normal, conjunctivae and sclerae normal and pupils equal, round, reactive to light and accomodation    EMNT: external ears normal, nares midline    Neck: no carotid bruit, supple, symmetrical, trachea midline and thyroid not enlarged, symmetric, no tenderness/mass/nodules     Respiratory: clear to auscultation and percussion bilaterally with normal respiratory effort    Cardiovascular: normal rate, regular rhythm, normal S1 and S2 and no murmurs    Gastrointestinal: soft, non-tender, non-distended, normal bowel sounds, no masses or organomegaly    Extremities: no clubbing, no edema    Skin:No rashes or nodules noted. Neurologic:negative           LABS:  Labs Reviewed   TYPE AND SCREEN         IMAGING:  Imaging has been reviewed in the computerized chart  XR LUMBAR SPINE 1 VW    Result Date: 5/3/2022  Radiology exam is complete.  No Radiologist dictation. Please follow up with ordering provider. FLUORO FOR SURGICAL PROCEDURES    Result Date: 5/3/2022  Radiology exam is complete. No Radiologist dictation. Please follow up with ordering provider. EKG: reviewed if available      Lab Results   Component Value Date    GLUCOSE 154 04/06/2022     No results found for: POCGLU  BP (!) 149/76   Pulse 63   Temp 97.3 °F (36.3 °C) (Oral)   Resp 16   Ht 5' 5.5\" (1.664 m)   Wt 166 lb 12.8 oz (75.7 kg)   SpO2 95%   BMI 27.33 kg/m²     MEDICAL DECISION MAKING:    Principal Problem:    Synovial cyst of lumbar spine -New Problem to me. Doing well immediately post op  Plan: Continue on post-operative pathway. PT/OT to see patient. Continue pain control - on IV pain meds acutely post op. Work on transitioning to oral pain meds when possible. Will follow serial h/h to monitor for acute blood loss anemia - labs ordered for tomorrow. Active Problems:    GERD (gastroesophageal reflux disease) -Established problem. Stable. No complaints  Plan: cont on ppi    High cholesterol -Established problem. Stable. Plan: cont statin, monitor for myalgias    Restless legs -Established problem. Stable. Plan: cont home meds [facility]        Diagnoses as listed above, designated as new or established and plan outlined for each. Data Reviewed:   (1) Lab tests were reviewed or ordered. (1) Radiology tests were reviewed or ordered. (1) Medical test (Echo, EKG, PFT/zuri) were not ordered. (1) History was not obtained from someone other than patient  (1) Old records  were reviewed - see HPI/MDM for pertinent details if review done. (2) Case was discussed with another health care provider: Dr Nathalie Mtz  (2) Imaging was viewed by myself. (2) EKG  was not viewed by myself. Thanks for the consult! Will follow along daily while patient is in house.       (Please note that portions of this note were completed with a voice recognition program.  Efforts were made to edit the dictations but occasionally words are mis-transcribed.)      Avni Dunn MD  5/3/2022

## 2022-05-03 NOTE — PROGRESS NOTES
Admission and shift assessment completed. Neuro WNL. VSS. Patient is alert and oriented x4.  at bed side. Reports pain 2/10 to lower back. Surgical incision to lower back; CDI; no drainage noted; EMILIA incision- dressing in place. The care plan and education reviewed and mutually agreed upon with the patient. Standard safety measures in place. Will continue to monitor patient.

## 2022-05-03 NOTE — PROGRESS NOTES
Phase I discharge criteria met,  Pt is awake and alert, VSS, O2 sats 96% on RA. Pt reports pain is tolerable.   Pt tolerating PO, no c/o N&V  Pt will transfer to 4T in stable condition

## 2022-05-03 NOTE — PROGRESS NOTES
Pt transferred from OR to pacu. VSS. O2 sats 96% with oral airway and simple mask at 4L. Pt responding to verbal stimuli.   Will monitor

## 2022-05-03 NOTE — ANESTHESIA PRE PROCEDURE
Department of Anesthesiology  Preprocedure Note       Name:  Wing Torres   Age:  77 y.o.  :  1945                                          MRN:  [id number]         Date:  5/3/2022      Surgeon: Vilma Hancock):  Severiano Speak, MD    Procedure: Procedure(s):  LUMBAR4-LUMBAR5 MICRO HEMILAMINECTOMY AND EXCISION OF SYNOVIAL CYST (73634, 39276)    Medications prior to admission:   Prior to Admission medications    Medication Sig Start Date End Date Taking? Authorizing Provider   BIOTIN PO Take by mouth daily   Yes Historical Provider, MD   Amoxicillin 500 MG TABS Take 4 (four) tablets 1 (one) hour prior to dental appointment. 1/3/22   Historical Provider, MD   Calcium Polycarbophil (FIBER) 625 MG TABS Take 625 mg by mouth 2 times daily    Historical Provider, MD   omeprazole (PRILOSEC) 10 MG delayed release capsule Take 10 mg by mouth every other day     Historical Provider, MD   Cholecalciferol (VITAMIN D) 50 MCG ( UT) CAPS capsule Take 1 capsule by mouth daily    Historical Provider, MD   Probiotic Product (PROBIOTIC-10 PO) Take 1 capsule by mouth daily    Historical Provider, MD   Melatonin 5 MG CAPS Take 10 mg by mouth nightly     Historical Provider, MD   simvastatin (ZOCOR) 20 MG tablet Take 20 mg by mouth nightly. Historical Provider, MD   ropinirole (REQUIP) 0.5 MG tablet Take 1.5 mg by mouth nightly     Historical Provider, MD   diclofenac (VOLTAREN) 75 MG EC tablet Take 75 mg by mouth 2 times daily as needed Takes 1-2 per week, only takes on really \"achy\" days    Historical Provider, MD   Calcium Carbonate-Vitamin D (CALCIUM PLUS VITAMIN D) 600-100 MG-UNIT CAPS Take 1 tablet by mouth nightly     Historical Provider, MD   Multiple Vitamin (MULTIVITAMIN PO) Take 1 tablet by mouth daily.       Historical Provider, MD       Current medications:    Current Facility-Administered Medications   Medication Dose Route Frequency Provider Last Rate Last Admin    lactated ringers infusion   IntraVENous Continuous Cathy Solo MD 50 mL/hr at 05/03/22 1028 New Bag at 05/03/22 1028    lidocaine PF 1 % injection 0.5 mL  0.5 mL IntraDERmal Once Cathy Solo MD        vancomycin 1000 mg IVPB in 250 mL D5W addavial  1,000 mg IntraVENous Once Cathy Solo MD        HYDROmorphone (DILAUDID) injection 0.25 mg  0.25 mg IntraVENous Q5 Min PRN Gilberto Barr MD        HYDROmorphone (DILAUDID) injection 0.5 mg  0.5 mg IntraVENous Q5 Min PRN Gilberto Barr MD        oxyCODONE (ROXICODONE) immediate release tablet 5 mg  5 mg Oral Once PRN Gilberto Barr MD        ondansetron [facility]) injection 4 mg  4 mg IntraVENous Once PRN Gilberto Barr MD        labetalol (NORMODYNE;TRANDATE) injection 5 mg  5 mg IntraVENous Q15 Min PRN Gilberto Barr MD        lidocaine PF 1 % injection 1 mL  1 mL IntraDERmal Once PRN Gilberto Barr MD        0.9 % sodium chloride infusion   IntraVENous Continuous Gilberto Barr MD        0.9 % sodium chloride infusion   IntraVENous Continuous Gilberto Barr MD           Allergies:     Allergies   Allergen Reactions    Sulfa Antibiotics Rash     Face red,flushed       Problem List:    Patient Active Problem List   Diagnosis Code    Acute foreign body of hand, left, initial encounter X94.179J       Past Medical History:        Diagnosis Date    Arthritis     Blood transfusion 2002    AUTOLOGOUS    Blood transfusion 1968    Diverticulosis     H/O irritable bowel syndrome     Hyperlipidemia     Osteopenia     Restless leg syndrome     Seasonal allergies     Synovial cyst     back       Past Surgical History:        Procedure Laterality Date    APPENDECTOMY      CARPAL TUNNEL RELEASE      LEFT    COLONOSCOPY      FINGER TRIGGER RELEASE      RIGHT - 2 FINGERS    FINGER TRIGGER RELEASE  6-    trigger finger release left long and ring fingers    FRACTURE SURGERY      BILATERAL FEMURS     FRACTURE SURGERY      RIGHT ANKLE/TALUS/TIBIA    HAND SURGERY Left 9/12/2019    LEFT RING FINGER  DEBRIDEMENT INCISION AND DRAINAGE, REMOVAL OF FOREIGN BODY performed by Eloina Perea MD at [address]      JOINT REPLACEMENT      RIGHT KNEE    KNEE ARTHROSCOPY      RIGHT AND LEFT MULTIPLE    OTHER SURGICAL HISTORY      spinal ablations        Social History:    Social History     Tobacco Use    Smoking status: Never Smoker    Smokeless tobacco: Never Used   Substance Use Topics    Alcohol use: Yes     Comment: occasional glass of wine with dinner                                Counseling given: Not Answered      Vital Signs (Current):   Vitals:    04/05/22 1637 05/03/22 1000   BP:  (!) 172/72   Pulse:  65   Resp:  18   Temp:  98.1 °F (36.7 °C)   TempSrc:  Temporal   SpO2:  99%   Weight: 168 lb (76.2 kg) 166 lb 12.8 oz (75.7 kg)   Height: 5' 5.5\" (1.664 m)                                               BP Readings from Last 3 Encounters:   05/03/22 (!) 172/72   05/11/20 115/72   09/12/19 (!) 114/55       NPO Status: Time of last liquid consumption: 2200                        Time of last solid consumption: 1800                        Date of last liquid consumption: 05/02/22                        Date of last solid food consumption: 05/02/22    BMI:   Wt Readings from Last 3 Encounters:   05/03/22 166 lb 12.8 oz (75.7 kg)   03/08/22 171 lb 6.4 oz (77.7 kg)   01/04/22 169 lb (76.7 kg)     Body mass index is 27.33 kg/m².     CBC:   Lab Results   Component Value Date    WBC 5.7 04/06/2022    RBC 4.13 04/06/2022    HGB 12.7 04/06/2022    HCT 38.0 04/06/2022    MCV 91.9 04/06/2022    RDW 13.8 04/06/2022     04/06/2022       CMP:   Lab Results   Component Value Date     04/06/2022    K 3.9 04/06/2022     04/06/2022    CO2 24 04/06/2022    BUN 12 04/06/2022    CREATININE 0.7 04/06/2022    GFRAA >60 04/06/2022    AGRATIO 1.5 01/10/2018    LABGLOM >60 04/06/2022    GLUCOSE 154 04/06/2022    PROT 6.6 01/10/2018    CALCIUM 9.8 04/06/2022    BILITOT 0.6 01/10/2018    ALKPHOS 43 01/10/2018    AST 19 01/10/2018    ALT 12 01/10/2018       POC Tests: No results for input(s): POCGLU, POCNA, POCK, POCCL, POCBUN, POCHEMO, POCHCT in the last 72 hours. Coags:   Lab Results   Component Value Date    PROTIME 11.7 04/06/2022    INR 1.03 04/06/2022    APTT 33.9 04/06/2022       HCG (If Applicable): No results found for: PREGTESTUR, PREGSERUM, HCG, HCGQUANT     ABGs: No results found for: PHART, PO2ART, QKE7MIT, SJJ9AIN, BEART, C8GVHQBX     Type & Screen (If Applicable):  No results found for: LABABO, LABRH    Drug/Infectious Status (If Applicable):  No results found for: HIV, HEPCAB    COVID-19 Screening (If Applicable): No results found for: COVID19        Anesthesia Evaluation  Patient summary reviewed and Nursing notes reviewed no history of anesthetic complications:   Airway: Mallampati: II  TM distance: >3 FB   Neck ROM: full  Mouth opening: > = 3 FB Dental: normal exam         Pulmonary:Negative Pulmonary ROS       (-) wheezes                           Cardiovascular:  Exercise tolerance: good (>4 METS),       (-) CABG/stent, dysrhythmias and  angina    ECG reviewed  Rhythm: regular  Rate: normal                    Neuro/Psych:      (-) seizures, TIA and CVA            ROS comment: Patient does not want versed GI/Hepatic/Renal:        (-) GERD       Endo/Other:        (-) hypothyroidism, hyperthyroidism               Abdominal:             Vascular: negative vascular ROS. Other Findings:             Anesthesia Plan      general     ASA 2       Induction: intravenous. MIPS: Postoperative opioids intended and Prophylactic antiemetics administered. Anesthetic plan and risks discussed with patient. Plan discussed with CRNA.                   Sulaiman Quesada MD   5/3/2022

## 2022-05-03 NOTE — BRIEF OP NOTE
Brief Postoperative Note      Patient: Jarred Vasquez  YOB: 1945  MRN: [id number]    Date of Procedure: 5/3/2022    Pre-Op Diagnosis: M71.38  SYNOVIAL CYST    Post-Op Diagnosis: Same       Procedure(s):  LEFT LUMBAR4-LUMBAR5 MICRO HEMILAMINECTOMY AND EXCISION OF SYNOVIAL CYST (69500, 35844)    Surgeon(s):  Jese Durham MD    Assistant:  First Assistant: Carmel Iyer; [facility]    Anesthesia: General    Estimated Blood Loss (mL): less than 50     Complications: None    Specimens:   * No specimens in log *    Implants:  * No implants in log *      Drains: * No LDAs found *    Findings: L5 synovial cyst    Electronically signed by Jese Durham MD on 5/3/2022 at 2:02 PM

## 2022-05-03 NOTE — PROGRESS NOTES
Consult noted  See orders    Active Hospital Problems    Diagnosis Date Noted    Synovial cyst of lumbar spine [M71.38] 05/03/2022     Priority: Medium    GERD (gastroesophageal reflux disease) [K21.9] 05/03/2022     Priority: Medium    High cholesterol [E78.00] 05/03/2022     Priority: Medium    Restless legs [G25.81] 05/03/2022     Priority: Medium

## 2022-05-03 NOTE — H&P
Date of Surgery Update:  Seferino Hankins was seen, history and physical examination reviewed, and patient examined by me today. There have been no significant clinical changes since the completion of the previous history and physical.    The risk, benefits, and alternatives of the proposed procedure have been explained to the patient (or appropriate guardian) and understanding verbalized. All questions answered. Patient wishes to proceed.     Electronically signed by: Caitie Garcias MD,5/3/2022,11:18 AM

## 2022-05-03 NOTE — CARE COORDINATION
Discharge Planning Note:      Chart reviewed and it appears that patient has minimal needs for discharge at this time. Discussed with patient and requested that case management be notified if discharge needs are identified.     - Current discharge plan is for the patient to return home with no needs. Case management will continue to follow progress and update discharge plan as needed.       Risk of Readmission Score: 4%    TINY Burkett RN    [facility]  Phone: [phone]

## 2022-05-03 NOTE — ANESTHESIA POSTPROCEDURE EVALUATION
Department of Anesthesiology  Postprocedure Note    Patient: Vinny Mei  MRN: [id number]  YOB: 1945  Date of evaluation: 5/3/2022  Time:  2:19 PM     Procedure Summary     Date: 05/03/22 Room / Location: [address]    Anesthesia Start: 6766 Anesthesia Stop:     Procedure: LEFT LUMBAR4-LUMBAR5 MICRO HEMILAMINECTOMY AND EXCISION OF SYNOVIAL CYST (95364, [id number]) (N/A Back) Diagnosis: (M71.38  SYNOVIAL CYST)    Surgeons: Haven Devlin MD Responsible Provider: Len Eller MD    Anesthesia Type: general ASA Status: 2          Anesthesia Type: No value filed. Jose G Phase I: Jose G Score: 10    Jose G Phase II:      Last vitals: Reviewed and per EMR flowsheets.        Anesthesia Post Evaluation    Patient location during evaluation: PACU  Patient participation: complete - patient participated  Level of consciousness: awake and alert  Pain score: 2  Airway patency: patent  Nausea & Vomiting: no vomiting  Complications: no  Cardiovascular status: blood pressure returned to baseline  Respiratory status: acceptable  Hydration status: euvolemic  Multimodal analgesia pain management approach

## 2022-05-03 NOTE — OP NOTE
[facility] OR  5/3/2022 2:14 PM    PATIENT NAME:          Sam Singh  YOB: 1945   MEDICAL RECORD#         [id number]  SURGERY DATE:         5/3/2022  SURGEON:                 Rudy Naik MD                                                      OPERATIVE REPORT     PREOPERATIVE DIAGNOSIS: synovial cyst L4-5 on the left            POSTOPERATIVE DIAGNOSIS:    same    PROCEDURES PERFORMED:  1. Left L4-5 hemilaminotomy and excision of synovial cyst  2. Microdissection using the operating room microscope. ANESTHESIA:  General    ESTIMATED BLOOD LOSS:  75 cc    INDICATIONS FOR SURGERY:   Sam Singh is a 77 y.o. female with back and leg pain. The symptoms failed to respond to conservative intervention. An MRI scan was performed and this showed evidence of a synovial cyst at the L4-5 level on the left. Having failed conservative management and experiencing persistent symptoms, the patient elected to proceed ahead with the surgical option of surgery  at L4-5. DETAILS OF PROCEDURE:  The patient was brought to the operating room and placed under general anesthesia. The patient was then placed prone on a Kanu frame. All bony prominences were inspected and padded prior to sterile draping. Using a #15 blade knife, the skin was incised in the midline and monopolar cautery was used to dissect through the subcutaneous tissue to open the fascia and reflect the paraspinal muscles laterally exposing the L4-5 interspace on the left side. The microscope was then brought into the field and used to assist with performing a microsurgical diskectomy at this level. Using the Midas Matthieu drill, I burred down the hemilamina of  L4 and a more significant portion of the inferior L5 lamina. I exposed beyond the pedicle of L5 where the cyst was located.   The underlying ligamentum flavum was freed with the micronerve hook from the underlying dura and removed with the 2 mm punch laterally, exposing the lateral dural tube and takeoff of the L5 nerve root. The L5 nerve root was noted to be  Displaced by a large synovial cyst. .  I gently retracted the nerve root  And meticulously used microdissection to free the nerve root from the cyst, which was densely adherent throughout. The cyst was totally removed in a piecemeal fashion. The wound was copiously irrigated with antibiotic solution. Duramorph paste was placed in the epidural space and 0.5% Marcaine in subcutaneous tissues for analgesia. The fascia was then reapproximated using interrupted 0 Vicryl sutures and interrupted 3-0 Vicryl sutures were used to reapproximate the subcuticular layer. A sterile dressing was then applied. The patient was extubated in the operating room and transferred to the recovery room in stable condition. There were no complications. In accordance with CMS guidelines, I attest that I was present for the entire procedure from the creation of the skin incision to the closure.

## 2022-05-04 VITALS
WEIGHT: 166.8 LBS | BODY MASS INDEX: 27.79 KG/M2 | HEIGHT: 65 IN | HEART RATE: 50 BPM | OXYGEN SATURATION: 95 % | TEMPERATURE: 97.7 F | SYSTOLIC BLOOD PRESSURE: 133 MMHG | DIASTOLIC BLOOD PRESSURE: 61 MMHG | RESPIRATION RATE: 17 BRPM

## 2022-05-04 PROBLEM — D62 ACUTE BLOOD LOSS AS CAUSE OF POSTOPERATIVE ANEMIA: Status: ACTIVE | Noted: 2022-05-04

## 2022-05-04 LAB
ANION GAP SERPL CALCULATED.3IONS-SCNC: 11 MMOL/L (ref 3–16)
BASOPHILS ABSOLUTE: 0.1 K/UL (ref 0–0.2)
BASOPHILS RELATIVE PERCENT: 0.6 %
BUN BLDV-MCNC: 12 MG/DL (ref 7–20)
CALCIUM SERPL-MCNC: 9.6 MG/DL (ref 8.3–10.6)
CHLORIDE BLD-SCNC: 104 MMOL/L (ref 99–110)
CO2: 23 MMOL/L (ref 21–32)
CREAT SERPL-MCNC: 0.6 MG/DL (ref 0.6–1.2)
EOSINOPHILS ABSOLUTE: 0 K/UL (ref 0–0.6)
EOSINOPHILS RELATIVE PERCENT: 0 %
GFR AFRICAN AMERICAN: >60
GFR NON-AFRICAN AMERICAN: >60
GLUCOSE BLD-MCNC: 146 MG/DL (ref 70–99)
HCT VFR BLD CALC: 33.1 % (ref 36–48)
HEMOGLOBIN: 11 G/DL (ref 12–16)
LYMPHOCYTES ABSOLUTE: 1.3 K/UL (ref 1–5.1)
LYMPHOCYTES RELATIVE PERCENT: 13 %
MCH RBC QN AUTO: 30.1 PG (ref 26–34)
MCHC RBC AUTO-ENTMCNC: 33.2 G/DL (ref 31–36)
MCV RBC AUTO: 90.6 FL (ref 80–100)
MONOCYTES ABSOLUTE: 0.6 K/UL (ref 0–1.3)
MONOCYTES RELATIVE PERCENT: 5.5 %
NEUTROPHILS ABSOLUTE: 8.4 K/UL (ref 1.7–7.7)
NEUTROPHILS RELATIVE PERCENT: 80.9 %
PDW BLD-RTO: 13.4 % (ref 12.4–15.4)
PLATELET # BLD: 212 K/UL (ref 135–450)
PMV BLD AUTO: 8.7 FL (ref 5–10.5)
POTASSIUM SERPL-SCNC: 4.4 MMOL/L (ref 3.5–5.1)
RBC # BLD: 3.65 M/UL (ref 4–5.2)
SODIUM BLD-SCNC: 138 MMOL/L (ref 136–145)
WBC # BLD: 10.4 K/UL (ref 4–11)

## 2022-05-04 PROCEDURE — 94760 N-INVAS EAR/PLS OXIMETRY 1: CPT

## 2022-05-04 PROCEDURE — 2500000003 HC RX 250 WO HCPCS: Performed by: NEUROLOGICAL SURGERY

## 2022-05-04 PROCEDURE — 6370000000 HC RX 637 (ALT 250 FOR IP): Performed by: NEUROLOGICAL SURGERY

## 2022-05-04 PROCEDURE — 36415 COLL VENOUS BLD VENIPUNCTURE: CPT

## 2022-05-04 PROCEDURE — 6360000002 HC RX W HCPCS: Performed by: NEUROLOGICAL SURGERY

## 2022-05-04 PROCEDURE — 80048 BASIC METABOLIC PNL TOTAL CA: CPT

## 2022-05-04 PROCEDURE — 97161 PT EVAL LOW COMPLEX 20 MIN: CPT

## 2022-05-04 PROCEDURE — 85025 COMPLETE CBC W/AUTO DIFF WBC: CPT

## 2022-05-04 PROCEDURE — 97165 OT EVAL LOW COMPLEX 30 MIN: CPT

## 2022-05-04 RX ORDER — OXYCODONE HYDROCHLORIDE 5 MG/1
5 TABLET ORAL EVERY 8 HOURS PRN
Qty: 5 TABLET | Refills: 0 | Status: SHIPPED | OUTPATIENT
Start: 2022-05-04 | End: 2022-05-07

## 2022-05-04 RX ADMIN — CEFAZOLIN SODIUM 2000 MG: 10 INJECTION, POWDER, FOR SOLUTION INTRAVENOUS at 03:02

## 2022-05-04 RX ADMIN — IBUPROFEN 600 MG: 600 TABLET ORAL at 01:40

## 2022-05-04 RX ADMIN — POTASSIUM CHLORIDE, DEXTROSE MONOHYDRATE AND SODIUM CHLORIDE 1000 ML: 150; 5; 450 INJECTION, SOLUTION INTRAVENOUS at 03:01

## 2022-05-04 RX ADMIN — POLYETHYLENE GLYCOL 3350 17 G: 17 POWDER, FOR SOLUTION ORAL at 08:47

## 2022-05-04 RX ADMIN — CALCIUM POLYCARBOPHIL 625 MG: 625 TABLET, FILM COATED ORAL at 08:47

## 2022-05-04 RX ADMIN — ACETAMINOPHEN 650 MG: 325 TABLET ORAL at 08:47

## 2022-05-04 RX ADMIN — ACETAMINOPHEN 650 MG: 325 TABLET ORAL at 03:03

## 2022-05-04 RX ADMIN — IBUPROFEN 600 MG: 600 TABLET ORAL at 11:28

## 2022-05-04 RX ADMIN — Medication 2000 UNITS: at 08:47

## 2022-05-04 ASSESSMENT — PAIN DESCRIPTION - LOCATION: LOCATION: BACK

## 2022-05-04 ASSESSMENT — PAIN SCALES - GENERAL
PAINLEVEL_OUTOF10: 0
PAINLEVEL_OUTOF10: 1
PAINLEVEL_OUTOF10: 3
PAINLEVEL_OUTOF10: 3
PAINLEVEL_OUTOF10: 4
PAINLEVEL_OUTOF10: 1

## 2022-05-04 ASSESSMENT — PAIN DESCRIPTION - ONSET: ONSET: ON-GOING

## 2022-05-04 ASSESSMENT — PAIN DESCRIPTION - DESCRIPTORS: DESCRIPTORS: SORE

## 2022-05-04 ASSESSMENT — PAIN DESCRIPTION - FREQUENCY: FREQUENCY: CONTINUOUS

## 2022-05-04 ASSESSMENT — PAIN DESCRIPTION - ORIENTATION: ORIENTATION: LOWER

## 2022-05-04 ASSESSMENT — PAIN DESCRIPTION - PAIN TYPE: TYPE: SURGICAL PAIN

## 2022-05-04 NOTE — PLAN OF CARE
Problem: Discharge Planning  Goal: Discharge to home or other facility with appropriate resources  5/4/2022 0931 by Golden Montgomery RN  Outcome: Progressing  5/3/2022 2247 by Carlie Mckeon RN  Outcome: Progressing  Flowsheets (Taken 5/3/2022 1957)  Discharge to home or other facility with appropriate resources:   Identify barriers to discharge with patient and caregiver   Arrange for needed discharge resources and transportation as appropriate   Identify discharge learning needs (meds, wound care, etc)   Refer to discharge planning if patient needs post-hospital services based on physician order or complex needs related to functional status, cognitive ability or social support system     Problem: ABCDS Injury Assessment  Goal: Absence of physical injury  5/4/2022 0931 by Golden Montgomery RN  Outcome: Progressing  5/3/2022 2247 by Carlie Mckeon RN  Outcome: Progressing  [address] (Taken 5/3/2022 1712 by Kya Betancourt RN)  Absence of Physical Injury: Implement safety measures based on patient assessment     Problem: Pain  Goal: Verbalizes/displays adequate comfort level or baseline comfort level  5/4/2022 0931 by Golden Montgomery RN  Outcome: Progressing  5/3/2022 2247 by Carlie Mckeon RN  Outcome: Progressing  Flowsheets  Taken 5/3/2022 2214 by Carlie Mckeon RN  Verbalizes/displays adequate comfort level or baseline comfort level:   Encourage patient to monitor pain and request assistance   Assess pain using appropriate pain scale   Administer analgesics based on type and severity of pain and evaluate response   Implement non-pharmacological measures as appropriate and evaluate response   Consider cultural and social influences on pain and pain management   Notify Licensed Independent Practitioner if interventions unsuccessful or patient reports new pain  Taken 5/3/2022 1625 by Kya Betancourt RN  Verbalizes/displays adequate comfort level or baseline comfort level: Encourage patient to monitor pain and request assistance     Problem: Skin/Tissue Integrity - Adult  Goal: Skin integrity remains intact  5/4/2022 0931 by Vini Mancilla RN  Outcome: Progressing  5/3/2022 2247 by Kulwant Pfeiffer RN  Outcome: Progressing  Flowsheets  Taken 5/3/2022 1957 by Kulwant Pfeiffer RN  Skin Integrity Remains Intact:   Monitor for areas of redness and/or skin breakdown   Assess vascular access sites hourly   Every 4-6 hours minimum: Change oxygen saturation probe site   Every 4-6 hours: If on nasal continuous positive airway pressure, respiratory therapy assesses nares and determine need for appliance change or resting period  Taken 5/3/2022 1557 by Barbi Singer RN  Skin Integrity Remains Intact: Monitor for areas of redness and/or skin breakdown  Goal: Incisions, wounds, or drain sites healing without S/S of infection  5/4/2022 0931 by Vini Mancilla RN  Outcome: Progressing  5/3/2022 2247 by Kulwant Pfeiffer RN  Outcome: Progressing  Flowsheets  Taken 5/3/2022 1957 by Kulwant Pfeiffer RN  Incisions, Wounds, or Drain Sites Healing Without Sign and Symptoms of Infection:   ADMISSION and DAILY: Assess and document risk factors for pressure ulcer development   TWICE DAILY: Assess and document skin integrity   TWICE DAILY: Assess and document dressing/incision, wound bed, drain sites and surrounding tissue   Implement wound care per orders   Initiate isolation precautions as appropriate   Initiate pressure ulcer prevention bundle as indicated  Taken 5/3/2022 1557 by Barbi Singer RN  Incisions, Wounds, or Drain Sites Healing Without Sign and Symptoms of Infection: ADMISSION and DAILY: Assess and document risk factors for pressure ulcer development  Goal: Oral mucous membranes remain intact  5/4/2022 0931 by Vini Mancilla RN  Outcome: Progressing  5/3/2022 2247 by Kulwant Pfeiffer RN  Outcome: Progressing  Flowsheets (Taken 5/3/2022 1957)  Oral Mucous Membranes Remain Intact:   Assess oral mucosa and hygiene practices   Implement preventative oral hygiene regimen   Implement oral medicated treatments as ordered

## 2022-05-04 NOTE — PLAN OF CARE
Problem: Discharge Planning  Goal: Discharge to home or other facility with appropriate resources  Outcome: Progressing  Flowsheets (Taken 5/3/2022 1957)  Discharge to home or other facility with appropriate resources:   Identify barriers to discharge with patient and caregiver   Arrange for needed discharge resources and transportation as appropriate   Identify discharge learning needs (meds, wound care, etc)   Refer to discharge planning if patient needs post-hospital services based on physician order or complex needs related to functional status, cognitive ability or social support system     Problem: Pain  Goal: Verbalizes/displays adequate comfort level or baseline comfort level  Outcome: Progressing  Flowsheets  Taken 5/3/2022 2214 by Andi Smith RN  Verbalizes/displays adequate comfort level or baseline comfort level:   Encourage patient to monitor pain and request assistance   Assess pain using appropriate pain scale   Administer analgesics based on type and severity of pain and evaluate response   Implement non-pharmacological measures as appropriate and evaluate response   Consider cultural and social influences on pain and pain management   Notify Licensed Independent Practitioner if interventions unsuccessful or patient reports new pain  Taken 5/3/2022 1625 by Jacqui Pringle RN  Verbalizes/displays adequate comfort level or baseline comfort level: Encourage patient to monitor pain and request assistance     Problem: ABCDS Injury Assessment  Goal: Absence of physical injury  Outcome: Progressing  Flowsheets (Taken 5/3/2022 1712 by Jacqui Pringle RN)  Absence of Physical Injury: Implement safety measures based on patient assessment     Problem: Skin/Tissue Integrity - Adult  Goal: Skin integrity remains intact  Outcome: Progressing  Flowsheets  Taken 5/3/2022 1957 by Andi Smith RN  Skin Integrity Remains Intact:   Monitor for areas of redness and/or skin breakdown   Assess vascular access sites hourly   Every 4-6 hours minimum: Change oxygen saturation probe site   Every 4-6 hours: If on nasal continuous positive airway pressure, respiratory therapy assesses nares and determine need for appliance change or resting period  Taken 5/3/2022 1557 by Kwesi Vega RN  Skin Integrity Remains Intact: Monitor for areas of redness and/or skin breakdown  Goal: Incisions, wounds, or drain sites healing without S/S of infection  Outcome: Progressing  Flowsheets  Taken 5/3/2022 1957 by Joseph Gómez RN  Incisions, Wounds, or Drain Sites Healing Without Sign and Symptoms of Infection:   ADMISSION and DAILY: Assess and document risk factors for pressure ulcer development   TWICE DAILY: Assess and document skin integrity   TWICE DAILY: Assess and document dressing/incision, wound bed, drain sites and surrounding tissue   Implement wound care per orders   Initiate isolation precautions as appropriate   Initiate pressure ulcer prevention bundle as indicated  Taken 5/3/2022 1557 by Kwesi Vega RN  Incisions, Wounds, or Drain Sites Healing Without Sign and Symptoms of Infection: ADMISSION and DAILY: Assess and document risk factors for pressure ulcer development  Goal: Oral mucous membranes remain intact  5/3/2022 2247 by Joseph Gómez RN  Outcome: Progressing  Flowsheets (Taken 5/3/2022 1957)  Oral Mucous Membranes Remain Intact:   Assess oral mucosa and hygiene practices   Implement preventative oral hygiene regimen   Implement oral medicated treatments as ordered  5/3/2022 1633 by Kwesi Vega RN  Outcome: Not Progressing

## 2022-05-04 NOTE — PROGRESS NOTES
[facility]   Phone: [phone]    Physical Therapy    [x] Initial Evaluation            [] Daily Treatment Note         [x] Discharge Summary      Patient: Angie Ruelas   : 1945   MRN: [id number]   Date of Service:  2022  Admitting Diagnosis: Synovial cyst of lumbar spine  Current Admission Summary: Left L4-5 hemilaminotomy and excision of synovial cyst  Past Medical History:  has a past medical history of Arthritis, Blood transfusion, Blood transfusion, Diverticulosis, H/O irritable bowel syndrome, Hyperlipidemia, Osteopenia, Restless leg syndrome, Seasonal allergies, and Synovial cyst.  Past Surgical History:  has a past surgical history that includes Appendectomy; fracture surgery; fracture surgery; Knee arthroscopy; Hemorrhoid surgery; joint replacement; Finger trigger release; Carpal tunnel release; Hardware Removal; Colonoscopy; Finger trigger release (2011); Hand surgery (Left, 2019); other surgical history; and Lumbar spine surgery (N/A, 5/3/2022). Discharge Recommendations: Angie Ruelas scored a 24/ on the AM-PAC short mobility form. At this time, no further PT is recommended upon discharge due to independence with functional mobility. Recommend patient returns to prior setting with prior services.         DME Required For Discharge: no DME required at discharge  Precautions/Restrictions: low fall risk  Weight Bearing Restrictions: no restrictions  [] Right Upper Extremity  [] Left Upper Extremity [] Right Lower Extremity  [] Left Lower Extremity     Required Braces/Orthotics: no braces required   [] Right  [] Left  Positional Restrictions:Spinal Precautions - No Bending, Lifting, Twisting    Pre-Admission Information   Lives With: spouse    Type of Home: house  Home Layout: two level, 1/2 bath on main level, bedroom/bathroom upstairs  Home Access: 1+1  step to enter without rails   Bathroom Layout: tub/shower unit  Toilet Height: elevated height  Bathroom Equipment: grab bars in shower, shower chair, hand held shower head  Home Equipment: rolling walker, quad cane, manual wheelchair, reacher  Transfer Assistance: Independent without use of device  Ambulation Assistance:Independent without use of device  ADL Assistance: independent with all ADL's  IADL Assistance: independent with homemaking tasks  Active :        [] Yes  [] No  Hand Dominance: [] Left  [] Right  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 Nia Swanson to discharge home. In chair upon arrival. States no concerns regarding her mobility  Pain: 1/10. Location: low back- incisional pain  Pain Interventions: not applicable       Functional Mobility  Bed Mobility  Bed mobility not completed on this date.   Comments:  Transfers  Sit to stand transfer: Independent  Stand to sit transfer: Independent  Car transfer: Independent  Comments:  Ambulation  Surface:level surface  Assistive Device: no device  Assistance: Independent  Distance: 200' x 2  Gait Mechanics: steady gait, no deviations noted  Comments:    Stair Mobility  Number of Steps: 4  Assistance: Independent  Comments: reciprocal pattern with no rail    Balance  Static Sitting Balance: good(+): independent with high level dynamic balance in unsupported position  Dynamic Sitting Balance: good(+): independent with high level dynamic balance in unsupported position  Static Standing Balance: good(+): independent with high level dynamic balance in unsupported position  Dynamic Standing Balance: good(+): independent with high level dynamic balance in unsupported position  Comments:    Other Therapeutic Interventions    Functional Outcomes  AM-PAC Inpatient Mobility Raw Score : 24              Cognition  WFL  Orientation:    alert and oriented x 4  Command Following:   [facility]    Education  Barriers To Learning: none  Patient Education: patient educated on goals, PT role and benefits, plan of care, general safety, functional mobility training, discharge recommendations  Learning Assessment:  patient verbalizes and demonstrates understanding    Assessment  Activity Tolerance: No fatigue reported following session  Impairments Requiring Therapeutic Intervention: none - eval with same day discharge  Prognosis: good  Clinical Assessment: Patient independent with functional mobility. No further acute PT needs  Safety Interventions: patient left in chair, call light within reach and nurse notified    Plan  Frequency: Eval with same day discharge. No follow up required. Goals  Patient Goals: none  Short Term Goals:    Patient eval with same day discharge.   No goals set secondary to independence with mobility    Therapy Session Time      Individual Group Co-treatment   Time In     0925   Time Out     0944   Minutes     19     Timed Code Treatment Minutes:  0 Minutes  Total Treatment Minutes:  19       Electronically Signed By: Sowmya Leiva PT  Thanks, Sowmya Leiva PT, DPT [id number]

## 2022-05-04 NOTE — DISCHARGE SUMMARY
Discharge Summary    Date of Admission: 5/3/2022  9:40 AM  Date of Discharge: 5/4/2022  Admission Diagnosis:   Patient Active Problem List   Diagnosis    Acute foreign body of hand, left, initial encounter    Synovial cyst of lumbar spine    GERD (gastroesophageal reflux disease)    High cholesterol    Restless legs    Acute blood loss as cause of postoperative anemia     Discharge Diagnosis: Same   Condition on Discharge: good  Attending for Admission: Dr. Tello Fry  Procedures: 1. Left L4-5 hemilaminotomy and excision of synovial cyst  2. Microdissection using the operating room microscope. Hospital Course: Jefferson Hayden is a 77 y.o. female patient with back and leg pain. The symptoms failed to respond to conservative intervention. An MRI scan was performed and this showed evidence of a synovial cyst at the L4-5 level on the left. Having failed conservative management and experiencing persistent symptoms, She underwent the procedure listed above on date of admission. After surgery, Her pre-operative radicular pain was improved. She complained of incisional pain. The pain was well-controlled on oral medications. The dressing was clean, dry and intact. There was no erythema or edema around the surgical site. Prior to discharge She was eating well, urinating and ambulating with a steady gait with PT/OT.      Discharge Vitals/Labs: /61   Pulse 50   Temp 97.7 °F (36.5 °C) (Oral)   Resp 17   Ht 5' 5\" (1.651 m)   Wt 166 lb 12.8 oz (75.7 kg)   SpO2 95%   BMI 27.76 kg/m²   CBC with Differential:    Lab Results   Component Value Date    WBC 10.4 05/04/2022    RBC 3.65 05/04/2022    HGB 11.0 05/04/2022    HCT 33.1 05/04/2022     05/04/2022    MCV 90.6 05/04/2022    MCH 30.1 05/04/2022    MCHC 33.2 05/04/2022    RDW 13.4 05/04/2022    LYMPHOPCT 13.0 05/04/2022    MONOPCT 5.5 05/04/2022    BASOPCT 0.6 05/04/2022    MONOSABS 0.6 05/04/2022    LYMPHSABS 1.3 05/04/2022    EOSABS 0.0 05/04/2022 BASOSABS 0.1 05/04/2022     CMP:    Lab Results   Component Value Date     05/04/2022    K 4.4 05/04/2022     05/04/2022    CO2 23 05/04/2022    BUN 12 05/04/2022    CREATININE 0.6 05/04/2022    GFRAA >60 05/04/2022    AGRATIO 1.5 01/10/2018    LABGLOM >60 05/04/2022    GLUCOSE 146 05/04/2022    PROT 6.6 01/10/2018    LABALBU 4.0 01/10/2018    CALCIUM 9.6 05/04/2022    BILITOT 0.6 01/10/2018    ALKPHOS 43 01/10/2018    AST 19 01/10/2018    ALT 12 01/10/2018      Discharge Medications:      Medication List      START taking these medications    oxyCODONE 5 MG immediate release tablet  Commonly known as: ROXICODONE  Take 1 tablet by mouth every 8 hours as needed for Pain for up to 3 days. CONTINUE taking these medications    Amoxicillin 500 MG Tabs     BIOTIN PO     Calcium Plus Vitamin D 600-100 MG-UNIT Caps  Generic drug: Calcium Carb-Cholecalciferol     diclofenac 75 MG EC tablet  Commonly known as: VOLTAREN     Fiber 625 MG Tabs     Melatonin 5 MG Caps     MULTIVITAMIN PO     omeprazole 10 MG delayed release capsule  Commonly known as: PRILOSEC     PROBIOTIC-10 PO     Requip 0.5 MG tablet  Generic drug: rOPINIRole     simvastatin 20 MG tablet  Commonly known as: ZOCOR     vitamin D 50 MCG (2000 UT) Caps capsule           Where to Get Your Medications      These medications were sent to [facility] [id number] [address], [address] [place] [address]    Phone: [phone]   · oxyCODONE 5 MG immediate release tablet       Discharge Destination: The patient was discharged to Home. Follow-up: The patient is to follow-up with our office in the office in 2-3 weeks. Discharge Instructions: Verbal and written discharge instructions as well as dressing change instructions were given to the patient at the time of consent. No anticoagulation for 1 week post-operatively. No driving. No lifting or bending.       RAMONA Marr - CNP  5/4/2022

## 2022-05-04 NOTE — PROGRESS NOTES
Progress Note - Dr. Blanca Monteiro - Internal Medicine  PCP: Curly Farrell MD [address] [facility] [id number] [address] Day: 1  Code Status: Full Code  Current Diet: ADULT DIET; Regular        CC: follow up on medical issues    Subjective:   Donovan Pritchett is a 77 y.o. female. Pt seen and examined  Chart reviewed since last visit, labs and imaging below        Doing ok  Pain controlled  Slept ok    hgb down a little    She denies chest pain, denies shortness of breath, denies nausea,  denies emesis. 10 system Review of Systems is reviewed with patient, and pertinent positives are noted in HPI above . Otherwise, Review of systems is negative. I have reviewed the patient's medical and social history in detail and updated the computerized patient record. To recap: She  has a past medical history of Arthritis, Blood transfusion, Blood transfusion, Diverticulosis, H/O irritable bowel syndrome, Hyperlipidemia, Osteopenia, Restless leg syndrome, Seasonal allergies, and Synovial cyst.. She  has a past surgical history that includes Appendectomy; fracture surgery; fracture surgery; Knee arthroscopy; Hemorrhoid surgery; joint replacement; Finger trigger release; Carpal tunnel release; Hardware Removal; Colonoscopy; Finger trigger release (6-); Hand surgery (Left, 9/12/2019); other surgical history; and Lumbar spine surgery (N/A, 5/3/2022). . She  reports that she has never smoked. She has never used smokeless tobacco. She reports current alcohol use. She reports that she does not use drugs. .        Active Hospital Problems    Diagnosis Date Noted    Acute blood loss as cause of postoperative anemia [D62] 05/04/2022     Priority: Medium    Synovial cyst of lumbar spine [M71.38] 05/03/2022     Priority: Medium    GERD (gastroesophageal reflux disease) [K21.9] 05/03/2022     Priority: Medium    High cholesterol [E78.00] 05/03/2022     Priority: Medium    Restless legs [G25.81] 05/03/2022 Priority: Medium       Current Facility-Administered Medications: rOPINIRole (REQUIP) tablet 1.5 mg, 1.5 mg, Oral, Nightly  melatonin tablet 9 mg, 9 mg, Oral, Nightly  Vitamin D (CHOLECALCIFEROL) tablet 2,000 Units, 2,000 Units, Oral, Daily  polycarbophil (FIBERCON) tablet 625 mg, 625 mg, Oral, BID  calcium-vitamin D (OSCAL-500) 500-200 MG-UNIT per tablet 1 tablet, 1 tablet, Oral, Nightly  sodium chloride flush 0.9 % injection 5-40 mL, 5-40 mL, IntraVENous, 2 times per day  sodium chloride flush 0.9 % injection 5-40 mL, 5-40 mL, IntraVENous, PRN  0.9 % sodium chloride infusion, , IntraVENous, PRN  acetaminophen (TYLENOL) tablet 650 mg, 650 mg, Oral, Q6H  ondansetron (ZOFRAN-ODT) disintegrating tablet 4 mg, 4 mg, Oral, Q8H PRN **OR** ondansetron (ZOFRAN) injection 4 mg, 4 mg, IntraVENous, Q6H PRN  cyclobenzaprine (FLEXERIL) tablet 10 mg, 10 mg, Oral, TID PRN  oxyCODONE (ROXICODONE) immediate release tablet 5 mg, 5 mg, Oral, Q4H PRN **OR** oxyCODONE (ROXICODONE) immediate release tablet 10 mg, 10 mg, Oral, Q4H PRN  HYDROmorphone HCl PF (DILAUDID) injection 0.5 mg, 0.5 mg, IntraVENous, Q3H PRN  polyethylene glycol (GLYCOLAX) packet 17 g, 17 g, Oral, Daily  bisacodyl (DULCOLAX) EC tablet 5 mg, 5 mg, Oral, Daily PRN  atorvastatin (LIPITOR) tablet 10 mg, 10 mg, Oral, Nightly  pantoprazole (PROTONIX) tablet 40 mg, 40 mg, Oral, Every Other Day  ibuprofen (ADVIL;MOTRIN) tablet 600 mg, 600 mg, Oral, Q8H PRN  hydrALAZINE (APRESOLINE) injection 10 mg, 10 mg, IntraVENous, Q6H PRN  0.9 % sodium chloride bolus, 500 mL, IntraVENous, PRN  potassium chloride (KLOR-CON M) extended release tablet 40 mEq, 40 mEq, Oral, PRN **OR** potassium bicarb-citric acid (EFFER-K) effervescent tablet 40 mEq, 40 mEq, Oral, PRN **OR** potassium chloride 10 mEq/100 mL IVPB (Peripheral Line), 10 mEq, IntraVENous, PRN         Objective:  /72   Pulse 52   Temp 97.8 °F (36.6 °C) (Oral)   Resp 15   Ht 5' 5\" (1.651 m)   Wt 166 lb 12.8 oz (75.7 kg)   SpO2 97%   BMI 27.76 kg/m²      Patient Vitals for the past 24 hrs:   BP Temp Temp src Pulse Resp SpO2 Height Weight   05/04/22 0304 122/72 97.8 °F (36.6 °C) Oral 52 15 97 % -- --   05/04/22 0013 107/65 98.1 °F (36.7 °C) Oral 60 16 95 % -- --   05/03/22 1957 128/67 98.1 °F (36.7 °C) Oral 65 16 96 % -- --   05/03/22 1740 -- -- -- -- -- 95 % -- --   05/03/22 1715 -- -- -- -- -- -- 5' 5\" (1.651 m) 166 lb 12.8 oz (75.7 kg)   05/03/22 1655 135/74 98.2 °F (36.8 °C) Oral 62 16 -- -- --   05/03/22 1625 127/71 98 °F (36.7 °C) Oral 63 16 95 % -- --   05/03/22 1557 (!) 149/76 97.3 °F (36.3 °C) Oral 63 16 95 % -- --   05/03/22 1511 -- -- -- -- 14 -- -- --   05/03/22 1500 (!) 159/76 -- -- 61 12 98 % -- --   05/03/22 1452 -- -- -- -- 11 -- -- --   05/03/22 1445 (!) 147/83 -- -- 92 18 96 % -- --   05/03/22 1430 (!) 150/78 96.9 °F (36.1 °C) Temporal 82 10 94 % -- --   05/03/22 1425 (!) 155/73 -- -- 87 23 99 % -- --   05/03/22 1420 (!) 145/70 -- -- 78 13 95 % -- --   05/03/22 1415 (!) 151/69 96.9 °F (36.1 °C) Temporal 78 14 97 % -- --   05/03/22 1000 (!) 172/72 98.1 °F (36.7 °C) Temporal 65 18 99 % -- 166 lb 12.8 oz (75.7 kg)     Patient Vitals for the past 96 hrs (Last 3 readings):   Weight   05/03/22 1715 166 lb 12.8 oz (75.7 kg)   05/03/22 1000 166 lb 12.8 oz (75.7 kg)           Intake/Output Summary (Last 24 hours) at 5/4/2022 9540  Last data filed at [facility] per 24 hour   Intake 1600 ml   Output 1585 ml   Net 15 ml         Physical Exam:   Vitals as above  General appearance: alert, appears stated age and cooperative    Head: Normocephalic, without obvious abnormality, atraumatic    Lungs: clear to auscultation bilaterally    Heart: regular rate and rhythm, S1, S2 normal, no murmur    Abdomen: soft, non-tender; bowel sounds normal; no masses, no organomegaly    Extremities: extremities normal, atraumatic, no cyanosis, no edema      Labs:  Lab Results   Component Value Date    WBC 10.4 05/04/2022    HGB 11.0 (L) 05/04/2022    HCT 33.1 (L) 05/04/2022     05/04/2022    ALT 12 01/10/2018    AST 19 01/10/2018     05/04/2022    K 4.4 05/04/2022     05/04/2022    CREATININE 0.6 05/04/2022    BUN 12 05/04/2022    CO2 23 05/04/2022    INR 1.03 04/06/2022    LABMICR YES 01/10/2018     No results found for: CKTOTAL, CKMB, CKMBINDEX, TROPONINI    Recent Imaging Results are Reviewed:  XR LUMBAR SPINE 1 VW    Result Date: 5/3/2022  Radiology exam is complete. No Radiologist dictation. Please follow up with ordering provider. FLUORO FOR SURGICAL PROCEDURES    Result Date: 5/3/2022  Radiology exam is complete. No Radiologist dictation. Please follow up with ordering provider. Lab Results   Component Value Date    GLUCOSE 146 05/04/2022     No results found for: POCGLU  /72   Pulse 52   Temp 97.8 °F (36.6 °C) (Oral)   Resp 15   Ht 5' 5\" (1.651 m)   Wt 166 lb 12.8 oz (75.7 kg)   SpO2 97%   BMI 27.76 kg/m²     Assessment and Plan:  Principal Problem:    Synovial cyst of lumbar spine -Established problem. Improving. Doing well post op  Plan: stay on post op pathway. To work with pt/ot today. Transition to oral pain meds. Cont to follow h/h to assess post op anemia. Active Problems:    GERD (gastroesophageal reflux disease) -Established problem. Stable. Denies heartburn  Plan: Continue present orders/plan. High cholesterol -Established problem. Stable. Plan: cont statin    Restless legs -Established problem. Stable. Slept ok  Plan: Continue present orders/plan. Acute blood loss as cause of postoperative anemia -New Problem to me.  hgb 11.0  Plan: No indication for transfusion. Cont to monitor h/h to assess progression of anemia. Recommend ferrous sulfate or MVI as outpatient.        Disp - medically stable      (Please note that portions of this note were completed with a voice recognition program.  Efforts were made to edit the dictations but occasionally words are mis-transcribed.)        Joce Carreno MD  5/4/2022 Alert, active, in no resp. distress. + inspiratory rales over the bilateral lung bases. Normal TMs and throat exam.

## 2022-05-04 NOTE — PROGRESS NOTES
[facility]   Phone: [phone]    Occupational Therapy    [x] Initial Evaluation            [] Daily Treatment Note         [x] Discharge Summary      Patient: Seferino Hankins   : 1945   MRN: [id number]   Date of Service:  2022  Admitting Diagnosis: Synovial cyst of lumbar spine  Current Admission Summary: Left L4-5 hemilaminotomy and excision of synovial cyst  Past Medical History:  has a past medical history of Arthritis, Blood transfusion, Blood transfusion, Diverticulosis, H/O irritable bowel syndrome, Hyperlipidemia, Osteopenia, Restless leg syndrome, Seasonal allergies, and Synovial cyst.  Past Surgical History:  has a past surgical history that includes Appendectomy; fracture surgery; fracture surgery; Knee arthroscopy; Hemorrhoid surgery; joint replacement; Finger trigger release; Carpal tunnel release; Hardware Removal; Colonoscopy; Finger trigger release (2011); Hand surgery (Left, 2019); other surgical history; and Lumbar spine surgery (N/A, 5/3/2022). Discharge Recommendations:  Seferino Hankins scored a / on the AM-[facility] ADL Inpatient form. At this time, no further OT is recommended upon discharge due to independence with ADLs and functional mobility. Recommend patient returns to prior setting with prior services.       DME Required For Discharge: no DME required at discharge  Precautions/Restrictions: low fall risk  Weight Bearing Restrictions: no restrictions  []? Right Upper Extremity     []? Left Upper Extremity     []? Right Lower Extremity       []? Left Lower Extremity         Required Braces/Orthotics: no braces required             []? Right          []?  Left  Positional Restrictions: Spinal Precautions - No Bending, Lifting, Twisting     Pre-Admission Information   Lives With: spouse              Type of Home: house  Home Layout: two level, 1/2 bath on main level, bedroom/bathroom upstairs  Home Access: 1+1  step to enter without rails   Bathroom Layout: tub/shower unit  Toilet Height: elevated height  Bathroom Equipment: grab bars in shower, shower chair, hand held shower head  Home Equipment: rolling walker, quad cane, manual wheelchair, reacher  Transfer Assistance: Independent without use of device  Ambulation Assistance:Independent without use of device  ADL Assistance: independent with all ADL's  IADL Assistance: independent with homemaking tasks  Active :        []? Yes            []? No  Hand Dominance: []? Left            []? 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 Reported some soreness at incision site. Patient with no concerns going home. Pain: 1/10. Location: incision site  Pain Interventions: ice applied at end of session, education provided         Activities of Daily Living  Basic Activities of Daily Living  Lower Extremity Dressing: Independent. Equipment: none patient able to doff socks and don shoes with no issues. Able to follow precautions   Instrumental Activities of Daily Living  No IADL completed on this date. Functional Mobility  Bed Mobility  Bed mobility not completed on this date. Comments:  Transfers  Sit to stand transfer:Independent  Stand to sit transfer: Independent  Tub transfer: modified independent. Mobility technique: ambulating. Equipment utilized: shower seat with back  Comments:  Functional Mobility:  Functional Mobility: no device. Independent.   Activity: 200 ft x 2   Patient able to complete steps and car transfer independently as well, in order to ensure safe return home. Other Therapeutic Interventions    Education provided on use of reacher and precautions. Functional Outcomes  AM-PAC Inpatient Daily Activity Raw Score: 24    Cognition  WFL  Orientation:    alert and oriented x 4  Command Following:   [facility]  Barriers To Learning: none  Patient Education: patient educated on OT role and benefits, precautions, proper use of assistive device/equipment, discharge recommendations  Learning Assessment:  patient verbalizes and demonstrates understanding    Assessment  Activity Tolerance: Patient tolerated evaluation well. No reports of SOB, pain or fatigue. No LOB. Impairments Requiring Therapeutic Intervention: none - eval with same day discharge  Prognosis: good  Clinical Assessment: Patient presents at baseline and no further OT services are indicated. Safety Interventions: patient left in chair, call light within reach and nurse notified    Plan  Frequency: Eval with same day discharge. No follow up required. Current Treatment Recommendations: not applicable, evaluation completed with same day discharge. Goals  Patient Goals:none   Short Term Goals:  Patient eval with same day discharge. No OT goals.      Therapy Session Time     Individual Group Co-treatment   Time In    7792   Time Out    0944   Minutes    19        Timed Code Treatment Minutes:    0 minutes  Total Treatment Minutes:  19 minutes        Electronically Signed By: JACK Armstrong, ANTONY OTR/L [id number]

## 2022-05-04 NOTE — PROGRESS NOTES
PM assessment completed. Pt resting in chair, spouse at her side. Pt reports mild pain/soreness, states scheduled tylenol effective pain relief at this time. Pt up to restroom, tolerating activity well. No needs voiced. Fall precautions in place, hourly rounding, call light and belongings in reach, bed in lowest position, wheels locked in place, side rails up x 2, walkways free of clutter. Pt aware to call for assistance to get out of chair/bed. The care plan and education has been reviewed and mutually agreed upon with the patient.

## 2022-05-04 NOTE — PROGRESS NOTES
Sitting up in bed resting and watching TV, pt is alert and oriented and rates her pain 1/10 and denies the need for pain medication at this time. Assessment done, VSS, call light in reach, will continue to monitor.

## 2022-05-31 ENCOUNTER — HOSPITAL ENCOUNTER (OUTPATIENT)
Dept: PHYSICAL THERAPY | Age: 77
Setting detail: THERAPIES SERIES
Discharge: HOME | End: 2022-05-31
Payer: MEDICARE

## 2022-05-31 PROCEDURE — 97110 THERAPEUTIC EXERCISES: CPT

## 2022-05-31 PROCEDURE — 97162 PT EVAL MOD COMPLEX 30 MIN: CPT

## 2022-05-31 NOTE — FLOWSHEET NOTE
[address] [facility] Physical Therapy  Phone: [phone]   Fax: [phone]    Physical Therapy Daily Treatment Note  Date:  2022    Patient Name:  Megan Harmon    :  1945  MRN: [id number]  Medical/Treatment Diagnosis Information:  · Diagnosis: Z48.811 - POST-OP NERVE CYST SURGERY, M54.16 - LUMBAR RADIC - LEFT, M71.38 - SYNOVIAL CYST  : SX - 22  · Treatment Diagnosis: low back pain, decreased spinal ROM, impaired activity tolerance, core/back weakness  Insurance/Certification information:  PT Insurance Information: [address] NOT MET, NO AUTH  Physician Information:   Emily Garzon MD; Rosemary Lantigua NP  Plan of care signed (Y/N): []  Yes [x]  No     Date of Patient follow up with Physician:      Progress Report: [x]  Yes  []  No     Date Range for reporting period:  Beginnin22  Ending: tbd    Progress report due (10 Rx/or 30 days whichever is less): visit #5 (date) OR     Recertification due (POC duration/ or 90 days whichever is less): visit #5 OR 22    Visit # Insurance Allowable Auth required?  Date Range   1 PMN []  Yes  [x]  No      Latex Allergy:  [x]NO      []YES  Preferred Language for Healthcare:   [x]English       []other:    Functional Scale:           Date assessed:  FOTO physical FS primary measure score = 52; Risk adjusted = 45  22    Pain level:  0/10     SUBJECTIVE:  See eval    OBJECTIVE: See eval    RESTRICTIONS/PRECAUTIONS:   Lifting Restrictions:  2 weeks post op = 15lbs  6 weeks post op = 20-25lbs  12 weeks post op = 30lbs  6 months post op = 50lbs     No vacuuming 6 weeks post-op    Exercises/Interventions:     Therapeutic Exercises (85734) - 8' Resistance / level Sets/sec Reps Notes          TVABD ACTIVATION    Discussed as part of HEP   HOOKLYING MARCHES    Discussed as part of HEP   PRONE HIP EXT    Discussed as part of HEP   Reviewed [facility] HEP   2' Therapeutic Activities (07776)                                          Neuromuscular Re-ed (98480)                                                 Manual Intervention (89698)                                                   Modalities:     Pt. Education:  -patient educated on diagnosis, prognosis and expectations for rehab  -all patient questions were answered    Home Exercise Program:  HEP instruction:   Access Code: [email or web address]  URL: [email or web address]  Date: 05/31/2022  Prepared by: Mara Su     Exercises  Supine Transversus Abdominis Bracing - Hands on Stomach - 1 x daily - 5 x weekly - 2 sets - 10 reps - 8 hold  Hooklying March + Abdominal Bracing - 1 x daily - 5 x weekly - 2 sets - 10 reps - 5 hold  Prone Hip Extension - Two Pillows - 1 x daily - 5 x weekly - 2 sets - 10 reps - 5 hold    Therapeutic Exercise and NMR EXR  [x] (46031) Provided verbal/tactile cueing for activities related to strengthening, flexibility, endurance, ROM for improvements in  [x] LE / Lumbar: LE, proximal hip, and core control with self care, mobility, lifting, ambulation. [] UE / Cervical: cervical, postural, scapular, scapulothoracic and UE control with self care, reaching, carrying, lifting, house/yardwork, driving, computer work.  [] (14374) Provided verbal/tactile cueing for activities related to improving balance, coordination, kinesthetic sense, posture, motor skill, proprioception to assist with   [] LE / lumbar: LE, proximal hip, and core control in self care, mobility, lifting, ambulation and eccentric single leg control.    [] UE / cervical: cervical, scapular, scapulothoracic and UE control with self care, reaching, carrying, lifting, house/yardwork, driving, computer work.   [] (24378) Therapist is in constant attendance of 2 or more patients providing skilled therapy interventions, but not providing any significant amount of measurable one-on-one time to either patient, for improvements in  [] LE / lumbar: LE, proximal hip, and core control in self care, mobility, lifting, ambulation and eccentric single leg control. [] UE / cervical: cervical, scapular, scapulothoracic and UE control with self care, reaching, carrying, lifting, house/yardwork, driving, computer work. NMR and Therapeutic Activities:    [] (58603 or 17315) Provided verbal/tactile cueing for activities related to improving balance, coordination, kinesthetic sense, posture, motor skill, proprioception and motor activation to allow for proper function of   [] LE: / Lumbar core, proximal hip and LE with self care and ADLs  [] UE / Cervical: cervical, postural, scapular, scapulothoracic and UE control with self care, carrying, lifting, driving, computer work.   [] (54354) Gait Re-education- Provided training and instruction to the patient for proper LE, core and proximal hip recruitment and positioning and eccentric body weight control with ambulation re-education including up and down stairs     Home Management Training / Self Care:  [] (94167) Provided self-care/home management training related to activities of daily living and compensatory training, and/or use of adaptive equipment for improvement with: ADLs and compensatory training, meal preparation, safety procedures and instruction in use of adaptive equipment, including bathing, grooming, dressing, personal hygiene, basic household cleaning and chores.      Home Exercise Program:    [x] (74777) Reviewed/Progressed HEP activities related to strengthening, flexibility, endurance, ROM of   [x] LE / Lumbar: core, proximal hip and LE for functional self-care, mobility, lifting and ambulation/stair navigation   [] UE / Cervical: cervical, postural, scapular, scapulothoracic and UE control with self care, reaching, carrying, lifting, house/yardwork, driving, computer work  [] (86081)Reviewed/Progressed HEP activities related to improving balance, coordination, kinesthetic sense, posture, motor skill, proprioception of   [] LE: core, proximal hip and LE for self care, mobility, lifting, and ambulation/stair navigation    [] UE / Cervical: cervical, postural,  scapular, scapulothoracic and UE control with self care, reaching, carrying, lifting, house/yardwork, driving, computer work    Manual Treatments:  PROM / STM / Oscillations-Mobs:  G-I, II, III, IV (PA's, Inf., Post.)  [] (51506) Provided manual therapy to mobilize LE, proximal hip and/or LS spine soft tissue/joints for the purpose of modulating pain, promoting relaxation,  increasing ROM, reducing/eliminating soft tissue swelling/inflammation/restriction, improving soft tissue extensibility and allowing for proper ROM for normal function with   [] LE / lumbar: self care, mobility, lifting and ambulation. [] UE / Cervical: self care, reaching, carrying, lifting, house/yardwork, driving, computer work. Modalities:  [] (09087) Vasopneumatic compression: Utilized vasopneumatic compression to decrease edema / swelling for the purpose of improving mobility and quad tone / recruitment which will allow for increased overall function including but not limited to self-care, transfers, ambulation, and ascending / descending stairs. Charges:  Timed Code Treatment Minutes: 8   Total Treatment Minutes: 40     [] EVAL - LOW (01507)   [x] EVAL - MOD (03585)  [] EVAL - HIGH (96469)  [] RE-EVAL (91220)  [x] KJ(34163) x 1       [] Ionto  [] NMR (46818) x       [] Vaso  [] Manual (94126) x       [] Ultrasound  [] TA x        [] Mech Traction (99139)  [] Aquatic Therapy x     [] ES (un) (85179):   [] Home Management Training x  [] ES(attended) (13159)   [] Dry Needling 1-2 muscles (80681):  [] Dry Needling 3+ muscles (849966)  [] Group:      [] Other:     GOALS:  Patient stated goal: no pain when standing, walking or sleeping  []? Progressing: []? Met: []? Not Met: []? Adjusted     Therapist goals for Patient:   Short Term Goals:  To be achieved in: 2 weeks  1. Independent in HEP and progression per patient tolerance, in order to prevent re-injury. []? Progressing: []? Met: []? Not Met: []? Adjusted  2. Patient will have a decrease in pain to <3/10 on average with ADLs and house chores to improve tolerance to performance. []? Progressing: []? Met: []? Not Met: []? Adjusted     Long Term Goals: To be achieved in: 4 weeks  1. FOTO score of 59 at least to assist with reaching prior level of function. []? Progressing: []? Met: []? Not Met: []? Adjusted  2. Patient will demonstrate increased AROM extension to 10deg to allow for greater tolerance to standing and extended trunk positions (e.g. walking uphill). []? Progressing: []? Met: []? Not Met: []? Adjusted  3. Patient will demonstrate an increase in Strength 5-/5 bilateral hips and >7/10 core activation to allow for proper stabilization and strength needed for prolonged tasks. []? Progressing: []? Met: []? Not Met: []? Adjusted  4. Patient will return to functional activities including walking neighborhood and aquatics >30 minutes without increased symptoms or restriction. []? Progressing: []? Met: []? Not Met: []? Adjusted  5. Patient will lift 25# 10x from floor to waist using proper squat lift technique in order to manage home and yard tasks. []? Progressing: []? Met: []? Not Met: []? Adjusted         Overall Progression Towards Functional goals/ Treatment Progress Update:  [] Patient is progressing as expected towards functional goals listed. [] Progression is slowed due to complexities/Impairments listed. [] Progression has been slowed due to co-morbidities.   [x] Plan just implemented, too soon to assess goals progression <30days   [] Goals require adjustment due to lack of progress  [] Patient is not progressing as expected and requires additional follow up with physician  [] Other    Persisting Functional Limitations/Impairments:  []Sleeping []Sitting               [x]Standing [x]Transfers        [x]Walking []Kneeling               [x]Stairs [x]Squatting / bending   []ADLs []Reaching  [x]Lifting  [x]Housework  []Driving []Job related tasks  [x]Sports/Recreation []Other:        ASSESSMENT:  See eval  Treatment/Activity Tolerance:  [x] Patient able to complete tx  [] Patient limited by fatigue  [] Patient limited by pain  [] Patient limited by other medical complications  [] Other:     Prognosis: [x] Good [] Fair  [] Poor    Patient Requires Follow-up: [x] Yes  [] No    Plan for next treatment session: standing, walking, aquatics; core activation, hip strengthening, gentle spinal ROM    PLAN: See eval. PT 1x / week for 5 weeks. [] Continue per plan of care [] Alter current plan (see comments)  [x] Plan of care initiated [] Hold pending MD visit [] Discharge    Electronically signed by: Janie Galarza, PT PT, DPT, OMT-C    Note: If patient does not return for scheduled/ recommended follow up visits, this note will serve as a discharge from care along with most recent update on progress.

## 2022-05-31 NOTE — PLAN OF CARE
[id number] [address], [address] [place] [address] Drive  Phone: [phone]   Fax: [phone]     Physical Therapy Certification    Dear  Tyson Lee NP, Matt Gunn MD,    We had the pleasure of evaluating the following patient for physical therapy services at [address] [place] [address]. A summary of our findings can be found in the initial assessment below. This includes our plan of care. If you have any questions or concerns regarding these findings, please do not hesitate to contact me at the office phone number checked above.   Thank you for the referral.       Physician Signature:_______________________________Date:__________________  By signing above (or electronic signature), therapists plan is approved by physician      Patient: Jefferson Hayden   : 1945   MRN: [id number]  Referring Physician:  Tyson Lee NP / Matt Gunn MD      Evaluation Date: 2022      Medical Diagnosis Information:  Diagnosis: Z48.811 - POST-OP NERVE CYST SURGERY, M54.16 - LUMBAR RADIC - LEFT, M71.38 - SYNOVIAL CYST  : SX - 22    Treatment Diagnosis: low back pain, decreased spinal ROM, impaired activity tolerance, core/back weakness                                         Insurance information: PT Insurance Information: [address] NOT MET, NO AUTH     Precautions/ Contra-indications: R subtalar joint fusion; bilat knee replacements; back synovial cyst removal  Lifting Restrictions:  2 weeks post op = 15lbs  6 weeks post op = 20-25lbs  12 weeks post op = 30lbs  6 months post op = 50lbs    No vacuuming 6 weeks post-op    Latex Allergy:  [x]NO      []YES  Preferred Language for Healthcare:   [x]English       []Other:    C-SSRS Triggered by Intake questionnaire (Past 2 wk assessment ):   [x] No, Questionnaire did not trigger screening.   [] Yes, Patient intake triggered C-SSRS Screening     [] Completed, no further action required. [] Completed, PCP notified via Epic    SUBJECTIVE: Patient stated complaint: patient reports since surgery, she has not had any radiating leg pain. Her back still hurts, but is improving since surgery. She is to perform walking, biking and swimming over the next several weeks and has been given some basic exercises to perform while rehabbing. She was given order for 5 visits. She has previous experience with PT provider, and opted to follow up after synovial cyst removal to ensure proper core strengthening, resumption of lifting and activity, and reduction of pain. Her goal for therapy is to achieve no pain when standing walking or sleeping. She is currently limited in lifting, sleeping and walking, which limits her participation in household management and social life    Surgery: 5/4/22:   1.  Left L4-5 hemilaminotomy and excision of synovial cyst  2.  Microdissection using the operating room microscope. Relevant Medical History:  OA, osteoporosis  Functional Outcome: FOTO physical FS primary measure score = 52; Risk adjusted = 45    Pain Scale: 0/10  Easing factors: sitting  Provocative factors: lifting, walking, standing     Type: []Constant   [x]Intermittent - aching  []Radiating []Localized []other:     Numbness/Tingling: denies    Occupation/School: retired    Living Status/Prior Level of Function: Prior to this injury / incident, pt was independent with ADLs and IADLs. See subjective above for activity limitations.     OBJECTIVE:   Palpation: +1 TTP around incision in low back    Functional Mobility/Transfers: able to perform all slowly    Posture: fair    Gait: (include devices/WB status) 6MWT = 1440 ft    Bandages/Dressings/Incisions: healing lumbar incision; top 1 cm still has epidermal layer that isn't FULLY healed - PT suggested waiting 1 additional week before aquatics in public pool    Dermatomes Normal Abnormal Comments   inguinal area (L1)       anterior mid-thigh (L2) x     distal ant thigh/med knee (L3) x     medial lower leg and foot (L4) x     lateral lower leg and foot (L5) x     posterior calf (S1) x     medial calcaneus (S2) x         Reflexes Normal Abnormal Comments   S1-2 Seated achilles      S1-2 Prone knee bend      L3-4 Patellar tendon      Clonus x     Babinski          ROM  Comments   Lumbar Flex 36    Lumbar Ext 5 Slight dizziness     ROM LEFT RIGHT Comments   Lumbar Side Bend 19 cm  19 cm     Lumbar Rotation      Hip Flexion 130 deg 130 deg    Hip Abd 30 deg 20 deg    Hip ER      Hip IR      Hip Extension      Knee Ext [facility] WFL    Knee Flex [facility] WFL    Hamstring Flex      Piriformis                    Joint mobility: L/S L1-5   []Normal    [x]Hypo   []Hyper    Strength / Myotomes LEFT RIGHT Comments   Multifidus      Transverse Ab      Hip Flexors (L1-2) 5 5-    Hip Abductors 4 3+    Hip Extensors 3+ 3+ Strain in back on R   Hip Internal Rotators      Hip External Rotators      Quads (L2-4) 5 5    Hamstrings  5 5    Ankle Plantarflexion (S1-2)      Ankle Dorsiflexion (L4-5) 4+ 4+    Ankle Inversion      Ankle Eversion (S1-2)      Great Toe Extension (L5)        Abdominal flexor endurance = 8 sec  Plank endurance = 23 sec    TA Muscle Contraction Scale    Criteria                                               Score  Quality of Contraction   Not Present      [] 0   Rapid, Superificial      [x] 1   Just Perceptible      [] 2     Gentle, Slow      [] 3    Substitution   Resting        [] 0   Moderate to Strong     [] 1    Subtle Perceptible      [x] 2   None       [] 3    Symmetry of Contraction   Unilateral        [] 0   Bilateral/Asymmetrical      [] 1   Symmetrical       [x] 2    Breathing     Inability/Difficulty Breathing during contraction  [x] 0   Able to hold contraction while Breathing   [] 1    Holding   Able to Hold Contraction <10 s    [x] 0   Able to Hold Contraction >10 s    [] 1      _5_/10  Adapted from [employer], Copyright 2009        Neural dynamic tension testing Normal Abnormal Comments   Slump Test  - Degree of knee flexion:  x     SLR       0-30      30-70      Femoral nerve (L2-4)          Orthopedic Special Tests: NP THIS DATE   Normal Abnormal N/A Comments   Toe walk         Heel Walk       Fwd Bend-aberrant or innominate mvmt)       Standing Flexion test       Trendelenburg       Kemps/Quadrant       Daylink       BEATRICE/Jeferson       Hip scour       SLR       Crossed SLR       Supine to sit       Hip thrust       SI distraction/compression       PA/Spring       Prone Instability test       Prone knee bend       Sacral Spring/thrust                [x] Patient history, allergies, meds reviewed. Medical chart reviewed. See intake form. Review Of Systems (ROS):  [x]Performed Review of systems (Integumentary, CardioPulmonary, Neurological) by intake and observation. Intake form has been scanned into medical record. Patient has been instructed to contact their primary care physician regarding ROS issues if not already being addressed at this time.       Co-morbidities/Complexities (which will affect course of rehabilitation):   []None        []Hx of COVID   Arthritic conditions   []Rheumatoid arthritis (M05.9)  [x]Osteoarthritis (M19.91)  []Gout   Cardiovascular conditions   []Hypertension (I10)  []Hyperlipidemia (E78.5)  []Angina pectoris (I20)  []Atherosclerosis (I70)  []Pacemaker  []Hx of CABG/stent/  cardiac surgeries   Musculoskeletal conditions   []Disc pathology   []Congenital spine pathologies   [x]Osteoporosis (M81.8)  [x]Osteopenia (M85.8)  []Scoliosis       Endocrine conditions   []Hypothyroid (E03.9)  []Hyperthyroid Gastrointestinal conditions   []Constipation (J79.07)   Metabolic conditions   []Morbid obesity (E66.01)  []Diabetes type 1(E10.65) or 2 (E11.65)   []Neuropathy (G60.9)     Cardio/Pulmonary conditions   []Asthma (J45)  []Coughing   []COPD (J44.9)  []CHF  []A-fib   Psychological Disorders  []Anxiety (F41.9)  []Depression (F32.9)   []Other: Developmental Disorders  []Autism (F84.0)  []CP (G80)  []Down Syndrome (Q90.9)  []Developmental delay     Neurological conditions  []Prior Stroke (I69.30)  []Parkinson's (G20)  []Encephalopathy (G93.40)  []MS (G35)  []Post-polio (G14)  []SCI  []TBI  []ALS Other conditions  []Fibromyalgia (M79.7)  []Vertigo  []Syncope  []Kidney Failure  []Cancer      []currently undergoing                treatment  []Pregnancy  []Incontinence   Prior surgeries  []involved limb  []previous spinal surgery  [] section birth  []hysterectomy  []bowel / bladder surgery  []other relevant surgeries   []Other:               Barriers to/and or personal factors that will affect rehab potential:              []Age  []Sex    []Smoker              []Motivation/Lack of Motivation                        []Co-Morbidities              []Cognitive Function, education/learning barriers              []Environmental, home barriers              []profession/work barriers  [x]past PT/medical experience  []other:  Justification: past experience will facilitate results    Falls Risk Assessment (30 days):   [x] Falls Risk assessed and no intervention required. [] Falls Risk assessed and Patient requires intervention due to being higher risk   TUG score (>12s at risk):     [] Falls education provided, including         ASSESSMENT: The patient is a 77year old female who presents approximately 4 weeks s/p synovial cyst removal from L5 nerve root. The patient will benefit from skilled PT services to improve spinal mobility, spinal ROM, core stability and endurance, hip strength and overall activity tolerance and performance (squatting, lifting, climbing stairs, transfers) in various mediums and across various modes of mobility (swimming, walking, standing).      Functional Impairments:     [x]Noted lumbar/proximal hip hypomobility   []Noted lumbosacral and/or generalized hypermobility   [x]Decreased Lumbosacral/hip/LE functional ROM   [x]Decreased core/proximal hip strength and neuromuscular control    [x]Decreased LE functional strength    []Abnormal reflexes/sensation/myotomal/dermatomal deficits  []Reduced balance/proprioceptive control    []other:      Functional Activity Limitations (from functional questionnaire and intake)   [x]Reduced ability to tolerate prolonged functional positions   [x]Reduced ability or difficulty with changes of positions or transfers between positions   [x]Reduced ability to maintain good posture and demonstrate good body mechanics with sitting, bending, and lifting   [x]Reduced ability to sleep   [] Reduced ability or tolerance with driving and/or computer work   [x]Reduced ability to perform lifting, reaching, carrying tasks   [x]Reduced ability to squat   []Reduced ability to forward bend   [x]Reduced ability to ambulate prolonged functional periods/distances/surfaces   [x]Reduced ability to ascend/descend stairs   []other:       Participation Restrictions   []Reduced participation in self care activities   [x]Reduced participation in home management activities   []Reduced participation in work activities   [x]Reduced participation in social activities. [x]Reduced participation in sport/recreational activities. Classification:   []Signs/symptoms consistent with Lumbar instability/stabilization subgroup. []Signs/symptoms consistent with Lumbar mobilization/manipulation subgroup, myotomes and dermatomes intact. Meets manipulation criteria.     []Signs/symptoms consistent with Lumbar direction specific/centralization subgroup   []Signs/symptoms consistent with Lumbar traction subgroup     []Signs/symptoms consistent with lumbar facet dysfunction   []Signs/symptoms consistent with lumbar stenosis type dysfunction   []Signs/symptoms consistent with nerve root involvement including myotome & dermatome dysfunction   [x]Signs/symptoms consistent with post-surgical status including: decreased ROM, strength and indicated  [x]  Patient education on joint protection, postural re-education, activity modification, progression of HEP. HEP instruction:   Access Code: [email or web address]  URL: [email or web address]  Date: 05/31/2022  Prepared by: Airam Steward    Exercises  Supine Transversus Abdominis Bracing - Hands on Stomach - 1 x daily - 5 x weekly - 2 sets - 10 reps - 8 hold  Hooklying March + Abdominal Bracing - 1 x daily - 5 x weekly - 2 sets - 10 reps - 5 hold  Prone Hip Extension - Two Pillows - 1 x daily - 5 x weekly - 2 sets - 10 reps - 5 hold    GOALS:  Patient stated goal: no pain when standing, walking or sleeping  [] Progressing: [] Met: [] Not Met: [] Adjusted    Therapist goals for Patient:   Short Term Goals: To be achieved in: 2 weeks  1. Independent in HEP and progression per patient tolerance, in order to prevent re-injury. [] Progressing: [] Met: [] Not Met: [] Adjusted  2. Patient will have a decrease in pain to <3/10 on average with ADLs and house chores to improve tolerance to performance. [] Progressing: [] Met: [] Not Met: [] Adjusted    Long Term Goals: To be achieved in: 4 weeks  1. FOTO score of 59 at least to assist with reaching prior level of function. [] Progressing: [] Met: [] Not Met: [] Adjusted  2. Patient will demonstrate increased AROM extension to 10deg to allow for greater tolerance to standing and extended trunk positions (e.g. walking uphill). [] Progressing: [] Met: [] Not Met: [] Adjusted  3. Patient will demonstrate an increase in Strength 5-/5 bilateral hips and >7/10 core activation to allow for proper stabilization and strength needed for prolonged tasks. [] Progressing: [] Met: [] Not Met: [] Adjusted  4. Patient will return to functional activities including walking neighborhood and aquatics >30 minutes without increased symptoms or restriction. [] Progressing: [] Met: [] Not Met: [] Adjusted  5.  Patient will lift 25# 10x from floor to waist using proper squat lift technique in order to manage home and yard tasks.   [] Progressing: [] Met: [] Not Met: [] Adjusted     Electronically signed by:  Nelson Conklin, PT, DPT, OMT-C

## 2022-06-03 ENCOUNTER — APPOINTMENT (OUTPATIENT)
Dept: PHYSICAL THERAPY | Age: 77
End: 2022-06-03
Payer: MEDICARE

## 2022-06-07 ENCOUNTER — HOSPITAL ENCOUNTER (OUTPATIENT)
Dept: PHYSICAL THERAPY | Age: 77
Setting detail: THERAPIES SERIES
Discharge: HOME | End: 2022-06-07
Payer: MEDICARE

## 2022-06-07 PROCEDURE — 97110 THERAPEUTIC EXERCISES: CPT

## 2022-06-07 PROCEDURE — 97112 NEUROMUSCULAR REEDUCATION: CPT

## 2022-06-07 NOTE — FLOWSHEET NOTE
[address] [facility] Physical Therapy  Phone: [phone]   Fax: [phone]    Physical Therapy Daily Treatment Note  Date:  2022    Patient Name:  Lizeth Bautista    :  1945  MRN: [id number]  Medical/Treatment Diagnosis Information:  · Diagnosis: Z48.811 - POST-OP NERVE CYST SURGERY, M54.16 - LUMBAR RADIC - LEFT, M71.38 - SYNOVIAL CYST  : SX - 22  · Treatment Diagnosis: low back pain, decreased spinal ROM, impaired activity tolerance, core/back weakness  Insurance/Certification information:  PT Insurance Information: [address] NOT MET, NO AUTH  Physician Information:   Murali Mahajan MD; Dannie Warren NP  Plan of care signed (Y/N): []  Yes [x]  No     Date of Patient follow up with Physician:      Progress Report: [x]  Yes  []  No     Date Range for reporting period:  Beginnin22  Ending: tbd    Progress report due (10 Rx/or 30 days whichever is less): visit #5 (date) OR 3/5/55    Recertification due (POC duration/ or 90 days whichever is less): visit #5 OR 22    Visit # Insurance Allowable Auth required? Date Range   2 PMN []  Yes  [x]  No      Latex Allergy:  [x]NO      []YES  Preferred Language for Healthcare:   [x]English       []other:    Functional Scale:           Date assessed:  TO physical FS primary measure score = 52; Risk adjusted = 45  22    Pain level:  0/10     SUBJECTIVE: The patient reports her glutes in midline are sore from prone hip abduction exercise, but doing well overall. She will be 5 weeks post-op tomorrow.     OBJECTIVE:  - patient able to press 10# using BUE in standing    RESTRICTIONS/PRECAUTIONS:   Lifting Restrictions:  2 weeks post op = 15lbs  6 weeks post op = 20-25lbs  12 weeks post op = 30lbs  6 months post op = 50lbs      No vacuuming 6 weeks post-op    Exercises/Interventions:     Therapeutic Exercises (78590) Resistance / level Sets/sec Reps Notes          TVABD ACTIVATION HOOKLYING MARCHES  PRONE HIP EXT  Reviewed [facility]         Glute Crossbody Stretch  20'' 2 R/L 6/7   Hooklying Curl Ups  2 10 6/7   Standing Plate Press + TvAbd Bracing  5'' / 2 10 6/7   Standing Squats No band    Lime band 1    1 10    10 6/7   TM walking 3% grade  1.5 mph  5 min 6/7           Therapeutic Activities (41638)                                          Neuromuscular Re-ed (12909)       Supine Deadbug w/ SB (single limb movement)  2 5 6/7 - v.c. for ball compression   SB bridges  3'' / 2 10 6/7 - added   Trunk-supported Hip Ext  2'' / 2 10 6/7 - added                        Manual Intervention (53396)                                                   Modalities:     Pt. Education:  -patient educated on diagnosis, prognosis and expectations for rehab  -all patient questions were answered    Home Exercise Program:  Access Code: [email or web address]  URL: [employer]  Date: 06/07/2022  Prepared by: Crissie Ingram    Exercises  Supine Transversus Abdominis Bracing - Hands on Stomach - 1 x daily - 5 x weekly - 2 sets - 10 reps - 8 hold  Hooklying March + Abdominal Bracing - 1 x daily - 5 x weekly - 2 sets - 10 reps - 5 hold  Prone Hip Extension - Two Pillows - 1 x daily - 5 x weekly - 2 sets - 10 reps - 5 hold  Supine Bridge - 1 x daily - 5 x weekly - 2 sets - 10 reps - 5 hold  Dead Bug with Verlin Oseas - 1 x daily - 5 x weekly - 2 sets - 5 reps  Prone Hip Extension on Table - 1 x daily - 5 x weekly - 2 sets - 10 reps - 3 hold    HEP instruction:    Access Code: [email or web address]  URL: [employer]  Date: 05/31/2022  Prepared by: Crissie Ingram     Exercises  Supine Transversus Abdominis Bracing - Hands on Stomach - 1 x daily - 5 x weekly - 2 sets - 10 reps - 8 hold  Hooklying March + Abdominal Bracing - 1 x daily - 5 x weekly - 2 sets - 10 reps - 5 hold  Prone Hip Extension - Two Pillows - 1 x daily - 5 x weekly - 2 sets - 10 reps - 5 hold    Therapeutic Exercise and NMR EXR  [x] (99846) Provided verbal/tactile cueing for activities related to strengthening, flexibility, endurance, ROM for improvements in  [x] LE / Lumbar: LE, proximal hip, and core control with self care, mobility, lifting, ambulation. [] UE / Cervical: cervical, postural, scapular, scapulothoracic and UE control with self care, reaching, carrying, lifting, house/yardwork, driving, computer work. [x] (25372) Provided verbal/tactile cueing for activities related to improving balance, coordination, kinesthetic sense, posture, motor skill, proprioception to assist with   [x] LE / lumbar: LE, proximal hip, and core control in self care, mobility, lifting, ambulation and eccentric single leg control. [] UE / cervical: cervical, scapular, scapulothoracic and UE control with self care, reaching, carrying, lifting, house/yardwork, driving, computer work.   [] (89831) Therapist is in constant attendance of 2 or more patients providing skilled therapy interventions, but not providing any significant amount of measurable one-on-one time to either patient, for improvements in  [] LE / lumbar: LE, proximal hip, and core control in self care, mobility, lifting, ambulation and eccentric single leg control. [] UE / cervical: cervical, scapular, scapulothoracic and UE control with self care, reaching, carrying, lifting, house/yardwork, driving, computer work.      NMR and Therapeutic Activities:    [x] (11095 or 92308) Provided verbal/tactile cueing for activities related to improving balance, coordination, kinesthetic sense, posture, motor skill, proprioception and motor activation to allow for proper function of   [x] LE: / Lumbar core, proximal hip and LE with self care and ADLs  [] UE / Cervical: cervical, postural, scapular, scapulothoracic and UE control with self care, carrying, lifting, driving, computer work.   [] (02254) Gait Re-education- Provided training and instruction to the patient for proper LE, core and proximal hip recruitment and positioning and eccentric body weight control with ambulation re-education including up and down stairs     Home Management Training / Self Care:  [] (67398) Provided self-care/home management training related to activities of daily living and compensatory training, and/or use of adaptive equipment for improvement with: ADLs and compensatory training, meal preparation, safety procedures and instruction in use of adaptive equipment, including bathing, grooming, dressing, personal hygiene, basic household cleaning and chores. Home Exercise Program:    [x] (66232) Reviewed/Progressed HEP activities related to strengthening, flexibility, endurance, ROM of   [x] LE / Lumbar: core, proximal hip and LE for functional self-care, mobility, lifting and ambulation/stair navigation   [] UE / Cervical: cervical, postural, scapular, scapulothoracic and UE control with self care, reaching, carrying, lifting, house/yardwork, driving, computer work  [x] (02068)Reviewed/Progressed HEP activities related to improving balance, coordination, kinesthetic sense, posture, motor skill, proprioception of   [x] LE: core, proximal hip and LE for self care, mobility, lifting, and ambulation/stair navigation    [] UE / Cervical: cervical, postural,  scapular, scapulothoracic and UE control with self care, reaching, carrying, lifting, house/yardwork, driving, computer work    Manual Treatments:  PROM / STM / Oscillations-Mobs:  G-I, II, III, IV (PA's, Inf., Post.)  [] (71038) Provided manual therapy to mobilize LE, proximal hip and/or LS spine soft tissue/joints for the purpose of modulating pain, promoting relaxation,  increasing ROM, reducing/eliminating soft tissue swelling/inflammation/restriction, improving soft tissue extensibility and allowing for proper ROM for normal function with   [] LE / lumbar: self care, mobility, lifting and ambulation.     [] UE / Cervical: self care, reaching, carrying, lifting, house/yardwork, driving, computer work. Modalities:  [] (19046) Vasopneumatic compression: Utilized vasopneumatic compression to decrease edema / swelling for the purpose of improving mobility and quad tone / recruitment which will allow for increased overall function including but not limited to self-care, transfers, ambulation, and ascending / descending stairs. Charges:  Timed Code Treatment Minutes: 45   Total Treatment Minutes: 45     [] EVAL - LOW (17316)   [] EVAL - MOD (07375)  [] EVAL - HIGH (49335)  [] RE-EVAL (72147)  [x] YZ(27293) x 2       [] Ionto  [x] NMR (82428) x 1       [] Vaso  [] Manual (58481) x       [] Ultrasound  [] TA x        [] Mech Traction (58698)  [] Aquatic Therapy x      [] ES (un) (59314):   [] Home Management Training x  [] ES(attended) (95912)   [] Dry Needling 1-2 muscles (15428):  [] Dry Needling 3+ muscles (998832)  [] Group:      [] Other:     GOALS:  Patient stated goal: no pain when standing, walking or sleeping  []? Progressing: []? Met: []? Not Met: []? Adjusted     Therapist goals for Patient:   Short Term Goals: To be achieved in: 2 weeks  1. Independent in HEP and progression per patient tolerance, in order to prevent re-injury. []? Progressing: []? Met: []? Not Met: []? Adjusted  2. Patient will have a decrease in pain to <3/10 on average with ADLs and house chores to improve tolerance to performance. []? Progressing: []? Met: []? Not Met: []? Adjusted     Long Term Goals: To be achieved in: 4 weeks  1. FOTO score of 59 at least to assist with reaching prior level of function. []? Progressing: []? Met: []? Not Met: []? Adjusted  2. Patient will demonstrate increased AROM extension to 10deg to allow for greater tolerance to standing and extended trunk positions (e.g. walking uphill). []? Progressing: []? Met: []? Not Met: []? Adjusted  3.  Patient will demonstrate an increase in Strength 5-/5 bilateral hips and >7/10 core activation to allow for proper stabilization and strength needed for prolonged tasks. []? Progressing: []? Met: []? Not Met: []? Adjusted  4. Patient will return to functional activities including walking neighborhood and aquatics >30 minutes without increased symptoms or restriction. []? Progressing: []? Met: []? Not Met: []? Adjusted  5. Patient will lift 25# 10x from floor to waist using proper squat lift technique in order to manage home and yard tasks. []? Progressing: []? Met: []? Not Met: []? Adjusted         Overall Progression Towards Functional goals/ Treatment Progress Update:  [] Patient is progressing as expected towards functional goals listed. [] Progression is slowed due to complexities/Impairments listed. [] Progression has been slowed due to co-morbidities. [x] Plan just implemented, too soon to assess goals progression <30days   [] Goals require adjustment due to lack of progress  [] Patient is not progressing as expected and requires additional follow up with physician  [] Other    Persisting Functional Limitations/Impairments:  []Sleeping []Sitting               [x]Standing [x]Transfers        [x]Walking []Kneeling               [x]Stairs [x]Squatting / bending   []ADLs []Reaching  [x]Lifting  [x]Housework  []Driving []Job related tasks  [x]Sports/Recreation []Other:      ASSESSMENT:  Good tolerance to progressions this date with some fatigue, but no pain. HEP updated with progressions. Patient OK'ed to resume volunteering at week 6 post-op if without symptoms. Patient will continue with PT for 3rd total visit, this week on Friday. Patient is experiencing gluteal and low back musculature soreness, as expected (and tolerable), with recent exercise prescription.     Treatment/Activity Tolerance:  [x] Patient able to complete tx  [] Patient limited by fatigue  [] Patient limited by pain  [] Patient limited by other medical complications  [] Other:     Prognosis: [x] Good [] Fair  [] Poor    Patient Requires Follow-up: [x] Yes  [] No    Plan for next treatment session: standing, walking, aquatics; core activation, hip strengthening, gentle spinal ROM    PLAN: PT 1x / week for 5 weeks. [x] Continue per plan of care [] Alter current plan (see comments)  [] Plan of care initiated [] Hold pending MD visit [] Discharge    Electronically signed by: Jelena Reynoso, PT PT, DPT, OMT-C    Note: If patient does not return for scheduled/ recommended follow up visits, this note will serve as a discharge from care along with most recent update on progress.

## 2022-06-10 ENCOUNTER — HOSPITAL ENCOUNTER (OUTPATIENT)
Dept: PHYSICAL THERAPY | Age: 77
Setting detail: THERAPIES SERIES
Discharge: HOME | End: 2022-06-10
Payer: MEDICARE

## 2022-06-10 PROCEDURE — 97112 NEUROMUSCULAR REEDUCATION: CPT

## 2022-06-10 PROCEDURE — 97110 THERAPEUTIC EXERCISES: CPT

## 2022-06-10 NOTE — FLOWSHEET NOTE
[facility] Physical Therapy  Phone: [phone]   Fax: [phone]    Physical Therapy Daily Treatment Note  Date:  6/10/2022    Patient Name:  Seferino Hankins    :  1945  MRN: [id number]  Medical/Treatment Diagnosis Information:  · Diagnosis: Z48.811 - POST-OP NERVE CYST SURGERY, M54.16 - LUMBAR RADIC - LEFT, M71.38 - SYNOVIAL CYST  : SX - 22  · Treatment Diagnosis: low back pain, decreased spinal ROM, impaired activity tolerance, core/back weakness  Insurance/Certification information:  PT Insurance Information: [address] NOT MET, NO AUTH  Physician Information:   Caitie Garcias MD; Kaila Childress NP  Plan of care signed (Y/N): []  Yes [x]  No     Date of Patient follow up with Physician:      Progress Report: [x]  Yes  []  No     Date Range for reporting period:  Beginnin22  Ending: tbd    Progress report due (10 Rx/or 30 days whichever is less): visit #5 (date) OR 41    Recertification due (POC duration/ or 90 days whichever is less): visit #5 OR 22    Visit # Insurance Allowable Auth required? Date Range   3 PMN []  Yes  [x]  No      Latex Allergy:  [x]NO      []YES  Preferred Language for Healthcare:   [x]English       []other:    Functional Scale:           Date assessed:  FOTO physical FS primary measure score = 52; Risk adjusted = 45  22    Pain level:  0/10     SUBJECTIVE: The patient reports some lateral ankle pain in left ankle. She is compliant with HEP. She has been walking longer and further, up to 20 minutes briskly.      OBJECTIVE:  - patient able to press 10# using BUE in standing     RESTRICTIONS/PRECAUTIONS:    Lifting Restrictions:  2 weeks post op = 15lbs  6 weeks post op = 20-25lbs  12 weeks post op = 30lbs  6 months post op = 50lbs      No vacuuming 6 weeks post-op    Exercises/Interventions:     Therapeutic Exercises (87091) Resistance / level Sets/sec Reps Notes          TVABD ACTIVATION  HOOKLYING MARCHES  PRONE HIP EXT  Reviewed Mylo Spine HEP         Glute Crossbody Stretch  Hooklying Curl Ups  Standing Plate Press + TvAbd Bracing 15# 3'' / 2 10 6/10 - ^ weight   Standing Squats TM walking Ankle Eversion TB - long sitting lime 2 15 6/10   SB Seated:  High [place] Lats Row  Alt Derrell  Alt LAQ   45#   2  1  1   15  10 R/L  10 R/L   6/10   TRX Squats  2 15 6/10   TRX Body Rows  2 10 6/10          Therapeutic Activities (11323)                                          Neuromuscular Re-ed (40896)       Supine Deadbug w/ SB (single limb movement)  SB bridges  Trunk-supported Hip Ext Lime loop 2'' / 2 10 R/L 6/10 - added resistance   Paloff Press blue 5'' / 1 10 R/L 6/10 - added   Tandem Stance + KB Passing Hand to Hand 5# KB  1 10 R/L ea 6/10 - added          Manual Intervention (06853)                                                   Modalities:     Pt. Education:  -patient educated on diagnosis, prognosis and expectations for rehab  -all patient questions were answered    Home Exercise Program:  Access Code: [email or web address]  URL: [employer]/  Date: 06/07/2022  Prepared by: Carole Bending    Exercises  Supine Transversus Abdominis Bracing - Hands on Stomach - 1 x daily - 5 x weekly - 2 sets - 10 reps - 8 hold  Hooklying March + Abdominal Bracing - 1 x daily - 5 x weekly - 2 sets - 10 reps - 5 hold  Prone Hip Extension - Two Pillows - 1 x daily - 5 x weekly - 2 sets - 10 reps - 5 hold  Supine Bridge - 1 x daily - 5 x weekly - 2 sets - 10 reps - 5 hold  Dead Bug with Graciela Green - 1 x daily - 5 x weekly - 2 sets - 5 reps  Prone Hip Extension on Table - 1 x daily - 5 x weekly - 2 sets - 10 reps - 3 hold    HEP instruction:    Access Code: [email or web address]  URL: [employer]/  Date: 05/31/2022  Prepared by: Carole Bending     Exercises  Supine Transversus Abdominis Bracing - Hands on Stomach - 1 x daily - 5 x weekly - 2 sets - 10 reps - 8 hold  Hooklying March + Abdominal Bracing - 1 x daily - 5 x weekly - 2 sets - 10 reps - 5 hold  Prone Hip Extension - Two Pillows - 1 x daily - 5 x weekly - 2 sets - 10 reps - 5 hold    Therapeutic Exercise and NMR EXR  [x] (62544) Provided verbal/tactile cueing for activities related to strengthening, flexibility, endurance, ROM for improvements in  [x] LE / Lumbar: LE, proximal hip, and core control with self care, mobility, lifting, ambulation. [] UE / Cervical: cervical, postural, scapular, scapulothoracic and UE control with self care, reaching, carrying, lifting, house/yardwork, driving, computer work. [x] (75358) Provided verbal/tactile cueing for activities related to improving balance, coordination, kinesthetic sense, posture, motor skill, proprioception to assist with   [x] LE / lumbar: LE, proximal hip, and core control in self care, mobility, lifting, ambulation and eccentric single leg control. [] UE / cervical: cervical, scapular, scapulothoracic and UE control with self care, reaching, carrying, lifting, house/yardwork, driving, computer work.   [] (37876) Therapist is in constant attendance of 2 or more patients providing skilled therapy interventions, but not providing any significant amount of measurable one-on-one time to either patient, for improvements in  [] LE / lumbar: LE, proximal hip, and core control in self care, mobility, lifting, ambulation and eccentric single leg control. [] UE / cervical: cervical, scapular, scapulothoracic and UE control with self care, reaching, carrying, lifting, house/yardwork, driving, computer work.      NMR and Therapeutic Activities:    [x] (11032 or 72977) Provided verbal/tactile cueing for activities related to improving balance, coordination, kinesthetic sense, posture, motor skill, proprioception and motor activation to allow for proper function of   [x] LE: / Lumbar core, proximal hip and LE with self care and ADLs  [] UE / Cervical: cervical, postural, scapular, scapulothoracic and UE control with self care, carrying, lifting, driving, computer work.   [] (27238) Gait Re-education- Provided training and instruction to the patient for proper LE, core and proximal hip recruitment and positioning and eccentric body weight control with ambulation re-education including up and down stairs     Home Management Training / Self Care:  [] (94116) Provided self-care/home management training related to activities of daily living and compensatory training, and/or use of adaptive equipment for improvement with: ADLs and compensatory training, meal preparation, safety procedures and instruction in use of adaptive equipment, including bathing, grooming, dressing, personal hygiene, basic household cleaning and chores.      Home Exercise Program:    [] (09234) Reviewed/Progressed HEP activities related to strengthening, flexibility, endurance, ROM of   [] LE / Lumbar: core, proximal hip and LE for functional self-care, mobility, lifting and ambulation/stair navigation   [] UE / Cervical: cervical, postural, scapular, scapulothoracic and UE control with self care, reaching, carrying, lifting, house/yardwork, driving, computer work  [] (35128)Reviewed/Progressed HEP activities related to improving balance, coordination, kinesthetic sense, posture, motor skill, proprioception of   [] LE: core, proximal hip and LE for self care, mobility, lifting, and ambulation/stair navigation    [] UE / Cervical: cervical, postural,  scapular, scapulothoracic and UE control with self care, reaching, carrying, lifting, house/yardwork, driving, computer work    Manual Treatments:  PROM / STM / Oscillations-Mobs:  G-I, II, III, IV (PA's, Inf., Post.)  [] (99310) Provided manual therapy to mobilize LE, proximal hip and/or LS spine soft tissue/joints for the purpose of modulating pain, promoting relaxation,  increasing ROM, reducing/eliminating soft tissue swelling/inflammation/restriction, improving soft tissue extensibility and allowing for proper ROM for normal function with   [] LE / lumbar: self care, mobility, lifting and ambulation. [] UE / Cervical: self care, reaching, carrying, lifting, house/yardwork, driving, computer work. Modalities:  [] (53964) Vasopneumatic compression: Utilized vasopneumatic compression to decrease edema / swelling for the purpose of improving mobility and quad tone / recruitment which will allow for increased overall function including but not limited to self-care, transfers, ambulation, and ascending / descending stairs. Charges:  Timed Code Treatment Minutes: 42   Total Treatment Minutes: 42     [] EVAL - LOW (36830)   [] EVAL - MOD (06035)  [] EVAL - HIGH (37016)  [] RE-EVAL (97753)  [x] BY(11441) x 2       [] Ionto  [x] NMR (35478) x 1       [] Vaso  [] Manual (20030) x       [] Ultrasound  [] TA x        [] Mech Traction (51173)  [] Aquatic Therapy x      [] ES (un) (78567):   [] Home Management Training x  [] ES(attended) (01506)   [] Dry Needling 1-2 muscles (60734):  [] Dry Needling 3+ muscles (610044)  [] Group:      [] Other:     GOALS:  Patient stated goal: no pain when standing, walking or sleeping  []? Progressing: []? Met: []? Not Met: []? Adjusted     Therapist goals for Patient:   Short Term Goals: To be achieved in: 2 weeks  1. Independent in HEP and progression per patient tolerance, in order to prevent re-injury. []? Progressing: [x]? Met: []? Not Met: []? Adjusted  2. Patient will have a decrease in pain to <3/10 on average with ADLs and house chores to improve tolerance to performance. []? Progressing: [x]? Met: []? Not Met: []? Adjusted     Long Term Goals: To be achieved in: 4 weeks  1. FOTO score of 59 at least to assist with reaching prior level of function. []? Progressing: []? Met: []? Not Met: []? Adjusted  2. Patient will demonstrate increased AROM extension to 10deg to allow for greater tolerance to standing and extended trunk positions (e.g. walking uphill). []? Progressing: []? Met: []? Not Met: []? Adjusted  3. Patient will demonstrate an increase in Strength 5-/5 bilateral hips and >7/10 core activation to allow for proper stabilization and strength needed for prolonged tasks. []? Progressing: []? Met: []? Not Met: []? Adjusted  4. Patient will return to functional activities including walking neighborhood and aquatics >30 minutes without increased symptoms or restriction. [x]? Progressing: []? Met: []? Not Met: []? Adjusted  5. Patient will lift 25# 10x from floor to waist using proper squat lift technique in order to manage home and yard tasks. []? Progressing: []? Met: []? Not Met: []? Adjusted         Overall Progression Towards Functional goals/ Treatment Progress Update:  [x] Patient is progressing as expected towards functional goals listed. [] Progression is slowed due to complexities/Impairments listed. [] Progression has been slowed due to co-morbidities. [] Plan just implemented, too soon to assess goals progression <30days   [] Goals require adjustment due to lack of progress  [] Patient is not progressing as expected and requires additional follow up with physician  [] Other    Persisting Functional Limitations/Impairments:  []Sleeping []Sitting               [x]Standing [x]Transfers        [x]Walking []Kneeling               [x]Stairs [x]Squatting / bending   []ADLs []Reaching  [x]Lifting  [x]Housework  []Driving []Job related tasks  [x]Sports/Recreation []Other:      ASSESSMENT:  Again, good tolerance to progressions this date with some fatigue, but no pain. She was challenged by resisted tasks, lifting and tandem balance with KB passing. The patient has less difficulty stabilizing core when LLE is in stance and the trunk wobbles when RLE is in support.      Treatment/Activity Tolerance:  [x] Patient able to complete tx  [] Patient limited by fatigue  [] Patient limited by pain  [] Patient limited by other medical complications  [] Other: Prognosis: [x] Good [] Fair  [] Poor    Patient Requires Follow-up: [x] Yes  [] No    Plan for next treatment session: standing, walking, aquatics; core activation, hip strengthening, gentle spinal ROM    PLAN: PT 1x / week for 5 weeks. [x] Continue per plan of care [] Alter current plan (see comments)  [] Plan of care initiated [] Hold pending MD visit [] Discharge    Electronically signed by: Patricia Lambert, PT PT, DPT, OMT-C    Note: If patient does not return for scheduled/ recommended follow up visits, this note will serve as a discharge from care along with most recent update on progress.

## 2022-06-11 SDOH — HEALTH STABILITY: PHYSICAL HEALTH: ON AVERAGE, HOW MANY MINUTES DO YOU ENGAGE IN EXERCISE AT THIS LEVEL?: 20 MIN

## 2022-06-11 SDOH — HEALTH STABILITY: PHYSICAL HEALTH: ON AVERAGE, HOW MANY DAYS PER WEEK DO YOU ENGAGE IN MODERATE TO STRENUOUS EXERCISE (LIKE A BRISK WALK)?: 4 DAYS

## 2022-06-13 ENCOUNTER — OFFICE VISIT (OUTPATIENT)
Dept: ORTHOPEDIC SURGERY | Age: 77
End: 2022-06-13
Payer: MEDICARE

## 2022-06-13 VITALS — BODY MASS INDEX: 28.49 KG/M2 | HEIGHT: 65 IN | WEIGHT: 171 LBS | RESPIRATION RATE: 16 BRPM

## 2022-06-13 DIAGNOSIS — M25.572 CHRONIC PAIN OF LEFT ANKLE: ICD-10-CM

## 2022-06-13 DIAGNOSIS — M19.079 ANKLE ARTHRITIS: Primary | ICD-10-CM

## 2022-06-13 DIAGNOSIS — G89.29 CHRONIC PAIN OF LEFT ANKLE: ICD-10-CM

## 2022-06-13 PROCEDURE — 1123F ACP DISCUSS/DSCN MKR DOCD: CPT | Performed by: ORTHOPAEDIC SURGERY

## 2022-06-13 PROCEDURE — L1902 AFO ANKLE GAUNTLET PRE OTS: HCPCS | Performed by: ORTHOPAEDIC SURGERY

## 2022-06-13 PROCEDURE — 20605 DRAIN/INJ JOINT/BURSA W/O US: CPT | Performed by: ORTHOPAEDIC SURGERY

## 2022-06-13 PROCEDURE — 99213 OFFICE O/P EST LOW 20 MIN: CPT | Performed by: ORTHOPAEDIC SURGERY

## 2022-06-13 RX ORDER — LIDOCAINE HYDROCHLORIDE 10 MG/ML
0.5 INJECTION, SOLUTION INFILTRATION; PERINEURAL ONCE
Status: COMPLETED | OUTPATIENT
Start: 2022-06-13 | End: 2022-06-13

## 2022-06-13 RX ORDER — BUPIVACAINE HYDROCHLORIDE 2.5 MG/ML
0.5 INJECTION, SOLUTION INFILTRATION; PERINEURAL ONCE
Status: COMPLETED | OUTPATIENT
Start: 2022-06-13 | End: 2022-06-13

## 2022-06-13 RX ORDER — TRIAMCINOLONE ACETONIDE 40 MG/ML
40 INJECTION, SUSPENSION INTRA-ARTICULAR; INTRAMUSCULAR ONCE
Status: COMPLETED | OUTPATIENT
Start: 2022-06-13 | End: 2022-06-13

## 2022-06-13 RX ADMIN — LIDOCAINE HYDROCHLORIDE 0.5 ML: 10 INJECTION, SOLUTION INFILTRATION; PERINEURAL at 13:40

## 2022-06-13 RX ADMIN — TRIAMCINOLONE ACETONIDE 40 MG: 40 INJECTION, SUSPENSION INTRA-ARTICULAR; INTRAMUSCULAR at 13:41

## 2022-06-13 RX ADMIN — BUPIVACAINE HYDROCHLORIDE 1.25 MG: 2.5 INJECTION, SOLUTION INFILTRATION; PERINEURAL at 13:40

## 2022-06-13 NOTE — PROGRESS NOTES
CHIEF COMPLAINT: Left foot pain    History:   Ms. Laura Segura 77 y.o. female presents today for evaluation of left foot pain. The patient was self-referred. This is evaluated as a personal. There was not a history of injury. The pain began \"a long time\" ago. She rates pain at 5/10. The pain is located anteriorly and laterally. Symptoms are worse with weightbearing. There is swelling in the foot. The patient has taken NSAIDs. Past Medical History:   Diagnosis Date    Arthritis     Blood transfusion 2002    AUTOLOGOUS    Blood transfusion 1968    Diverticulosis     H/O irritable bowel syndrome     Hyperlipidemia     Osteopenia     Restless leg syndrome     Seasonal allergies     Synovial cyst     back       Past Surgical History:   Procedure Laterality Date    APPENDECTOMY      CARPAL TUNNEL RELEASE      LEFT    COLONOSCOPY      FRACTURE SURGERY      BILATERAL FEMURS     FRACTURE SURGERY      RIGHT ANKLE/TALUS/TIBIA    HAND SURGERY Left 9/12/2019    LEFT RING FINGER  DEBRIDEMENT INCISION AND DRAINAGE, REMOVAL OF FOREIGN BODY performed by Bret Hines MD at [facility]      JOINT REPLACEMENT      RIGHT KNEE    KNEE ARTHROSCOPY      RIGHT AND LEFT MULTIPLE    LUMBAR SPINE SURGERY N/A 5/3/2022    LEFT LUMBAR4-LUMBAR5 MICRO HEMILAMINECTOMY AND EXCISION OF SYNOVIAL CYST (26742, 64627) performed by Jese Durham MD at [address] [place] [address],[facility]      spinal ablations     TRIGGER FINGER RELEASE      RIGHT - 2 FINGERS    TRIGGER FINGER RELEASE  6-    trigger finger release left long and ring fingers       Current Outpatient Medications on File Prior to Visit   Medication Sig Dispense Refill    BIOTIN PO Take by mouth daily      Amoxicillin 500 MG TABS Take 4 (four) tablets 1 (one) hour prior to dental appointment.       Calcium Polycarbophil (FIBER) 625 MG TABS Take 625 mg by mouth 2 times daily      omeprazole (PRILOSEC) 10 MG delayed release capsule Take 10 mg by mouth every other day       Cholecalciferol (VITAMIN D) 50 MCG (2000 UT) CAPS capsule Take 1 capsule by mouth daily      Probiotic Product (PROBIOTIC-10 PO) Take 1 capsule by mouth daily      Melatonin 5 MG CAPS Take 10 mg by mouth nightly       simvastatin (ZOCOR) 20 MG tablet Take 20 mg by mouth nightly.  ropinirole (REQUIP) 0.5 MG tablet Take 1.5 mg by mouth nightly       diclofenac (VOLTAREN) 75 MG EC tablet Take 75 mg by mouth 2 times daily as needed Takes 1-2 per week, only takes on really \"achy\" days      Calcium Carbonate-Vitamin D (CALCIUM PLUS VITAMIN D) 600-100 MG-UNIT CAPS Take 1 tablet by mouth nightly       Multiple Vitamin (MULTIVITAMIN PO) Take 1 tablet by mouth daily. No current facility-administered medications on file prior to visit. Allergies   Allergen Reactions    Sulfa Antibiotics Rash     Face red,flushed       Social History     Socioeconomic History    Marital status:      Spouse name: Not on file    Number of children: Not on file    Years of education: Not on file    Highest education level: Not on file   Occupational History    Not on file   Tobacco Use    Smoking status: Never Smoker    Smokeless tobacco: Never Used   Vaping Use    Vaping Use: Never used   Substance and Sexual Activity    Alcohol use: Yes     Comment: occasional glass of wine with dinner    Drug use: No    Sexual activity: Not on file   Other Topics Concern    Not on file   Social History Narrative    Not on file     Social Determinants of Health     Financial Resource Strain:     Difficulty of Paying Living Expenses: Not on file   Food Insecurity:     Worried About Running Out of Food in the Last Year: Not on file    Noelle of Food in the Last Year: Not on file   Transportation Needs:     Lack of Transportation (Medical): Not on file    Lack of Transportation (Non-Medical):  Not on file   Physical Activity: Insufficiently Active    Days of Exercise per Week: 4 days    Minutes of Exercise per Session: 20 min   Stress:     Feeling of Stress : Not on file   Social Connections:     Frequency of Communication with Friends and Family: Not on file    Frequency of Social Gatherings with Friends and Family: Not on file    Attends Temple Services: Not on file    Active Member of Clubs or Organizations: Not on file    Attends Club or Organization Meetings: Not on file    Marital Status: Not on file   Intimate Partner Violence: Not At Risk    Fear of Current or Ex-Partner: No    Emotionally Abused: No    Physically Abused: No    Sexually Abused: No   Housing Stability:     Unable to Pay for Housing in the Last Year: Not on file    Number of [place] in the Last Year: Not on file    Unstable Housing in the Last Year: Not on file       Family History   Problem Relation Age of Onset    Cancer Father         PROSTATE    Heart Disease Mother         CHF       Physical Examination:     Ms. Berta Contreras is a 77 y.o. female who presents today in no acute distress, awake, alert, and oriented. Resp 16   Ht 5' 5\" (1.651 m)   Wt 171 lb (77.6 kg)   BMI 28.46 kg/m²      Examination of the gait, showed that the patient walks heel-toe with a non-antalgic gait and no limp. Examination of both ankles showing a good range of motion. Patient has dorsiflexion to about 30 degrees bilaterally. There is swelling that can be seen, no change in the color. Sensation intact to light touch throughout the foot and good pedal pulses bilaterally. There is good strength in all four planes, including inversion/eversion bilaterally. There is  tenderness at along her tibiotalar joint line. IMAGING:  Left foot Xray's: 3 views (AP, lateral, obqliue) obtained and reviewed demonstrating moderate tibiotalar narrowing. There appears to be a remote fracture of her anterior tibia seen on the lateral view.       Assessment:     Left ankle osteoarthritis      Plan:     Natural history and expected course discussed. Questions answered. Ice as needed. Procedures    [place] Wraptor Ankle Brace     Patient was prescribed a [place] Ankle Brace. The left ankle will require stabilization / immobilization from this semi-rigid / rigid orthosis to improve their function. The orthosis will assist in protecting the affected area, provide functional support and facilitate healing. Patient was instructed to progress ambulation weight bearing as tolerated in the device. The patient was educated and fit by a healthcare professional with expert knowledge and specialization in brace application while under the direct supervision of the treating physician. Verbal and written instructions for the use of and application of this item were provided. They were instructed to contact the office immediately should the brace result in increased pain, decreased sensation, increased swelling or worsening of the condition. The risks and benefits of an injection were discussed with the patient. The patient had full opportunity to ask questions and all were answered. The patient then provided verbal informed consent. The skin was then prepped with betadine solution and alcohol. Under aseptic conditions, the  left ankle joint was injected with 1/2cc of 1% xylocaine, 1/2cc of 0.25% marcaine, and 1cc of Kenalog (40mg/ml). There were no immediate complications following the injection. The patient was advised of the possibility of injection site reaction and instructed to apply ice to the area and take NSAIDs if able. I do recommend she continue the ankle strengthening exercises that was shown to her in physical therapy. Follow-up in 3 months as needed. Dannis Lab. Sweetie Regalado MD  Orthopaedic Surgery and Sports Medicine     Disclaimer: This note was generated with use of a verbal recognition program and an attempt was made to check for errors.   It is possible that there are still dictated errors within this office note. If so, please bring any significant errors to my attention for an addendum. All efforts were made to ensure that this office note is accurate.

## 2022-06-14 ENCOUNTER — HOSPITAL ENCOUNTER (OUTPATIENT)
Dept: PHYSICAL THERAPY | Age: 77
Setting detail: THERAPIES SERIES
Discharge: HOME | End: 2022-06-14
Payer: MEDICARE

## 2022-06-14 PROCEDURE — 97110 THERAPEUTIC EXERCISES: CPT

## 2022-06-14 PROCEDURE — 97530 THERAPEUTIC ACTIVITIES: CPT

## 2022-06-14 PROCEDURE — 97112 NEUROMUSCULAR REEDUCATION: CPT

## 2022-06-14 NOTE — FLOWSHEET NOTE
[address] [facility]  Phone: [phone]   Fax: [phone]    Physical Therapy Daily Treatment Note  Date:  2022    Patient Name:  Jen Aiken    :  1945  MRN: [id number]  Medical/Treatment Diagnosis Information:  · Diagnosis: Z48.811 - POST-OP NERVE CYST SURGERY, M54.16 - LUMBAR RADIC - LEFT, M71.38 - SYNOVIAL CYST  : SX - 5/3/22  · Treatment Diagnosis: low back pain, decreased spinal ROM, impaired activity tolerance, core/back weakness  Insurance/Certification information:  PT Insurance Information: [address] NOT MET, NO AUTH  Physician Information:   Apurva Nicolas MD; Yobany Javed NP  Plan of care signed (Y/N): []  Yes [x]  No     Date of Patient follow up with Physician:      Progress Report: [x]  Yes  []  No     Date Range for reporting period:  Beginnin22  Ending: tbd    Progress report due (10 Rx/or 30 days whichever is less): visit #5 (date) OR     Recertification due (POC duration/ or 90 days whichever is less): visit #5 OR 22    Visit # Insurance Allowable Auth required? Date Range   4 PMN []  Yes  [x]  No      Latex Allergy:  [x]NO      []YES  Preferred Language for Healthcare:   [x]English       []other:    Functional Scale:           Date assessed:  TO physical FS primary measure score = 52; Risk adjusted = 45  22    Pain level:  1/10     SUBJECTIVE: The patient received ankle cortisone injection and is had increased pain the night of. She is wearing ankle brace today to help support the ankle. She indicated low level pain around R PSIS. The patient is 6 weeks s/p tomorrow.      OBJECTIVE:    - hip abd MMT = 4+/5 L; R = 4/5   - patient able to press 10# using BUE in standing     RESTRICTIONS/PRECAUTIONS:    Lifting Restrictions:  2 weeks post op = 15lbs  6 weeks post op = 20-25lbs  12 weeks post op = 30lbs  6 months post op = 50lbs      No vacuuming 6 weeks post-op    Exercises/Interventions:     Therapeutic Exercises (96030) Resistance / level Sets/sec Reps Notes          TVABD ACTIVATION  HOOKLYING MARCHES  PRONE HIP EXT  Reviewed [facility] HEP         Glute Crossbody Stretch  Hooklying Curl Ups  Standing Plate Press + TvAbd Bracing 15# 3'' / 1 15 6/14 - reduced set, ^ reps   Standing Squats TM walking 2% grade  2.2 mph  5 min 6/14 - ^ pace   Ankle Eversion TB - long sitting SB Seated:  High [place] Lats Row  Alt Marches  Alt LAQ  BUE Flexion + TvAbd Activation   45#      3#   2  1  1  1   15  10 R/L  10 R/L  10x   6/14 - added   TRX Squats  2 15 6/10   TRX Body Rows  2 10 6/10   Lateral Band Walk lime 2 15ft R/L 6/10 - legs straight, knees bent   Leg Press (L4, B1) 55# 2 10 6/14 - added          Therapeutic Activities (10197)       Box Lift & Carry 20# 70ft 3x 6/14 - added   Farmer's Carry 20# KB 100ft 1x R/L UE ea 6/14 - added                        Neuromuscular Re-ed (56236)       Supine Deadbug w/ SB (single limb movement)  SB bridges  Trunk-supported Hip Ext Lime loop 2'' / 2 10 R/L 6/10 - added resistance   Paloff Press purple 5'' / 1 10 R/L 6/14 - ^ resistance   Tandem Stance + KB Passing Hand to Hand 5# KB  2 5 R/L ea 6/14 - improved          Manual Intervention (41007)                                                   Modalities:     Pt. Education:  -patient educated on diagnosis, prognosis and expectations for rehab  -all patient questions were answered    Home Exercise Program:  Access Code: [email or web address]  URL: [email or web address]  Date: 06/07/2022  Prepared by: Anamaria Rand    Exercises  Supine Transversus Abdominis Bracing - Hands on Stomach - 1 x daily - 5 x weekly - 2 sets - 10 reps - 8 hold  Hooklying March + Abdominal Bracing - 1 x daily - 5 x weekly - 2 sets - 10 reps - 5 hold  Prone Hip Extension - Two Pillows - 1 x daily - 5 x weekly - 2 sets - 10 reps - 5 hold  Supine Bridge - 1 x daily - 5 x weekly - 2 sets - 10 reps - 5 hold  Dead Bug driving, computer work. NMR and Therapeutic Activities:    [x] (84665 or 17261) Provided verbal/tactile cueing for activities related to improving balance, coordination, kinesthetic sense, posture, motor skill, proprioception and motor activation to allow for proper function of   [x] LE: / Lumbar core, proximal hip and LE with self care and ADLs  [] UE / Cervical: cervical, postural, scapular, scapulothoracic and UE control with self care, carrying, lifting, driving, computer work.   [] (55886) Gait Re-education- Provided training and instruction to the patient for proper LE, core and proximal hip recruitment and positioning and eccentric body weight control with ambulation re-education including up and down stairs     Home Management Training / Self Care:  [] (00977) Provided self-care/home management training related to activities of daily living and compensatory training, and/or use of adaptive equipment for improvement with: ADLs and compensatory training, meal preparation, safety procedures and instruction in use of adaptive equipment, including bathing, grooming, dressing, personal hygiene, basic household cleaning and chores.      Home Exercise Program:    [] (06435) Reviewed/Progressed HEP activities related to strengthening, flexibility, endurance, ROM of   [] LE / Lumbar: core, proximal hip and LE for functional self-care, mobility, lifting and ambulation/stair navigation   [] UE / Cervical: cervical, postural, scapular, scapulothoracic and UE control with self care, reaching, carrying, lifting, house/yardwork, driving, computer work  [] (76364)Reviewed/Progressed HEP activities related to improving balance, coordination, kinesthetic sense, posture, motor skill, proprioception of   [] LE: core, proximal hip and LE for self care, mobility, lifting, and ambulation/stair navigation    [] UE / Cervical: cervical, postural,  scapular, scapulothoracic and UE control with self care, reaching, carrying, lifting, house/yardwork, driving, computer work    Manual Treatments:  PROM / STM / Oscillations-Mobs:  G-I, II, III, IV (PA's, Inf., Post.)  [] (20034) Provided manual therapy to mobilize LE, proximal hip and/or LS spine soft tissue/joints for the purpose of modulating pain, promoting relaxation,  increasing ROM, reducing/eliminating soft tissue swelling/inflammation/restriction, improving soft tissue extensibility and allowing for proper ROM for normal function with   [] LE / lumbar: self care, mobility, lifting and ambulation. [] UE / Cervical: self care, reaching, carrying, lifting, house/yardwork, driving, computer work. Modalities:  [] (04424) Vasopneumatic compression: Utilized vasopneumatic compression to decrease edema / swelling for the purpose of improving mobility and quad tone / recruitment which will allow for increased overall function including but not limited to self-care, transfers, ambulation, and ascending / descending stairs. Charges:  Timed Code Treatment Minutes: 45   Total Treatment Minutes: 45     [] EVAL - LOW (54875)   [] EVAL - MOD (15135)  [] EVAL - HIGH (19977)  [] RE-EVAL (01741)  [x] QE(88500) x 1       [] Ionto  [x] NMR (26231) x 1       [] Vaso  [] Manual (26145) x       [] Ultrasound  [x] TA x 1       [] Mech Traction (28072)  [] Aquatic Therapy x      [] ES (un) (11023):   [] Home Management Training x  [] ES(attended) (91098)   [] Dry Needling 1-2 muscles (79565):  [] Dry Needling 3+ muscles (526423)  [] Group:      [] Other:     GOALS:  Patient stated goal: no pain when standing, walking or sleeping  []? Progressing: []? Met: []? Not Met: []? Adjusted     Therapist goals for Patient:   Short Term Goals: To be achieved in: 2 weeks  1. Independent in HEP and progression per patient tolerance, in order to prevent re-injury. []? Progressing: [x]? Met: []? Not Met: []? Adjusted  2.  Patient will have a decrease in pain to <3/10 on average with ADLs and house chores to improve tolerance to performance. []? Progressing: [x]? Met: []? Not Met: []? Adjusted     Long Term Goals: To be achieved in: 4 weeks  1. FOTO score of 59 at least to assist with reaching prior level of function. []? Progressing: []? Met: []? Not Met: []? Adjusted  2. Patient will demonstrate increased AROM extension to 10deg to allow for greater tolerance to standing and extended trunk positions (e.g. walking uphill). []? Progressing: []? Met: []? Not Met: []? Adjusted  3. Patient will demonstrate an increase in Strength 5-/5 bilateral hips and >7/10 core activation to allow for proper stabilization and strength needed for prolonged tasks. [x]? Progressing: []? Met: []? Not Met: []? Adjusted  4. Patient will return to functional activities including walking neighborhood and aquatics >30 minutes without increased symptoms or restriction. [x]? Progressing: []? Met: []? Not Met: []? Adjusted  5. Patient will lift 25# 10x from floor to waist using proper squat lift technique in order to manage home and yard tasks. [x]? Progressing: []? Met: []? Not Met: []? Adjusted         Overall Progression Towards Functional goals/ Treatment Progress Update:  [x] Patient is progressing as expected towards functional goals listed. [] Progression is slowed due to complexities/Impairments listed. [] Progression has been slowed due to co-morbidities.   [] Plan just implemented, too soon to assess goals progression <30days   [] Goals require adjustment due to lack of progress  [] Patient is not progressing as expected and requires additional follow up with physician  [] Other    Persisting Functional Limitations/Impairments:  []Sleeping []Sitting               [x]Standing [x]Transfers        [x]Walking []Kneeling               [x]Stairs [x]Squatting / bending   []ADLs []Reaching  [x]Lifting  [x]Housework  []Driving []Job related tasks  [x]Sports/Recreation []Other:      ASSESSMENT:  The patient was challenged by weight progressions today and pacing of session, but she performed well overall without increased pain. She is improving with functional tolerance to lifting, carrying and hip strength is improving. Ongoing self-progression with activity, exercise has facilitated results. She may soon DC from PT and transition to [facility]. Treatment/Activity Tolerance:  [x] Patient able to complete tx  [] Patient limited by fatigue  [] Patient limited by pain  [] Patient limited by other medical complications  [] Other:     Prognosis: [x] Good [] Fair  [] Poor    Patient Requires Follow-up: [x] Yes  [] No    Plan for next treatment session: standing, walking, aquatics; core activation, hip strengthening, gentle spinal ROM    PLAN: PT 1x / week for 5 weeks. [x] Continue per plan of care [] Alter current plan (see comments)  [] Plan of care initiated [] Hold pending MD visit [] Discharge    Electronically signed by: Doroteo Reyes, PT PT, DPT, OMT-C    Note: If patient does not return for scheduled/ recommended follow up visits, this note will serve as a discharge from care along with most recent update on progress.

## 2022-06-17 ENCOUNTER — APPOINTMENT (OUTPATIENT)
Dept: PHYSICAL THERAPY | Age: 77
End: 2022-06-17
Payer: MEDICARE

## 2022-06-21 ENCOUNTER — HOSPITAL ENCOUNTER (OUTPATIENT)
Dept: PHYSICAL THERAPY | Age: 77
Setting detail: THERAPIES SERIES
Discharge: HOME | End: 2022-06-21
Payer: MEDICARE

## 2022-06-21 PROCEDURE — 97110 THERAPEUTIC EXERCISES: CPT

## 2022-06-21 PROCEDURE — 97530 THERAPEUTIC ACTIVITIES: CPT

## 2022-06-21 PROCEDURE — 97112 NEUROMUSCULAR REEDUCATION: CPT

## 2022-06-21 NOTE — FLOWSHEET NOTE
[facility]  Phone: [phone]   Fax: [phone]    Physical Therapy Daily Treatment Note  Date:  2022    Patient Name:  Jarred Vasquez    :  1945  MRN: [id number]  Medical/Treatment Diagnosis Information:  · Diagnosis: Z48.811 - POST-OP NERVE CYST SURGERY, M54.16 - LUMBAR RADIC - LEFT, M71.38 - SYNOVIAL CYST  : SX - 5/3/22  · Treatment Diagnosis: low back pain, decreased spinal ROM, impaired activity tolerance, core/back weakness  Insurance/Certification information:  PT Insurance Information: [address] NOT MET, NO AUTH  Physician Information:   Jese Durham MD; Hank Dominguez NP  Plan of care signed (Y/N): [x]  Yes []  No     Date of Patient follow up with Physician:       Progress Report: []  Yes  [x]  No     Date Range for reporting period:  Beginnin22  Ending: tbd    Progress report due (10 Rx/or 30 days whichever is less): visit #5 (date) OR 99    Recertification due (POC duration/ or 90 days whichever is less): visit #5 OR 22    Visit # Insurance Allowable Auth required? Date Range   eval + 4 PMN []  Yes  [x]  No      Latex Allergy:  [x]NO      []YES  Preferred Language for Healthcare:   [x]English       []other:    Functional Scale:           Date assessed:  TO physical FS primary measure score = 52; Risk adjusted = 45  22    Pain level:  2/10     SUBJECTIVE: The patient reports aching around and inferior to R PSIS. She is getting injections before she leaves for [place]. She reports her ankle feels much better since injection. She denies issues with volunteering physical tasks. She is agreeable to consider DC at next visit.      OBJECTIVE:     - patient able to lift 25# w/o increased LBP   - hip abd MMT = 4+/5 L; R = 4/5   - patient able to press 10# using BUE in standing     RESTRICTIONS/PRECAUTIONS:    Lifting Restrictions:  2 weeks post op = 15lbs  6 weeks post op = 20-25lbs  12 weeks post op = 30lbs  6 months post op = 50lbs      No vacuuming 6 weeks post-op    Exercises/Interventions:     Therapeutic Exercises (41778) Resistance / level Sets/sec Reps Notes          TVABD ACTIVATION  HOOKLYING MARCHES  PRONE HIP EXT  Reviewed [facility] HEP         Glute Crossbody Stretch  Hooklying Curl Ups  Standing Plate Press + TvAbd Bracing Standing Squats TM walking Ankle Eversion TB - long sitting SB Seated:  [place] Lats Row  Alt Marches + Sustained BUE Flex  Alt LAQ  BUE Flexion + TvAbd Activation     3#  3#  3#   1  1  1     10 R/L  10 R/L  10x   6/21 - modified   TRX Squats >> TRX Body Row  1 10 6/21   Lateral Band Walk Blue  2 15ft R/L 6/21 - legs straight, knees bentLeg Press (L4, B1) 60#  70# 1  2 10  10 6/21 - ^ resistance          Therapeutic Activities (51690)       Box Lift & Carry Manning's Carry Lift Floor to Waist 25# 1 10 6/21   Sit to Stand 25# 1 5 6/21          Neuromuscular Re-ed (93458)       Supine Deadbug w/ SB (single limb movement)  SB bridges  Trunk-supported Hip Ext Paloff Press 15# 5'' / 1 5 R/L 6/21 - ^ resistance   Paloff Press Stir the Pot 15#  5x cw/ccw  R/L 6/21 - added   Tandem Stance + KB Passing Hand to Hand 7.5# KB  2 5 R/L ea 6/14 - ^ resistance          Manual Intervention (69784)                                                   Modalities:     Pt. Education:  -patient educated on diagnosis, prognosis and expectations for rehab  -all patient questions were answered    Home Exercise Program:  Access Code: [email or web address]  URL: [email or web address]  Date: 06/21/2022  Prepared by: Osvaldo Smallwood    Exercises  Supine Bridge - 1 x daily - 5 x weekly - 1 sets - 10 reps - 5 hold  Dead Bug with Lizeth Balbina - 1 x daily - 5 x weekly - 2 sets - 5 reps  Prone Hip Extension on Table - 1 x daily - 5 x weekly - 2 sets - 10 reps - 3 hold  Side Stepping with Resistance at Ankles - 1 x daily - 5 x weekly - 1 sets - 2-3 reps - 15 feet  Prone Arm Lift - 1 x daily - 5 x weekly - 2 sets - 10 reps  Single Leg Bridge - 1 x daily - 5 x weekly - 2 sets - 10 reps    Access Code: [email or web address]  URL: [employer]/  Date: 06/07/2022  Prepared by: Prasanth Million    Exercises  Supine Transversus Abdominis Bracing - Hands on Stomach - 1 x daily - 5 x weekly - 2 sets - 10 reps - 8 hold  Hooklying March + Abdominal Bracing - 1 x daily - 5 x weekly - 2 sets - 10 reps - 5 hold  Prone Hip Extension - Two Pillows - 1 x daily - 5 x weekly - 2 sets - 10 reps - 5 hold  Supine Bridge - 1 x daily - 5 x weekly - 2 sets - 10 reps - 5 hold  Dead Bug with Tad Ink - 1 x daily - 5 x weekly - 2 sets - 5 reps  Prone Hip Extension on Table - 1 x daily - 5 x weekly - 2 sets - 10 reps - 3 hold    HEP instruction:    Access Code: [email or web address]  URL: [employer]/  Date: 05/31/2022  Prepared by: Prasanth Million     Exercises  Supine Transversus Abdominis Bracing - Hands on Stomach - 1 x daily - 5 x weekly - 2 sets - 10 reps - 8 hold  Hooklying March + Abdominal Bracing - 1 x daily - 5 x weekly - 2 sets - 10 reps - 5 hold  Prone Hip Extension - Two Pillows - 1 x daily - 5 x weekly - 2 sets - 10 reps - 5 hold    Therapeutic Exercise and NMR EXR  [x] (74931) Provided verbal/tactile cueing for activities related to strengthening, flexibility, endurance, ROM for improvements in  [x] LE / Lumbar: LE, proximal hip, and core control with self care, mobility, lifting, ambulation. [] UE / Cervical: cervical, postural, scapular, scapulothoracic and UE control with self care, reaching, carrying, lifting, house/yardwork, driving, computer work. [x] (75068) Provided verbal/tactile cueing for activities related to improving balance, coordination, kinesthetic sense, posture, motor skill, proprioception to assist with   [x] LE / lumbar: LE, proximal hip, and core control in self care, mobility, lifting, ambulation and eccentric single leg control.    [] UE / cervical: cervical, scapular, scapulothoracic and UE control with self care, reaching, carrying, lifting, house/yardwork, driving, computer work.   [] (38566) Therapist is in constant attendance of 2 or more patients providing skilled therapy interventions, but not providing any significant amount of measurable one-on-one time to either patient, for improvements in  [] LE / lumbar: LE, proximal hip, and core control in self care, mobility, lifting, ambulation and eccentric single leg control. [] UE / cervical: cervical, scapular, scapulothoracic and UE control with self care, reaching, carrying, lifting, house/yardwork, driving, computer work. NMR and Therapeutic Activities:    [x] (93758 or 81427) Provided verbal/tactile cueing for activities related to improving balance, coordination, kinesthetic sense, posture, motor skill, proprioception and motor activation to allow for proper function of   [x] LE: / Lumbar core, proximal hip and LE with self care and ADLs  [] UE / Cervical: cervical, postural, scapular, scapulothoracic and UE control with self care, carrying, lifting, driving, computer work.   [] (09783) Gait Re-education- Provided training and instruction to the patient for proper LE, core and proximal hip recruitment and positioning and eccentric body weight control with ambulation re-education including up and down stairs     Home Management Training / Self Care:  [] (55725) Provided self-care/home management training related to activities of daily living and compensatory training, and/or use of adaptive equipment for improvement with: ADLs and compensatory training, meal preparation, safety procedures and instruction in use of adaptive equipment, including bathing, grooming, dressing, personal hygiene, basic household cleaning and chores.      Home Exercise Program:    [] (56606) Reviewed/Progressed HEP activities related to strengthening, flexibility, endurance, ROM of   [] LE / Lumbar: core, proximal hip and LE for functional self-care, mobility, lifting and ambulation/stair navigation   [] UE / Cervical: cervical, postural, scapular, scapulothoracic and UE control with self care, reaching, carrying, lifting, house/yardwork, driving, computer work  [] (97320)Reviewed/Progressed HEP activities related to improving balance, coordination, kinesthetic sense, posture, motor skill, proprioception of   [] LE: core, proximal hip and LE for self care, mobility, lifting, and ambulation/stair navigation    [] UE / Cervical: cervical, postural,  scapular, scapulothoracic and UE control with self care, reaching, carrying, lifting, house/yardwork, driving, computer work    Manual Treatments:  PROM / STM / Oscillations-Mobs:  G-I, II, III, IV (PA's, Inf., Post.)  [] (80887) Provided manual therapy to mobilize LE, proximal hip and/or LS spine soft tissue/joints for the purpose of modulating pain, promoting relaxation,  increasing ROM, reducing/eliminating soft tissue swelling/inflammation/restriction, improving soft tissue extensibility and allowing for proper ROM for normal function with   [] LE / lumbar: self care, mobility, lifting and ambulation. [] UE / Cervical: self care, reaching, carrying, lifting, house/yardwork, driving, computer work. Modalities:  [] (03647) Vasopneumatic compression: Utilized vasopneumatic compression to decrease edema / swelling for the purpose of improving mobility and quad tone / recruitment which will allow for increased overall function including but not limited to self-care, transfers, ambulation, and ascending / descending stairs.      Charges:  Timed Code Treatment Minutes: 45   Total Treatment Minutes: 45     [] EVAL - LOW (40667)   [] EVAL - MOD (84466)  [] EVAL - HIGH (29462)  [] RE-EVAL (10264)  [x] XF(76904) x 1       [] Ionto  [x] NMR (46263) x 1       [] Vaso  [] Manual (17926) x       [] Ultrasound  [x] TA x 1       [] Mech Traction (92237)  [] Aquatic Therapy x      [] ES (un) (63921):   [] Home Management Training x  [] ES(attended) (98887)   [] Dry Needling 1-2 muscles (65474):  [] Dry Needling 3+ muscles (238300)  [] Group:      [] Other:     GOALS:  Patient stated goal: no pain when standing, walking or sleeping  [x]? Progressing: []? Met: []? Not Met: []? Adjusted     Therapist goals for Patient:   Short Term Goals: To be achieved in: 2 weeks  1. Independent in HEP and progression per patient tolerance, in order to prevent re-injury. []? Progressing: [x]? Met: []? Not Met: []? Adjusted  2. Patient will have a decrease in pain to <3/10 on average with ADLs and house chores to improve tolerance to performance. []? Progressing: [x]? Met: []? Not Met: []? Adjusted     Long Term Goals: To be achieved in: 4 weeks  1. FOTO score of 59 at least to assist with reaching prior level of function. []? Progressing: []? Met: []? Not Met: []? Adjusted  2. Patient will demonstrate increased AROM extension to 10deg to allow for greater tolerance to standing and extended trunk positions (e.g. walking uphill). []? Progressing: [x]? Met: []? Not Met: []? Adjusted  3. Patient will demonstrate an increase in Strength 5-/5 bilateral hips and >7/10 core activation to allow for proper stabilization and strength needed for prolonged tasks. [x]? Progressing: []? Met: []? Not Met: []? Adjusted  4. Patient will return to functional activities including walking neighborhood and aquatics >30 minutes without increased symptoms or restriction. [x]? Progressing: []? Met: []? Not Met: []? Adjusted  5. Patient will lift 25# 10x from floor to waist using proper squat lift technique in order to manage home and yard tasks. []? Progressing: [x]? Met: []? Not Met: []? Adjusted         Overall Progression Towards Functional goals/ Treatment Progress Update:  [x] Patient is progressing as expected towards functional goals listed. [] Progression is slowed due to complexities/Impairments listed. [] Progression has been slowed due to co-morbidities.   [] Plan just implemented, too soon to assess goals progression <30days   [] Goals require adjustment due to lack of progress  [] Patient is not progressing as expected and requires additional follow up with physician  [] Other    Persisting Functional Limitations/Impairments:  []Sleeping []Sitting               [x]Standing [x]Transfers        [x]Walking []Kneeling               [x]Stairs [x]Squatting / bending   []ADLs []Reaching  [x]Lifting  [x]Housework  []Driving []Job related tasks  [x]Sports/Recreation []Other:      ASSESSMENT:  The patient achieved established LTGs of lifting and spinal ROM this date. The patient's pain is lessening overall and her activity tolerance is improving, especially more energetic activities like lifting. She has been able to fully participate in aquatic arthritis class without issue or symptoms - but admits some difficulty with heavy lifting, sleeping. Treatment/Activity Tolerance:  [x] Patient able to complete tx  [] Patient limited by fatigue  [] Patient limited by pain  [] Patient limited by other medical complications  [] Other:     Prognosis: [x] Good [] Fair  [] Poor    Patient Requires Follow-up: [x] Yes  [] No    Plan for next treatment session: standing, walking, aquatics; core activation, hip strengthening; consider DC    PLAN: PT 1x / week for 5 weeks. [x] Continue per plan of care [] Alter current plan (see comments)  [] Plan of care initiated [] Hold pending MD visit [] Discharge    Electronically signed by: Lexi Alfaro, PT PT, DPT, OMT-C    Note: If patient does not return for scheduled/ recommended follow up visits, this note will serve as a discharge from care along with most recent update on progress.

## 2022-06-24 ENCOUNTER — APPOINTMENT (OUTPATIENT)
Dept: PHYSICAL THERAPY | Age: 77
End: 2022-06-24
Payer: MEDICARE

## 2022-06-28 ENCOUNTER — APPOINTMENT (OUTPATIENT)
Dept: PHYSICAL THERAPY | Age: 77
End: 2022-06-28
Payer: MEDICARE

## 2022-07-06 ENCOUNTER — HOSPITAL ENCOUNTER (OUTPATIENT)
Dept: PHYSICAL THERAPY | Age: 77
Setting detail: THERAPIES SERIES
Discharge: HOME | End: 2022-07-06
Payer: MEDICARE

## 2022-07-06 PROCEDURE — 97530 THERAPEUTIC ACTIVITIES: CPT

## 2022-07-06 NOTE — DISCHARGE SUMMARY
should continue to improve in reasonable time if they continue HEP   []Patient has plateaued and is no longer responding to skilled PT intervention    []Patient is getting worse and would benefit from return to referring MD   []Patient unable to adhere to initial POC   []Other:     Date range of Visits: 22 - 22  Total Visits: 6    Recommendation:    [x] Discharge to HEP. Follow up with PT or MD PRN. Physical Therapy Daily Treatment Note  Date:  2022    Patient Name:  Garrett Robbins    :  1945  MRN: [id number]  Medical/Treatment Diagnosis Information:  · Diagnosis: Z48.811 - POST-OP NERVE CYST SURGERY, M54.16 - LUMBAR RADIC - LEFT, M71.38 - SYNOVIAL CYST  : SX - 5/3/22  · Treatment Diagnosis: low back pain, decreased spinal ROM, impaired activity tolerance, core/back weakness  Insurance/Certification information:  PT Insurance Information: [address] NOT MET, NO AUTH  Physician Information:   Barbara Ceja MD; Adi Arroyo NP  Plan of care signed (Y/N): [x]  Yes []  No     Date of Patient follow up with Physician:       Progress Report: [x]  Yes  []  No     Date Range for reporting period:  Beginnin22  Endin22    Progress report due (10 Rx/or 30 days whichever is less): visit #5 (date) OR 08     Recertification due (POC duration/ or 90 days whichever is less): visit #5 OR 22    Visit # Insurance Allowable Auth required?  Date Range   eval + 5 PMN []  Yes  [x]  No      Latex Allergy:  [x]NO      []YES  Preferred Language for Healthcare:   [x]English       []other:    Functional Scale:           Date assessed:  FOTO physical FS primary measure score = 52; Risk adjusted = 45  22  FOTO physical FS primary measure score = 79     22    Pain level:  0/10     SUBJECTIVE: SEE DC     OBJECTIVE:  SEE DC   - patient able to lift 25# w/o increased LBP   - hip abd MMT = 4+/5 L; R = 4/5   - patient able to press 10# using BUE in standing     RESTRICTIONS/PRECAUTIONS:    Lifting Restrictions:  2 weeks post op = 15lbs  6 weeks post op = 20-25lbs  12 weeks post op = 30lbs  6 months post op = 50lbs      No vacuuming 6 weeks post-op    Exercises/Interventions:     Therapeutic Exercises (13268) Resistance / level Sets/sec Reps Notes          TVABD ACTIVATION HOOKLYING MARCHES PRONE HIP EXT Reviewed [facility]  Glute Crossbody Stretch Hooklying Curl Ups Standing Plate Press + TvAbd Bracing Standing Squats TM walking Ankle Eversion TB - long sitting SB Seated:  High [place] Lats Row  Alt Marches + Sustained BUE Flex  Alt LAQ  BUE Flexion + TvAbd Activation TRX Squats >> TRX Body Row Lateral Band Walk Leg Press (L4, B1)  Therapeutic Activities (77920) The patient was provided a final assessment including evaluative tests and measures, as well as discharge documentation to track progress. 30' 7/6   [address] [facility] Lift Floor to Waist Sit to Stand  Neuromuscular Re-ed (32014) Supine Deadbug w/ SB (single limb movement) SB bridges Trunk-supported Hip Ext Pratt's Press Pratt's Press Stir the Pot Tandem Stance + KB Passing Hand to Hand        Manual Intervention (95690)                                                   Modalities:     Pt. Education:  -patient educated on diagnosis, prognosis and expectations for rehab  -all patient questions were answered    Home Exercise Program:  Access Code: [email or web address]  URL: [email or web address]  Date: 06/21/2022  Prepared by: Betty Fat    Exercises  Supine Bridge - 1 x daily - 5 x weekly - 1 sets - 10 reps - 5 hold  Dead Bug with Debbie Guest - 1 x daily - 5 x weekly - 2 sets - 5 reps  Prone Hip Extension on Table - 1 x daily - 5 x weekly - 2 sets - 10 reps - 3 hold  Side Stepping with Resistance at Ankles - 1 x daily - 5 x weekly - 1 sets - 2-3 reps - 15 feet  Prone Arm Lift - 1 x daily - 5 x weekly - 2 sets - 10 reps  Single Leg Bridge - 1 x daily - 5 x weekly - 2 sets - 10 reps    Access Code: [email or web address]  URL: [email or web address]  Date: 06/07/2022  Prepared by: Earleen Grates    Exercises  Supine Transversus Abdominis Bracing - Hands on Stomach - 1 x daily - 5 x weekly - 2 sets - 10 reps - 8 hold  Hooklying March + Abdominal Bracing - 1 x daily - 5 x weekly - 2 sets - 10 reps - 5 hold  Prone Hip Extension - Two Pillows - 1 x daily - 5 x weekly - 2 sets - 10 reps - 5 hold  Supine Bridge - 1 x daily - 5 x weekly - 2 sets - 10 reps - 5 hold  Dead Bug with Bekah Frisk - 1 x daily - 5 x weekly - 2 sets - 5 reps  Prone Hip Extension on Table - 1 x daily - 5 x weekly - 2 sets - 10 reps - 3 hold    HEP instruction:    Access Code: [email or web address]  URL: [email or web address]  Date: 05/31/2022  Prepared by: Earleen Grates     Exercises  Supine Transversus Abdominis Bracing - Hands on Stomach - 1 x daily - 5 x weekly - 2 sets - 10 reps - 8 hold  Hooklying March + Abdominal Bracing - 1 x daily - 5 x weekly - 2 sets - 10 reps - 5 hold  Prone Hip Extension - Two Pillows - 1 x daily - 5 x weekly - 2 sets - 10 reps - 5 hold    Therapeutic Exercise and NMR EXR  [] (42975) Provided verbal/tactile cueing for activities related to strengthening, flexibility, endurance, ROM for improvements in  [] LE / Lumbar: LE, proximal hip, and core control with self care, mobility, lifting, ambulation. [] UE / Cervical: cervical, postural, scapular, scapulothoracic and UE control with self care, reaching, carrying, lifting, house/yardwork, driving, computer work.  [] (62766) Provided verbal/tactile cueing for activities related to improving balance, coordination, kinesthetic sense, posture, motor skill, proprioception to assist with   [] LE / lumbar: LE, proximal hip, and core control in self care, mobility, lifting, ambulation and eccentric single leg control. [] UE / cervical: cervical, scapular, scapulothoracic and UE control with self care, reaching, carrying, lifting, house/yardwork, driving, computer work. [] (07610) Therapist is in constant attendance of 2 or more patients providing skilled therapy interventions, but not providing any significant amount of measurable one-on-one time to either patient, for improvements in  [] LE / lumbar: LE, proximal hip, and core control in self care, mobility, lifting, ambulation and eccentric single leg control. [] UE / cervical: cervical, scapular, scapulothoracic and UE control with self care, reaching, carrying, lifting, house/yardwork, driving, computer work. NMR and Therapeutic Activities:    [x] (18985 or 34188) Provided verbal/tactile cueing for activities related to improving balance, coordination, kinesthetic sense, posture, motor skill, proprioception and motor activation to allow for proper function of   [x] LE: / Lumbar core, proximal hip and LE with self care and ADLs  [] UE / Cervical: cervical, postural, scapular, scapulothoracic and UE control with self care, carrying, lifting, driving, computer work.   [] (06823) Gait Re-education- Provided training and instruction to the patient for proper LE, core and proximal hip recruitment and positioning and eccentric body weight control with ambulation re-education including up and down stairs     Home Management Training / Self Care:  [] (31521) Provided self-care/home management training related to activities of daily living and compensatory training, and/or use of adaptive equipment for improvement with: ADLs and compensatory training, meal preparation, safety procedures and instruction in use of adaptive equipment, including bathing, grooming, dressing, personal hygiene, basic household cleaning and chores.      Home Exercise Program:    [] (00792) Reviewed/Progressed HEP activities related to strengthening, flexibility, endurance, ROM of   [] LE / Lumbar: core, proximal hip and LE for functional self-care, mobility, lifting and ambulation/stair navigation   [] UE / Cervical: cervical, postural, scapular, scapulothoracic and UE control with self care, reaching, carrying, lifting, house/yardwork, driving, computer work  [] (77511)Reviewed/Progressed HEP activities related to improving balance, coordination, kinesthetic sense, posture, motor skill, proprioception of   [] LE: core, proximal hip and LE for self care, mobility, lifting, and ambulation/stair navigation    [] UE / Cervical: cervical, postural,  scapular, scapulothoracic and UE control with self care, reaching, carrying, lifting, house/yardwork, driving, computer work    Manual Treatments:  PROM / STM / Oscillations-Mobs:  G-I, II, III, IV (PA's, Inf., Post.)  [] (11527) Provided manual therapy to mobilize LE, proximal hip and/or LS spine soft tissue/joints for the purpose of modulating pain, promoting relaxation,  increasing ROM, reducing/eliminating soft tissue swelling/inflammation/restriction, improving soft tissue extensibility and allowing for proper ROM for normal function with   [] LE / lumbar: self care, mobility, lifting and ambulation. [] UE / Cervical: self care, reaching, carrying, lifting, house/yardwork, driving, computer work. Modalities:  [] (23450) Vasopneumatic compression: Utilized vasopneumatic compression to decrease edema / swelling for the purpose of improving mobility and quad tone / recruitment which will allow for increased overall function including but not limited to self-care, transfers, ambulation, and ascending / descending stairs.      Charges:  Timed Code Treatment Minutes: 30   Total Treatment Minutes: 30     [] EVAL - LOW (77519)   [] EVAL - MOD (31868)  [] EVAL - HIGH (18909)  [] RE-EVAL (47279)  [] ED(42995) x        [] Ionto  [] NMR (09405) x        [] Vaso  [] Manual (55751) x       [] Ultrasound  [x] TA x 2       [] Mech Traction (26973)  [] Aquatic Therapy x      [] ES (un) (93964):   [] Home Management Training x  [] ES(attended) (76160)   [] Dry Needling 1-2 muscles (67457):  [] Dry Needling 3+ muscles (906208)  [] Group:      [] Other:     GOALS:  Patient stated goal: no pain when standing, walking or sleeping  []? Progressing: [x]? Partially Met: []? Not Met: []? Adjusted      Therapist goals for Patient:   Short Term Goals: To be achieved in: 2 weeks  1. Independent in HEP and progression per patient tolerance, in order to prevent re-injury. []? Progressing: [x]? Met: []? Not Met: []? Adjusted  2. Patient will have a decrease in pain to <3/10 on average with ADLs and house chores to improve tolerance to performance. []? Progressing: [x]? Met: []? Not Met: []? Adjusted     Long Term Goals: To be achieved in: 4 weeks  1. FOTO score of 59 at least to assist with reaching prior level of function. []? Progressing: [x]? Met: []? Not Met: []? Adjusted  2. Patient will demonstrate increased AROM extension to 10deg to allow for greater tolerance to standing and extended trunk positions (e.g. walking uphill). []? Progressing: [x]? Met: []? Not Met: []? Adjusted  3. Patient will demonstrate an increase in Strength 5-/5 bilateral hips and >7/10 core activation to allow for proper stabilization and strength needed for prolonged tasks. [x]? Progressing: [x]? Partially Met: []? Not Met: []? Adjusted   4. Patient will return to functional activities including walking neighborhood and aquatics >30 minutes without increased symptoms or restriction. []? Progressing: [x]? Met: []? Not Met: []? Adjusted  5. Patient will lift 25# 10x from floor to waist using proper squat lift technique in order to manage home and yard tasks. []? Progressing: [x]? Met: []? Not Met: []? Adjusted         Overall Progression Towards Functional goals/ Treatment Progress Update:  [x] Patient is progressing as expected towards functional goals listed. [] Progression is slowed due to complexities/Impairments listed. [] Progression has been slowed due to co-morbidities.   [] Plan just implemented, too soon to assess goals progression <30days [] Goals require adjustment due to lack of progress  [] Patient is not progressing as expected and requires additional follow up with physician  [] Other    Persisting Functional Limitations/Impairments:  []Sleeping []Sitting               []Standing []Transfers        []Walking []Kneeling               []Stairs []Squatting / bending   []ADLs []Reaching  []Lifting  []Housework  []Driving []Job related tasks  []Sports/Recreation []Other:      ASSESSMENT:  SEE DC    Treatment/Activity Tolerance:  [x] Patient able to complete tx  [] Patient limited by fatigue  [] Patient limited by pain  [] Patient limited by other medical complications  [] Other:     Prognosis: [x] Good [] Fair  [] Poor    Patient Requires Follow-up: [] Yes  [x] No    Plan for next treatment session: DC    PLAN: PT 1x / week for 5 weeks   [] Continue per plan of care [] Alter current plan (see comments)  [] Plan of care initiated [] Hold pending MD visit [x] Discharge    Electronically signed by: Giovana Marr, PT PT, DPT, OMT-C    Note: If patient does not return for scheduled/ recommended follow up visits, this note will serve as a discharge from care along with most recent update on progress.

## 2022-07-19 ENCOUNTER — APPOINTMENT (OUTPATIENT)
Dept: SPEECH THERAPY | Age: 77
End: 2022-07-19
Payer: MEDICARE

## 2022-07-25 ENCOUNTER — HOSPITAL ENCOUNTER (OUTPATIENT)
Dept: SPEECH THERAPY | Age: 77
Setting detail: THERAPIES SERIES
Discharge: HOME | End: 2022-07-25
Payer: MEDICARE

## 2022-07-25 PROCEDURE — 92524 BEHAVRAL QUALIT ANALYS VOICE: CPT

## 2022-07-25 NOTE — PLAN OF CARE
will resolve over the next 6 to 12 months. We discussed the option of observation, speech therapy versus vocal fold augmentation for treatment. She would like to proceed with observation. \" Pt presents this date with acute sinusitis, has had nasal congestion, cough, and increased hoarseness since 7/21/2022; followed by PCP. ONSET: 5/3/2022    Recent MRI Brain/Head CT: None in past year    History/Prior Level of Function: Pt previously a frequent voice user. Minimal caffeine intake, no smoking, no inhaler use. Pt does report seasonal allergies, particularly in the spring and fall. Living Status: Private residence  Occupation/School: Retired  Medication Management: :  Primary  Finance Management: Primary  Hearing: WFL  Vision: Wears glasses at all times    Relevant Medical History:  [x] Patient history, allergies, meds reviewed. Medical chart reviewed. See intake form. Review Of Systems (ROS):  [x]Performed Review of systems (Integumentary, CardioPulmonary, Neurological) by intake and observation. Intake form has been scanned into medical record. Patient has been instructed to contact their primary care physician regarding ROS issues if not already being addressed at this time.       Co-morbidities/Complexities (which will affect course of rehabilitation):   []None           Cardiovascular conditions   []Hypertension (I10)  []Hyperlipidemia (E78.5)  []Angina pectoris (I20)  []Atherosclerosis (I70)   Gastrointestinal conditions   [x]GERD (K21.9)   Pulmonary conditions   []Asthma (J45)  []Coughing   []COPD (J44.9)     Psychological Disorders  []Anxiety (F41.9)  []Depression (F32.9)   []Other:   Neurological Disorders  []Dementia (F03.90)  []Parkinson's Disease (G20)  []Other:   [x]Other: Seasonal allergies, Synovial cyst          Functional Outcome:   FOTO Primary Measure: NeuroQOL - Communication: 25.0  Voice Handicap Index: 10    PAIN:  Pain Scale: 0/10  Numbness/Tingling: N/A      OBJECTIVE:        Barriers to/and or personal factors that will affect rehab potential: N/A              []Age  []Sex    []Smoker              []Motivation/Lack of Motivation                        []Co-Morbidities              []Cognitive Function, education/learning barriers              []Environmental, home barriers              []profession/work barriers  []past ST/medical experience  []other:    Falls Risk Assessment (30 days):   [x] Falls Risk assessed and no intervention required. [] Falls Risk assessed and Patient requires intervention due to being higher risk   TUG score (>12s at risk):     [] Falls education provided, including     C-SSRS Triggered by Intake questionnaire (Past 2 wk assessment):   [x] No, Questionnaire did not trigger screening.   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 These deficits contribute to overall decrease in speech intelligibility for functional communication. Pt reports vocal quality has been more rough since 7/21/2022 due to acute sinusitis; however, pt's voice was not at baseline prior to illness. Pt denies concerns with swallowing or speech, language, and cognition at this time. Recommend initiating voice therapy with focus on improving voicing efficiency.       Cranial nerve / Oral motor exam:   CN VII (facial): WFL  CN IX/X (glossopharyngeal/vagus): MPT: Reduced; pitch range: WFL; vocal quality: hoarse; cough: Strong-perceptually  CN XII (hypoglossal): WFL      ACOUSTIC AND PERCEPTUAL RATINGS  Diadochokinetic rate: [x]WFL []Mild   [] Moderate  []Severe  []To be assessed   []Slow rate   []Accelerated rate   []Irregular rate    Maximum Phonation Time: []WFL []Mild [x] Moderate  []Severe  []To be assessed   - Trial 1: 13   - Trial 2: 8  - Trial 3: 6    Pitch Range: []WFL [x]Mild   [] Moderate  []Severe  []To be assessed   []Low to high   []High to low     Glottal Valving: []WFL [x]Mild   [] Moderate  []Severe  []To be assessed   - /s/ average: 12   - /z/ average: 8   - s/z ratio: 1.5     Intensity: []WFL [x]Mild   [] Moderate  []Severe  []To be assessed   [x]Sustained phonation 64 dB   []Pitch glide   [x]Spontaneous speech 66 dB   []Reading      Onset of Phonation: []WFL [x]Mild   [] Moderate  []Severe  []To be assessed   []Breathy   [x]Hard attack    Resonance: [x]WFL []Mild   [] Moderate  []Severe  []To be assessed   []Hypernasal   []Hyponasal    []Characteristics of velopharyngeal insufficiency:       Consensus Auditory-Perceptual Evaluation of Voice (CAPE-V)  The Consensus Auditory-Perceptual Evaluation of Voice (CAPE-V) was administered to describe the severity of auditory-perceptual attributes of the pt's voice problem. The parameters of voice quality below were rated upon pt completion of the following tasks: (1) sustained vowels, /a/ and /i/ for 3-5 seconds duration each; (2) sentence production; (3) spontaneous speech in response to verbal prompt. - Overall Severity: 45  - Roughness: 55  - Breathiness: 40  - Strain: 35  - Pitch: 20  - Loudness: 30      ADDITIONAL ASSESSMENT:   The Voice Handicap Index was used to assess pt voice experience and the effect of the pt's voice on their life.  The pt reported the following:  Physical: 10  Functional: 0  Emotional: 0  TOTAL: 10 (Mild)      Treatment Diagnosis:  Voice  [x]Dysphonia   [x]Mild   [] Moderate  []Severe    []Aphonia   []Mild   [] Moderate  []Severe    []Hypophonia   []Mild   [] Moderate  []Severe    []Other:   []Mild   [] Moderate  []Severe        Prognosis/Rehab Potential:      []Excellent   [x]Good    []Fair   []Poor    Tolerance of evaluation/treatment:    [x]Excellent   []Good    []Fair   []Poor       PLAN: Frequency/Duration: 2 days per week for 4 Weeks:  Therapeutic Interventions:  []  Speech-Language Evaluation/Treatment  []  Cognitive-Linguistic Skills Development  [x]  Voice Evaluation and Treatment  []  [facility] Voice Treatment (LSVT)  []  Dysphagia Evaluation/Treatment  []  Modified Barium Swallow Study  []  Dysphagia Treatment via Neuromuscular Electrical Stimulation (NMES)  []  Evaluation, Modifications, and Training in Voice Prosthetic  []  Evaluation for Speech-Generating Augmentative and Alternative Communication Device   []  Therapeutic Services for the use of Speech-Generating Device  []  Other:          HEP instruction: Written HEP instructions provided and reviewed:  Education re: results of evaluation and recommendations provided to pt. Pt verbalized understanding and agreement. GOALS:  Patient stated goal: Voice  [] Progressing: [] Met: [] Not Met: [] Adjusted    Therapist goals for Patient:   Short Term Goals: To be achieved in: 3 weeks  1. The patient will improve sustained phonation to > 15 seconds to increase breath support for speaking. [] Progressing: [] Met: [] Not Met: [] Adjusted  2. The patient will complete pitch glides x 10 without pitch breaks to improve vocal quality and pitch range. [] Progressing: [] Met: [] Not Met: [] Adjusted  3. The patient will verbalize single spontaneous sentences with vocal quality judged to be > 80% as rated by pt/SLP in order to reduced hoarseness. [] Progressing: [] Met: [] Not Met: [] Adjusted  4. The patient will verbalize 2-minute discourse with vocal quality judged to be > 80% as rated by pt/SLP in order to reduced hoarseness. [] Progressing: [] Met: [] Not Met: [] Adjusted    Long Term Goals: To be achieved in: 4 weeks  1. Patient will improve vocal quality to baseline as subjectively rated by SLP, pt, and family.    [] Progressing: [] Met: [] Not Met: [] Adjusted         Total Treatment Time / Charges     Time in Time out Total Time / units   Speech Sound/Lang Comp Eval         Swallow Eval         Behav Qualitative Analysis of Voice 0900 0945 45 min / 1 unit   Eval Speech Sound Production      Cognitive Tx      Speech Tx      Dysphagia Tx             Electronically signed by:    Len Adler  [address] Pathologist  [place]. [id number]        Note: If patient does not return for scheduled/ recommended follow up visits, this note will serve as a discharge from care along with most recent update on progress.

## 2022-07-28 ENCOUNTER — HOSPITAL ENCOUNTER (OUTPATIENT)
Dept: SPEECH THERAPY | Age: 77
Setting detail: THERAPIES SERIES
Discharge: HOME | End: 2022-07-28
Payer: MEDICARE

## 2022-07-28 PROCEDURE — 92507 TX SP LANG VOICE COMM INDIV: CPT

## 2022-07-28 NOTE — FLOWSHEET NOTE
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 Molly Abebe MD  Plan of care signed (Y/N): N (Faxed 2022)    Date of Patient follow up with Physician: JOSELINE    Functional outcome measure:   FOTO Primary Measure: NeuroQOL - Communication: 25.0  Voice Handicap Index: 10    Progress Note: []  Yes  [x]  No  Next due by: Visit #10 or 2022    RESTRICTIONS/PRECAUTIONS: N/A  Latex Allergy:  [x]NO      []YES    Preferred Language for Healthcare:   [x]English       []other:    Visit # Insurance Allowable Date Range (if applicable)    BMN N/A       Pain level: 0/10     SUBJECTIVE:  Pt alert and receptive, reports completing Vocal Function Exercises at home.      OBJECTIVE:     Exercises/Interventions:     Voice (68927) Reps Seconds Notes   Vocal Function Interventions:      Semi-Occluded Vocal Tract      Resonant Voice Therapy      Vocal Function Exercises - Warm-up exercise: sustained \"e\" x 2  - Stretching exercise: \"whoop\" pitch glide up x 2  - Joelle exercise: \"whoop\" pitch glide down x 2  - Power exercise: sustained \"ol\" musical notes x 5 Sustained \"e\" 12 seconds Phonation breaks x 2 during joelle exercise    Mod verbal and visual cues   Easy Onset      PhoRTE      Respiratory Interventions:      Respiratory Coordination      Respiratory Support Diaphragmatic breathing education and training:  - graded inhalation and exhalation x 10 (2x4x6)  Treatment focused on improving respiratory support for phonation through implementation of diaphragmatic breathing, postural adjustments, training respiratory/ phonatory efficiency Redd Stapleton et al., 2014)   Musculoskeletal Interventions:      Stretching Cervical relaxation:  - shoulder rolls x 30 seconds  - shoulder swings x 30 seconds    Extrinsic laryngeal relaxation:  - head turn x 30 seconds  - head tilt x 30 seconds  - lateral neck stretch x 30 seconds  - chin to chest x 30 seconds  Model with tactile and verbal cues   Postural Modification            Pt. Education:  -Pt educated on diagnosis, prognosis and expectations for rehab  -All pt questions were answered  -Pt verbalized understanding and agreement    HEP instruction:  -Pt provided with written and illustrated instructions for HEP: diaphragmatic breathing, shoulder/cervical stretches, Vocal Function Exercises, vocal hygiene      Speech/Voice Therapy:    [x] (94028) Treatment of speech, language, voice, communication, and/or auditory processing disorder; individual    Cognitive Function:  [] (13919) Therapeutic interventions that focus on cognitive function (eg, attention, memory, reasoning, executive function, problem solving, and/or pragmatic functioning) and compensatory strategies to manage the performance of an activity (eg, managing time or schedules, initiating, organizing, and sequencing tasks), direct (one-on-one) patient contact; initial 15 minutes (Report 64000 only once per day)  [] ([id number] [phone]) each additional 15 minutes     Dysphagia Therapy:    [] (01457) Treatment of swallowing dysfunction and/or oral function for feeding         Charges:  Timed Code Treatment Minutes: 0   Total Treatment Minutes: 45     [x] Speech-Language Treatment (99027)        [] Voice Treatment (06289)                                         [] Cognitive-Linguistic Skills Development Initial 15 minutes (05120)  [] Cognitive-Linguistic Skills Development Additional 15 minutes (76587)   [] Dysphagia Treatment/NMES ([facility]) (85884)  [] Other:     GOALS: \"e\", continued phonation breaks at lower frequencies. Treatment/Activity Tolerance:  [x] Patient able to complete tx [] Patient limited by fatigue  [] Patient limited by pain  [] Patient limited by other medical complications  [] Other:     Prognosis: [x] Good [] Fair  [] Poor    Patient Requires Follow-up: [x] Yes  [] No    PLAN: See eval. ST 2x / week for 4 weeks. [] Continue per plan of care [] Alter current plan (see comments)  [x] Plan of care initiated [] Hold pending MD visit [] Discharge    Electronically signed by:   Alena Nelson MA CCC-SLP  Speech-Language Pathologist  SP. [id number]      Note: If patient does not return for scheduled/ recommended follow up visits, this note will serve as a discharge from care along with most recent update on progress.

## 2022-08-01 ENCOUNTER — HOSPITAL ENCOUNTER (OUTPATIENT)
Dept: SPEECH THERAPY | Age: 77
Setting detail: THERAPIES SERIES
Discharge: HOME | End: 2022-08-01
Payer: MEDICARE

## 2022-08-01 PROCEDURE — 92507 TX SP LANG VOICE COMM INDIV: CPT

## 2022-08-01 PROCEDURE — 92526 ORAL FUNCTION THERAPY: CPT

## 2022-08-01 NOTE — FLOWSHEET NOTE
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 Ebonie Robbins MD  Plan of care signed (Y/N): N (Faxed 2022, 2022)    Date of Patient follow up with Physician: JOSEILNE    Functional outcome measure:   FOTO Primary Measure: NeuroQOL - Communication: 25.0  Voice Handicap Index: 10    Progress Note: []  Yes  [x]  No  Next due by: Visit #10 or 2022    RESTRICTIONS/PRECAUTIONS: N/A  Latex Allergy:  [x]NO      []YES    Preferred Language for Healthcare:   [x]English       []other:    Visit # Insurance Allowable Date Range (if applicable)   3/8 BMN N/A       Pain level: 0/10     SUBJECTIVE:  Pt alert and receptive, feels like her cold is nearly resolved but continues to report pain in throat since onset of voice problems.      OBJECTIVE:     Exercises/Interventions:     Voice (90213) Reps Seconds Notes   Vocal Function Interventions:      Semi-Occluded Vocal Tract - Straw and bubbles x 5  - Straw sustained phonation and bubbles x 5  - stretching and joelle with bubbles x 5  Mod verbal and visual cues   Resonant Voice Therapy      Vocal Function Exercises - Warm-up exercise: sustained \"e\" x 2  - Stretching exercise: \"whoop\" pitch glide up x 2  - Joelle exercise: \"whoop\" pitch glide down x 2  - Power exercise: sustained \"ol\" musical notes x 5 Sustained \"e\" 11 seconds Phonation breaks x 3 during joelle exercise    Min-mod verbal and visual cues   Easy Onset      PhoRTE - Prolonged \"ah\" x 10 with 100% above 75 dB, 50% over 10 seconds Respiratory Interventions:      Respiratory Coordination      Respiratory Support Diaphragmatic breathing education and training:  - square breathing x 5 (4x4)  Treatment focused on improving respiratory support for phonation through implementation of diaphragmatic breathing, postural adjustments, training respiratory/ phonatory efficiency Tyson Lozoya et al., 2014)   Musculoskeletal Interventions:      Stretching Education and demonstration of laryngeal massage:  - hyoid cartilage  - thyroid cartilage  - posterior borders thyroid cartilage  - inferior laryngeal movement   Model with tactile and verbal cues   Postural Modification            Pt. Education:  -Pt educated on diagnosis, prognosis and expectations for rehab  -All pt questions were answered  -Pt verbalized understanding and agreement    HEP instruction:  -Pt provided with written and illustrated instructions for HEP: diaphragmatic breathing, shoulder/cervical stretches, Vocal Function Exercises, vocal hygiene      Speech/Voice Therapy:    [x] (54693) Treatment of speech, language, voice, communication, and/or auditory processing disorder; individual    Cognitive Function:  [] (19337) Therapeutic interventions that focus on cognitive function (eg, attention, memory, reasoning, executive function, problem solving, and/or pragmatic functioning) and compensatory strategies to manage the performance of an activity (eg, managing time or schedules, initiating, organizing, and sequencing tasks), direct (one-on-one) patient contact; initial 15 minutes (Report  only once per day)  [] (14 [phone]) each additional 15 minutes     Dysphagia Therapy:    [] (08335) Treatment of swallowing dysfunction and/or oral function for feeding         Charges:  Timed Code Treatment Minutes: 0   Total Treatment Minutes: 39     [x] Speech-Language Treatment (07819)        [] Voice Treatment (46558)                                         [] Cognitive-Linguistic Skills Development Initial 15 minutes (83882)  [] Cognitive-Linguistic Skills Development Additional 15 minutes (51417)   [] Dysphagia Treatment/NMES ([facility]) (03874)  [] Other:     GOALS:   Patient stated goal: Voice  [] Progressing: [] Met: [] Not Met: [] Adjusted     Therapist goals for Patient:  Short Term Goals: To be achieved in: 3 weeks  1. The patient will improve sustained phonation to > 15 seconds to increase breath support for speaking. [] Progressing: [] Met: [] Not Met: [] Adjusted  2. The patient will complete pitch glides x 10 without pitch breaks to improve vocal quality and pitch range. [] Progressing: [] Met: [] Not Met: [] Adjusted  3. The patient will verbalize single spontaneous sentences with vocal quality judged to be > 80% as rated by pt/SLP in order to reduced hoarseness. [] Progressing: [] Met: [] Not Met: [] Adjusted  4. The patient will verbalize 2-minute discourse with vocal quality judged to be > 80% as rated by pt/SLP in order to reduced hoarseness. [] Progressing: [] Met: [] Not Met: [] Adjusted     Long Term Goals: To be achieved in: 4 weeks  1. Patient will improve vocal quality to baseline as subjectively rated by SLP, pt, and family. [] Progressing: [] Met: [] Not Met: [] Adjusted      Progression Towards Functional goals:  [] Patient is progressing as expected towards functional goals listed. [] Progression is slowed due to complexities listed. [] Progression has been slowed due to co-morbidities.   [x] Plan just implemented, too soon to assess goals progression  [] Other:     Persisting Functional Limitations/Impairments:  []Attention []Word Finding       []Memory []Speech Intelligibility      []Executive Function []Diet Tolerance     []Problem Solving []Coughing/Choking with PO  []Expressive Language []Medication/Finance Management  []Receptive Language [x]Other: Dysphonia      ASSESSMENT: Good carryover of diaphragmatic breathing for all voice exercises but with reduced breath support at lower frequencies. Pt with breathy vocal quality during sustained phonation, ongoing phonation breaks and hoarseness at lower frequencies. Pt benefited from training of semi-occluded vocal tract exercises with target of adequate airflow and forward resonance for improved vocal quality. Treatment/Activity Tolerance:  [x] Patient able to complete tx [] Patient limited by fatigue  [] Patient limited by pain  [] Patient limited by other medical complications  [] Other:     Prognosis: [x] Good [] Fair  [] Poor    Patient Requires Follow-up: [x] Yes  [] No    PLAN: See eval. ST 2x / week for 4 weeks. [x] Continue per plan of care [] Alter current plan (see comments)  [] Plan of care initiated [] Hold pending MD visit [] Discharge    Electronically signed by:   Nohemi Linton MA CCC-SLP  Speech-Language Pathologist  SP. [id number]      Note: If patient does not return for scheduled/ recommended follow up visits, this note will serve as a discharge from care along with most recent update on progress.

## 2022-08-05 ENCOUNTER — HOSPITAL ENCOUNTER (OUTPATIENT)
Dept: SPEECH THERAPY | Age: 77
Setting detail: THERAPIES SERIES
Discharge: HOME | End: 2022-08-05
Payer: MEDICARE

## 2022-08-05 PROCEDURE — 92507 TX SP LANG VOICE COMM INDIV: CPT

## 2022-08-05 NOTE — FLOWSHEET NOTE
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 Darian Michel MD  Plan of care signed (Y/N): N (Faxed 2022, 2022)    Date of Patient follow up with Physician: JOSELINE    Functional outcome measure:   FOTO Primary Measure: NeuroQOL - Communication: 25.0  Voice Handicap Index: 10 (); 6 ()    Progress Note: []  Yes  [x]  No  Next due by: Visit #10 or 2022    RESTRICTIONS/PRECAUTIONS: N/A  Latex Allergy:  [x]NO      []YES    Preferred Language for Healthcare:   [x]English       []other:    Visit # Insurance Allowable Date Range (if applicable)    BMN N/A       Pain level: 0/10     SUBJECTIVE:  Pt alert and receptive, reports she had a coughing fit last night. OBJECTIVE:   The Voice Handicap Index was used to assess pt voice experience and the effect of the pt's voice on their life.  The pt reported the following:  Physical: 5  Functional: 0  Emotional: 1  TOTAL: 6    Exercises/Interventions:     Voice (89613) Reps Seconds Notes   Vocal Function Interventions:      Semi-Occluded Vocal Tract - Straw and bubbles x 5  - Straw sustained phonation and bubbles x 5  - Straw and bubble sentences x 10  Mod verbal and visual cues    Improving vocal quality following straw phonation   Resonant Voice Therapy      Vocal Function Exercises - Warm-up exercise: sustained \"e\" x 2  - Stretching exercise: \"whoop\" pitch glide up x 2  - Shimon exercise: \"whoop\" pitch glide down x 2  - Power exercise: sustained \"ol\" musical notes x 5 Sustained \"e\" 11.61 seconds    Power exercise 14.32 seconds Phonation breaks x 2 during joelle exercise    Min verbal and visual cues   Easy Onset      PhoRTE - Prolonged \"ah\" x 10 with 100% above 75 dB, 100% over 10 seconds Average 13.39 seconds     Respiratory Interventions:      Respiratory Coordination      Respiratory Support      Musculoskeletal Interventions:      Stretching Cervical relaxation:  - shoulder rolls x 30 seconds  - shoulder swings x 30 seconds    Extrinsic laryngeal relaxation:  - head turn x 30 seconds  - head tilt x 30 seconds  - lateral neck stretch x 30 seconds  - chin to chest x 30   Min verbal cues   Postural Modification            Pt. Education:  -Pt educated on diagnosis, prognosis and expectations for rehab  -All pt questions were answered  -Pt verbalized understanding and agreement    HEP instruction:  -Pt provided with written and illustrated instructions for HEP: diaphragmatic breathing, shoulder/cervical stretches, Vocal Function Exercises, vocal hygiene      Speech/Voice Therapy:    [x] (39995) Treatment of speech, language, voice, communication, and/or auditory processing disorder; individual    Cognitive Function:  [] (54114) Therapeutic interventions that focus on cognitive function (eg, attention, memory, reasoning, executive function, problem solving, and/or pragmatic functioning) and compensatory strategies to manage the performance of an activity (eg, managing time or schedules, initiating, organizing, and sequencing tasks), direct (one-on-one) patient contact; initial 15 minutes (Report  only once per day)  [] (30 [phone]) each additional 15 minutes     Dysphagia Therapy:    [] (58326) Treatment of swallowing dysfunction and/or oral function for feeding         Charges:  Timed Code Treatment Minutes: 0   Total Treatment Minutes: 45     [x] Speech-Language Treatment (04101)        [] Voice Treatment (72456)                                         [] Cognitive-Linguistic Skills Development Initial 15 minutes (99416)  [] Cognitive-Linguistic Skills Development Additional 15 minutes (30735)   [] Dysphagia Treatment/NMES ([facility]) (49417)  [] Other:     GOALS:   Patient stated goal: Voice  [] Progressing: [] Met: [] Not Met: [] Adjusted     Therapist goals for Patient:  Short Term Goals: To be achieved in: 3 weeks  1. The patient will improve sustained phonation to > 15 seconds to increase breath support for speaking. [] Progressing: [] Met: [] Not Met: [] Adjusted  2. The patient will complete pitch glides x 10 without pitch breaks to improve vocal quality and pitch range. [] Progressing: [] Met: [] Not Met: [] Adjusted  3. The patient will verbalize single spontaneous sentences with vocal quality judged to be > 80% as rated by pt/SLP in order to reduced hoarseness. [] Progressing: [] Met: [] Not Met: [] Adjusted  4. The patient will verbalize 2-minute discourse with vocal quality judged to be > 80% as rated by pt/SLP in order to reduced hoarseness. [] Progressing: [] Met: [] Not Met: [] Adjusted     Long Term Goals: To be achieved in: 4 weeks  1. Patient will improve vocal quality to baseline as subjectively rated by SLP, pt, and family. [] Progressing: [] Met: [] Not Met: [] Adjusted      Progression Towards Functional goals:  [x] Patient is progressing as expected towards functional goals listed. [] Progression is slowed due to complexities listed. [] Progression has been slowed due to co-morbidities.   [] Plan just implemented, too soon to assess goals progression  [] Other:     Persisting Functional Limitations/Impairments:  []Attention []Word Finding       []Memory []Speech Intelligibility      []Executive Function []Diet Tolerance     []Problem Solving []Coughing/Choking with PO  []Expressive Language []Medication/Finance Management  []Receptive Language [x]Other: Dysphonia      ASSESSMENT: Pt with improving breath support and maximum phonation time across voice exercises, continues to carry over diaphragmatic breathing. Less phonation breaks noted this session. Pt benefited from forward focus training and reinforcement via humming and straw phonation for improved vocal quality at reading sentence level. Treatment/Activity Tolerance:  [x] Patient able to complete tx [] Patient limited by fatigue  [] Patient limited by pain  [] Patient limited by other medical complications  [] Other:     Prognosis: [x] Good [] Fair  [] Poor    Patient Requires Follow-up: [x] Yes  [] No    PLAN: See eval. ST 2x / week for 4 weeks. [x] Continue per plan of care [] Alter current plan (see comments)  [] Plan of care initiated [] Hold pending MD visit [] Discharge    Electronically signed by:   Ada Borjas MA CCC-SLP  Speech-Language Pathologist  SP. [id number]      Note: If patient does not return for scheduled/ recommended follow up visits, this note will serve as a discharge from care along with most recent update on progress.

## 2022-08-08 ENCOUNTER — HOSPITAL ENCOUNTER (OUTPATIENT)
Dept: SPEECH THERAPY | Age: 77
Setting detail: THERAPIES SERIES
Discharge: HOME | End: 2022-08-08
Payer: MEDICARE

## 2022-08-08 PROCEDURE — 92507 TX SP LANG VOICE COMM INDIV: CPT

## 2022-08-08 NOTE — FLOWSHEET NOTE
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 Carlos Humphreys MD  Plan of care signed (Y/N): N (Faxed 2022, 2022, 2022)    Date of Patient follow up with Physician: JOSELINE    Functional outcome measure:   FOTO Primary Measure: NeuroQOL - Communication: 25.0  Voice Handicap Index: 10 (); 6 ()    Progress Note: []  Yes  [x]  No  Next due by: Visit #10 or 2022    RESTRICTIONS/PRECAUTIONS: N/A  Latex Allergy:  [x]NO      []YES    Preferred Language for Healthcare:   [x]English       []other:    Visit # Insurance Allowable Date Range (if applicable)    BMN N/A       Pain level: 0/10     SUBJECTIVE:  Pt alert and receptive, good participation as usual and motivated to participate.      OBJECTIVE:     Exercises/Interventions:     Voice (72005) Reps Seconds Notes   Vocal Function Interventions:      Semi-Occluded Vocal Tract - Straw and bubbles x 5  - Straw and bubble sentences x 10  - Straw and bubble paragraphs x 3  - Straw phonation words x 15  Mod verbal and visual cues    Improving vocal quality following straw phonation   Resonant Voice Therapy      Vocal Function Exercises - Warm-up exercise: sustained \"e\" x 2  - Stretching exercise: \"whoop\" pitch glides up  - Joelle exercise: \"whoop\" pitch glides down  - Power exercise: sustained \"ol\" musical notes x 5 Sustained \"e\" 9.4 seconds    Power exercise 7.35 seconds Phonation breaks x 1 during joelle exercise    Min verbal and visual cues   Easy Onset      LSVT      Other: Adduction exercises - Prolonged \"ah\" x 10 with 100% above 75 dB, 100% over 10 seconds  - Pitch glides x 10  - Functional sentences at low pitch x 10 and high pitch x 10 Average 16.67 seconds  Breathy vocal quality at lower frequencies    Respiratory Interventions:      Respiratory Coordination      Respiratory Support Diaphragmatic breathing with graded exhalations x 10  Mod integral stimulation (tactile, verbal, visual cues)   Musculoskeletal Interventions:      Stretching Cervical relaxation:  - shoulder rolls x 30 seconds  - shoulder swings x 30 seconds    Extrinsic laryngeal relaxation:  - head turn x 30 seconds  - head tilt x 30 seconds  - lateral neck stretch x 30 seconds  - chin to chest x 30   Min verbal cues   Postural Modification            Pt. Education:  -Pt educated on diagnosis, prognosis and expectations for rehab  -All pt questions were answered  -Pt verbalized understanding and agreement    HEP instruction:  -Pt provided with written and illustrated instructions for HEP: diaphragmatic breathing, shoulder/cervical stretches, Vocal Function Exercises, vocal hygiene      Speech/Voice Therapy:    [x] (23965) Treatment of speech, language, voice, communication, and/or auditory processing disorder; individual    Cognitive Function:  [] (16811) Therapeutic interventions that focus on cognitive function (eg, attention, memory, reasoning, executive function, problem solving, and/or pragmatic functioning) and compensatory strategies to manage the performance of an activity (eg, managing time or schedules, initiating, organizing, and sequencing tasks), direct (one-on-one) patient contact; initial 15 minutes (Report 19327 only once per day)  [] ([phone]) each additional 15 minutes     Dysphagia Therapy:    [] (67003) Treatment of swallowing dysfunction and/or oral function for feeding         Charges:  Timed Code Treatment Minutes: 0   Total Treatment Minutes: 45     [x] Speech-Language Treatment (95004) [] Voice Treatment (13187)                                         [] Cognitive-Linguistic Skills Development Initial 15 minutes (63863)  [] Cognitive-Linguistic Skills Development Additional 15 minutes (20130)   [] Dysphagia Treatment/NMES ([facility]) (79675)  [] Other:     GOALS:   Patient stated goal: Voice  [] Progressing: [] Met: [] Not Met: [] Adjusted     Therapist goals for Patient:  Short Term Goals: To be achieved in: 3 weeks  1. The patient will improve sustained phonation to > 15 seconds to increase breath support for speaking. [] Progressing: [] Met: [] Not Met: [] Adjusted  2. The patient will complete pitch glides x 10 without pitch breaks to improve vocal quality and pitch range. [] Progressing: [] Met: [] Not Met: [] Adjusted  3. The patient will verbalize single spontaneous sentences with vocal quality judged to be > 80% as rated by pt/SLP in order to reduced hoarseness. [] Progressing: [] Met: [] Not Met: [] Adjusted  4. The patient will verbalize 2-minute discourse with vocal quality judged to be > 80% as rated by pt/SLP in order to reduced hoarseness. [] Progressing: [] Met: [] Not Met: [] Adjusted     Long Term Goals: To be achieved in: 4 weeks  1. Patient will improve vocal quality to baseline as subjectively rated by SLP, pt, and family. [] Progressing: [] Met: [] Not Met: [] Adjusted      Progression Towards Functional goals:  [x] Patient is progressing as expected towards functional goals listed. [] Progression is slowed due to complexities listed. [] Progression has been slowed due to co-morbidities.   [] Plan just implemented, too soon to assess goals progression  [] Other:     Persisting Functional Limitations/Impairments:  []Attention []Word Finding       []Memory []Speech Intelligibility      []Executive Function []Diet Tolerance     []Problem Solving []Coughing/Choking with PO  []Expressive Language []Medication/Finance Management  []Receptive Language [x]Other: Dysphonia      ASSESSMENT: Pt benefited from increased training and reinforcement of breath support but demonstrated improved vocal quality for exercises targeting increased intensity. Focus on self-monitoring of vocal quality and forward vs back resonance following SOVT exercises, pt reporting improved awareness. Treatment/Activity Tolerance:  [x] Patient able to complete tx [] Patient limited by fatigue  [] Patient limited by pain  [] Patient limited by other medical complications  [] Other:     Prognosis: [x] Good [] Fair  [] Poor    Patient Requires Follow-up: [x] Yes  [] No    PLAN: See eval. ST 2x / week for 4 weeks. [x] Continue per plan of care [] Alter current plan (see comments)  [] Plan of care initiated [] Hold pending MD visit [] Discharge    Electronically signed by:   Juanita Daiz MA CCC-SLP  Speech-Language Pathologist  SP. [id number]      Note: If patient does not return for scheduled/ recommended follow up visits, this note will serve as a discharge from care along with most recent update on progress.

## 2022-08-12 ENCOUNTER — HOSPITAL ENCOUNTER (OUTPATIENT)
Dept: SPEECH THERAPY | Age: 77
Setting detail: THERAPIES SERIES
Discharge: HOME | End: 2022-08-12
Payer: MEDICARE

## 2022-08-12 PROCEDURE — 92507 TX SP LANG VOICE COMM INDIV: CPT

## 2022-08-12 NOTE — FLOWSHEET NOTE
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 Tricia Mccabe MD  Plan of care signed (Y/N): Y (2022)    Date of Patient follow up with Physician: JOSELINE    Functional outcome measure:   FOTO Primary Measure: NeuroQOL - Communication: 25.0  Voice Handicap Index: 10 (); 6 ()    Progress Note: []  Yes  [x]  No  Next due by: Visit #10 or 2022    RESTRICTIONS/PRECAUTIONS: N/A  Latex Allergy:  [x]NO      []YES    Preferred Language for Healthcare:   [x]English       []other:    Visit # Insurance Allowable Date Range (if applicable)    BMN N/A       Pain level: 0/10     SUBJECTIVE:  Pt alert and receptive, reports ongoing dry cough with sinus drainage and frequent throat clearing.      OBJECTIVE:     Exercises/Interventions:     Voice (68788) Reps Seconds Notes   Vocal Function Interventions:      Semi-Occluded Vocal Tract - Straw and bubble paragraph  - Straw phonation words x 15  - Straw phonation sentences x 10  - Straw phonation paragraph with pt rating vocal quality of 80%  Pt and SLP rating 80% vocal quality at paragraph level after straw phonation    Resonant Voice Therapy      Vocal Function Exercises - Warm-up exercise: sustained \"e\" x 2  - Stretching exercise: \"whoop\" pitch glides up  - Joelle exercise: \"whoop\" pitch glides down  - Power exercise: sustained \"ol\" musical notes x 5 Sustained \"e\" 12 seconds    Power exercise 8.67 seconds Phonation breaks x 1 during stretching and joelle exercise     Easy Onset      LSVT Other: Adduction exercises - Prolonged \"ah\" x 5 with 100% above 75 dB, 100% over 10 seconds  - Pitch glides x 10  - Functional sentences at low pitch x 10 and high pitch x 10 Average 13.4 seconds     Respiratory Interventions:      Respiratory Coordination      Respiratory Support Diaphragmatic breathing with square breathing x 6  Min integral stimulation (tactile, verbal, visual cues)   Musculoskeletal Interventions:      Stretching      Postural Modification            Pt. Education:  -Pt educated on diagnosis, prognosis and expectations for rehab  -All pt questions were answered  -Pt verbalized understanding and agreement    HEP instruction:  -Pt provided with written and illustrated instructions for HEP: diaphragmatic breathing, shoulder/cervical stretches, Vocal Function Exercises, vocal hygiene      Speech/Voice Therapy:    [x] (33742) Treatment of speech, language, voice, communication, and/or auditory processing disorder; individual    Cognitive Function:  [] (13390) Therapeutic interventions that focus on cognitive function (eg, attention, memory, reasoning, executive function, problem solving, and/or pragmatic functioning) and compensatory strategies to manage the performance of an activity (eg, managing time or schedules, initiating, organizing, and sequencing tasks), direct (one-on-one) patient contact; initial 15 minutes (Report 47692 only once per day)  [] ([phone]) each additional 15 minutes     Dysphagia Therapy:    [] (48507) Treatment of swallowing dysfunction and/or oral function for feeding         Charges:  Timed Code Treatment Minutes: 0   Total Treatment Minutes: 45     [x] Speech-Language Treatment (09630)        [] Voice Treatment (90126)                                         [] Cognitive-Linguistic Skills Development Initial 15 minutes (57176)  [] Cognitive-Linguistic Skills Development Additional 15 minutes (45358)   [] Dysphagia Treatment/NMES ([facility]) (52028)  [] Other:     GOALS: Patient stated goal: Voice  [] Progressing: [] Met: [] Not Met: [] Adjusted     Therapist goals for Patient:  Short Term Goals: To be achieved in: 3 weeks  1. The patient will improve sustained phonation to > 15 seconds to increase breath support for speaking. [] Progressing: [] Met: [] Not Met: [] Adjusted  2. The patient will complete pitch glides x 10 without pitch breaks to improve vocal quality and pitch range. [] Progressing: [] Met: [] Not Met: [] Adjusted  3. The patient will verbalize single spontaneous sentences with vocal quality judged to be > 80% as rated by pt/SLP in order to reduced hoarseness. [] Progressing: [] Met: [] Not Met: [] Adjusted  4. The patient will verbalize 2-minute discourse with vocal quality judged to be > 80% as rated by pt/SLP in order to reduced hoarseness. [] Progressing: [] Met: [] Not Met: [] Adjusted     Long Term Goals: To be achieved in: 4 weeks  1. Patient will improve vocal quality to baseline as subjectively rated by SLP, pt, and family. [] Progressing: [] Met: [] Not Met: [] Adjusted      Progression Towards Functional goals:  [x] Patient is progressing as expected towards functional goals listed. [] Progression is slowed due to complexities listed. [] Progression has been slowed due to co-morbidities. [] Plan just implemented, too soon to assess goals progression  [] Other:     Persisting Functional Limitations/Impairments:  []Attention []Word Finding       []Memory []Speech Intelligibility      []Executive Function []Diet Tolerance     []Problem Solving []Coughing/Choking with PO  []Expressive Language []Medication/Finance Management  []Receptive Language [x]Other: Dysphonia      ASSESSMENT: Counseling re: vocal hygiene including importance of increased water intake and alternative strategies to throat clearing (e.g., hard swallow, silent cough, hum throat clear).  Pt continues to be stimulable to forward resonance techniques and to cues for use of strong, loud voice. Improving self-monitoring of vocal quality. Treatment/Activity Tolerance:  [x] Patient able to complete tx [] Patient limited by fatigue  [] Patient limited by pain  [] Patient limited by other medical complications  [] Other:     Prognosis: [x] Good [] Fair  [] Poor    Patient Requires Follow-up: [x] Yes  [] No    PLAN: See eval. ST 2x / week for 4 weeks. [x] Continue per plan of care [] Alter current plan (see comments)  [] Plan of care initiated [] Hold pending MD visit [] Discharge    Electronically signed by:   Aster Mckeon MA CCC-SLP  Speech-Language Pathologist  SP. [id number]      Note: If patient does not return for scheduled/ recommended follow up visits, this note will serve as a discharge from care along with most recent update on progress.

## 2022-08-15 ENCOUNTER — HOSPITAL ENCOUNTER (OUTPATIENT)
Dept: SPEECH THERAPY | Age: 77
Setting detail: THERAPIES SERIES
Discharge: HOME | End: 2022-08-15
Payer: MEDICARE

## 2022-08-15 PROCEDURE — 92507 TX SP LANG VOICE COMM INDIV: CPT

## 2022-08-15 NOTE — FLOWSHEET NOTE
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 Prakash Perla MD  Plan of care signed (Y/N): Y (2022)    Date of Patient follow up with Physician: JOSELINE    Functional outcome measure:   FOTO Primary Measure: NeuroQOL - Communication: 25.0  Voice Handicap Index: 10 (); 6 ()    Progress Note: []  Yes  [x]  No  Next due by: Visit #10 or 2022    RESTRICTIONS/PRECAUTIONS: N/A  Latex Allergy:  [x]NO      []YES    Preferred Language for Healthcare:   [x]English       []other:    Visit # Insurance Allowable Date Range (if applicable)    BMN N/A       Pain level: 0/10     SUBJECTIVE:  Pt alert and receptive, reports she had a busy weekend with family and that her voice maintained throughout the weekend.      OBJECTIVE:     Exercises/Interventions:     Voice (46717) Reps Seconds Notes   Vocal Function Interventions:      Semi-Occluded Vocal Tract - Straw phonation reading sentences x 10  - Straw phonation spontaneous sentences x 20  - paragraph reading (2 minutes) x 2 with pt rating vocal quality of 80%  - simple discourse (1 minute) x 5 with pt rating vocal quality of 90%  SLP reinforced self-monitoring of voice during extended reading and spontaneous speech    Resonant Voice Therapy      Vocal Function Exercises - Warm-up exercise: sustained \"e\" x 2  - Stretching exercise: \"whoop\" pitch glides up  - Shimon exercise: \"whoop\" pitch glides down  - Power exercise: sustained \"ol\" musical notes x 5 Sustained \"e\" 11 seconds    Power exercise 8.67 seconds No phonation breaks      Easy Onset      LSVT      Other: Adduction exercises - Prolonged \"ah\" x 5 with 100% above 75 dB, 100% over 10 seconds  - Pitch glides x 10 Average 14.67 seconds     Respiratory Interventions:      Respiratory Coordination      Respiratory Support Diaphragmatic breathing with graded exhalation x 8    Yawn sign x 5  Min integral stimulation (tactile, verbal, visual cues)   Musculoskeletal Interventions:      Stretching      Postural Modification            Pt. Education:  -Pt educated on diagnosis, prognosis and expectations for rehab  -All pt questions were answered  -Pt verbalized understanding and agreement    HEP instruction:  -Pt provided with written and illustrated instructions for HEP: diaphragmatic breathing, shoulder/cervical stretches, Vocal Function Exercises, vocal hygiene      Speech/Voice Therapy:    [x] (87401) Treatment of speech, language, voice, communication, and/or auditory processing disorder; individual    Cognitive Function:  [] (10216) Therapeutic interventions that focus on cognitive function (eg, attention, memory, reasoning, executive function, problem solving, and/or pragmatic functioning) and compensatory strategies to manage the performance of an activity (eg, managing time or schedules, initiating, organizing, and sequencing tasks), direct (one-on-one) patient contact; initial 15 minutes (Report 26362 only once per day)  [] ([phone]) each additional 15 minutes     Dysphagia Therapy:    [] (12421) Treatment of swallowing dysfunction and/or oral function for feeding         Charges:  Timed Code Treatment Minutes: 0   Total Treatment Minutes: 45     [x] Speech-Language Treatment (17859)        [] Voice Treatment (28385)                                         [] Cognitive-Linguistic Skills Development Initial 15 minutes (34819)  [] Cognitive-Linguistic Skills Development Additional 15 minutes ([phone])   [] Dysphagia Treatment/NMES ([facility]) (39879)  [] Other:     GOALS:   Patient stated goal: Voice  [] Progressing: [] Met: [] Not Met: [] Adjusted     Therapist goals for Patient:  Short Term Goals: To be achieved in: 3 weeks  1. The patient will improve sustained phonation to > 15 seconds to increase breath support for speaking. [] Progressing: [] Met: [] Not Met: [] Adjusted  2. The patient will complete pitch glides x 10 without pitch breaks to improve vocal quality and pitch range. [] Progressing: [] Met: [] Not Met: [] Adjusted  3. The patient will verbalize single spontaneous sentences with vocal quality judged to be > 80% as rated by pt/SLP in order to reduced hoarseness. [] Progressing: [] Met: [] Not Met: [] Adjusted  4. The patient will verbalize 2-minute discourse with vocal quality judged to be > 80% as rated by pt/SLP in order to reduced hoarseness. [] Progressing: [] Met: [] Not Met: [] Adjusted     Long Term Goals: To be achieved in: 4 weeks  1. Patient will improve vocal quality to baseline as subjectively rated by SLP, pt, and family. [] Progressing: [] Met: [] Not Met: [] Adjusted      Progression Towards Functional goals:  [x] Patient is progressing as expected towards functional goals listed. [] Progression is slowed due to complexities listed. [] Progression has been slowed due to co-morbidities. [] Plan just implemented, too soon to assess goals progression  [] Other:     Persisting Functional Limitations/Impairments:  []Attention []Word Finding       []Memory []Speech Intelligibility      []Executive Function []Diet Tolerance     []Problem Solving []Coughing/Choking with PO  []Expressive Language []Medication/Finance Management  []Receptive Language [x]Other: Dysphonia      ASSESSMENT: Pt with no phonation or pitch breaks during pitch glides this date. Session focusing on self-monitoring and adjusting resonance/vocal quality during extended reading and spontaneous speech.  Pt continues to demonstrate appropriate diaphragmatic breathing across speaking tasks. Treatment/Activity Tolerance:  [x] Patient able to complete tx [] Patient limited by fatigue  [] Patient limited by pain  [] Patient limited by other medical complications  [] Other:     Prognosis: [x] Good [] Fair  [] Poor    Patient Requires Follow-up: [x] Yes  [] No    PLAN: See eval. ST 2x / week for 4 weeks. [x] Continue per plan of care [] Alter current plan (see comments)  [] Plan of care initiated [] Hold pending MD visit [] Discharge    Electronically signed by:   Debbie Hartman MA CCC-SLP  Speech-Language Pathologist  SP. [id number]      Note: If patient does not return for scheduled/ recommended follow up visits, this note will serve as a discharge from care along with most recent update on progress.

## 2022-08-18 ENCOUNTER — HOSPITAL ENCOUNTER (OUTPATIENT)
Dept: SPEECH THERAPY | Age: 77
Setting detail: THERAPIES SERIES
Discharge: HOME | End: 2022-08-18
Payer: MEDICARE

## 2022-08-18 PROCEDURE — 92507 TX SP LANG VOICE COMM INDIV: CPT

## 2022-08-18 NOTE — PLAN OF CARE
[facility] [address] Therapy Re-Certification Plan of Care    Dear Dr. Shivam Palomo MD,    We had the pleasure of treating the following patient for speech therapy services at [address] [place] [address]. A summary of our findings can be found in the updated assessment below. This includes our plan of care. If you have any questions or concerns regarding these findings, please do not hesitate to contact me at the office phone number checked above. Thank you for the referral.     Physician Signature:________________________________Date:__________________  By signing above (or electronic signature), therapists plan is approved by physician      Functional Outcome:    2022   NeuroQOL - Communication 25.0 24.0   Voice Handicap Index 10 5       Overall Response to Treatment:   [x]Patient is responding well to treatment and improvement is noted with regards to goals   []Patient should continue to improve in reasonable time if they continue HEP   []Patient has plateaued and is no longer responding to skilled ST intervention    []Patient is getting worse and would benefit from return to referring MD   []Patient unable to adhere to initial POC   []Other:     Date range of Visits: -2022  Total Visits: 8    Recommendation:    [x]Continue ST 1x / wk for 4 weeks.     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 Shivam Palomo MD  Plan of care signed (Y/N): Y (2022)    Date of Patient follow up with Physician: JOSELINE    Functional outcome measure:   FOTO Primary Measure: NeuroQOL - Communication: 24.0  Voice Handicap Index: 10 (7/25); 6 (8/5); 5 (8/18)    Progress Note: [x]  Yes  []  No  Next due by: 9/18/2022    RESTRICTIONS/PRECAUTIONS: N/A  Latex Allergy:  [x]NO      []YES    Preferred Language for Healthcare:   [x]English       []other:    Visit # Insurance Allowable Date Range (if applicable)   8/8 BMN N/A       Pain level: 0/10     SUBJECTIVE:  Pt alert and receptive, continues to complete home exercises without difficulty. OBJECTIVE:   FOTO Primary Measure: NeuroQOL - Communication: 24.0    The Voice Handicap Index was used to assess pt voice experience and the effect of the pt's voice on their life.  The pt reported the following:  Physical: 3  Functional: 2  Emotional: 0  TOTAL: 5      Exercises/Interventions:     Voice (54454) Reps Seconds Notes   Vocal Function Interventions:      Semi-Occluded Vocal Tract - Straw phonation spontaneous sentences x 8  - simple discourse (2 minute) x 5 with pt rating vocal quality of 75%  Auditory feedback for self-monitoring of vocal quality   Resonant Voice Therapy      Vocal Function Exercises - Warm-up exercise: sustained \"e\" x 2  - Stretching exercise: \"whoop\" pitch glides up  - Shimon exercise: \"whoop\" pitch glides down  - Power exercise: sustained \"ol\" musical notes x 5 Sustained \"e\" 15 seconds    Power exercise 10.8 seconds No phonation breaks      Easy Onset      LSVT      Other: Adduction exercises - Prolonged \"ah\" x 5 with 100% above 75 dB, 100% over 10 seconds  - Pitch glides x 10 Average 21.4 seconds     Respiratory Interventions:      Respiratory Coordination      Respiratory Support Yawn sign x 3     Musculoskeletal Interventions:      Stretching      Postural Modification            Pt. Education:  -Pt educated on diagnosis, prognosis and expectations for rehab  -All pt questions were answered  -Pt verbalized understanding and agreement  -Progress since initiation of POC    HEP instruction:  -Pt provided with written and illustrated instructions for HEP: diaphragmatic breathing, shoulder/cervical stretches, Vocal Function Exercises, vocal hygiene      Speech/Voice Therapy:    [x] (68902) Treatment of speech, language, voice, communication, and/or auditory processing disorder; individual    Cognitive Function:  [] (30843) Therapeutic interventions that focus on cognitive function (eg, attention, memory, reasoning, executive function, problem solving, and/or pragmatic functioning) and compensatory strategies to manage the performance of an activity (eg, managing time or schedules, initiating, organizing, and sequencing tasks), direct (one-on-one) patient contact; initial 15 minutes (Report 08699 only once per day)  [] ([phone]) each additional 15 minutes     Dysphagia Therapy:    [] (42596) Treatment of swallowing dysfunction and/or oral function for feeding         Charges:  Timed Code Treatment Minutes: 0   Total Treatment Minutes: 45     [x] Speech-Language Treatment (84829)        [] Voice Treatment (54560)                                         [] Cognitive-Linguistic Skills Development Initial 15 minutes (08973)  [] Cognitive-Linguistic Skills Development Additional 15 minutes (67908)   [] Dysphagia Treatment/NMES ([facility]) (60544)  [] Other:     GOALS:   Patient stated goal: Voice  [x] Progressing: [] Met: [] Not Met: [] Adjusted     Therapist goals for Patient:  Short Term Goals: To be achieved in: 3 weeks  1. The patient will improve sustained phonation to > 15 seconds to increase breath support for speaking. [] Progressing: [x] Met: [] Not Met: [] Adjusted  2. The patient will complete pitch glides x 10 without pitch breaks to improve vocal quality and pitch range. [] Progressing: [x] Met: [] Not Met: [] Adjusted  3.  The patient will verbalize single spontaneous sentences with vocal quality judged to be > 80% as rated by pt/SLP in order to reduced hoarseness. [] Progressing: [x] Met: [] Not Met: [] Adjusted  4. The patient will verbalize 2-minute discourse with vocal quality judged to be > 80% as rated by pt/SLP in order to reduced hoarseness. [x] Progressing: [] Met: [] Not Met: [] Adjusted     Long Term Goals: To be achieved in: 4 weeks  1. Patient will improve vocal quality to baseline as subjectively rated by SLP, pt, and family. [x] Progressing: [] Met: [] Not Met: [] Adjusted      Progression Towards Functional goals:  [x] Patient is progressing as expected towards functional goals listed. [] Progression is slowed due to complexities listed. [] Progression has been slowed due to co-morbidities. [] Plan just implemented, too soon to assess goals progression  [] Other:     Persisting Functional Limitations/Impairments:  []Attention []Word Finding       []Memory []Speech Intelligibility      []Executive Function []Diet Tolerance     []Problem Solving []Coughing/Choking with PO  []Expressive Language []Medication/Finance Management  []Receptive Language [x]Other: Dysphonia      ASSESSMENT: The patient is making good progress towards all voice goals and has met short-term goals for improved maximum phonation time, improved pitch range without phonation breaks, and improved vocal quality for short spontaneous utterance. Pt with improved score on Voice Handicap Index, from 10 at initial evaluation to 5 this date. Pt continues to benefit from skilled intervention for forward focus resonance and diaphragmatic breathing in longer discourse to improve vocal quality across a variety of communication contexts. Continue POC as above with reduced frequency of visits.        Treatment/Activity Tolerance:  [x] Patient able to complete tx [] Patient limited by fatigue  [] Patient limited by pain  [] Patient limited by other medical complications  [] Other:     Prognosis: [x] Good [] Fair  [] Poor    Patient Requires Follow-up: [x] Yes  [] No    PLAN: See eval. ST 1x / week for 4 weeks. [] Continue per plan of care [x] Alter current plan (see comments)  [] Plan of care initiated [] Hold pending MD visit [] Discharge    Electronically signed by:   Apolonia Root MA CCC-SLP  Speech-Language Pathologist  SP. [id number]      Note: If patient does not return for scheduled/ recommended follow up visits, this note will serve as a discharge from care along with most recent update on progress.

## 2022-08-22 ENCOUNTER — HOSPITAL ENCOUNTER (OUTPATIENT)
Dept: SPEECH THERAPY | Age: 77
Setting detail: THERAPIES SERIES
Discharge: HOME | End: 2022-08-22
Payer: MEDICARE

## 2022-08-22 PROCEDURE — 92507 TX SP LANG VOICE COMM INDIV: CPT

## 2022-08-22 NOTE — FLOWSHEET NOTE
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 Shivam Palomo MD  Plan of care signed (Y/N): Y (2022)    Date of Patient follow up with Physician: JOSELINE    Functional outcome measure:   FOTO Primary Measure: NeuroQOL - Communication: 24.0  Voice Handicap Index: 10 (); 6 (); 5 ()    Progress Note: [x]  Yes  []  No  Next due by: 2022    RESTRICTIONS/PRECAUTIONS: N/A  Latex Allergy:  [x]NO      []YES    Preferred Language for Healthcare:   [x]English       []other:    Visit # Insurance Allowable Date Range (if applicable)    +  BMN N/A       Pain level: 0/10     SUBJECTIVE:  Pt alert and receptive, denies recent difficulty or concerns with voice.      OBJECTIVE:     Exercises/Interventions:     Voice (54558) Reps Seconds Notes   Vocal Function Interventions:      Semi-Occluded Vocal Tract      Resonant Voice Therapy Discourse (2 minutes) in a small group x 5 training forward focus with pt rating vocal quality of 85%     Vocal Function Exercises - Warm-up exercise: sustained \"e\"  - Stretching exercise: \"whoop\" pitch glides up  - Shimon exercise: \"whoop\" pitch glides down  - Power exercise: sustained \"ol\" musical notes x 5 Sustained \"e\" 18 seconds    Power exercise average 10 seconds No phonation breaks      Easy Onset      LSVT      Other: Adduction exercises - Prolonged \"ah\" x 10 with 100% above 75 dB, 100% over 10 seconds  - Pitch glides x 10  - Low pitch sentences x 10  - High pitch sentences x 10 Average 24.6 seconds Respiratory Interventions:      Respiratory Coordination      Respiratory Support Diaphragmatic breathing:  - square breathing x 4  - graded exhalation, voiced and voiceless, x 3     Musculoskeletal Interventions:      Stretching      Postural Modification            Pt. Education:  -Pt educated on diagnosis, prognosis and expectations for rehab  -All pt questions were answered  -Pt verbalized understanding and agreement    HEP instruction:  -Pt provided with written and illustrated instructions for HEP: diaphragmatic breathing, shoulder/cervical stretches, Vocal Function Exercises, vocal hygiene      Speech/Voice Therapy:    [x] (74353) Treatment of speech, language, voice, communication, and/or auditory processing disorder; individual    Cognitive Function:  [] (72407) Therapeutic interventions that focus on cognitive function (eg, attention, memory, reasoning, executive function, problem solving, and/or pragmatic functioning) and compensatory strategies to manage the performance of an activity (eg, managing time or schedules, initiating, organizing, and sequencing tasks), direct (one-on-one) patient contact; initial 15 minutes (Report 95780 only once per day)  [] ([phone]) each additional 15 minutes     Dysphagia Therapy:    [] (03633) Treatment of swallowing dysfunction and/or oral function for feeding         Charges:  Timed Code Treatment Minutes: 0   Total Treatment Minutes: 45     [x] Speech-Language Treatment (10134)        [] Voice Treatment (10324)                                         [] Cognitive-Linguistic Skills Development Initial 15 minutes (60462)  [] Cognitive-Linguistic Skills Development Additional 15 minutes (55143)   [] Dysphagia Treatment/NMES ([facility]) (14222)  [] Other:     GOALS:   Patient stated goal: Voice  [x] Progressing: [] Met: [] Not Met: [] Adjusted     Therapist goals for Patient:  Short Term Goals: To be achieved in: 3 weeks  1.  The patient will improve sustained phonation to > 15 seconds to increase breath support for speaking. [] Progressing: [x] Met: [] Not Met: [] Adjusted  2. The patient will complete pitch glides x 10 without pitch breaks to improve vocal quality and pitch range. [] Progressing: [x] Met: [] Not Met: [] Adjusted  3. The patient will verbalize single spontaneous sentences with vocal quality judged to be > 80% as rated by pt/SLP in order to reduced hoarseness. [] Progressing: [x] Met: [] Not Met: [] Adjusted  4. The patient will verbalize 2-minute discourse with vocal quality judged to be > 80% as rated by pt/SLP in order to reduced hoarseness. [x] Progressing: [] Met: [] Not Met: [] Adjusted     Long Term Goals: To be achieved in: 4 weeks  1. Patient will improve vocal quality to baseline as subjectively rated by SLP, pt, and family. [x] Progressing: [] Met: [] Not Met: [] Adjusted      Progression Towards Functional goals:  [x] Patient is progressing as expected towards functional goals listed. [] Progression is slowed due to complexities listed. [] Progression has been slowed due to co-morbidities. [] Plan just implemented, too soon to assess goals progression  [] Other:     Persisting Functional Limitations/Impairments:  []Attention []Word Finding       []Memory []Speech Intelligibility      []Executive Function []Diet Tolerance     []Problem Solving []Coughing/Choking with PO  []Expressive Language []Medication/Finance Management  []Receptive Language [x]Other: Dysphonia      ASSESSMENT: Pt with improving self-monitoring of vocal quality during discourse, benefited from increased cues in more complex contexts (e.g., communicating with a group of people). Improving maximum phonation time across voice tasks and implementation of diaphragmatic breathing.        Treatment/Activity Tolerance:  [x] Patient able to complete tx [] Patient limited by fatigue  [] Patient limited by pain  [] Patient limited by other medical complications  [] Other: Prognosis: [x] Good [] Fair  [] Poor    Patient Requires Follow-up: [x] Yes  [] No    PLAN: See eval. ST 1x / week for 4 weeks. [x] Continue per plan of care [] Alter current plan (see comments)  [] Plan of care initiated [] Hold pending MD visit [] Discharge    Electronically signed by:   Mehnaz Hermosillo MA CCC-SLP  Speech-Language Pathologist  SP. [id number]      Note: If patient does not return for scheduled/ recommended follow up visits, this note will serve as a discharge from care along with most recent update on progress.

## 2022-08-29 ENCOUNTER — HOSPITAL ENCOUNTER (OUTPATIENT)
Dept: SPEECH THERAPY | Age: 77
Setting detail: THERAPIES SERIES
Discharge: HOME | End: 2022-08-29
Payer: MEDICARE

## 2022-08-29 PROCEDURE — 92507 TX SP LANG VOICE COMM INDIV: CPT

## 2022-08-29 NOTE — FLOWSHEET NOTE
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 Darian Michel MD  Plan of care signed (Y/N): Y (2022)    Date of Patient follow up with Physician: JOSELINE    Functional outcome measure:   FOTO Primary Measure: NeuroQOL - Communication: 24.0  Voice Handicap Index: 10 (); 6 (); 5 ()    Progress Note: []  Yes  [x]  No  Next due by: 2022    RESTRICTIONS/PRECAUTIONS: N/A  Latex Allergy:  [x]NO      []YES    Preferred Language for Healthcare:   [x]English       []other:    Visit # Insurance Allowable Date Range (if applicable)    + 2/ BMN N/A       Pain level: 0/10     SUBJECTIVE:  Pt reports she had her allergy shot recently and will get them every three weeks.       OBJECTIVE:     Exercises/Interventions:     Voice (97567) Reps Seconds Notes   Vocal Function Interventions:      Semi-Occluded Vocal Tract      Resonant Voice Therapy Sentence length \"molm\" forward resonance reinforcement x 15    Discourse (2 minutes) x 5 in loud environment (outside of therapy room) with pt rating vocal quality ranging from 60-90%     Vocal Function Exercises      Easy Onset      LSVT      Other: Adduction exercises - Pitch glides x 10  - Low pitch sentences x 10  - High pitch sentences x 10      Respiratory Interventions:      Respiratory Coordination      Respiratory Support Diaphragmatic breathing:  - square breathing x 6  - graded exhalation, voiced and voiceless, x 10     Musculoskeletal Interventions:      Stretching Cervical relaxation:  - shoulder rolls x 30 seconds  - anterior/posterior shoulder movement x 30 seconds     Extrinsic laryngeal relaxation:  - head turn x 30 seconds  - head tilt x 30 seconds  - lateral neck stretch x 30 seconds  - chin to chest x 30      Postural Modification            Pt. Education:  -Pt educated on diagnosis, prognosis and expectations for rehab  -All pt questions were answered  -Pt verbalized understanding and agreement    HEP instruction:  -Pt provided with written and illustrated instructions for HEP: diaphragmatic breathing, shoulder/cervical stretches, Vocal Function Exercises, vocal hygiene      Speech/Voice Therapy:    [x] (81161) Treatment of speech, language, voice, communication, and/or auditory processing disorder; individual    Cognitive Function:  [] (24242) Therapeutic interventions that focus on cognitive function (eg, attention, memory, reasoning, executive function, problem solving, and/or pragmatic functioning) and compensatory strategies to manage the performance of an activity (eg, managing time or schedules, initiating, organizing, and sequencing tasks), direct (one-on-one) patient contact; initial 15 minutes (Report 92454 only once per day)  [] (21 [phone]) each additional 15 minutes     Dysphagia Therapy:    [] (67404) Treatment of swallowing dysfunction and/or oral function for feeding         Charges:  Timed Code Treatment Minutes: 0   Total Treatment Minutes: 45     [x] Speech-Language Treatment (55821)        [] Voice Treatment (97206)                                         [] Cognitive-Linguistic Skills Development Initial 15 minutes (34781)  [] Cognitive-Linguistic Skills Development Additional 15 minutes (42316)   [] Dysphagia Treatment/NMES ([facility]) (69385)  [] Other:     GOALS:   Patient stated goal: Voice  [x] Progressing: [] Met: [] Not Met: [] Adjusted     Therapist goals for Patient:  Short Term Goals: To be achieved in: 3 weeks  1.  The patient will improve sustained phonation to > medical complications  [] Other:     Prognosis: [x] Good [] Fair  [] Poor    Patient Requires Follow-up: [x] Yes  [] No    PLAN: See eval. ST 1x / week for 4 weeks. [x] Continue per plan of care [] Alter current plan (see comments)  [] Plan of care initiated [] Hold pending MD visit [] Discharge    Electronically signed by:   ZBIGNIEW Metcalf  Speech-Language Pathology Graduate Clinician    The speech-language pathologist was present, directed the patient's care, made skilled judgment and was responsible for assessment and treatment. Dirk Rowell MA CCC-SLP  Speech-Language Pathologist  [address] [id number]      Note: If patient does not return for scheduled/ recommended follow up visits, this note will serve as a discharge from care along with most recent update on progress.

## 2022-09-08 ENCOUNTER — HOSPITAL ENCOUNTER (OUTPATIENT)
Dept: SPEECH THERAPY | Age: 77
Setting detail: THERAPIES SERIES
Discharge: HOME | End: 2022-09-08
Payer: MEDICARE

## 2022-09-08 PROCEDURE — 92507 TX SP LANG VOICE COMM INDIV: CPT

## 2022-09-08 NOTE — PLAN OF CARE
Krzysztof Reed Discharge Summary    Dear Dr. Kristal Orosco MD,    We had the pleasure of treating the following patient for speech therapy services at [address] [place] [address]. A summary of our findings can be found in the discharge summary below. If you have any questions or concerns regarding these findings, please do not hesitate to contact me at the office phone number checked above. Thank you for the referral.     Physician Signature:________________________________Date:__________________  By signing above (or electronic signature), therapists plan is approved by physician      Functional Outcome:    2022   NeuroQOL - Communication 25.0 24.0 25.0   Voice Handicap Index 10 5 3       Overall Response to Treatment:   [x]Patient is responding well to treatment and improvement is noted with regards  to goals   [x]Patient should continue to improve in reasonable time if they continue HEP   []Patient has plateaued and is no longer responding to skilled ST intervention    []Patient is getting worse and would benefit from return to referring MD   []Patient unable to adhere to initial POC   []Other:     Date range of Visits: 2022-2022  Total Visits: 13    Recommendation: [x] Discharge to [facility]. Follow up with  PRN.        Speech Therapy Daily Treatment Note  Date:  2022    Patient Name:  Bard Padilla    :  1945  MRN: [id number]  Medical/Treatment Diagnosis Information:  J38.00 (ICD-10-CM) - Paralysis of vocal cords and larynx, unspecified  R49.0 (ICD-10-CM) - Dysphonia          Treatment Diagnosis: Mild Dysphonia   Insurance/Certification information: [employer]vantage PPO; BMN, no prior authorization  Physician Information: Dr. Kristal Orosco MD  Plan of care signed (Y/N): Y (2022)    Date of Patient follow up with Physician: turn x 30 seconds  - head tilt x 30 seconds  - lateral neck stretch x 30 seconds  - chin to chest x 30      Postural Modification            Pt. Education:  -Pt educated on diagnosis, prognosis and expectations for rehab  -All pt questions were answered  -Pt verbalized understanding and agreement  -Progress since initiation of POC    HEP instruction:  -Pt provided with written and illustrated instructions for HEP: diaphragmatic breathing, shoulder/cervical stretches, Vocal Function Exercises, vocal hygiene      Speech/Voice Therapy:    [x] (17487) Treatment of speech, language, voice, communication, and/or auditory processing disorder; individual    Cognitive Function:  [] (63964) Therapeutic interventions that focus on cognitive function (eg, attention, memory, reasoning, executive function, problem solving, and/or pragmatic functioning) and compensatory strategies to manage the performance of an activity (eg, managing time or schedules, initiating, organizing, and sequencing tasks), direct (one-on-one) patient contact; initial 15 minutes (Report 15636 only once per day)  [] ([phone]) each additional 15 minutes     Dysphagia Therapy:    [] (72805) Treatment of swallowing dysfunction and/or oral function for feeding         Charges:  Timed Code Treatment Minutes: 0   Total Treatment Minutes: 45     [x] Speech-Language Treatment (56857)        [] Voice Treatment (31653)                                         [] Cognitive-Linguistic Skills Development Initial 15 minutes (92860)  [] Cognitive-Linguistic Skills Development Additional 15 minutes ([phone])   [] Dysphagia Treatment/NMES (VitalStim) (31141)  [] Other:     GOALS:   Patient stated goal: Voice  [] Progressing: [x] Met: [] Not Met: [] Adjusted     Therapist goals for Patient:  Short Term Goals: To be achieved in: 3 weeks  1. The patient will improve sustained phonation to > 15 seconds to increase breath support for speaking.   [] Progressing: [x] Met: [] Not Met: [] Adjusted  2. The patient will complete pitch glides x 10 without pitch breaks to improve vocal quality and pitch range. [] Progressing: [x] Met: [] Not Met: [] Adjusted  3. The patient will verbalize single spontaneous sentences with vocal quality judged to be > 80% as rated by pt/SLP in order to reduced hoarseness. [] Progressing: [x] Met: [] Not Met: [] Adjusted  4. The patient will verbalize 2-minute discourse with vocal quality judged to be > 80% as rated by pt/SLP in order to reduced hoarseness. [] Progressing: [x] Met: [] Not Met: [] Adjusted     Long Term Goals: To be achieved in: 4 weeks  1. Patient will improve vocal quality to baseline as subjectively rated by SLP, pt, and family. [] Progressing: [x] Met: [] Not Met: [] Adjusted      Progression Towards Functional goals:  [x] Patient is progressing as expected towards functional goals listed. [] Progression is slowed due to complexities listed. [] Progression has been slowed due to co-morbidities. [] Plan just implemented, too soon to assess goals progression  [] Other:     Persisting Functional Limitations/Impairments:  []Attention []Word Finding       []Memory []Speech Intelligibility     []Executive Function []Diet Tolerance     []Problem Solving []Coughing/Choking with PO  []Expressive Language []Medication/Finance Management  []Receptive Language []Other:       ASSESSMENT: The patient has made good progress towards voice and has met all short and long-term goals. Pt rating improved score on Voice Handicap Index as compared to initial evaluation, particularly in vocal endurance throughout the day. Pt reports improved vocal quality across variety of complex discourse contexts, with improved self-monitoring for diaphragmatic breathing and forward focus resonance. At this time, recommend discharge from speech therapy.        Treatment/Activity Tolerance:  [x] Patient able to complete tx [] Patient limited by fatigue  [] Patient limited by pain  [] Patient limited by other medical complications  [] Other:     Prognosis: [x] Good [] Fair  [] Poor    Patient Requires Follow-up: [] Yes  [x] No    PLAN: See eval. ST 1x / week for 4 weeks. [] Continue per plan of care [] Alter current plan (see comments)  [] Plan of care initiated [] Hold pending MD visit [x] Discharge    Electronically signed by:   ZBIGNIEW Trinidad  Speech-Language Pathology Graduate Clinician    The speech-language pathologist was present, directed the patient's care, made skilled judgment and was responsible for assessment and treatment. Sathya Adams MA CCC-SLP  Speech-Language Pathologist  [phone]      Note: If patient does not return for scheduled/ recommended follow up visits, this note will serve as a discharge from care along with most recent update on progress.

## 2022-10-21 ENCOUNTER — OFFICE VISIT (OUTPATIENT)
Dept: ORTHOPEDIC SURGERY | Age: 77
End: 2022-10-21
Payer: MEDICARE

## 2022-10-21 VITALS — HEIGHT: 65 IN | WEIGHT: 169 LBS | BODY MASS INDEX: 28.16 KG/M2

## 2022-10-21 DIAGNOSIS — M25.561 RIGHT KNEE PAIN, UNSPECIFIED CHRONICITY: ICD-10-CM

## 2022-10-21 DIAGNOSIS — M25.562 LEFT KNEE PAIN, UNSPECIFIED CHRONICITY: Primary | ICD-10-CM

## 2022-10-21 PROCEDURE — 1123F ACP DISCUSS/DSCN MKR DOCD: CPT | Performed by: ORTHOPAEDIC SURGERY

## 2022-10-21 PROCEDURE — 99212 OFFICE O/P EST SF 10 MIN: CPT | Performed by: ORTHOPAEDIC SURGERY

## 2022-10-21 NOTE — PROGRESS NOTES
Patient: Lynn Engel  : 1945    MRN: [id number]    Date of Visit: 10/21/22    Attending Physician: Maik Agrawal MD    History of Present Illness  Ms. Hipolito Barahona is a very pleasant 77 y.o. patient with a history of bilateral total knee arthroplasty. She had the right one replaced over 20 years ago and the left one replaced about 10 years ago. She reports that she did well postoperatively has had no issues does get occasional aches and pains in the knees as well as a clicking sensation in the knees. She denies fevers or chills denies any sensation of instability    She takes over-the-counter medications for soreness and pains. She has a daily walking regimen.     PMH/PSH:  Past Medical History:   Diagnosis Date    Arthritis     Blood transfusion     AUTOLOGOUS    Blood transfusion     Diverticulosis     H/O irritable bowel syndrome     Hyperlipidemia     Osteopenia     Restless leg syndrome     Seasonal allergies     Synovial cyst     back     Patient Active Problem List   Diagnosis    Acute foreign body of hand, left, initial encounter    Synovial cyst of lumbar spine    GERD (gastroesophageal reflux disease)    High cholesterol    Restless legs    Acute blood loss as cause of postoperative anemia     Past Surgical History:   Procedure Laterality Date    APPENDECTOMY      CARPAL TUNNEL RELEASE      LEFT    COLONOSCOPY      FRACTURE SURGERY      BILATERAL FEMURS     FRACTURE SURGERY      RIGHT ANKLE/TALUS/TIBIA    HAND SURGERY Left 2019    LEFT RING FINGER  DEBRIDEMENT INCISION AND DRAINAGE, REMOVAL OF FOREIGN BODY performed by Ephraim Ann MD at [address] REPLACEMENT      RIGHT KNEE    KNEE ARTHROSCOPY      RIGHT AND LEFT MULTIPLE    LUMBAR SPINE SURGERY N/A 5/3/2022    LEFT LUMBAR4-LUMBAR5 MICRO HEMILAMINECTOMY AND EXCISION OF SYNOVIAL CYST (90995, 15519) performed by More Shaffer MD at [facility] SURGICAL HISTORY      spinal ablations     TRIGGER FINGER RELEASE      RIGHT - 2 FINGERS    TRIGGER FINGER RELEASE  6-    trigger finger release left long and ring fingers       MEDS:  Scheduled Meds:  Continuous Infusions:  PRN Meds:  Current Meds:No current outpatient medications on file. ALLERGIES:  No Known Allergies      Social History:   Social History     Socioeconomic History    Marital status: Single     Spouse name: Not on file    Number of children: Not on file    Years of education: Not on file    Highest education level: Not on file   Social Needs    Financial resource strain: Not on file    Food insecurity - worry: Not on file    Food insecurity - inability: Not on file    Transportation needs - medical: Not on file    Transportation needs - non-medical: Not on file   Occupational History    Not on file   Tobacco Use    Smoking status: Never Smoker    Smokeless tobacco: Never Used   Substance and Sexual Activity    Alcohol use: No     Frequency: Never    Drug use: No    Sexual activity: Not on file   Other Topics Concern    Not on file   Social History Narrative    Not on file         Family History:   No family history on file. Review of Systems:  No personal history of DVT, PE. 12 point ROS otherwise negative other than reported in HPI. Physical Examination:  Patient is alert and oriented x 3 and appears well nourished and appropriate for today's visit. Gait: The patient walks with an antalgic gait. Right knee: Well-healed midline incision able to perform a straight leg raise range of motion 0-1 20 stable throughout the arc of motion from extension mid flexion and flexion. There is no flexion instability. Left knee: Well-healed midline incision able to perform a straight leg raise range of motion 0-1 20 stable throughout the arc of motion from extension mid flexion and flexion. There is no flexion instability.   Peripheral vasculature: Bilateral 2+ dorsal pedis pulses; bilateral 2+ posterior tibial pulses; No lower extremity edema  Skin: Skin appears to be intact in both upper and lower extremities. There does not appear to be any ulceration or other non-healing wounds. Radiographs: Multiple views of the bilateral knees including AP lateral AP standing notch view as well as sunrise view. On the right side there is a well-positioned cruciate retaining total knee arthroplasty. There is no evidence of loosening subluxation dislocation or significant polywear. The patella there is slight tilt as well as erosion of patellar bone on the lateral condyle. On the left side there is a posterior stabilized total knee arthroplasty that appears to be well-positioned without evidence of loosening displacement no subluxation or signs of polywear. The patella is tracking midline    Assessment and Plan?: The patient has bilateral total knee arthroplasties that appear to be functioning well. .   We discussed the diagnosis and treatment options in detail with the patient. At this point we will continue to see the patient on a as needed basis explain her concerning signs including pain swelling redness any sensation of instability.   We will see her back on an as-needed basis

## 2023-05-18 ENCOUNTER — HOSPITAL ENCOUNTER (OUTPATIENT)
Dept: PHYSICAL THERAPY | Age: 78
Setting detail: THERAPIES SERIES
Discharge: HOME | End: 2023-05-18
Payer: MEDICARE

## 2023-05-18 PROCEDURE — 97161 PT EVAL LOW COMPLEX 20 MIN: CPT

## 2023-05-18 PROCEDURE — 97112 NEUROMUSCULAR REEDUCATION: CPT

## 2023-05-18 PROCEDURE — 97530 THERAPEUTIC ACTIVITIES: CPT

## 2023-05-18 NOTE — PLAN OF CARE
hours in regards to BPPV management   [] Written home exercise instructions   [] Other:    Frequency/Duration:  1 days per week for 4 Weeks:  Interventions:  [x]  Therapeutic exercise including: strength training, ROM, for LEs, cervical spine & UEs  [x]  NMR activation and proprioception for BLEs, vestibular training/habituation, balance, coordination  [x]  Therapeutic activity, dynamic activities designed to improve functional performance. [x]  Manual therapy as indicated via: canalith repositioning maneuvers, Dry Needling/IASTM, STM, PROM, Gr I-IV mobilizations, manipulation. [x]  Modalities as needed that may include: thermal agents, E-stim, Biofeedback, US, iontophoresis as indicated  [x]  Gait training  [x]  Patient education on BPPV/vestibular function, balance, postural re-education, activity modification, progression of HEP. HEP instruction: Written HEP instructions provided and reviewed. GOALS:  Patient stated goal:   [] Progressing: [] Met: [] Not Met: [] Adjusted    Therapist goals for Patient:   Short Term Goals: To be achieved in: 2 weeks  1. Independent in HEP and progression per patient tolerance, in order to prevent re-injury. [] Progressing: [] Met: [] Not Met: [] Adjusted  2. Patient will have a decrease in dizziness/imbalance/symptoms with daily   to facilitate improvement in movement, function, balance and ADLs as indicated by Functional Deficits. [] Progressing: [] Met: [] Not Met: [] Adjusted    Long Term Goals: To be achieved in: at discharge  1. Decrease DHI functional outcome score from 12 to 0  to assist with reaching prior level of function. [] Progressing: [] Met: [] Not Met: [] Adjusted    2. Patient will demonstrate negative oculomotor special testing/positional testing as indicated by patients Functional Deficits. [] Progressing: [] Met: [] Not Met: [] Adjusted  3.  Patient will return to functional activities including volunteer services  without increased symptoms

## 2023-05-18 NOTE — FLOWSHEET NOTE
dizziness/imbalance/symptoms with daily   to facilitate improvement in movement, function, balance and ADLs as indicated by Functional Deficits. [] Progressing: [] Met: [] Not Met: [] Adjusted     Long Term Goals: To be achieved in: at discharge  1. Decrease DHI functional outcome score from 12 to 0  to assist with reaching prior level of function. [] Progressing: [] Met: [] Not Met: [] Adjusted     2. Patient will demonstrate negative oculomotor special testing/positional testing as indicated by patients Functional Deficits. [] Progressing: [] Met: [] Not Met: [] Adjusted  3. Patient will return to functional activities including volunteer services  without increased symptoms or restriction. [] Progressing: [] Met: [] Not Met: [] Adjusted  4. Patient to be able tolerate rapid head movement in all planes without symptoms of dizziness. [] Progressing: [] Met: [] Not Met: [] Adjusted       ASSESSMENT:  See eval    Treatment/Activity Tolerance:  [x] Patient tolerated treatment well [] Patient limited by fatique  [] Patient limited by pain  [] Patient limited by other medical complications  [] Other:     Overall Progression Towards Functional goals/ Treatment Progress Update:  [] Patient is progressing as expected towards functional goals listed. [] Progression is slowed due to complexities/Impairments listed. [] Progression has been slowed due to co-morbidities.   [x] Plan just implemented, too soon to assess goals progression <30days   [] Goals require adjustment due to lack of progress  [] Patient is not progressing as expected and requires additional follow up with physician  [] Other    Prognosis for POC: [x] Good [] Fair  [] Poor    Patient requires continued skilled intervention: [x] Yes  [] No        PLAN:   [] Continue per plan of care [] Alter current plan (see comments)  [x] Plan of care initiated [] Hold pending MD visit [] Discharge    Electronically signed by: Maura Stern PT    Note: If

## 2023-05-24 ENCOUNTER — HOSPITAL ENCOUNTER (OUTPATIENT)
Dept: PHYSICAL THERAPY | Age: 78
Setting detail: THERAPIES SERIES
Discharge: HOME | End: 2023-05-24
Payer: MEDICARE

## 2023-05-24 PROCEDURE — 97112 NEUROMUSCULAR REEDUCATION: CPT

## 2023-05-24 NOTE — FLOWSHEET NOTE
ambulation.  [] (37133) Provided verbal/tactile cueing for activities related to improving balance, coordination, kinesthetic sense, posture, motor skill, proprioception  to assist with LE, proximal hip, and core control in self care, mobility, lifting, ambulation and eccentric single leg control. [address] and Therapeutic Activities:    [] (31452 or 22701) Provided verbal/tactile cueing for activities related to improving balance, coordination, kinesthetic sense, posture, motor skill, proprioception and motor activation to allow for proper function of core, proximal hip and LE with self care and ADLs and functional mobility.   [] (04161) Gait Re-education- Provided training and instruction to the patient for proper LE, core and proximal hip recruitment and positioning and eccentric body weight control with ambulation re-education including up and down stairs     Home Exercise Program:    [x] (90476) Reviewed/Progressed HEP activities related to strengthening, flexibility, endurance, ROM of core, proximal hip and LE for functional self-care, mobility, lifting and ambulation/stair navigation   [] (72538)Reviewed/Progressed HEP activities related to improving balance, coordination, kinesthetic sense, posture, motor skill, proprioception of core, proximal hip and LE for self care, mobility, lifting, and ambulation/stair navigation      Manual Treatments:  PROM / STM / Oscillations-Mobs:  G-I, II, III, IV (PA's, Inf., Post.)  [] (70479) Provided manual therapy to mobilize LE, proximal hip and/or LS spine soft tissue/joints for the purpose of modulating pain, promoting relaxation,  increasing ROM, reducing/eliminating soft tissue swelling/inflammation/restriction, improving soft tissue extensibility and allowing for proper ROM for normal function with self care, mobility, lifting and ambulation.      Charges:  Timed Code Treatment Minutes: 43   Total Treatment Minutes: 43      [] EVAL (LOW) 84606 (typically 20 minutes

## 2023-05-31 ENCOUNTER — HOSPITAL ENCOUNTER (OUTPATIENT)
Dept: PHYSICAL THERAPY | Age: 78
Setting detail: THERAPIES SERIES
Discharge: HOME | End: 2023-05-31
Payer: MEDICARE

## 2023-05-31 PROCEDURE — 97530 THERAPEUTIC ACTIVITIES: CPT

## 2023-05-31 PROCEDURE — 97112 NEUROMUSCULAR REEDUCATION: CPT

## 2023-05-31 NOTE — FLOWSHEET NOTE
[facility]  [address], [place], [address]  Phone: [phone]   Fax:     [phone]      Physical Therapy Treatment Note/ Progress Report:   Date:  2023    Patient Name:  Lilo Hutchinson    :  1945  MRN: [id number]    Medical/Treatment Diagnosis Information:  Medical Diagnosis: Benign paroxysmal positional vertigo [H81.10]  PT Diagnosis:Unilateral vestibular hypofunction and imbalance                               Insurance/Certification information:  PT Insurance Information: Medicare  Physician Information:  Sherryle Spinner, DO  Plan of care signed (Y/N): []  Yes [x]  No , faxed 23    Date of Patient follow up with Physician: prn     Progress Report: []  Yes  [x]  No     Date Range for reporting period:  Beginnin2023  Ending:     Progress report due (10 Rx/or 30 days whichever is less):  #4,     Recertification due (POC duration/ or 90 days whichever is less):  #4, 23    Visit # Insurance Allowable Auth required? Date Range   3 / 4 Mn []  Yes []  No NA     Latex Allergy:  [x]NO      []YES  Preferred Language for Healthcare:   [x]English       []other:    Functional Outcomes Measure:     Test: [facility]  Date Assessed Score   23 12           Pain level:  0/10     SUBJECTIVE:  Pt reports she wakes up in the morning with dizziness/lightheaded feeling 0-3/10. The rest of the day she has no dizziness. Much improved since eval.     OBJECTIVE:   :seated BP after volunteering 101/63 (per pt usu 114/60)        Pertinent Medical History:  B TKA  RESTRICTIONS/PRECAUTIONS:     Exercises/Interventions:     Therapeutic Exercises (75164) Min:5 Resistance/Reps Notes/Cues   Standing cervical sb and ext 3 ea slow  if pt goes faster she feels dizzy, gave in HEP        Therapeutic Activities (62671) Min:15     Pt education on BP, check, # above in obj.     Pt will talk with Katarzyna Sexton at next

## 2023-06-07 ENCOUNTER — HOSPITAL ENCOUNTER (OUTPATIENT)
Dept: PHYSICAL THERAPY | Age: 78
Setting detail: THERAPIES SERIES
Discharge: HOME | End: 2023-06-07
Payer: MEDICARE

## 2023-06-07 PROCEDURE — 97112 NEUROMUSCULAR REEDUCATION: CPT

## 2023-06-07 PROCEDURE — 97530 THERAPEUTIC ACTIVITIES: CPT

## 2023-06-07 NOTE — PLAN OF CARE
POC   []Other:     Date range of Visits: 23 - 23  Total Visits: 4    Recommendation:    [x]Continue PT 1x / wk for 2 weeks               []Hold PT, pending MD visit      Physical Therapy Treatment Note/ Progress Report:   Date:  2023    Patient Name:  Mohini Peralta    :  1945  MRN: [id number]    Medical/Treatment Diagnosis Information:  Medical Diagnosis: Benign paroxysmal positional vertigo [H81.10]  PT Diagnosis:Unilateral vestibular hypofunction and imbalance                               Insurance/Certification information:  PT Insurance Information: Medicare  Physician Information:  Danica Age, DO  Plan of care signed (Y/N): []  Yes [x]  No , faxed 23    Date of Patient follow up with Physician: prn     Progress Report: []  Yes  [x]  No     Date Range for reporting period:  Beginnin2023  Ending:     Progress report due (10 Rx/or 30 days whichever is less):  #4,     Recertification due (POC duration/ or 90 days whichever is less):  #4, 23    Visit # Insurance Allowable Auth required? Date Range    Mn []  Yes []  No NA     Latex Allergy:  [x]NO      []YES  Preferred Language for Healthcare:   [x]English       []other:    Functional Outcomes Measure:     Test: [facility]  Date Assessed Score   23 12   23     Pain level:  0/10; Dizziness: 4-5/10    SUBJECTIVE: The patient wakes with dizziness and lightheadedness. Putting in eyedrops in a.m. definitely triggers symptoms. OBJECTIVE:    - see POC above  :seated BP after volunteering 101/63 (per pt usu 114/60)    Pertinent Medical History:  B TKA  RESTRICTIONS/PRECAUTIONS:     Exercises/Interventions:     Therapeutic Exercises (87199) Min: Resistance/Reps Notes/Cues   Standing cervical sb and ext  if pt goes faster she feels dizzy, gave in HEP        Therapeutic Activities (72273) Min:     Pt education on BP, check, # above in obj.     Pt will talk with Yong Thompson at next appt if she feels good

## 2023-07-05 ENCOUNTER — HOSPITAL ENCOUNTER (OUTPATIENT)
Dept: PHYSICAL THERAPY | Age: 78
Setting detail: THERAPIES SERIES
Discharge: HOME | End: 2023-07-05
Payer: MEDICARE

## 2023-07-05 PROCEDURE — 97530 THERAPEUTIC ACTIVITIES: CPT

## 2024-01-22 ENCOUNTER — OFFICE VISIT (OUTPATIENT)
Dept: SURGERY | Age: 79
End: 2024-01-22
Payer: MEDICARE

## 2024-01-22 VITALS
DIASTOLIC BLOOD PRESSURE: 68 MMHG | WEIGHT: 159 LBS | SYSTOLIC BLOOD PRESSURE: 135 MMHG | BODY MASS INDEX: 26.46 KG/M2 | HEART RATE: 74 BPM

## 2024-01-22 DIAGNOSIS — M79.89 SOFT TISSUE MASS: Primary | ICD-10-CM

## 2024-01-22 PROCEDURE — 1123F ACP DISCUSS/DSCN MKR DOCD: CPT | Performed by: SURGERY

## 2024-01-22 PROCEDURE — 99203 OFFICE O/P NEW LOW 30 MIN: CPT | Performed by: SURGERY

## 2024-01-22 NOTE — PROGRESS NOTES
PATIENT NAME: Maria M Tang     YOB: 1945     TODAY'S DATE: 1/22/2024    Reason for Visit:  multiple soft tissue masses    Requesting Physician:  Dr. Giron    HISTORY OF PRESENT ILLNESS:              The patient is a 78 y.o. female with a PMHx as delineated below who presents with multiple soft tissue bumps. She has two on her back, one on her chest and one on her thumb. She reports dermatologist previously attempted excision of two cysts on her back but they recurred. The lesions on her back are the most bothersome to her but she wanted to evaluate the bump on her chest and finger while she was here as well. The bump on her finger bothers her when using scissors while she does her sewing.     Chief Complaint   Patient presents with    New Patient      Cysts on back / Ref Dr. Rommel Giron         REVIEW OF SYSTEMS:  CONSTITUTIONAL:  negative  HEENT:  negative  RESPIRATORY:  negative  CARDIOVASCULAR:  negative  GASTROINTESTINAL:  negative   GENITOURINARY:  negative  HEMATOLOGIC/LYMPHATIC:  negative  MUSCULOSKELETAL: negative  NEUROLOGICAL:  negative    PMH  Past Medical History:   Diagnosis Date    Arthritis     Blood transfusion 2002    AUTOLOGOUS    Blood transfusion 1968    Diverticulosis     H/O irritable bowel syndrome     Hyperlipidemia     Osteopenia     Restless leg syndrome     Seasonal allergies     Synovial cyst     back       PSH  Past Surgical History:   Procedure Laterality Date    APPENDECTOMY      CARPAL TUNNEL RELEASE      LEFT    COLONOSCOPY      FINGER TRIGGER RELEASE      RIGHT - 2 FINGERS    FINGER TRIGGER RELEASE  6-    trigger finger release left long and ring fingers    FRACTURE SURGERY      BILATERAL FEMURS     FRACTURE SURGERY      RIGHT ANKLE/TALUS/TIBIA    HAND SURGERY Left 9/12/2019    LEFT RING FINGER  DEBRIDEMENT INCISION AND DRAINAGE, REMOVAL OF FOREIGN BODY performed by Darwin Ferrer MD at [facility] OR    HARDWARE REMOVAL      HEMORRHOID SURGERY

## 2024-01-24 ENCOUNTER — TELEPHONE (OUTPATIENT)
Dept: SURGERY | Age: 79
End: 2024-01-24

## 2024-01-24 NOTE — TELEPHONE ENCOUNTER
He is taking atorvastatin 40 mg every evening.  His cholesterol is stable and well controlled.  He denies side effects from taking atorvastatin.  I reviewed blood test result with him in clinic today.    Results for AYO HICKS (MRN [id number]) as of 10/2/2018 10:47   Ref. Range 6/1/2017 08:26 3/13/2018 10:39   Cholesterol,Tot Latest Ref Range: 100 - 199 mg/dL 141 110   Triglycerides Latest Ref Range: 0 - 149 mg/dL 147 110   HDL Latest Ref Range: >=40 mg/dL 45 39 (A)   LDL Latest Ref Range: <100 mg/dL 67 49      LM to call office to schedule procedure. First available is 2/7/24 at 3pm

## 2024-01-25 ENCOUNTER — HOSPITAL ENCOUNTER (OUTPATIENT)
Dept: PHYSICAL THERAPY | Age: 79
Setting detail: THERAPIES SERIES
Discharge: HOME | End: 2024-01-25
Payer: MEDICARE

## 2024-01-25 DIAGNOSIS — R29.898 WEAKNESS OF BOTH HIPS: ICD-10-CM

## 2024-01-25 DIAGNOSIS — M53.86 DECREASED RANGE OF MOTION OF LUMBAR SPINE: Primary | ICD-10-CM

## 2024-01-25 DIAGNOSIS — M62.89 HAMSTRING TIGHTNESS OF RIGHT LOWER EXTREMITY: ICD-10-CM

## 2024-01-25 DIAGNOSIS — M25.651 DECREASED RANGE OF RIGHT HIP MOVEMENT: ICD-10-CM

## 2024-01-25 PROCEDURE — 97110 THERAPEUTIC EXERCISES: CPT

## 2024-01-25 PROCEDURE — 97161 PT EVAL LOW COMPLEX 20 MIN: CPT

## 2024-01-25 NOTE — FLOWSHEET NOTE
[facility] - Outpatient Rehabilitation and Therapy [address], [place] [address] office: [phone] fax: [phone]      Physical Therapy: TREATMENT/PROGRESS NOTE   Patient: Maria M Tang (78 y.o. female)   Treatment Date: 2024   :  1945 MRN: [id number]   Visit #: 1   Insurance Allowable Auth Needed   BMN []Yes    [x]No    Insurance: Payor: AETNA MEDICARE / Plan: AETNA MEDICARE-ADVANTAGE PPO / Product Type: Medicare /   Insurance ID: [id number] - (Medicare Managed)  Secondary Insurance (if applicable):      BENEFITS FOR PT   NO DED OR OOP MAX  ALL CODES BILLABLE   NO COPAY  4% CO INSURANCE  NO VISIT LIMIT PER PINO YEAR  NO HARD MAX  NO AUTH    Treatment Diagnosis:     ICD-10-CM    1. Decreased range of motion of lumbar spine  M53.86       2. Weakness of both hips  R29.898       3. Hamstring tightness of right lower extremity  M62.89       4. Decreased range of right hip movement  M25.651          Medical Diagnosis:    Pain in left hip [M25.552]  Pain in right hip [M25.551]   Referring Physician: Kieran Joe MD     [facility]  Fax: [phone] PCP: Antoine Bee MD                             Plan of care signed: NO    Date of Patient follow up with Physician:      Progress Report/POC: EVAL today  POC update due: (10 visits /OR AUTH LIMITS, whichever is less)  2024     Precautions/ Contra-indications:                                                                                          Latex allergy:  NO  Pacemaker:    NO  Contraindications for Manipulation: NA  Date of Surgery: n/a  Other: Lumbar ablation 24; hx of pelvic fx and multiple LE fractures, R fused ankle    Preferred Language for Healthcare:   [x]English       []other:    SUBJECTIVE EXAMINATION     Patient Report/Comments: see eval     Test used Initial score  2024   Pain Summary VAS 1-8    Functional questionnaire LEFS 65/ 80  18.75 % dysf

## 2024-01-25 NOTE — PLAN OF CARE
re-education.   [x] NMR activation and proprioception, including postural re-education.    [x] Manual therapy as indicated to include: PROM, Gr I-IV mobilizations, and STM  [x] Modalities as needed that may include: Cryotherapy  [x] Patient education on joint protection, postural re-education, activity modification, progression of HEP.        [] Aquatic Therapy     HEP instruction: Refer to HEP instructions outlined on the flowsheet on 01/25/2024.    Electronically Signed by Venkat Motta PT DPT [facility] Date: 01/25/2024

## 2024-01-29 ENCOUNTER — HOSPITAL ENCOUNTER (OUTPATIENT)
Dept: PHYSICAL THERAPY | Age: 79
Setting detail: THERAPIES SERIES
Discharge: HOME | End: 2024-01-29
Payer: MEDICARE

## 2024-01-29 PROCEDURE — 97110 THERAPEUTIC EXERCISES: CPT

## 2024-01-29 PROCEDURE — 97140 MANUAL THERAPY 1/> REGIONS: CPT

## 2024-01-29 NOTE — FLOWSHEET NOTE
[facility] - Outpatient Rehabilitation and Therapy [address], [place] [address] office: [phone] fax: [phone]      Physical Therapy: TREATMENT/PROGRESS NOTE   Patient: Maria M Tang (78 y.o. female)   Treatment Date: 2024   :  1945 MRN: [id number]   Visit #: 2   Insurance Allowable Auth Needed   BMN []Yes    [x]No    Insurance: Payor: AETNA MEDICARE / Plan: AETNA MEDICARE-ADVANTAGE PPO / Product Type: Medicare /   Insurance ID: [id number] - (Medicare Managed)  Secondary Insurance (if applicable):      BENEFITS FOR PT   NO DED OR OOP MAX  ALL CODES BILLABLE   NO COPAY  4% CO INSURANCE  NO VISIT LIMIT PER PINO YEAR  NO HARD MAX  NO AUTH    Treatment Diagnosis:     ICD-10-CM    1. Decreased range of motion of lumbar spine  M53.86       2. Weakness of both hips  R29.898       3. Hamstring tightness of right lower extremity  M62.89       4. Decreased range of right hip movement  M25.651          Medical Diagnosis:    Pain in left hip [M25.552]  Pain in right hip [M25.551]   Referring Physician: Kieran Joe MD     [facility]  Fax: [phone] PCP: Antoine Bee MD                             Plan of care signed: NO    Date of Patient follow up with Physician:      Progress Report/POC: NO  POC update due: (10 visits /OR AUTH LIMITS, whichever is less)  2024     Precautions/ Contra-indications:                                                                                          Latex allergy:  NO  Pacemaker:    NO  Contraindications for Manipulation: NA  Date of Surgery: n/a  Other: Lumbar ablation 24; hx of pelvic fx and multiple LE fractures, R fused ankle    Preferred Language for Healthcare:   [x]English       []other:    SUBJECTIVE EXAMINATION     Patient Report/Comments: Pt had a Prolia injection for osteoporosis this morning. Feeling good overall.      Test used Initial score  2024   Pain Summary VAS

## 2024-01-31 ENCOUNTER — HOSPITAL ENCOUNTER (OUTPATIENT)
Dept: PHYSICAL THERAPY | Age: 79
Setting detail: THERAPIES SERIES
Discharge: HOME | End: 2024-01-31
Payer: MEDICARE

## 2024-01-31 PROCEDURE — 97530 THERAPEUTIC ACTIVITIES: CPT

## 2024-01-31 PROCEDURE — 97110 THERAPEUTIC EXERCISES: CPT

## 2024-01-31 NOTE — FLOWSHEET NOTE
use of dynamic activities to improve functional performance. (Ex include squatting, ascending/descending stairs, walking, bending, lifting, catching, throwing, pushing, pulling, jumping.)  Direct, one on one contact, billed in 15-minute increments.    TREATMENT PLAN   Plan: Cont POC- Continue emphasis/focus on increasing ROM and reduce/eliminate soft tissue swelling/inflammation/restriction. Next visit plan to progress weights, progress reps, add new exercises, and progress balance     Electronically Signed by Venkat Motta, PT  DPT [facility]            Date: 01/31/2024     Note: If patient does not return for scheduled/recommended follow up visits, this note will serve as a discharge from care along with the most recent update on progress.

## 2024-02-05 ENCOUNTER — HOSPITAL ENCOUNTER (OUTPATIENT)
Dept: PHYSICAL THERAPY | Age: 79
Setting detail: THERAPIES SERIES
Discharge: HOME | End: 2024-02-05
Payer: MEDICARE

## 2024-02-05 PROCEDURE — 97530 THERAPEUTIC ACTIVITIES: CPT

## 2024-02-05 PROCEDURE — 97110 THERAPEUTIC EXERCISES: CPT

## 2024-02-05 NOTE — FLOWSHEET NOTE
[facility] - Outpatient Rehabilitation and Therapy [address], [place] [address] office: [phone] fax: [phone]      Physical Therapy: TREATMENT/PROGRESS NOTE   Patient: Maria M Tang (78 y.o. female)   Treatment Date: 2024   :  1945 MRN: [id number]   Visit #: 4 /10  Insurance Allowable Auth Needed   BMN []Yes    [x]No    Insurance: Payor: AETNA MEDICARE / Plan: AETNA MEDICARE-ADVANTAGE PPO / Product Type: Medicare /   Insurance ID: [id number] - (Medicare Managed)  Secondary Insurance (if applicable):      BENEFITS FOR PT   NO DED OR OOP MAX  ALL CODES BILLABLE   NO COPAY  4% CO INSURANCE  NO VISIT LIMIT PER PINO YEAR  NO HARD MAX  NO AUTH    Treatment Diagnosis:     ICD-10-CM    1. Decreased range of motion of lumbar spine  M53.86       2. Weakness of both hips  R29.898       3. Hamstring tightness of right lower extremity  M62.89       4. Decreased range of right hip movement  M25.651          Medical Diagnosis:    Pain in left hip [M25.552]  Pain in right hip [M25.551]   Referring Physician: Kieran Joe MD     [facility]  Fax: [phone] PCP: Antoine Bee MD                             Plan of care signed: NO    Date of Patient follow up with Physician:      Progress Report/POC: NO  POC update due: (10 visits /OR AUTH LIMITS, whichever is less)  2024     Precautions/ Contra-indications:                                                                                          Latex allergy:  NO  Pacemaker:    NO  Contraindications for Manipulation: NA  Date of Surgery: n/a  Other: Lumbar ablation 24; hx of pelvic fx and multiple LE fractures, R fused ankle    Preferred Language for Healthcare:   [x]English       []other:    SUBJECTIVE EXAMINATION     Patient Report/Comments: Pt reports she was a little sore last Thursday but had 2 PT sessions plus visiting the gym and doing the swim class may have been too much last

## 2024-02-07 ENCOUNTER — PROCEDURE VISIT (OUTPATIENT)
Dept: SURGERY | Age: 79
End: 2024-02-07
Payer: MEDICARE

## 2024-02-07 ENCOUNTER — HOSPITAL ENCOUNTER (OUTPATIENT)
Dept: PHYSICAL THERAPY | Age: 79
Setting detail: THERAPIES SERIES
Discharge: HOME | End: 2024-02-07
Payer: MEDICARE

## 2024-02-07 DIAGNOSIS — L72.3 SEBACEOUS CYST: Primary | ICD-10-CM

## 2024-02-07 PROCEDURE — 97110 THERAPEUTIC EXERCISES: CPT

## 2024-02-07 PROCEDURE — 97530 THERAPEUTIC ACTIVITIES: CPT

## 2024-02-07 PROCEDURE — 97140 MANUAL THERAPY 1/> REGIONS: CPT

## 2024-02-07 PROCEDURE — 11402 EXC TR-EXT B9+MARG 1.1-2 CM: CPT | Performed by: SURGERY

## 2024-02-07 NOTE — FLOWSHEET NOTE
[facility] - Outpatient Rehabilitation and Therapy [address], [place] [address] office: [phone] fax: [phone]      Physical Therapy: TREATMENT/PROGRESS NOTE   Patient: Maria M Tang (78 y.o. female)   Treatment Date: 2024   :  1945 MRN: [id number]   Visit #: 5 /10  Insurance Allowable Auth Needed   BMN []Yes    [x]No    Insurance: Payor: AETNA MEDICARE / Plan: AETNA MEDICARE-ADVANTAGE PPO / Product Type: Medicare /   Insurance ID: [id number] - (Medicare Managed)  Secondary Insurance (if applicable):      BENEFITS FOR PT   NO DED OR OOP MAX  ALL CODES BILLABLE   NO COPAY  4% CO INSURANCE  NO VISIT LIMIT PER PINO YEAR  NO HARD MAX  NO AUTH    Treatment Diagnosis:     ICD-10-CM    1. Decreased range of motion of lumbar spine  M53.86       2. Weakness of both hips  R29.898       3. Hamstring tightness of right lower extremity  M62.89       4. Decreased range of right hip movement  M25.651          Medical Diagnosis:    Pain in left hip [M25.552]  Pain in right hip [M25.551]   Referring Physician: Kieran Joe MD     [facility]  Fax: [phone] PCP: Antoine Bee MD                             Plan of care signed: NO    Date of Patient follow up with Physician: JOSELINE     Progress Report/POC: NO  POC update due: (10 visits /OR AUTH LIMITS, whichever is less)  2024     Precautions/ Contra-indications:                                                                                          Latex allergy:  NO  Pacemaker:    NO  Contraindications for Manipulation: NA  Date of Surgery: n/a  Other: Lumbar ablation 24; hx of pelvic fx and multiple LE fractures, R fused ankle    Preferred Language for Healthcare:   [x]English       []other:    SUBJECTIVE EXAMINATION     Patient Report/Comments: Pt reports she felt good after last session. Legs felt fatigued.      Test used Initial score  2024   Pain Summary VAS 1-8

## 2024-02-07 NOTE — PROGRESS NOTES
Sebaceous Cyst Excision Procedure Note    Pre-operative Diagnosis: Epidermal cyst x 2    Post-operative Diagnosis: same    Locations:lower, upper  back    Indications: Increasing in size and mild discomfort    Anesthesia: Lidocaine 1% without epinephrine without added sodium bicarbonate    Procedure Details   History of allergy to iodine: no    Patient informed of the risks (including bleeding and infection) and benefits of the   procedure and Verbal informed consent obtained.    The lesion of the upper back and surrounding area was given a sterile prep using chlorhexidine and draped in the usual sterile fashion. An incision was made over the cyst, which was dissected free of the surrounding tissue and removed.  The cyst was filled with typical sebaceous material.  The wound was closed with 4-0 Vicryl using simple interrupted stitches. Antibiotic ointment and a sterile dressing applied.  The specimen was not sent for pathologic examination. The patient tolerated the procedure well.    The lesion of the lower back and surrounding area was given a sterile prep using chlorhexidine and draped in the usual sterile fashion. An incision was made over the cyst, which was dissected free of the surrounding tissue and removed.  The cyst was filled with typical sebaceous material.  The wound was closed with 4-0 Vicryl using simple interrupted stitches. Antibiotic ointment and a sterile dressing applied.  The specimen was not sent for pathologic examination. The patient tolerated the procedure well.    EBL: Minimal    Findings:  1.5 cm epidermoid cyst of the upper back and 2 cm epidermoid cyst of the lower back    Condition:  Stable    Complications:  none.    Plan:  1. Instructed to keep the wound dry and covered for 24-48h and clean thereafter.  2. Warning signs of infection were reviewed.    3. Recommended that the patient use OTC analgesics as needed for pain.   4. Return for suture removal in 1 week.

## 2024-02-14 ENCOUNTER — HOSPITAL ENCOUNTER (OUTPATIENT)
Dept: PHYSICAL THERAPY | Age: 79
Setting detail: THERAPIES SERIES
Discharge: HOME | End: 2024-02-14
Payer: MEDICARE

## 2024-02-14 PROCEDURE — 97140 MANUAL THERAPY 1/> REGIONS: CPT

## 2024-02-14 PROCEDURE — 97110 THERAPEUTIC EXERCISES: CPT

## 2024-02-14 NOTE — FLOWSHEET NOTE
[facility] - Outpatient Rehabilitation and Therapy [address], [place] [address] office: [phone] fax: [phone]      Physical Therapy: TREATMENT/PROGRESS NOTE   Patient: Maria M Tang (78 y.o. female)   Treatment Date: 2024   :  1945 MRN: [id number]   Visit #: 6 /10  Insurance Allowable Auth Needed   BMN []Yes    [x]No    Insurance: Payor: AETNA MEDICARE / Plan: AETNA MEDICARE-ADVANTAGE PPO / Product Type: Medicare /   Insurance ID: [id number] - (Medicare Managed)  Secondary Insurance (if applicable):      BENEFITS FOR PT   NO DED OR OOP MAX  ALL CODES BILLABLE   NO COPAY  4% CO INSURANCE  NO VISIT LIMIT PER PINO YEAR  NO HARD MAX  NO AUTH    Treatment Diagnosis:     ICD-10-CM    1. Decreased range of motion of lumbar spine  M53.86       2. Weakness of both hips  R29.898       3. Hamstring tightness of right lower extremity  M62.89       4. Decreased range of right hip movement  M25.651          Medical Diagnosis:    Pain in left hip [M25.552]  Pain in right hip [M25.551]   Referring Physician: Kieran Joe MD     [facility]tho [facility]  Fax: [phone] PCP: Antoine Bee MD                             Plan of care signed: NO    Date of Patient follow up with Physician: JOSELINE     Progress Report/POC: NO  POC update due: (10 visits /OR AUTH LIMITS, whichever is less)  2024     Precautions/ Contra-indications:                                                                                          Latex allergy:  NO  Pacemaker:    NO  Contraindications for Manipulation: NA  Date of Surgery: n/a  Other: Lumbar ablation 24; hx of pelvic fx and multiple LE fractures, R fused ankle    Preferred Language for Healthcare:   [x]English       []other:    SUBJECTIVE EXAMINATION     Patient Report/Comments: Pt used diclofenac this morning and cream this morning. States she still gets achy when walking around but it decreases when she sits.

## 2024-02-19 ENCOUNTER — OFFICE VISIT (OUTPATIENT)
Dept: SURGERY | Age: 79
End: 2024-02-19

## 2024-02-19 DIAGNOSIS — L72.3 SEBACEOUS CYST: Primary | ICD-10-CM

## 2024-02-19 PROCEDURE — 99024 POSTOP FOLLOW-UP VISIT: CPT | Performed by: SURGERY

## 2024-02-19 NOTE — PROGRESS NOTES
SURGERY N/A 05/03/2022    LEFT LUMBAR4-LUMBAR5 MICRO HEMILAMINECTOMY AND EXCISION OF SYNOVIAL CYST (72358, [address]10) performed by Tristan Mcleod MD at [facility] OR    OTHER SURGICAL HISTORY      spinal ablations        Social History  Social History     Socioeconomic History    Marital status:      Spouse name: Not on file    Number of children: Not on file    Years of education: Not on file    Highest education level: Not on file   Occupational History    Not on file   Tobacco Use    Smoking status: Never    Smokeless tobacco: Never   Vaping Use    Vaping Use: Never used   Substance and Sexual Activity    Alcohol use: Yes     Comment: occasional glass of wine with dinner    Drug use: No    Sexual activity: Not on file   Other Topics Concern    Not on file   Social History Narrative    Not on file     Social Determinants of Health     Financial Resource Strain: Not on file   Food Insecurity: Not on file   Transportation Needs: Not on file   Physical Activity: Insufficiently Active (6/11/2022)    Exercise Vital Sign     Days of Exercise per Week: 4 days     Minutes of Exercise per Session: 20 min   Stress: Not on file   Social Connections: Not on file   Intimate Partner Violence: Not At Risk (6/11/2022)    Humiliation, Afraid, Rape, and Kick questionnaire     Fear of Current or Ex-Partner: No     Emotionally Abused: No     Physically Abused: No     Sexually Abused: No   Housing Stability: Not on file       Family History:       Problem Relation Age of Onset    Cancer Father         PROSTATE    Heart Disease Mother         CHF       Allergy:   Allergies   Allergen Reactions    Amitriptyline      GAINED WEIGHT    Macrobid [Nitrofurantoin] Hives    Versed [Midazolam]      DOESN'T LIKE EFFECTS    Sulfa Antibiotics Rash     Face red,flushed       PHYSICAL EXAM:  VITALS:  There were no vitals taken for this visit.    CONSTITUTIONAL:  Alert, no acute distress  EYES:  sclera clear  ENT:  normocepalic, without obvious

## 2024-02-27 NOTE — PROGRESS NOTES
PRE-OP INSTRUCTIONS FOR LOCAL SURGERY PATIENTS:      Follow all instructions given to you from your surgeon's office.    Arrive at time given per your surgeon's office. If they did not give you an arrival time, plan to be at hospital 1 hour prior to procedure.    Enter the MAIN entrance located on [address] and report to the surgical desk on LEFT side of lobby.   Bring your insurance card & photo ID with you. Try to leave all other valuables at home.               You will be able to drive yourself home unless you receive pain medication or your physician plans to give you any type of IV sedation. If that is the plan, you will need to have someone to drive you home.   Currently only 2 Adults may accompany you. Everyone is required to be masked.  You must contact your surgeon for any changes in your physical condition such as a cold, fever, rash, cuts, sores, or any other infection, especially near your surgical site.  A Pre-Surgical History and Physical MUST be completed WITHIN 30 DAYS OR LESS prior to your procedure.by your Physician or an Urgent Care    You may have a light breakfast UNLESS your surgeon has instructed you otherwise  Dress in loose, comfortable clothing appropriate for redressing after your procedure.  Please do not wear jewelry.  Women childbearing age only- Make sure you can give a urine sample upon arrival.  If you have further questions, you may contact your surgeon's office or us at [phone].    Tanmay Hernandez RN.2/27/2024 .2:30 PM

## 2024-02-27 NOTE — PROGRESS NOTES
[facility] PRE-SURGICAL TESTING INSTRUCTIONS                      PRIOR TO PROCEDURE DATE:    1. PLEASE FOLLOW ANY INSTRUCTIONS GIVEN TO YOU PER YOUR SURGEON.      2. Arrange for someone to drive you home and be with you for the first 24 hours after discharge for your safety after your procedure for which you received sedation. Ensure it is someone we can share information with regarding your discharge.     NOTE: At this time ONLY 2 ADULTS may accompany you   One person ENCOURAGED to stay at hospital entire time if outpatient surgery      3. You must contact your surgeon for instructions IF:  You are taking any blood thinners, aspirin, anti-inflammatory or vitamins.  There is a change in your physical condition such as a cold, fever, rash, cuts, sores, or any other infection, especially near your surgical site.    4. Do not drink alcohol the day before or day of your procedure.  Do not use any recreational marijuana at least 24 hours or street drugs (heroin, cocaine) at minimum 5 days prior to your procedure.     5. A Pre-Surgical History and Physical MUST be completed WITHIN 30 DAYS OR LESS prior to your procedure.by your Physician or an Urgent Care        THE DAY OF YOUR PROCEDURE:  1.  Follow instructions for ARRIVAL TIME as DIRECTED BY YOUR SURGEON.     2. Enter the MAIN entrance from [facility] and follow the signs to the free Parking Garage or  Parking (offered free of charge 7 am-5pm).      3. Enter the Main Entrance of the hospital (do not enter from the lower level of the parking garage). Upon entrance, check in with the  at the surgical information desk on your LEFT.   Bring your insurance card and photo ID to register      4. DO NOT EAT ANYTHING 8 hours prior to arrival for surgery.  You may have up to 8 ounces of water 4 hours prior to your arrival for surgery.   NOTE: ALL Gastric, Bariatric & Bowel surgery patients - you MUST follow your surgeon's instructions regarding  eating/ drinking as you will have very specific instructions to follow.  If you did not receive these, call your surgeon's office immediately.     5. MEDICATIONS:  Take the following medications with a SMALL sip of water: REQUIP  Use your usual dose of inhalers the morning of surgery. BRING your rescue inhaler with you to hospital.   Anesthesia does NOT want you to take insulin the morning of surgery. They will control your blood sugar while you are at the hospital. Please contact your ordering physician for instructions regarding your insulin the night before your procedure. If you have an insulin pump, please keep it set on basal rate.   Bariatric patient's call your surgeon if on diabetic medications as some may need to be stopped 1 week prior to surgery    6. Do not swallow additional water when brushing teeth. No gum, candy, mints, or ice chips. Refrain from smoking or at least decrease the amount on day of surgery.    7. Morning of surgery:   Take a shower with an antibacterial soap (i.e., Safeguard or Dial) OR your physician may have instructed you to use Hibiclens.  Dress in loose, comfortable clothing appropriate for redressing after your procedure.   Do not wear jewelry (including body piercings), make-up (especially NO eye make-up), fingernail polish (NO toenail polish if foot/leg surgery), lotion, powders, or metal hairclips.   Do not shave or wax for 72 hours prior to procedure near your operative site. Shaving with a razor can irritate your skin and make it easier to develop an infection. On the day of your procedure, any hair that needs to be removed near the surgical site will be 'clipped' by a healthcare worker using a special clipper designed to avoid skin irritation.    8. Dentures, glasses, or contacts will need to be removed before your procedure. Bring cases for your glasses, contacts, dentures, or hearing aids to protect them while you are in surgery.      9. If you use a CPAP, please bring it

## 2024-03-01 ENCOUNTER — HOSPITAL ENCOUNTER (OUTPATIENT)
Age: 79
Setting detail: OUTPATIENT SURGERY
Discharge: HOME | End: 2024-03-01
Attending: ORTHOPAEDIC SURGERY | Admitting: ORTHOPAEDIC SURGERY
Payer: MEDICARE

## 2024-03-01 VITALS
WEIGHT: 161.4 LBS | HEART RATE: 53 BPM | SYSTOLIC BLOOD PRESSURE: 123 MMHG | HEIGHT: 65 IN | DIASTOLIC BLOOD PRESSURE: 62 MMHG | BODY MASS INDEX: 26.89 KG/M2 | RESPIRATION RATE: 16 BRPM | OXYGEN SATURATION: 99 % | TEMPERATURE: 97.7 F

## 2024-03-01 DIAGNOSIS — M79.89 SOFT TISSUE MASS: ICD-10-CM

## 2024-03-01 PROCEDURE — 2580000003 HC RX 258: Performed by: ORTHOPAEDIC SURGERY

## 2024-03-01 PROCEDURE — 88305 TISSUE EXAM BY PATHOLOGIST: CPT

## 2024-03-01 PROCEDURE — 6360000002 HC RX W HCPCS: Performed by: ORTHOPAEDIC SURGERY

## 2024-03-01 PROCEDURE — 6370000000 HC RX 637 (ALT 250 FOR IP): Performed by: ORTHOPAEDIC SURGERY

## 2024-03-01 PROCEDURE — 88341 IMHCHEM/IMCYTCHM EA ADD ANTB: CPT

## 2024-03-01 PROCEDURE — 3600000014 HC SURGERY LEVEL 4 ADDTL 15MIN: Performed by: ORTHOPAEDIC SURGERY

## 2024-03-01 PROCEDURE — 88342 IMHCHEM/IMCYTCHM 1ST ANTB: CPT

## 2024-03-01 PROCEDURE — 2500000003 HC RX 250 WO HCPCS: Performed by: ORTHOPAEDIC SURGERY

## 2024-03-01 PROCEDURE — 7100000010 HC PHASE II RECOVERY - FIRST 15 MIN: Performed by: ORTHOPAEDIC SURGERY

## 2024-03-01 PROCEDURE — 2709999900 HC NON-CHARGEABLE SUPPLY: Performed by: ORTHOPAEDIC SURGERY

## 2024-03-01 PROCEDURE — 3600000004 HC SURGERY LEVEL 4 BASE: Performed by: ORTHOPAEDIC SURGERY

## 2024-03-01 RX ORDER — SODIUM CHLORIDE, SODIUM LACTATE, POTASSIUM CHLORIDE, CALCIUM CHLORIDE 600; 310; 30; 20 MG/100ML; MG/100ML; MG/100ML; MG/100ML
INJECTION, SOLUTION INTRAVENOUS CONTINUOUS
Status: DISCONTINUED | OUTPATIENT
Start: 2024-03-01 | End: 2024-03-01 | Stop reason: HOSPADM

## 2024-03-01 RX ORDER — GINSENG 100 MG
CAPSULE ORAL PRN
Status: DISCONTINUED | OUTPATIENT
Start: 2024-03-01 | End: 2024-03-01 | Stop reason: ALTCHOICE

## 2024-03-01 RX ADMIN — SODIUM CHLORIDE, POTASSIUM CHLORIDE, SODIUM LACTATE AND CALCIUM CHLORIDE: 600; 310; 30; 20 INJECTION, SOLUTION INTRAVENOUS at 08:24

## 2024-03-01 ASSESSMENT — PAIN DESCRIPTION - ORIENTATION: ORIENTATION: LOWER

## 2024-03-01 ASSESSMENT — PAIN DESCRIPTION - FREQUENCY: FREQUENCY: CONTINUOUS

## 2024-03-01 ASSESSMENT — PAIN DESCRIPTION - PAIN TYPE: TYPE: CHRONIC PAIN

## 2024-03-01 ASSESSMENT — PAIN DESCRIPTION - ONSET: ONSET: ON-GOING

## 2024-03-01 ASSESSMENT — PAIN DESCRIPTION - DESCRIPTORS: DESCRIPTORS: ACHING;DISCOMFORT

## 2024-03-01 ASSESSMENT — PAIN DESCRIPTION - LOCATION: LOCATION: BACK

## 2024-03-01 ASSESSMENT — PAIN - FUNCTIONAL ASSESSMENT
PAIN_FUNCTIONAL_ASSESSMENT: 0-10
PAIN_FUNCTIONAL_ASSESSMENT: PREVENTS OR INTERFERES WITH MANY ACTIVE NOT PASSIVE ACTIVITIES

## 2024-03-01 ASSESSMENT — PAIN SCALES - GENERAL: PAINLEVEL_OUTOF10: 3

## 2024-03-01 NOTE — DISCHARGE INSTRUCTIONS
[facility]   Post op Instructions for Hand and Upper Extremity Surgery    * Keep dressing dry and clean. It is ok to Shower just keep Dressing dry.  * Elevate operative arm.  * Ice 20 minutes on 20 minutes off.  * Follow-up appointment as scheduled by office staff. Check paperwork from office.    If no appointment scheduled call the office.  * If dressing is too tight, you may loosen Ace bandage and leave splint in place.  * Sling, as needed  * If you have any questions, refer to the office number listed below.    [phone]  ([place] Administrative Office)

## 2024-03-01 NOTE — H&P
I have reviewed the history and physical and examined the patient and find no relevant changes.    I have reviewed with the patient and/or family the risks, benefits, and alternatives to the procedure.    Sherwin Harper MD  3/1/2024

## 2024-03-01 NOTE — OP NOTE
[facility] Orthopaedics and Sports Medicine  Operative Report  [facility]     Patient:  Maria M Tang  Date of Surgery:  3/1/2024    Pre-Op Diagnoses:  1.   Soft tissue mass of right thumb with associated osteophyte right thumb IP joint   2.          Post-op Diagnoses:   1.  Same  2.         Procedure(s) Performed:  1.  Excisional biopsy of right thumb soft tissue mass with associated excision of osteophyte right thumb IP joint   2.            Surgeon:  Sherwin Harper MD  Assist:  [facility]     Anesthesia Type:   Local 1% lidocaine and 0.25% marcaine without epinephrine     Blood Loss:   less than 5ml     Pathology:   mass right thumb with associated osteophyte    Complications:   None immediate apparent     Antibiotics:  See Anesthesia Note    The surgical site was marked in preop holding by Dr Harper  Cyst was visible on the operative digit.  Informed and signed consent was on the chart.  Risks and benefits were discussed again, including the possibility of recurrence.  The patient was brought to the operating and room and placed in the supine position.  Prior to and After local anesthesia a standard time-out was performed.  Antibiotics prophylaxis was in place.      Description of the Procedure:  Following the injection, the right upper extremity was prepped and draped in the normal sterile fashion.  After final timeout, the upper extremity was exsanguinated and a tourniquot was applied to the base of the right thumb   An S shaped incision was created over proximal phalanx on the side of the cyst.  Sharp dissection was taken through skin only.  Full-thickness skin flaps were elevated protecting the extensor tendon.  The cyst was identified arising from between the extensor tendon and the collateral ligament and also a portion of the mass adherent to the skin and subcutaneous tissue. .  Both of these structures were protected while the Mass was excised.  Mass  was sent as pathology specimen.  It

## 2024-03-01 NOTE — PROGRESS NOTES
Ambulatory Surgery/Procedure Discharge Note    Vitals:    03/01/24 0948   BP: 123/62   Pulse: 53   Resp: 16   Temp: 97.7 °F (36.5 °C)   SpO2: 99%     Pt meets discharge criteria per Jose G score.  No intake/output data recorded.    Restroom use offered before discharge.  Yes    Pain assessment:  none  Pain Level: 0    Pt and S.O./family states \"ready to go home\". Pt alert and oriented x4. IV removed. Denies N/V or pain. Dressing to R thumb, c/d/I.  Voided prior to discharge. Pt tolerating po intake. Discharge instructions given to pt and  with pt permission. Pt and  verbalized understanding of all instructions. Left with all belongings, and discharge instructions.     Patient discharged to home/self care. Patient discharged via wheel chair by transporter to waiting family/S.O.       3/1/2024 10:05 AM

## 2024-03-01 NOTE — PROGRESS NOTES
Patient alert and oriented x 4. IV placed and IV fluids infusing. Consent signed and placed on chart.

## 2024-03-01 NOTE — BRIEF OP NOTE
Brief Postoperative Note      Patient: Maria M Tang  YOB: 1945  MRN: [id number]    Date of Procedure: 3/1/2024    Pre-Op Diagnosis Codes:     * Soft tissue mass [M79.89] with osteophyte right thumb     Post-Op Diagnosis: Same       Procedure: 1. Excisional biopsy of right thumb soft tissue mass with excision of osteophyte proximal phalanx right thumb    Surgeon(s):  Sherwin Harper MD    Assistant:  Surgical Assistant: Venus Osborne    Anesthesia: Local 1% lidocaine, 0.25% marcaine without epinephrine     Estimated Blood Loss (mL): less than 5ml      Complications: None immediate apparent     Specimens:   ID Type Source Tests Collected by Time Destination   A : MASS-RIGHT THUMB Tissue Tissue SURGICAL PATHOLOGY Sherwin Harper MD 3/1/2024 0931        Implants:  none      Drains: none    Findings: see fully dictated operative report   This procedure was not performed to treat primary cutaneous melanoma through wide local excision      Electronically signed by Sherwin Harper MD on 3/1/2024 at 9:45 AM

## 2024-03-14 ENCOUNTER — HOSPITAL ENCOUNTER (OUTPATIENT)
Dept: OCCUPATIONAL THERAPY | Age: 79
Setting detail: THERAPIES SERIES
Discharge: HOME | End: 2024-03-14
Payer: MEDICARE

## 2024-03-14 DIAGNOSIS — M25.641 JOINT STIFFNESS OF HAND, RIGHT: Primary | ICD-10-CM

## 2024-03-14 PROCEDURE — 97530 THERAPEUTIC ACTIVITIES: CPT

## 2024-03-14 PROCEDURE — 97165 OT EVAL LOW COMPLEX 30 MIN: CPT

## 2024-03-14 PROCEDURE — 97110 THERAPEUTIC EXERCISES: CPT

## 2024-03-14 NOTE — PLAN OF CARE
Met: [] Not Met: [] Adjusted    Long Term Goals to be achieved in 4 weeks   Pt will increase R thumb IP flexion to 75* or more to improve functional participation in IADLs.  [] Progressing: [] Met: [] Not Met: [] Adjusted   Pt will report ability to complete all home management and volunteer tasks without discomfort from baseline.   [] Progressing: [] Met: [] Not Met: [] Adjusted     TREATMENT PLAN     Frequency/Duration:  1x every week or every other week  for 2-4 weeks for the following treatment interventions:    Interventions:  [x] Therapeutic exercise including: strength training, ROM, including postural re-education.   [x] NMR activation and proprioception, including postural re-education.    [x] Manual therapy as indicated to include: PROM and STM  [x] Modalities as needed that may include: Thermal Agents, Ultrasound, Paraffin, and fluidotherapy  [x] Patient education on joint protection, postural re-education, activity modification, progression of HEP.        [] Aquatic Therapy    Plan: POC initiated as per evaluation    Electronically Signed by Venus Toth OT  Date: 03/14/2024    Note: Portions of this note have been templated and/or copied from initial evaluation, reassessments and prior notes for documentation efficiency.

## 2024-03-27 ENCOUNTER — HOSPITAL ENCOUNTER (OUTPATIENT)
Dept: OCCUPATIONAL THERAPY | Age: 79
Setting detail: THERAPIES SERIES
Discharge: HOME | End: 2024-03-27
Payer: MEDICARE

## 2024-03-27 PROCEDURE — 97035 APP MDLTY 1+ULTRASOUND EA 15: CPT

## 2024-03-27 PROCEDURE — 97140 MANUAL THERAPY 1/> REGIONS: CPT

## 2024-03-27 NOTE — FLOWSHEET NOTE
[facility] - Outpatient Rehabilitation and Therapy [address], [place] [address] office: [phone] fax: [phone]         Occupational Therapy: TREATMENT/PROGRESS NOTE   Patient: Maria M Tang (78 y.o. female)   Examination Date: 2024   :  1945 MRN: [id number]   Visit #: 2   Insurance Allowable Auth Needed   MN []Yes    [x]No    Insurance: Payor: AETNA MEDICARE / Plan: AETNA MEDICARE-ADVANTAGE PPO / Product Type: Medicare /   Insurance ID: [id number] - (Medicare Managed)  Secondary Insurance (if applicable):    Treatment Diagnosis:   Joint stiffness of hand, right  M25.641     Medical Diagnosis:  Other specified soft tissue disorders [M79.89]   Referring Physician: Dr. Sherwin Harper MD PCP: Cecilio Franks MD     DOI: chronic     DOS:   3/1/24 - Excisional biopsy of right thumb soft tissue mass and excision of osteophyte     Plan of care signed (Y/N): N    Date of Patient follow up with Physician: TBD     Progress Report/POC: NO  POC update due: (10 visits /OR AUTH LIMITS, whichever is less) 24                                             Precautions/ Contra-indications:           Latex allergy:  NO  Pacemaker:    NO  Other: none noted on order    Red Flags:  None    C-SSRS Triggered by Intake questionnaire:   [x] No, Questionnaire did not trigger screening.   [] Yes, Patient intake triggered further evaluation      [] C-SSRS Screening completed  [] PCP notified via Plan of Care  [] Emergency services notified     Preferred Language for Healthcare:   [x] English       [] other:    SUBJECTIVE EXAMINATION     Patient stated complaint: Pt reports mild increased hardness of skin at incision site.        Test used Initial score  3/14/24 2024   Pain Summary VAS 0/10    Functional questionnaire Quick DASH 14/6.82%    Other:                  OBJECTIVE EXAMINATION     Hand Dominance: right    Objective Measures: 3/14/24 3/27/24   PAIN 0/10    Quick DASH

## 2024-04-02 ENCOUNTER — HOSPITAL ENCOUNTER (OUTPATIENT)
Dept: OCCUPATIONAL THERAPY | Age: 79
Setting detail: THERAPIES SERIES
End: 2024-04-02
Payer: MEDICARE

## 2024-04-03 ENCOUNTER — HOSPITAL ENCOUNTER (OUTPATIENT)
Dept: OCCUPATIONAL THERAPY | Age: 79
Setting detail: THERAPIES SERIES
Discharge: HOME | End: 2024-04-03
Payer: MEDICARE

## 2024-04-03 PROCEDURE — 97140 MANUAL THERAPY 1/> REGIONS: CPT

## 2024-04-03 PROCEDURE — 97110 THERAPEUTIC EXERCISES: CPT

## 2024-04-03 PROCEDURE — 97035 APP MDLTY 1+ULTRASOUND EA 15: CPT

## 2024-04-03 NOTE — FLOWSHEET NOTE
[facility] - Outpatient Rehabilitation and Therapy [address], [place] [address] office: [phone] fax: [phone]         Occupational Therapy: TREATMENT/PROGRESS NOTE   Patient: Maria M Tang (78 y.o. female)   Examination Date: 2024   :  1945 MRN: [id number]   Visit #: 3   Insurance Allowable Auth Needed   MN []Yes    [x]No    Insurance: Payor: AETNA MEDICARE / Plan: AETNA MEDICARE-ADVANTAGE PPO / Product Type: Medicare /   Insurance ID: [id number] - (Medicare Managed)  Secondary Insurance (if applicable):    Treatment Diagnosis:   Joint stiffness of hand, right  M25.641     Medical Diagnosis:  Other specified soft tissue disorders [M79.89]   Referring Physician: Dr. Sherwin Harper MD PCP: Cecilio Franks MD     DOI: chronic     DOS:   3/1/24 - Excisional biopsy of right thumb soft tissue mass and excision of osteophyte     Plan of care signed (Y/N): N    Date of Patient follow up with Physician: TBD     Progress Report/POC: NO  POC update due: (10 visits /OR AUTH LIMITS, whichever is less) 24                                             Precautions/ Contra-indications:           Latex allergy:  NO  Pacemaker:    NO  Other: none noted on order    Red Flags:  None    C-SSRS Triggered by Intake questionnaire:   [x] No, Questionnaire did not trigger screening.   [] Yes, Patient intake triggered further evaluation      [] C-SSRS Screening completed  [] PCP notified via Plan of Care  [] Emergency services notified     Preferred Language for Healthcare:   [x] English       [] other:    SUBJECTIVE EXAMINATION     Patient stated complaint: Pt reports same amount of hardness in thumb as previous session. With HEP pt reports \"less stiffness\" in thumb.        Test used Initial score  3/14/24 2024   Pain Summary VAS 0/10    Functional questionnaire Quick DASH 14/6.82%    Other:                  OBJECTIVE EXAMINATION     Hand Dominance: right    Objective

## 2024-04-05 ENCOUNTER — HOSPITAL ENCOUNTER (OUTPATIENT)
Dept: OCCUPATIONAL THERAPY | Age: 79
Setting detail: THERAPIES SERIES
Discharge: HOME | End: 2024-04-05
Payer: MEDICARE

## 2024-04-05 PROCEDURE — 97140 MANUAL THERAPY 1/> REGIONS: CPT

## 2024-04-05 PROCEDURE — 97035 APP MDLTY 1+ULTRASOUND EA 15: CPT

## 2024-04-05 PROCEDURE — 97110 THERAPEUTIC EXERCISES: CPT

## 2024-04-05 NOTE — FLOWSHEET NOTE
(TIMED) minutes # CPT Code (UNTIMED) #     Therex (02728)  18 1  EVAL:LOW (21406 - Typically 30 minutes face-to-face)     Neuromusc. Re-ed (24467)    Re-Eval (23332)     Manual (78776) 18 1  Estim Unattended (73415)     Ther. Act (45393)    Parraffin (80883)     Gait (07857)    Fluidotherapy (41427)     ADL Training (00002)    Dry Needle 1-2 muscle (27330)     Iontophoresis (66359)    Dry Needle 3+ muscle (20561)     Ultrasound (87118) 8 1  VASO (46530)     Estim Attended (71935)    Group Therapy (17772)     Other:    Other:    Total Timed Code Tx Minutes 44 3       L Code: NA    Total Treatment Minutes 44        Charge Justification:  (10799) THERAPEUTIC EXERCISE - Provided verbal/tactile cueing for activities related to strengthening, flexibility, endurance, ROM performed to prevent loss of range of motion, maintain or improve muscular strength or increase flexibility, following either an injury or surgery.   (35899) MANUAL THERAPY -  Manual therapy techniques, 1 or more regions, each 15 minutes (Mobilization/manipulation, manual lymphatic drainage, manual traction) for the purpose of modulating pain, promoting relaxation,  increasing ROM, reducing/eliminating soft tissue swelling/inflammation/restriction, improving soft tissue extensibility and allowing for proper ROM for normal function with self care, mobility, lifting and ambulation  US soft tissue benefits      GOALS       Patient stated goal: \"mobility after surgery\"    [] Progressing: [x] Met: [] Not Met: [] Adjusted    Therapist goals for Patient:   Short Term Goals to be achieved in 2 weeks  1. Independent in HEP and progression per patient tolerance to progress toward goals.    [] Progressing: [x] Met: [] Not Met: [] Adjusted    Long Term Goals to be achieved in 4 weeks   Pt will increase R thumb IP flexion to 75* or more to improve functional participation in IADLs.  [] Progressing: [x] Met: [] Not Met: [] Adjusted   Pt will report ability to complete

## 2024-04-10 ENCOUNTER — HOSPITAL ENCOUNTER (OUTPATIENT)
Dept: OCCUPATIONAL THERAPY | Age: 79
Setting detail: THERAPIES SERIES
Discharge: HOME | End: 2024-04-10
Payer: MEDICARE

## 2024-04-10 ENCOUNTER — HOSPITAL ENCOUNTER (OUTPATIENT)
Dept: PHYSICAL THERAPY | Age: 79
Setting detail: THERAPIES SERIES
Discharge: HOME | End: 2024-04-10
Payer: MEDICARE

## 2024-04-10 DIAGNOSIS — M47.816 LUMBAR SPONDYLOSIS: ICD-10-CM

## 2024-04-10 DIAGNOSIS — M54.50 CHRONIC LOW BACK PAIN WITHOUT SCIATICA, UNSPECIFIED BACK PAIN LATERALITY: Primary | ICD-10-CM

## 2024-04-10 DIAGNOSIS — G89.29 CHRONIC LOW BACK PAIN WITHOUT SCIATICA, UNSPECIFIED BACK PAIN LATERALITY: Primary | ICD-10-CM

## 2024-04-10 PROCEDURE — 97161 PT EVAL LOW COMPLEX 20 MIN: CPT

## 2024-04-10 PROCEDURE — 97110 THERAPEUTIC EXERCISES: CPT

## 2024-04-10 PROCEDURE — 97140 MANUAL THERAPY 1/> REGIONS: CPT

## 2024-04-10 PROCEDURE — 20560 NDL INSJ W/O NJX 1 OR 2 MUSC: CPT

## 2024-04-10 PROCEDURE — 97018 PARAFFIN BATH THERAPY: CPT

## 2024-04-10 NOTE — PLAN OF CARE
[facility] - Outpatient Rehabilitation and Therapy [address], [place] [address] office: [phone] fax: [phone]     Physical Therapy Initial Evaluation Certification      Dear Xiomara Nicholas APRN *,    We had the pleasure of evaluating the following patient for physical therapy services at [facility] Out[facility] Physical Therapy.  A summary of our findings can be found in the initial assessment below.  This includes our plan of care.  If you have any questions or concerns regarding these findings, please do not hesitate to contact me at the office phone number listed above.  Thank you for the referral.     Physician Signature:_______________________________Date:__________________  By signing above (or electronic signature), therapist’s plan is approved by physician     Physical Therapy: TREATMENT/PROGRESS NOTE   Patient: Maria M Tang (78 y.o. female)   Examination Date: 04/10/2024   :  1945 MRN: [id number]   Visit #: 1   Insurance Allowable Auth Needed   PMN []Yes    []No    Insurance: Payor: AETNA MEDICARE / Plan: AETNA MEDICARE-ADVANTAGE PPO / Product Type: Medicare /   Insurance ID: [id number] - (Medicare Managed)  Secondary Insurance (if applicable):    Treatment Diagnosis:     ICD-10-CM    1. Chronic low back pain without sciatica, unspecified back pain laterality  M54.50     G89.29       2. Lumbar spondylosis  M47.816          Medical Diagnosis:  Pain in left hip [M25.552]  Pain in right hip [M25.551]  Other specified soft tissue disorders [M79.89]   Referring Physician: Xiomara Nicholas APRN *  PCP: Cecilio Franks MD     Plan of care signed (Y/N):     Date of Patient follow up with Physician:      Progress Report/POC: EVAL today  POC update due: (10 visits /OR AUTH LIMITS, whichever is less)  5/10/2024                                             Precautions/ Contra-indications:           Latex allergy:  NO  Pacemaker:    NO  Contraindications for

## 2024-04-10 NOTE — PLAN OF CARE
TREATMENT PLAN       Plan: Discharge    Electronically Signed by BRANDY Davis/L, C/NDT ([id number])    Note: Portions of this note have been templated and/or copied from initial evaluation, reassessments and prior notes for documentation efficiency.

## 2024-04-12 ENCOUNTER — HOSPITAL ENCOUNTER (OUTPATIENT)
Dept: PHYSICAL THERAPY | Age: 79
Setting detail: THERAPIES SERIES
Discharge: HOME | End: 2024-04-12
Payer: MEDICARE

## 2024-04-12 PROCEDURE — 20561 NDL INSJ W/O NJX 3+ MUSC: CPT

## 2024-04-12 PROCEDURE — 97110 THERAPEUTIC EXERCISES: CPT

## 2024-04-12 PROCEDURE — 97140 MANUAL THERAPY 1/> REGIONS: CPT

## 2024-04-12 NOTE — FLOWSHEET NOTE
low frequency electrical stimulation was used to help reduce muscle spasm and help to interrupt /[place] the pain cycle. (37416) - 5'    4/10 - Dry needling manual therapy: consisted on the placement of 5 needles in the following muscles:  R SIJ, R glute max fibers.  A 60 mm needle was inserted, piston, rotated, and coned to produce intramuscular mobilization.  These techniques were used to restore functional range of motion, reduce muscle spasm and induce healing in the corresponding musculature. (19375)  Clean Technique was utilized today while applying Dry needling treatment.  The treatment sites where cleaned with 70% solution of  isopropyl alcohol .  The PT washed their hands and utilized treatment gloves along with hand  prior to inserting the needles.  All needles where removed and discarded in the appropriate sharps container.  MD has given verbal and/or written approval for this treatment. - 12'    Modalities:    No modalities applied this session    Education/Home Exercise Program:   Access Code: [email or web address]  URL: [email or web address]  Date: 04/12/2024  Prepared by: Herber Arias    Exercises  - Supine Gluteus Stretch  - 2 x daily - 7 x weekly - 1 sets - 3 reps - 30 hold  - Supine Piriformis Stretch with Foot on Ground  - 2 x daily - 7 x weekly - 1 sets - 3 reps - 30 hold    ASSESSMENT     Today's Assessment:  Good response to tpDN with stim, followed by stretches and strengthening for bilateral gluteals and tissues of pelvis, all without exacerbation of pain. She was provided HEP to continue with stretches. Strengthening will continue in PT next visit, with addition of core-focused exercises to improve prolonged tolerance with standing, walking.    Medical Necessity Documentation:  I certify that this patient meets the below criteria necessary for medical necessity for care and/or justification of therapy services:  The patient has functional impairments and/or activity limitations and would

## 2024-04-15 ENCOUNTER — HOSPITAL ENCOUNTER (OUTPATIENT)
Dept: PHYSICAL THERAPY | Age: 79
Setting detail: THERAPIES SERIES
Discharge: HOME | End: 2024-04-15
Payer: MEDICARE

## 2024-04-15 PROCEDURE — 20561 NDL INSJ W/O NJX 3+ MUSC: CPT

## 2024-04-15 PROCEDURE — 97110 THERAPEUTIC EXERCISES: CPT

## 2024-04-15 NOTE — FLOWSHEET NOTE
[facility] - Outpatient Rehabilitation and Therapy [address], [place] [address] office: [phone] fax: [phone]     Physical Therapy: TREATMENT/PROGRESS NOTE   Patient: Maria M Tang (78 y.o. female)   Examination Date: 04/15/2024   :  1945 MRN: [id number]   Visit #: 3   Insurance Allowable Auth Needed   PMN []Yes    []No    Insurance: Payor: AETNA MEDICARE / Plan: AETNA MEDICARE-ADVANTAGE PPO / Product Type: Medicare /   Insurance ID: [id number] - (Medicare Managed)  Secondary Insurance (if applicable):    Treatment Diagnosis:     ICD-10-CM    1. Chronic low back pain without sciatica, unspecified back pain laterality  M54.50     G89.29       2. Lumbar spondylosis  M47.816          Medical Diagnosis:  Pain in left hip [M25.552]  Pain in right hip [M25.551]  Other specified soft tissue disorders [M79.89]   Referring Physician: Xiomara Nicholas APRN *  PCP: Cecilio Franks MD     Plan of care signed (Y/N):     Date of Patient follow up with Physician:      Progress Report/POC: NO  POC update due: (10 visits /OR AUTH LIMITS, whichever is less)  5/10/2024                                             Precautions/ Contra-indications:           Latex allergy:  NO  Pacemaker:    NO  Contraindications for Manipulation: osteopenia  Date of Surgery:   Other:    Red Flags:  None    C-SSRS Triggered by Intake questionnaire:   [x] No, Questionnaire did not trigger screening.   [] Yes, Patient intake triggered further evaluation      [] C-SSRS Screening completed  [] PCP notified via Plan of Care  [] Emergency services notified     Preferred Language for Healthcare:   [x] English       [] other:    SUBJECTIVE EXAMINATION     Patient stated complaint: She reports she believes that since the beginning of the year her symptoms have only worsened. When she left last visit she she walked around 1.5 hours at [employer] event and \"felt OK.\" She went to family member's play performance

## 2024-04-17 ENCOUNTER — HOSPITAL ENCOUNTER (OUTPATIENT)
Dept: PHYSICAL THERAPY | Age: 79
Setting detail: THERAPIES SERIES
Discharge: HOME | End: 2024-04-17
Payer: MEDICARE

## 2024-04-17 PROCEDURE — 97110 THERAPEUTIC EXERCISES: CPT

## 2024-04-17 PROCEDURE — 20561 NDL INSJ W/O NJX 3+ MUSC: CPT

## 2024-04-17 NOTE — FLOWSHEET NOTE
[facility] - Outpatient Rehabilitation and Therapy [address], [place] [address] office: [phone] fax: [phone]     Physical Therapy: TREATMENT/PROGRESS NOTE   Patient: Maria M Tang (78 y.o. female)   Examination Date: 2024   :  1945 MRN: [id number]   Visit #: 4   Insurance Allowable Auth Needed   PMN []Yes    []No    Insurance: Payor: AETNA MEDICARE / Plan: AETNA MEDICARE-ADVANTAGE PPO / Product Type: Medicare /   Insurance ID: [id number] - (Medicare Managed)  Secondary Insurance (if applicable):    Treatment Diagnosis:     ICD-10-CM    1. Chronic low back pain without sciatica, unspecified back pain laterality  M54.50     G89.29       2. Lumbar spondylosis  M47.816          Medical Diagnosis:  Pain in left hip [M25.552]  Pain in right hip [M25.551]  Other specified soft tissue disorders [M79.89]   Referring Physician: Xiomara Nicholas APRN *  PCP: Cecilio Franks MD     Plan of care signed (Y/N):     Date of Patient follow up with Physician:      Progress Report/POC: NO  POC update due: (10 visits /OR AUTH LIMITS, whichever is less)  5/10/2024                                             Precautions/ Contra-indications:           Latex allergy:  NO  Pacemaker:    NO  Contraindications for Manipulation: osteopenia  Date of Surgery:   Other:    Red Flags:  None    C-SSRS Triggered by Intake questionnaire:   [x] No, Questionnaire did not trigger screening.   [] Yes, Patient intake triggered further evaluation      [] C-SSRS Screening completed  [] PCP notified via Plan of Care  [] Emergency services notified     Preferred Language for Healthcare:   [x] English       [] other:    SUBJECTIVE EXAMINATION     Patient stated complaint: Soreness noted from last session. She used diclofenac yesterday and had relief. She did more sitting on Monday, which also kept pain levels low. She didn't sleep well last night. She is agreeable to needling gluteals and SIJ again

## 2024-05-06 ENCOUNTER — HOSPITAL ENCOUNTER (OUTPATIENT)
Dept: PHYSICAL THERAPY | Age: 79
Setting detail: THERAPIES SERIES
Discharge: HOME | End: 2024-05-06
Payer: MEDICARE

## 2024-05-06 PROCEDURE — 97112 NEUROMUSCULAR REEDUCATION: CPT

## 2024-05-06 PROCEDURE — 20560 NDL INSJ W/O NJX 1 OR 2 MUSC: CPT

## 2024-05-06 NOTE — FLOWSHEET NOTE
[facility] - Outpatient Rehabilitation and Therapy [address], [place] [address] office: [phone] fax: [phone]     Physical Therapy: TREATMENT/PROGRESS NOTE   Patient: Maria M Tang (78 y.o. female)   Examination Date: 2024   :  1945 MRN: [id number]   Visit #: 5   Insurance Allowable Auth Needed   PMN []Yes    []No    Insurance: Payor: AETNA MEDICARE / Plan: AETNA MEDICARE-ADVANTAGE PPO / Product Type: Medicare /   Insurance ID: [id number] - (Medicare Managed)  Secondary Insurance (if applicable):    Treatment Diagnosis:     ICD-10-CM    1. Chronic low back pain without sciatica, unspecified back pain laterality  M54.50     G89.29       2. Lumbar spondylosis  M47.816          Medical Diagnosis:  Pain in left hip [M25.552]  Pain in right hip [M25.551]  Other specified soft tissue disorders [M79.89]   Referring Physician: Xiomara Nicholas APRN *  PCP: Cecilio Franks MD     Plan of care signed (Y/N):     Date of Patient follow up with Physician:      Progress Report/POC: NO  POC update due: (10 visits /OR AUTH LIMITS, whichever is less)  5/10/2024                                             Precautions/ Contra-indications:           Latex allergy:  NO  Pacemaker:    NO  Contraindications for Manipulation: osteopenia  Date of Surgery:   Other:    Red Flags:  None    C-SSRS Triggered by Intake questionnaire:   [x] No, Questionnaire did not trigger screening.   [] Yes, Patient intake triggered further evaluation      [] C-SSRS Screening completed  [] PCP notified via Plan of Care  [] Emergency services notified     Preferred Language for Healthcare:   [x] English       [] other:    SUBJECTIVE EXAMINATION     Patient stated complaint: She did a lot of activity over the weekend (in [place]), and felt symptoms down her leg when standing and being active. When seated she didn't have symptoms. A lot of relief after tpDN last session.    EVAL: Right sided buttocks

## 2024-05-08 ENCOUNTER — HOSPITAL ENCOUNTER (OUTPATIENT)
Dept: PHYSICAL THERAPY | Age: 79
Setting detail: THERAPIES SERIES
Discharge: HOME | End: 2024-05-08
Payer: MEDICARE

## 2024-05-08 PROCEDURE — 20561 NDL INSJ W/O NJX 3+ MUSC: CPT

## 2024-05-08 PROCEDURE — 97032 APPL MODALITY 1+ESTIM EA 15: CPT

## 2024-05-08 PROCEDURE — 97110 THERAPEUTIC EXERCISES: CPT

## 2024-05-08 NOTE — FLOWSHEET NOTE
[] Progression is slowed due to complexities/Impairments listed.  [] Progression has been slowed due to co-morbidities.  [] Plan just implemented, too soon (<30days) to assess goals progression   [] Goals require adjustment due to lack of progress  [] Patient is not progressing as expected and requires additional follow up with physician  [] Other:     TREATMENT PLAN     Frequency/Duration: 2x/week for  3-4  weeks for the following treatment interventions:    Interventions:  [x] Therapeutic exercise including: strength training, ROM, including postural re-education.   [x] NMR activation and proprioception, including postural re-education.    [x] Manual therapy as indicated to include: PROM, Gr I-IV mobilizations, STM, and Dry Needling/IASTM  [x] Modalities as needed that may include: Cryotherapy, Electrical Stimulation, and Thermal Agents  [x] Patient education on joint protection, postural re-education, activity modification, progression of HEP.        [] Aquatic Therapy    Plan:  continue with tpDN, stretching and strengthening of gluteals, core activation & stabilization (anti-rotation press)    Electronically Signed by Jose Arias, PT, DPT, OCS, OMT-C Date: 05/08/2024     Note: Portions of this note have been templated and/or copied from initial evaluation, reassessments and prior notes for documentation efficiency.

## 2024-05-10 ENCOUNTER — HOSPITAL ENCOUNTER (OUTPATIENT)
Dept: PHYSICAL THERAPY | Age: 79
Setting detail: THERAPIES SERIES
Discharge: HOME | End: 2024-05-10
Payer: MEDICARE

## 2024-05-10 PROCEDURE — 97032 APPL MODALITY 1+ESTIM EA 15: CPT

## 2024-05-10 PROCEDURE — 97530 THERAPEUTIC ACTIVITIES: CPT

## 2024-05-10 PROCEDURE — 20560 NDL INSJ W/O NJX 1 OR 2 MUSC: CPT

## 2024-05-10 PROCEDURE — 97140 MANUAL THERAPY 1/> REGIONS: CPT

## 2024-05-10 NOTE — PLAN OF CARE
motion, reduce muscle spasm and induce healing in the corresponding musculature. (02684)  Clean Technique was utilized today while applying Dry needling treatment.  The treatment sites where cleaned with 70% solution of  isopropyl alcohol .  The PT washed their hands and utilized treatment gloves along with hand  prior to inserting the needles.  All needles where removed and discarded in the appropriate sharps container.  MD has given verbal and/or written approval for this treatment. - 8.5'    4/17 - Dry needling manual therapy: consisted on the placement of 6 needles in the following muscles:  L/R SIJ, L/R glute max & glute med fibers, R/L piriformis.  A 60 mm needle was inserted, piston, rotated, and coned to produce intramuscular mobilization.  These techniques were used to restore functional range of motion, reduce muscle spasm and induce healing in the corresponding musculature. (91757)  Clean Technique was utilized today while applying Dry needling treatment.  The treatment sites where cleaned with 70% solution of  isopropyl alcohol .  The PT washed their hands and utilized treatment gloves along with hand  prior to inserting the needles.  All needles where removed and discarded in the appropriate sharps container.  MD has given verbal and/or written approval for this treatment. - 12'    4/15 - Dry needling manual therapy: consisted on the placement of 6 needles in the following muscles:  L/R SIJ, L/R glute max & glute med fibers, R/L piriformis.  A 60 mm needle was inserted, piston, rotated, and coned to produce intramuscular mobilization.  These techniques were used to restore functional range of motion, reduce muscle spasm and induce healing in the corresponding musculature. (06922)  Clean Technique was utilized today while applying Dry needling treatment.  The treatment sites where cleaned with 70% solution of  isopropyl alcohol .  The PT washed their hands and utilized treatment gloves

## 2024-05-20 ENCOUNTER — HOSPITAL ENCOUNTER (OUTPATIENT)
Dept: PHYSICAL THERAPY | Age: 79
Setting detail: THERAPIES SERIES
Discharge: HOME | End: 2024-05-20
Payer: MEDICARE

## 2024-05-20 PROCEDURE — 97110 THERAPEUTIC EXERCISES: CPT

## 2024-05-20 PROCEDURE — 97140 MANUAL THERAPY 1/> REGIONS: CPT

## 2024-05-20 PROCEDURE — 20561 NDL INSJ W/O NJX 3+ MUSC: CPT

## 2024-05-20 NOTE — FLOWSHEET NOTE
[facility] - Outpatient Rehabilitation and Therapy [address], [place] [address] office: [phone] fax: [phone]     Physical Therapy: TREATMENT/PROGRESS NOTE   Patient: Maria M Tang (78 y.o. female)   Examination Date: 2024   :  1945 MRN: [id number]   Visit #:   Insurance Allowable Auth Needed   PMN []Yes    []No    Insurance: Payor: AETNA MEDICARE / Plan: AETNA MEDICARE-ADVANTAGE PPO / Product Type: Medicare /   Insurance ID: [id number] - (Medicare Managed)  Secondary Insurance (if applicable):    Treatment Diagnosis:     ICD-10-CM    1. Chronic low back pain without sciatica, unspecified back pain laterality  M54.50     G89.29       2. Lumbar spondylosis  M47.816          Medical Diagnosis:  Pain in left hip [M25.552]  Pain in right hip [M25.551]  Other specified soft tissue disorders [M79.89]   Referring Physician: Xiomara Nicholas APRN *  PCP: Cecilio Franks MD     Plan of care signed (Y/N): N    Date of Patient follow up with Physician:      Progress Report/POC: NO  POC update due: (10 visits /OR AUTH LIMITS, whichever is less)  5/10/2024                                             Precautions/ Contra-indications:           Latex allergy:  NO  Pacemaker:    NO  Contraindications for Manipulation: osteopenia  Date of Surgery:   Other:    Red Flags:  None    C-SSRS Triggered by Intake questionnaire:   [x] No, Questionnaire did not trigger screening.   [] Yes, Patient intake triggered further evaluation      [] C-SSRS Screening completed  [] PCP notified via Plan of Care  [] Emergency services notified     Preferred Language for Healthcare:   [x] English       [] other:    SUBJECTIVE EXAMINATION     Patient stated complaint: She has massage after last session but reports it could have been deeper. She comments she has a lot of soreness in low back region across PSIS. She has appt  for follow up with MD for injections prior to [place] trip.

## 2024-05-22 ENCOUNTER — HOSPITAL ENCOUNTER (OUTPATIENT)
Dept: PHYSICAL THERAPY | Age: 79
Setting detail: THERAPIES SERIES
Discharge: HOME | End: 2024-05-22
Payer: MEDICARE

## 2024-05-22 PROCEDURE — 97140 MANUAL THERAPY 1/> REGIONS: CPT

## 2024-05-22 PROCEDURE — 20560 NDL INSJ W/O NJX 1 OR 2 MUSC: CPT

## 2024-05-22 PROCEDURE — 97110 THERAPEUTIC EXERCISES: CPT

## 2024-05-22 NOTE — FLOWSHEET NOTE
progressing as expected towards functional goals listed.    [] Progression is slowed due to complexities/Impairments listed.  [] Progression has been slowed due to co-morbidities.  [] Plan just implemented, too soon (<30days) to assess goals progression   [] Goals require adjustment due to lack of progress  [] Patient is not progressing as expected and requires additional follow up with physician  [] Other:     TREATMENT PLAN     Frequency/Duration: 2x/week for  3-4  weeks for the following treatment interventions:    Interventions:  [x] Therapeutic exercise including: strength training, ROM, including postural re-education.   [x] NMR activation and proprioception, including postural re-education.    [x] Manual therapy as indicated to include: PROM, Gr I-IV mobilizations, STM, and Dry Needling/IASTM  [x] Modalities as needed that may include: Cryotherapy, Electrical Stimulation, and Thermal Agents  [x] Patient education on joint protection, postural re-education, activity modification, progression of HEP.        [] Aquatic Therapy    Plan:  continue with tpDN, stretching and strengthening of gluteals, core activation & stabilization (anti-rotation press), loading spine -- LTGs    Electronically Signed by Jose Arias, PT, DPT, OCS, OMT-C Date: 05/22/2024       Note: Portions of this note have been templated and/or copied from initial evaluation, reassessments and prior notes for documentation efficiency.

## 2024-05-24 ENCOUNTER — APPOINTMENT (OUTPATIENT)
Dept: PHYSICAL THERAPY | Age: 79
End: 2024-05-24
Payer: MEDICARE

## 2024-06-13 ENCOUNTER — TRANSCRIBE ORDERS (OUTPATIENT)
Dept: ADMINISTRATIVE | Age: 79
End: 2024-06-13

## 2024-06-13 DIAGNOSIS — M54.40 LOW BACK PAIN WITH SCIATICA, SCIATICA LATERALITY UNSPECIFIED, UNSPECIFIED BACK PAIN LATERALITY, UNSPECIFIED CHRONICITY: Primary | ICD-10-CM

## 2024-06-13 DIAGNOSIS — G89.29 CHRONIC IDIOPATHIC ANAL PAIN: ICD-10-CM

## 2024-06-13 DIAGNOSIS — K62.89 CHRONIC IDIOPATHIC ANAL PAIN: ICD-10-CM

## 2024-06-18 ENCOUNTER — HOSPITAL ENCOUNTER (OUTPATIENT)
Dept: MRI IMAGING | Age: 79
Discharge: HOME | End: 2024-06-18
Payer: MEDICARE

## 2024-06-18 DIAGNOSIS — M54.40 LOW BACK PAIN WITH SCIATICA, SCIATICA LATERALITY UNSPECIFIED, UNSPECIFIED BACK PAIN LATERALITY, UNSPECIFIED CHRONICITY: ICD-10-CM

## 2024-06-18 DIAGNOSIS — G89.29 CHRONIC IDIOPATHIC ANAL PAIN: ICD-10-CM

## 2024-06-18 DIAGNOSIS — K62.89 CHRONIC IDIOPATHIC ANAL PAIN: ICD-10-CM

## 2024-06-18 PROCEDURE — 72148 MRI LUMBAR SPINE W/O DYE: CPT

## 2024-06-21 ENCOUNTER — HOSPITAL ENCOUNTER (OUTPATIENT)
Dept: GENERAL RADIOLOGY | Age: 79
Discharge: HOME | End: 2024-06-21
Payer: MEDICARE

## 2024-06-21 ENCOUNTER — HOSPITAL ENCOUNTER (OUTPATIENT)
Age: 79
Discharge: HOME | End: 2024-06-21
Payer: MEDICARE

## 2024-06-21 DIAGNOSIS — R52 PAIN: ICD-10-CM

## 2024-06-21 PROCEDURE — 72110 X-RAY EXAM L-2 SPINE 4/>VWS: CPT

## 2024-08-26 ENCOUNTER — HOSPITAL ENCOUNTER (OUTPATIENT)
Age: 79
Discharge: HOME | End: 2024-08-26
Payer: MEDICARE

## 2024-08-26 ENCOUNTER — HOSPITAL ENCOUNTER (OUTPATIENT)
Dept: GENERAL RADIOLOGY | Age: 79
Discharge: HOME | End: 2024-08-26
Payer: MEDICARE

## 2024-08-26 DIAGNOSIS — R52 PAIN: ICD-10-CM

## 2024-08-26 PROCEDURE — 72100 X-RAY EXAM L-S SPINE 2/3 VWS: CPT

## 2024-08-30 ENCOUNTER — HOSPITAL ENCOUNTER (OUTPATIENT)
Dept: PHYSICAL THERAPY | Age: 79
Setting detail: THERAPIES SERIES
Discharge: HOME | End: 2024-08-30
Payer: MEDICARE

## 2024-08-30 DIAGNOSIS — M48.062 SPINAL STENOSIS, LUMBAR REGION, WITH NEUROGENIC CLAUDICATION: ICD-10-CM

## 2024-08-30 DIAGNOSIS — M54.16 LUMBAR RADICULOPATHY: ICD-10-CM

## 2024-08-30 DIAGNOSIS — M43.16 SPONDYLOLISTHESIS OF LUMBAR REGION: Primary | ICD-10-CM

## 2024-08-30 PROCEDURE — 97161 PT EVAL LOW COMPLEX 20 MIN: CPT

## 2024-08-30 PROCEDURE — 97530 THERAPEUTIC ACTIVITIES: CPT

## 2024-08-30 PROCEDURE — 97110 THERAPEUTIC EXERCISES: CPT

## 2024-08-30 NOTE — PLAN OF CARE
efficiency.    Note: If patient does not return for scheduled/recommended follow up visits, this note will serve as a discharge from care along with the most recent update on progress.

## 2024-09-04 ENCOUNTER — APPOINTMENT (OUTPATIENT)
Dept: PHYSICAL THERAPY | Age: 79
End: 2024-09-04
Payer: MEDICARE

## 2024-09-06 ENCOUNTER — APPOINTMENT (OUTPATIENT)
Dept: PHYSICAL THERAPY | Age: 79
End: 2024-09-06
Payer: MEDICARE

## 2024-09-09 ENCOUNTER — APPOINTMENT (OUTPATIENT)
Dept: PHYSICAL THERAPY | Age: 79
End: 2024-09-09
Payer: MEDICARE

## 2024-09-10 ENCOUNTER — APPOINTMENT (OUTPATIENT)
Dept: PHYSICAL THERAPY | Age: 79
End: 2024-09-10
Payer: MEDICARE

## 2024-09-13 ENCOUNTER — APPOINTMENT (OUTPATIENT)
Dept: PHYSICAL THERAPY | Age: 79
End: 2024-09-13
Payer: MEDICARE

## 2024-09-17 ENCOUNTER — APPOINTMENT (OUTPATIENT)
Dept: PHYSICAL THERAPY | Age: 79
End: 2024-09-17
Payer: MEDICARE

## 2024-09-20 ENCOUNTER — HOSPITAL ENCOUNTER (OUTPATIENT)
Dept: PHYSICAL THERAPY | Age: 79
Setting detail: THERAPIES SERIES
Discharge: HOME | End: 2024-09-20
Payer: MEDICARE

## 2024-09-20 PROCEDURE — 97112 NEUROMUSCULAR REEDUCATION: CPT

## 2024-09-20 PROCEDURE — 97140 MANUAL THERAPY 1/> REGIONS: CPT

## 2024-09-24 ENCOUNTER — HOSPITAL ENCOUNTER (OUTPATIENT)
Dept: PHYSICAL THERAPY | Age: 79
Setting detail: THERAPIES SERIES
Discharge: HOME | End: 2024-09-24
Payer: MEDICARE

## 2024-09-24 PROCEDURE — 97112 NEUROMUSCULAR REEDUCATION: CPT

## 2024-09-24 PROCEDURE — 97140 MANUAL THERAPY 1/> REGIONS: CPT

## 2024-09-27 ENCOUNTER — HOSPITAL ENCOUNTER (OUTPATIENT)
Dept: PHYSICAL THERAPY | Age: 79
Setting detail: THERAPIES SERIES
End: 2024-09-27
Payer: MEDICARE

## 2024-09-30 ENCOUNTER — HOSPITAL ENCOUNTER (OUTPATIENT)
Dept: PHYSICAL THERAPY | Age: 79
Setting detail: THERAPIES SERIES
Discharge: HOME | End: 2024-09-30
Payer: MEDICARE

## 2024-09-30 PROCEDURE — 97530 THERAPEUTIC ACTIVITIES: CPT

## 2024-09-30 PROCEDURE — 97112 NEUROMUSCULAR REEDUCATION: CPT

## 2024-09-30 PROCEDURE — 97110 THERAPEUTIC EXERCISES: CPT

## 2024-09-30 NOTE — PLAN OF CARE
[facility] - Outpatient Rehabilitation and Therapy [address], [place] [address] office: [phone] fax: [phone]  Physical Therapy Re-Certification Plan of Care    Dear Adán Reagan MD  ,    We had the pleasure of treating the following patient for physical therapy services at [facility]. A summary of our findings can be found in the updated assessment below.  This includes our plan of care.  If you have any questions or concerns regarding these findings, please do not hesitate to contact me at the office phone number checked above.  Thank you for the referral.     Physician Signature:________________________________Date:__________________  By signing above (or electronic signature), therapist's plan is approved by physician    Functional Outcome: Darek = 12/50 = 24% dys    Lumbar flexion = 20deg  Lumbar extension = 20deg  Hip flexion = 4-/5     Maria M Tang 1945 continues to present with functional deficits in ROM, joint mobility, strength symmetry, and endurance of strength  limiting ability with walking on uneven ground, transitions between positions, managing bed mobility, carrying items, lifting items, don/doff shoes/socks, light home activity, and heavy home activity .  During therapy this date, patient required progression of exercises and program for exercise progression, improving proper muscle recruitment and activation/motor control patterns, modulating pain, increasing ROM, improving soft tissue extensibility, allowing for proper ROM, kinesthetic sense and proprioception, and improving postural awareness. Patient will continue to benefit from ongoing evaluation and advanced clinical decision from a Physical Therapist to improve pain control, ROM, muscle strength, neuromuscular control, endurance, normalization of gait, balance and proprioception, functional mobility, ADL status, proper body mechanics, and high level

## 2024-10-02 ENCOUNTER — HOSPITAL ENCOUNTER (OUTPATIENT)
Dept: PHYSICAL THERAPY | Age: 79
Setting detail: THERAPIES SERIES
Discharge: HOME | End: 2024-10-02
Payer: MEDICARE

## 2024-10-02 PROCEDURE — 97112 NEUROMUSCULAR REEDUCATION: CPT

## 2024-10-02 PROCEDURE — 97110 THERAPEUTIC EXERCISES: CPT

## 2024-10-02 NOTE — FLOWSHEET NOTE
[facility] - Outpatient Rehabilitation and Therapy [address], [place] [address] office: [phone] fax: [phone]     Physical Therapy: TREATMENT/PROGRESS NOTE   Patient: Maria M Tang (78 y.o. female)   Examination Date: 10/02/2024   :  1945 MRN: [id number]   Visit #: 5   Insurance Allowable Auth Needed   PMN []Yes    []No    Insurance: Payor: AETNA MEDICARE / Plan: AETNA MEDICARE-ADVANTAGE PPO / Product Type: Medicare /   Insurance ID: [id number] - (Medicare Managed)  Secondary Insurance (if applicable):    Treatment Diagnosis:     ICD-10-CM    1. Spondylolisthesis of lumbar region  M43.16       2. Lumbar radiculopathy  M54.16       3. Spinal stenosis, lumbar region, with neurogenic claudication  M48.062          Medical Diagnosis:  M48.062 (ICD-10-CM) - Spinal stenosis, lumbar region with neurogenic claudication   M43.16 (ICD-10-CM) - Spondylolisthesis, lumbar region   M54.16 (ICD-10-CM) - Radiculopathy, lumbar region      Referring Physician:   Adán Reagan MD    PCP: Cecilio Franks MD     Plan of care signed (Y/N):     Date of Patient follow up with Physician:      Plan of Care Report: YES, Date Range for this report: 24 to 24  POC update due: (10 visits /OR AUTH LIMITS, whichever is less)  2024                                             Medical History:  Comorbidities:  Osteoporosis/Osteopenia  Other: hyperlipidemia  Relevant Medical History: extensive - please see patient surgical history                                         Precautions/ Contra-indications:           Latex allergy:  NO  Pacemaker:    NO  Contraindications for Manipulation: osteoporosis  and remote history of spinal fusion (relative)  Date of Surgery: 24 - TLIF L4-5  Other:    Red Flags:  None    Suicide Screening:   The patient did not verbalize a primary behavioral concern, suicidal ideation, suicidal intent, or demonstrate suicidal behaviors.    Preferred

## 2024-10-07 ENCOUNTER — HOSPITAL ENCOUNTER (OUTPATIENT)
Age: 79
Discharge: HOME | End: 2024-10-07
Payer: MEDICARE

## 2024-10-07 ENCOUNTER — HOSPITAL ENCOUNTER (OUTPATIENT)
Dept: GENERAL RADIOLOGY | Age: 79
Discharge: HOME | End: 2024-10-07
Payer: MEDICARE

## 2024-10-07 ENCOUNTER — HOSPITAL ENCOUNTER (OUTPATIENT)
Dept: PHYSICAL THERAPY | Age: 79
Setting detail: THERAPIES SERIES
Discharge: HOME | End: 2024-10-07
Payer: MEDICARE

## 2024-10-07 DIAGNOSIS — M54.50 LUMBAR PAIN: ICD-10-CM

## 2024-10-07 PROCEDURE — 72100 X-RAY EXAM L-S SPINE 2/3 VWS: CPT

## 2024-10-07 PROCEDURE — 97112 NEUROMUSCULAR REEDUCATION: CPT

## 2024-10-07 PROCEDURE — 97110 THERAPEUTIC EXERCISES: CPT

## 2024-10-07 NOTE — FLOWSHEET NOTE
[facility] - Outpatient Rehabilitation and Therapy [address], [place] [address] office: [phone] fax: [phone]     Physical Therapy: TREATMENT/PROGRESS NOTE   Patient: Maria M Tang (78 y.o. female)   Examination Date: 10/07/2024   :  1945 MRN: [id number]   Visit #: 6   Insurance Allowable Auth Needed   PMN []Yes    []No    Insurance: Payor: AETNA MEDICARE / Plan: AETNA MEDICARE-ADVANTAGE PPO / Product Type: Medicare /   Insurance ID: [id number] - (Medicare Managed)  Secondary Insurance (if applicable):    Treatment Diagnosis:     ICD-10-CM    1. Spondylolisthesis of lumbar region  M43.16       2. Lumbar radiculopathy  M54.16       3. Spinal stenosis, lumbar region, with neurogenic claudication  M48.062          Medical Diagnosis:  M48.062 (ICD-10-CM) - Spinal stenosis, lumbar region with neurogenic claudication   M43.16 (ICD-10-CM) - Spondylolisthesis, lumbar region   M54.16 (ICD-10-CM) - Radiculopathy, lumbar region      Referring Physician:   Adán Reagan MD    PCP: Cecilio Franks MD     Plan of care signed (Y/N):     Date of Patient follow up with Physician:      Plan of Care Report: NO  POC update due: (10 visits /OR AUTH LIMITS, whichever is less): 10/30/24                                            Medical History:  Comorbidities:  Osteoporosis/Osteopenia  Other: hyperlipidemia  Relevant Medical History: extensive - please see patient surgical history                                         Precautions/ Contra-indications:           Latex allergy:  NO  Pacemaker:    NO  Contraindications for Manipulation: osteoporosis  and remote history of spinal fusion (relative)  Date of Surgery: 24 - TLIF L4-5  Other:    Red Flags:  None    Suicide Screening:   The patient did not verbalize a primary behavioral concern, suicidal ideation, suicidal intent, or demonstrate suicidal behaviors.    Preferred Language for Healthcare:   [x] English       []

## 2024-10-09 ENCOUNTER — HOSPITAL ENCOUNTER (OUTPATIENT)
Dept: PHYSICAL THERAPY | Age: 79
Setting detail: THERAPIES SERIES
Discharge: HOME | End: 2024-10-09
Payer: MEDICARE

## 2024-10-09 PROCEDURE — 97112 NEUROMUSCULAR REEDUCATION: CPT

## 2024-10-09 PROCEDURE — 97110 THERAPEUTIC EXERCISES: CPT

## 2024-10-09 NOTE — FLOWSHEET NOTE
progression of HEP    Plan:  POC CONTINUATION - core stabilization, mobility, functional movements    Electronically Signed by Jose Arias, PT, DPT, OCS, OMT-C  Date: 10/09/2024     Note: Portions of this note have been templated and/or copied from initial evaluation, reassessments and prior notes for documentation efficiency.    Note: If patient does not return for scheduled/recommended follow up visits, this note will serve as a discharge from care along with the most recent update on progress.

## 2024-10-14 ENCOUNTER — APPOINTMENT (OUTPATIENT)
Dept: PHYSICAL THERAPY | Age: 79
End: 2024-10-14
Payer: MEDICARE

## 2024-10-16 ENCOUNTER — HOSPITAL ENCOUNTER (OUTPATIENT)
Dept: CT IMAGING | Age: 79
Discharge: HOME | End: 2024-10-16
Payer: MEDICARE

## 2024-10-16 ENCOUNTER — HOSPITAL ENCOUNTER (OUTPATIENT)
Dept: PHYSICAL THERAPY | Age: 79
Setting detail: THERAPIES SERIES
Discharge: HOME | End: 2024-10-16
Payer: MEDICARE

## 2024-10-16 DIAGNOSIS — R31.29 OTHER MICROSCOPIC HEMATURIA: ICD-10-CM

## 2024-10-16 LAB
BUN SERPL-MCNC: 15 MG/DL (ref 7–20)
CREAT SERPL-MCNC: 0.7 MG/DL (ref 0.6–1.2)
GFR SERPLBLD CREATININE-BSD FMLA CKD-EPI: 89 ML/MIN/{1.73_M2}

## 2024-10-16 PROCEDURE — 74178 CT ABD&PLV WO CNTR FLWD CNTR: CPT

## 2024-10-16 PROCEDURE — 97110 THERAPEUTIC EXERCISES: CPT

## 2024-10-16 PROCEDURE — 36415 COLL VENOUS BLD VENIPUNCTURE: CPT

## 2024-10-16 PROCEDURE — 84520 ASSAY OF UREA NITROGEN: CPT

## 2024-10-16 PROCEDURE — 6360000004 HC RX CONTRAST MEDICATION

## 2024-10-16 PROCEDURE — 82565 ASSAY OF CREATININE: CPT

## 2024-10-16 RX ORDER — IOPAMIDOL 755 MG/ML
120 INJECTION, SOLUTION INTRAVASCULAR
Status: COMPLETED | OUTPATIENT
Start: 2024-10-16 | End: 2024-10-16

## 2024-10-16 RX ADMIN — IOPAMIDOL 120 ML: 755 INJECTION, SOLUTION INTRAVENOUS at 16:42

## 2024-10-16 NOTE — FLOWSHEET NOTE
[facility] - Outpatient Rehabilitation and Therapy [address], [place] [address] office: [phone] fax: [phone]     Physical Therapy: TREATMENT/PROGRESS NOTE   Patient: Maria M Tang (78 y.o. female)   Examination Date: 10/16/2024   :  1945 MRN: [id number]   Visit #: 8   Insurance Allowable Auth Needed   PMN []Yes    []No    Insurance: Payor: AETNA MEDICARE / Plan: AETNA MEDICARE-ADVANTAGE PPO / Product Type: Medicare /   Insurance ID: [id number] - (Medicare Managed)  Secondary Insurance (if applicable):    Treatment Diagnosis:     ICD-10-CM    1. Spondylolisthesis of lumbar region  M43.16       2. Lumbar radiculopathy  M54.16       3. Spinal stenosis, lumbar region, with neurogenic claudication  M48.062          Medical Diagnosis:  M48.062 (ICD-10-CM) - Spinal stenosis, lumbar region with neurogenic claudication   M43.16 (ICD-10-CM) - Spondylolisthesis, lumbar region   M54.16 (ICD-10-CM) - Radiculopathy, lumbar region      Referring Physician:   Adán Reagan MD    PCP: Cecilio Franks MD     Plan of care signed (Y/N): Yes    Date of Patient follow up with Physician:      Plan of Care Report: NO  POC update due: (10 visits /OR AUTH LIMITS, whichever is less): 10/30/24                                            Medical History:  Comorbidities:  Osteoporosis/Osteopenia  Other: hyperlipidemia  Relevant Medical History: extensive - please see patient surgical history                                         Precautions/ Contra-indications:           Latex allergy:  NO  Pacemaker:    NO  Contraindications for Manipulation: osteoporosis  and remote history of spinal fusion (relative)  Date of Surgery: 24 - TLIF L4-5  Other:    Red Flags:  None    Suicide Screening:   The patient did not verbalize a primary behavioral concern, suicidal ideation, suicidal intent, or demonstrate suicidal behaviors.    Preferred Language for Healthcare:   [x] English       []

## 2024-10-21 ENCOUNTER — HOSPITAL ENCOUNTER (OUTPATIENT)
Dept: PHYSICAL THERAPY | Age: 79
Setting detail: THERAPIES SERIES
Discharge: HOME | End: 2024-10-21
Payer: MEDICARE

## 2024-10-21 DIAGNOSIS — S46.011D STRAIN OF TENDON OF RIGHT ROTATOR CUFF, SUBSEQUENT ENCOUNTER: Primary | ICD-10-CM

## 2024-10-21 DIAGNOSIS — M25.511 ACUTE PAIN OF RIGHT SHOULDER: ICD-10-CM

## 2024-10-21 DIAGNOSIS — R29.898 WEAKNESS OF RIGHT SHOULDER: ICD-10-CM

## 2024-10-21 PROCEDURE — 97112 NEUROMUSCULAR REEDUCATION: CPT

## 2024-10-21 PROCEDURE — 97161 PT EVAL LOW COMPLEX 20 MIN: CPT

## 2024-10-21 PROCEDURE — 97110 THERAPEUTIC EXERCISES: CPT

## 2024-10-21 NOTE — PLAN OF CARE
[facility] - Outpatient Rehabilitation and Therapy [address], [place] [address] office: [phone] fax: [phone]     Physical Therapy Initial Evaluation Certification      Dear Elmo Phelps MD,    We had the pleasure of evaluating the following patient for physical therapy services at [facility].  A summary of our findings can be found in the initial assessment below.  This includes our plan of care.  If you have any questions or concerns regarding these findings, please do not hesitate to contact me at the office phone number listed above.  Thank you for the referral.     Physician Signature:_______________________________Date:__________________  By signing above (or electronic signature), therapist’s plan is approved by physician     Physical Therapy: TREATMENT/PROGRESS NOTE   Patient: Maria M Tang (78 y.o. female)   Examination Date: 10/21/2024   :  1945 MRN: [id number]   Visit #: 1   Insurance Allowable Auth Needed   PMN []Yes    []No    Insurance: Payor: AETNA MEDICARE / Plan: AETNA MEDICARE-ADVANTAGE PPO / Product Type: Medicare /   Insurance ID: [id number] - (Medicare Managed)  Secondary Insurance (if applicable):    Treatment Diagnosis:     ICD-10-CM    1. Strain of tendon of right rotator cuff, subsequent encounter  S46.011D       2. Weakness of right shoulder  R29.898       3. Acute pain of right shoulder  M25.511       4. Decreased ROM of right shoulder  M25.611          Medical Diagnosis:  Pain in left hip [M25.552]  Pain in right hip [M25.551]  Other specified soft tissue disorders [M79.89]   Referring Physician: Elmo Phelps MD PCP: Cecilio Franks MD     Plan of care signed (Y/N): N    Date of Patient follow up with Physician:      Plan of Care Report: EVAL today  POC update due: (10 visits /OR AUTH LIMITS, whichever is less)  2024                                             Medical

## 2024-10-21 NOTE — FLOWSHEET NOTE
with <2/10 pain for patient to return to lifting and carrying home items.   [x] Progressing: [] Met: [] Not Met: [] Adjusted    Overall Progression Towards Functional goals/ Treatment Progress Update:  [x] Patient is progressing as expected towards functional goals listed.    [] Progression is slowed due to complexities/Impairments listed.  [] Progression has been slowed due to co-morbidities.  [] Plan just implemented, too soon (<30days) to assess goals progression   [] Goals require adjustment due to lack of progress  [] Patient is not progressing as expected and requires additional follow up with physician  [] Other:     TREATMENT PLAN     Frequency/Duration: CONTINUE 2x/week for 6 weeks for the following treatment interventions:    Interventions:  Therapeutic Exercise (78100) including: strength training, ROM, and functional mobility  Therapeutic Activities (97163) including: functional mobility training and education.  Neuromuscular Re-education (84566) activation and proprioception, including postural re-education.    Gait Training (68903) for normalization of ambulation patterns and AD training.   Manual Therapy (60405) as indicated to include: Passive Range of Motion, Gr I-IV mobilizations, Soft Tissue Mobilization, Dry Needling/IASTM, Trigger Point Release, and Myofascial Release  Modalities as needed that may include: Cryotherapy, Electrical Stimulation, and Thermal Agents  Patient education on joint protection, postural re-education, activity modification, and progression of HEP    Plan:  POC CONTINUATION - core stabilization, mobility, functional movements    Electronically Signed by Jose Arias, PT, DPT, OCS, OMT-C  Date: 10/21/2024     Note: Portions of this note have been templated and/or copied from initial evaluation, reassessments and prior notes for documentation efficiency.    Note: If patient does not return for scheduled/recommended follow up visits, this note will serve as a discharge from

## 2024-10-23 ENCOUNTER — HOSPITAL ENCOUNTER (OUTPATIENT)
Dept: PHYSICAL THERAPY | Age: 79
Setting detail: THERAPIES SERIES
Discharge: HOME | End: 2024-10-23
Payer: MEDICARE

## 2024-10-23 PROCEDURE — 97530 THERAPEUTIC ACTIVITIES: CPT

## 2024-10-23 PROCEDURE — 97112 NEUROMUSCULAR REEDUCATION: CPT

## 2024-10-23 NOTE — PLAN OF CARE
[facility] - Outpatient Rehabilitation and Therapy [address], [place] [address] office: [phone] fax: [phone]  Physical Therapy Re-Certification Plan of Care    Dear Adán Reagan MD    We had the pleasure of treating the following patient for physical therapy services at [facility]. A summary of our findings can be found in the updated assessment below.  This includes our plan of care.  If you have any questions or concerns regarding these findings, please do not hesitate to contact me at the office phone number checked above.  Thank you for the referral.     Physician Signature:________________________________Date:__________________  By signing above (or electronic signature), therapist's plan is approved by physician      Functional Outcome: HECTOR = 10 = 20% dys    Maria M Tang 1945 continues to make progress with functional questionnaires, improving lumbopelvic ROM, core strength and hip strength. She should continue with PT services to promote greater functional mobility, lifting, symptoms reduction and strength.     Overall Response to Treatment:  Patient is responding well to treatment and improvement is noted with regards to goals    Total Visits: 10     Recommendation:    [x] Continue PT 2x / wk for 2 weeks.   [] Hold PT, pending MD visit   [] Discharge to [facility]. Follow up with PT or MD PRN.      Physical Therapy: TREATMENT/PROGRESS NOTE   Patient: Maria M Tang (78 y.o. female)   Examination Date: 10/23/2024   :  1945 MRN: [id number]   Visit #: 10   Insurance Allowable Auth Needed   PMN []Yes    []No    Insurance: Payor: AETNA MEDICARE / Plan: AETNA MEDICARE-ADVANTAGE PPO / Product Type: Medicare /   Insurance ID: [id number] - (Medicare Managed)  Secondary Insurance (if applicable):    Treatment Diagnosis:     ICD-10-CM    1. Spondylolisthesis of lumbar region  M43.16       2. Lumbar radiculopathy

## 2024-10-25 ENCOUNTER — HOSPITAL ENCOUNTER (OUTPATIENT)
Dept: GENERAL RADIOLOGY | Age: 79
Discharge: HOME | End: 2024-10-25
Payer: MEDICARE

## 2024-10-25 ENCOUNTER — HOSPITAL ENCOUNTER (OUTPATIENT)
Age: 79
Discharge: HOME | End: 2024-10-25
Payer: MEDICARE

## 2024-10-25 DIAGNOSIS — M48.062 PSEUDOCLAUDICATION SYNDROME: ICD-10-CM

## 2024-10-25 PROCEDURE — 73502 X-RAY EXAM HIP UNI 2-3 VIEWS: CPT

## 2024-10-25 PROCEDURE — 72100 X-RAY EXAM L-S SPINE 2/3 VWS: CPT

## 2024-10-28 ENCOUNTER — HOSPITAL ENCOUNTER (OUTPATIENT)
Dept: PHYSICAL THERAPY | Age: 79
Setting detail: THERAPIES SERIES
Discharge: HOME | End: 2024-10-28
Payer: MEDICARE

## 2024-10-28 PROCEDURE — 97530 THERAPEUTIC ACTIVITIES: CPT

## 2024-10-28 PROCEDURE — 97110 THERAPEUTIC EXERCISES: CPT

## 2024-10-28 PROCEDURE — 97112 NEUROMUSCULAR REEDUCATION: CPT

## 2024-10-28 NOTE — FLOWSHEET NOTE
Frequency/Duration: CONTINUE 2x/week for 2 weeks for the following treatment interventions:    Interventions:  Therapeutic Exercise (76911) including: strength training, ROM, and functional mobility  Therapeutic Activities (12841) including: functional mobility training and education.  Neuromuscular Re-education (79077) activation and proprioception, including postural re-education.    Gait Training (34753) for normalization of ambulation patterns and AD training.   Manual Therapy (12838) as indicated to include: Passive Range of Motion, Gr I-IV mobilizations, Soft Tissue Mobilization, Dry Needling/IASTM, Trigger Point Release, and Myofascial Release  Modalities as needed that may include: Cryotherapy, Electrical Stimulation, and Thermal Agents  Patient education on joint protection, postural re-education, activity modification, and progression of HEP    Plan:  core stabilization, mobility, functional movements, lifting and mobility as tolerated    Electronically Signed by Jose Arias, PT, DPT, OCS, OMT-C  Date: 10/28/2024     Note: Portions of this note have been templated and/or copied from initial evaluation, reassessments and prior notes for documentation efficiency.    Note: If patient does not return for scheduled/recommended follow up visits, this note will serve as a discharge from care along with the most recent update on progress.

## 2024-10-28 NOTE — FLOWSHEET NOTE
[facility] - Outpatient Rehabilitation and Therapy [address], [place] [address] office: [phone] fax: [phone]     Physical Therapy: TREATMENT/PROGRESS NOTE   Patient: Maria M Tang (78 y.o. female)   Examination Date: 10/28/2024   :  1945 MRN: [id number]   Visit #: 2   Insurance Allowable Auth Needed   PMN []Yes    []No    Insurance: Payor: AETNA MEDICARE / Plan: AETNA MEDICARE-ADVANTAGE PPO / Product Type: Medicare /   Insurance ID: [id number] - (Medicare Managed)  Secondary Insurance (if applicable):    Treatment Diagnosis:     ICD-10-CM    1. Strain of tendon of right rotator cuff, subsequent encounter  S46.011D       2. Weakness of right shoulder  R29.898       3. Acute pain of right shoulder  M25.511       4. Decreased ROM of right shoulder  M25.611          Medical Diagnosis:  Treatment Diagnosis:       ICD-10-CM     1. Strain of tendon of right rotator cuff, subsequent encounter  S46.011D         2. Weakness of right shoulder  R29.898         3. Acute pain of right shoulder  M25.511         4. Decreased ROM of right shoulder  M25.611       Referring Physician: Elmo Phelps MD PCP: Cecilio Franks MD     Plan of care signed (Y/N): N    Date of Patient follow up with Physician:      Plan of Care Report: NO  POC update due: (10 visits /OR AUTH LIMITS, whichever is less)  2024                                             Medical History:  Comorbidities:  Osteoarthritis  Other: hyperlipidemia, osteoporosis  Relevant Medical History: spinal fusion                                         Precautions/ Contra-indications:           Latex allergy:  NO  Pacemaker:    NO  Contraindications for Manipulation: remote history of spinal fusion (relative) and osteopenia  Date of Surgery: 24 - TLIF L4-5  Other:    Red Flags:  None    Suicide Screening:   The patient did not verbalize a primary behavioral concern, suicidal ideation, suicidal intent, or

## 2024-10-30 ENCOUNTER — HOSPITAL ENCOUNTER (OUTPATIENT)
Dept: PHYSICAL THERAPY | Age: 79
Setting detail: THERAPIES SERIES
Discharge: HOME | End: 2024-10-30
Payer: MEDICARE

## 2024-10-30 PROCEDURE — 97110 THERAPEUTIC EXERCISES: CPT

## 2024-10-30 PROCEDURE — 97112 NEUROMUSCULAR REEDUCATION: CPT

## 2024-10-30 PROCEDURE — 97530 THERAPEUTIC ACTIVITIES: CPT

## 2024-10-30 NOTE — FLOWSHEET NOTE
[facility] - Outpatient Rehabilitation and Therapy [address], [place] [address] office: [phone] fax: [phone]     Physical Therapy: TREATMENT/PROGRESS NOTE   Patient: Maria M Tang (78 y.o. female)   Examination Date: 10/30/2024   :  1945 MRN: [id number]   Visit #: 12   Insurance Allowable Auth Needed   PMN []Yes    []No    Insurance: Payor: AETNA MEDICARE / Plan: AETNA MEDICARE-ADVANTAGE PPO / Product Type: Medicare /   Insurance ID: [id number] - (Medicare Managed)  Secondary Insurance (if applicable):    Treatment Diagnosis:     ICD-10-CM    1. Spondylolisthesis of lumbar region  M43.16       2. Lumbar radiculopathy  M54.16       3. Spinal stenosis, lumbar region, with neurogenic claudication  M48.062          Medical Diagnosis:  M48.062 (ICD-10-CM) - Spinal stenosis, lumbar region with neurogenic claudication   M43.16 (ICD-10-CM) - Spondylolisthesis, lumbar region   M54.16 (ICD-10-CM) - Radiculopathy, lumbar region      Referring Physician:   Adán Reagan MD    PCP: Cecilio Franks MD     Plan of care signed (Y/N): Yes    Date of Patient follow up with Physician:      Plan of Care Report: NO  POC update due: (10 visits /OR AUTH LIMITS, whichever is less):                                             Medical History:  Comorbidities:  Osteoporosis/Osteopenia  Other: hyperlipidemia  Relevant Medical History: extensive - please see patient surgical history                                         Precautions/ Contra-indications:           Latex allergy:  NO  Pacemaker:    NO  Contraindications for Manipulation: osteoporosis  and remote history of spinal fusion (relative)  Date of Surgery: 24 - TLIF L4-5  Other:    Red Flags:  None    Suicide Screening:   The patient did not verbalize a primary behavioral concern, suicidal ideation, suicidal intent, or demonstrate suicidal behaviors.    Preferred Language for Healthcare:   [x] English       []

## 2024-10-30 NOTE — FLOWSHEET NOTE
[facility] - Outpatient Rehabilitation and Therapy [address], [place] [address] office: [phone] fax: [phone]     Physical Therapy: TREATMENT/PROGRESS NOTE   Patient: Maria M Tang (78 y.o. female)   Examination Date: 10/30/2024   :  1945 MRN: [id number]   Visit #: 3   Insurance Allowable Auth Needed   PMN []Yes    []No    Insurance: Payor: AETNA MEDICARE / Plan: AETNA MEDICARE-ADVANTAGE PPO / Product Type: Medicare /   Insurance ID: [id number] - (Medicare Managed)  Secondary Insurance (if applicable):    Treatment Diagnosis:     ICD-10-CM    1. Strain of tendon of right rotator cuff, subsequent encounter  S46.011D       2. Weakness of right shoulder  R29.898       3. Acute pain of right shoulder  M25.511       4. Decreased ROM of right shoulder  M25.611          Medical Diagnosis:  Treatment Diagnosis:       ICD-10-CM     1. Strain of tendon of right rotator cuff, subsequent encounter  S46.011D         2. Weakness of right shoulder  R29.898         3. Acute pain of right shoulder  M25.511         4. Decreased ROM of right shoulder  M25.611       Referring Physician: Elmo Phelps MD PCP: Cecilio Franks MD     Plan of care signed (Y/N): Y    Date of Patient follow up with Physician:      Plan of Care Report: NO  POC update due: (10 visits /OR AUTH LIMITS, whichever is less)  2024                                             Medical History:  Comorbidities:  Osteoarthritis  Other: hyperlipidemia, osteoporosis  Relevant Medical History: spinal fusion                                         Precautions/ Contra-indications:           Latex allergy:  NO  Pacemaker:    NO  Contraindications for Manipulation: remote history of spinal fusion (relative) and osteopenia  Date of Surgery: 24 - TLIF L4-5  Other:    Red Flags:  None    Suicide Screening:   The patient did not verbalize a primary behavioral concern, suicidal ideation, suicidal intent, or

## 2024-11-04 ENCOUNTER — HOSPITAL ENCOUNTER (OUTPATIENT)
Dept: PHYSICAL THERAPY | Age: 79
Setting detail: THERAPIES SERIES
Discharge: HOME | End: 2024-11-04
Payer: MEDICARE

## 2024-11-04 PROCEDURE — 97110 THERAPEUTIC EXERCISES: CPT

## 2024-11-04 PROCEDURE — 97530 THERAPEUTIC ACTIVITIES: CPT

## 2024-11-04 NOTE — FLOWSHEET NOTE
(54086 - Typically 20 minutes face-to-face)     Neuromusc. Re-ed (21380)    Re-Eval (99688)     Manual (56687)    Estim Unattended (12724)     Ther. Act (78050) 10 1  Mech. Traction (62901)     Gait (65721)    Dry Needle 1-2 muscle (95796)     Aquatic Therex (91353)    Dry Needle 3+ muscle (20561)     Iontophoresis (20007)    VASO (52742)     Ultrasound (79380)    Group Therapy (93031)     Estim Attended (79041)    Canalith Repositioning (64462)     Physical Performance Test (85700)         Other:    Other:    Total Timed Code Tx Minutes 40 3       Total Treatment Minutes 40      Charge Justification:  (54546) THERAPEUTIC EXERCISE - Provided verbal/tactile cueing for activities related to strengthening, flexibility, endurance, ROM performed to prevent loss of range of motion, maintain or improve muscular strength or increase flexibility, following either an injury or surgery.   (08014) THERAPEUTIC ACTIVITY - use of dynamic activities to improve functional performance. (Ex include squatting, ascending/descending stairs, walking, bending, lifting, catching, throwing, pushing, pulling, jumping.)  Direct, one on one contact, billed in 15-minute increments.    GOALS     GOALS:  Patient stated goal: to reduce pain, improve motion and strength  [] Progressing: [x] Met: [] Not Met: [] Adjusted    Therapist goals for Patient:   Short Term Goals: To be achieved in: 2 weeks  1. Independent and compliant (minimum of 3 days per week) with HEP and progressions to progress PT plan of care and promote restoration of ROM/strength.  [] Progressing: [x] Met: [] Not Met: [] Adjusted  2. Patient will have a decrease in pain to a <2/10 to allow the patient to have less than 2 sleep disturbances on average due to pain per night to improve sleep hygiene and promote restorative sleep.  [] Progressing: [x] Met: [] Not Met: [] Adjusted    Long Term Goals: To be achieved in: 4 weeks  1. Pt will improve QDASH score by 10 points to promote

## 2024-11-05 ENCOUNTER — HOSPITAL ENCOUNTER (OUTPATIENT)
Dept: PHYSICAL THERAPY | Age: 79
Setting detail: THERAPIES SERIES
Discharge: HOME | End: 2024-11-05
Payer: MEDICARE

## 2024-11-05 PROCEDURE — 97530 THERAPEUTIC ACTIVITIES: CPT

## 2024-11-05 PROCEDURE — 97110 THERAPEUTIC EXERCISES: CPT

## 2024-11-05 NOTE — FLOWSHEET NOTE
care and promote restoration of ROM/strength.  [] Progressing: [x] Met: [] Not Met: [] Adjusted  2. Patient will have a decrease in pain to a <3/10 to allow the patient to have less than 2 sleep disturbances on average per night to improve sleep hygiene and promote restorative sleep.  [] Progressing: [x] Met: [] Not Met: [] Adjusted    Long Term Goals: To be achieved in: 6 weeks  1. Pt will improve HECTOR by 10 points to promote clinically significant improvement in lumbar function and reduce level of disability.  [x] Progressing: [] Met: [] Not Met: [] Adjusted  2. Patient will demonstrate increased AROM to >20deg with lumbar flexion to improve patient ability to squat and reach forward.  [] Progressing: [x] Met: [] Not Met: [] Adjusted  3. Patient will demonstrate an increase in Strength to at least 4+/5 in hip extension and abduction to perform resisted standing and quilting.   [x] Progressing: [] Met: [] Not Met: [] Adjusted  4. Patient will return to vacuuming for 15 min without an increase in pain more than 2/10 in order to perform home chores.    [x] Progressing: [] Met: [] Not Met: [] Adjusted  5. Patient will lift 10# box 10x with <2/10 pain for patient to return to lifting and carrying home items.   [x] Progressing: [] Met: [] Not Met: [] Adjusted    Overall Progression Towards Functional goals/ Treatment Progress Update:  [x] Patient is progressing as expected towards functional goals listed.    [] Progression is slowed due to complexities/Impairments listed.  [] Progression has been slowed due to co-morbidities.  [] Plan just implemented, too soon (<30days) to assess goals progression   [] Goals require adjustment due to lack of progress  [] Patient is not progressing as expected and requires additional follow up with physician  [] Other:     TREATMENT PLAN     Frequency/Duration: CONTINUE 2x/week for 2 weeks for the following treatment interventions:    Interventions:  Therapeutic Exercise (31383)

## 2024-11-06 ENCOUNTER — HOSPITAL ENCOUNTER (OUTPATIENT)
Dept: PHYSICAL THERAPY | Age: 79
Setting detail: THERAPIES SERIES
Discharge: HOME | End: 2024-11-06
Payer: MEDICARE

## 2024-11-06 PROCEDURE — 97110 THERAPEUTIC EXERCISES: CPT

## 2024-11-06 PROCEDURE — 97530 THERAPEUTIC ACTIVITIES: CPT

## 2024-11-06 NOTE — DISCHARGE SUMMARY
[facility] - Outpatient Rehabilitation and Therapy [address], [place] [address] office: [phone] fax: [phone]   Physical Therapy Discharge Summary    Dear Elmo Phelps MD    We had the pleasure of treating the following patient for physical therapy services at [facility].  A summary of our findings can be found in the discharge summary below.  If you have any questions or concerns regarding these findings, please do not hesitate to contact me at the office phone number checked above.  Thank you for the referral.     Functional Outcome: Quick DASH: 14 = 6.8% disability    Total Visits: 5     Plan of Care: Discharge from Physical Therapy    Reason for Discharge:  All Goals achieved and Patient should continue to improve in reasonable time if they continue HEP    Full R shoulder PROM/AROM    UE MMT:   Right:   GH Flexion:   4+  GH aBduction:  5  GH IR:   5  GH ER:    4+  Elbow Flexion:  5  Elbow Extension:   5     Physical Therapy: TREATMENT/PROGRESS NOTE   Patient: Maria M Tang (78 y.o. female)   Examination Date: 2024   :  1945 MRN: [id number]   Visit #: 5   Insurance Allowable Auth Needed   PMN []Yes    []No    Insurance: Payor: AETNA MEDICARE / Plan: AETNA MEDICARE-ADVANTAGE PPO / Product Type: Medicare /   Insurance ID: [id number] - (Medicare Managed)  Secondary Insurance (if applicable):    Treatment Diagnosis:     ICD-10-CM    1. Strain of tendon of right rotator cuff, subsequent encounter  S46.011D       2. Weakness of right shoulder  R29.898       3. Acute pain of right shoulder  M25.511       4. Decreased ROM of right shoulder  M25.611          Medical Diagnosis:  Treatment Diagnosis:       ICD-10-CM     1. Strain of tendon of right rotator cuff, subsequent encounter  S46.011D         2. Weakness of right shoulder  R29.898         3. Acute pain of right shoulder  M25.511         4. Decreased ROM of right

## 2024-11-08 ENCOUNTER — HOSPITAL ENCOUNTER (OUTPATIENT)
Dept: PHYSICAL THERAPY | Age: 79
Setting detail: THERAPIES SERIES
Discharge: HOME | End: 2024-11-08
Payer: MEDICARE

## 2024-11-08 PROCEDURE — 97530 THERAPEUTIC ACTIVITIES: CPT

## 2024-11-08 PROCEDURE — 97112 NEUROMUSCULAR REEDUCATION: CPT

## 2024-11-08 NOTE — PLAN OF CARE
Called patient left voice message with office number regarding Labs.   Physician:   Adán Reagan MD    PCP: Cecilio Franks MD     Plan of care signed (Y/N): Yes    Date of Patient follow up with Physician:      Plan of Care Report: YES, Date Range for this report: 8/30/24 to 11/8/24  POC update due: (10 visits /OR AUTH LIMITS, whichever is less): POC                                             Medical History:  Comorbidities:  Osteoporosis/Osteopenia  Other: hyperlipidemia  Relevant Medical History: extensive - please see patient surgical history                                         Precautions/ Contra-indications:           Latex allergy:  NO  Pacemaker:    NO  Contraindications for Manipulation: osteoporosis  and remote history of spinal fusion (relative)  Date of Surgery: 8/13/24 - TLIF L4-5  Other:    Red Flags:  None    Suicide Screening:   The patient did not verbalize a primary behavioral concern, suicidal ideation, suicidal intent, or demonstrate suicidal behaviors.    Preferred Language for Healthcare:   [x] English       [] other:    SUBJECTIVE EXAMINATION     Patient stated complaint: She is very eager to be able to resume using diclofenac in February, and starting Mobic on Nov 13th! Standing for prolonged periods causes aching. She has some pain in bilateral hips. She reports ongoing tightness in anterior hip flexor/psoas. She would like to pursue dry needling at future visits.    Eval: TLIF for L4-5 on 8/13/24. She was told she does not need to wear brace at this time. She has followed B.L.T. restrictions. She reports ache in bilateral thighs as she stands and walks more. She denies much pain otherwise, but this achiness increases as activity progresses. Some local aching around scars/incisions; she endorses tenderness there. She is using tylenol now. She battled UTI infection and she just finished anti-biotics last night. She sees urologist today. Her incisions are tender, especially when lying on her back. Her goal is to walk without pain and move more

## 2024-11-19 ENCOUNTER — HOSPITAL ENCOUNTER (OUTPATIENT)
Dept: PHYSICAL THERAPY | Age: 79
Setting detail: THERAPIES SERIES
Discharge: HOME | End: 2024-11-19
Payer: MEDICARE

## 2024-11-19 PROCEDURE — 20561 NDL INSJ W/O NJX 3+ MUSC: CPT

## 2024-11-19 PROCEDURE — 97530 THERAPEUTIC ACTIVITIES: CPT

## 2024-11-19 NOTE — FLOWSHEET NOTE
corresponding musculature by means of intramuscular mobilization.  Clean Technique was utilized today while applying the Dry Needling treatment. The treatment sites were cleaned with 70% solution of isopropyl alcohol. The PT washed their hands and utilized treatment gloves along with hand  prior to inserting the needles.  All needles were removed and discarded in the appropriate sharps container.     Muscle  Needle Size Technique Notes   Site 1 Iliacus  75 mm [x] Pistoning / []  Threading  []  Winding/Coning Left and R; supine   Site 2 TFL 60 mm [x] Pistoning / []  Threading  []  Winding/Coning L and R, sidelying   Site 3 Gluteus medius/anterior fibers 75 mm [x] Pistoning / []  Threading  []  Winding/Coning L and R, sidelying   Site 4   [] Pistoning / []  Threading  []  Winding/Coning    Site 5   [] Pistoning / []  Threading  []  Winding/Coning      Educated on drinking plenty of fluids as well as use of MHP PRN    Education/Home Exercise Program:     Access Code: [email or web address]  URL: [email or web address]  Date: 11/08/2024  Prepared by: Herber Arias    Exercises  - Supine Active Straight Leg Raise  - 2 x daily - 4 x weekly - 3 sets - 10 reps  - Supine Lower Trunk Rotation  - 3 x daily - 7 x weekly - 1 sets - 10 reps - 2 hold  - Standing Hip Abduction with Resistance at Ankles and Counter Support  - 1 x daily - 4 x weekly - 3 sets - 10 reps  - Standing Hip Extension with Resistance at Ankles and Counter Support  - 1 x daily - 4 x weekly - 3 sets - 10 reps  - Side Stepping with Resistance at Feet  - 1 x daily - 4 x weekly - 3 sets - 10 reps  - Alternating Single Leg Bridge  - 1 x daily - 4 x weekly - 3 sets - 10 reps  - Supine Psoas Self-Massage  - 1 x daily - 7 x weekly - 3 sets - 10 reps  - IN STANDING - hip flexor stretch with sidebend  - 1-2 x daily - 7 x weekly - 1 sets - 4 reps - 15 hold  - Dead Bugs with foam/block  - 1 x daily - 4 x weekly - 2 sets - 10 reps  - Squatting Anti-Rotation Press  - 1 x

## 2024-11-22 ENCOUNTER — HOSPITAL ENCOUNTER (OUTPATIENT)
Dept: PHYSICAL THERAPY | Age: 79
Setting detail: THERAPIES SERIES
Discharge: HOME | End: 2024-11-22
Payer: MEDICARE

## 2024-11-22 PROCEDURE — 97110 THERAPEUTIC EXERCISES: CPT

## 2024-11-22 PROCEDURE — 20561 NDL INSJ W/O NJX 3+ MUSC: CPT

## 2024-11-22 PROCEDURE — 97140 MANUAL THERAPY 1/> REGIONS: CPT

## 2024-11-22 NOTE — FLOWSHEET NOTE
[facility] - Outpatient Rehabilitation and Therapy [address], [place] [address] office: [phone] fax: [phone]   P   Physical Therapy: TREATMENT/PROGRESS NOTE   Patient: Maria M Tang (79 y.o. female)   Examination Date: 2024   :  1945 MRN: [id number]   Visit #:   Insurance Allowable Auth Needed   PMN []Yes    []No    Insurance: Payor: AET MEDICARE / Plan: AETNA MEDICARE-ADVANTAGE PPO / Product Type: Medicare /   Insurance ID: [id number] - (Medicare Managed)  Secondary Insurance (if applicable):    Treatment Diagnosis:     ICD-10-CM    1. Spondylolisthesis of lumbar region  M43.16       2. Lumbar radiculopathy  M54.16       3. Spinal stenosis, lumbar region, with neurogenic claudication  M48.062          Medical Diagnosis:  M48.062 (ICD-10-CM) - Spinal stenosis, lumbar region with neurogenic claudication   M43.16 (ICD-10-CM) - Spondylolisthesis, lumbar region   M54.16 (ICD-10-CM) - Radiculopathy, lumbar region      Referring Physician:   Adán Reagan MD    PCP: Cecilio Franks MD     Plan of care signed (Y/N): Yes    Date of Patient follow up with Physician:      Plan of Care Report: NO  POC update due: (10 visits /OR AUTH LIMITS, whichever is less): POC                                             Medical History:  Comorbidities:  Osteoporosis/Osteopenia  Other: hyperlipidemia  Relevant Medical History: extensive - please see patient surgical history                                         Precautions/ Contra-indications:           Latex allergy:  NO  Pacemaker:    NO  Contraindications for Manipulation: osteoporosis  and remote history of spinal fusion (relative)  Date of Surgery: 24 - TLIF L4-5  Other:    Red Flags:  None    Suicide Screening:   The patient did not verbalize a primary behavioral concern, suicidal ideation, suicidal intent, or demonstrate suicidal behaviors.    Preferred Language for Healthcare:   [x] English

## 2024-12-03 ENCOUNTER — HOSPITAL ENCOUNTER (OUTPATIENT)
Dept: PHYSICAL THERAPY | Age: 79
Setting detail: THERAPIES SERIES
Discharge: HOME | End: 2024-12-03
Payer: MEDICARE

## 2024-12-03 PROCEDURE — 97110 THERAPEUTIC EXERCISES: CPT

## 2024-12-03 PROCEDURE — 20561 NDL INSJ W/O NJX 3+ MUSC: CPT

## 2024-12-03 NOTE — FLOWSHEET NOTE
[facility] - Outpatient Rehabilitation and Therapy [address], [place] [address] office: [phone] fax: [phone]    Physical Therapy: TREATMENT/PROGRESS NOTE   Patient: Maria M Tang (79 y.o. female)   Examination Date: 2024   :  1945 MRN: [id number]   Visit #:   Insurance Allowable Auth Needed   PMN []Yes    []No    Insurance: Payor: AET MEDICARE / Plan: AETNA MEDICARE-ADVANTAGE PPO / Product Type: Medicare /   Insurance ID: [id number] - (Medicare Managed)  Secondary Insurance (if applicable):    Treatment Diagnosis:     ICD-10-CM    1. Spondylolisthesis of lumbar region  M43.16       2. Lumbar radiculopathy  M54.16       3. Spinal stenosis, lumbar region, with neurogenic claudication  M48.062          Medical Diagnosis:  M48.062 (ICD-10-CM) - Spinal stenosis, lumbar region with neurogenic claudication   M43.16 (ICD-10-CM) - Spondylolisthesis, lumbar region   M54.16 (ICD-10-CM) - Radiculopathy, lumbar region      Referring Physician:   Adán Reagan MD    PCP: Cecilio Franks MD     Plan of care signed (Y/N): Yes    Date of Patient follow up with Physician:      Plan of Care Report: NO  POC update due: (10 visits /OR AUTH LIMITS, whichever is less): POC                                             Medical History:  Comorbidities:  Osteoporosis/Osteopenia  Other: hyperlipidemia  Relevant Medical History: extensive - please see patient surgical history                                         Precautions/ Contra-indications:           Latex allergy:  NO  Pacemaker:    NO  Contraindications for Manipulation: osteoporosis  and remote history of spinal fusion (relative)  Date of Surgery: 24 - TLIF L4-5  Other:    Red Flags:  None    Suicide Screening:   The patient did not verbalize a primary behavioral concern, suicidal ideation, suicidal intent, or demonstrate suicidal behaviors.    Preferred Language for Healthcare:   [x] English       []

## 2024-12-06 ENCOUNTER — HOSPITAL ENCOUNTER (OUTPATIENT)
Dept: PHYSICAL THERAPY | Age: 79
Setting detail: THERAPIES SERIES
Discharge: HOME | End: 2024-12-06
Payer: MEDICARE

## 2024-12-06 PROCEDURE — 97140 MANUAL THERAPY 1/> REGIONS: CPT

## 2024-12-06 PROCEDURE — 20561 NDL INSJ W/O NJX 3+ MUSC: CPT

## 2024-12-06 PROCEDURE — 97530 THERAPEUTIC ACTIVITIES: CPT

## 2024-12-06 NOTE — PLAN OF CARE
swelling/inflammation/restriction, improving soft tissue extensibility and allowing for proper ROM for normal function with self care, mobility, lifting and ambulation  (20561) Needle insertion(s) without injection; 3 or more muscle(s)    GOALS     GOALS:  Patient stated goal: to walk without pain, to move more easily  [x] Progressing: [] Met: [] Not Met: [] Adjusted    Therapist goals for Patient:   Short Term Goals: To be achieved in: 2 weeks  Independent and compliant (minimum of 3 days per week) with HEP and progressions to progress PT plan of care and promote restoration of ROM/strength.  [] Progressing: [x] Met: [] Not Met: [] Adjusted  2. Patient will have a decrease in pain to a <3/10 to allow the patient to have less than 2 sleep disturbances on average per night to improve sleep hygiene and promote restorative sleep.  [] Progressing: [x] Met: [] Not Met: [] Adjusted    Long Term Goals: To be achieved in: 6 weeks  1. Pt will improve HECTOR by 10 points to promote clinically significant improvement in lumbar function and reduce level of disability.  [] Progressing: [x] Met: [] Not Met: [] Adjusted  2. Patient will demonstrate increased AROM to >20deg with lumbar flexion to improve patient ability to squat and reach forward.  [] Progressing: [x] Met: [] Not Met: [] Adjusted  3. Patient will demonstrate an increase in Strength to at least 4+/5 in hip extension and abduction to perform resisted standing and quilting.   [] Progressing: [x] Met: [] Not Met: [] Adjusted  4. Patient will return to vacuuming for 15 min without an increase in pain more than 2/10 in order to perform home chores.    [] Progressing: [x] Met: [] Not Met: [] Adjusted  5. Patient will lift 10# box 10x with <2/10 pain for patient to return to lifting and carrying home items.   [] Progressing: [x] Met: [] Not Met: [] Adjusted    Overall Progression Towards Functional goals/ Treatment Progress Update:  [x] Patient is progressing as expected

## 2024-12-09 ENCOUNTER — APPOINTMENT (OUTPATIENT)
Dept: PHYSICAL THERAPY | Age: 79
End: 2024-12-09
Payer: MEDICARE

## 2024-12-12 ENCOUNTER — APPOINTMENT (OUTPATIENT)
Dept: PHYSICAL THERAPY | Age: 79
End: 2024-12-12
Payer: MEDICARE

## 2025-02-17 ENCOUNTER — HOSPITAL ENCOUNTER (OUTPATIENT)
Age: 80
Discharge: HOME | End: 2025-02-17
Payer: MEDICARE

## 2025-02-17 ENCOUNTER — HOSPITAL ENCOUNTER (OUTPATIENT)
Dept: GENERAL RADIOLOGY | Age: 80
Discharge: HOME | End: 2025-02-17
Payer: MEDICARE

## 2025-02-17 DIAGNOSIS — Z98.1 ARTHRODESIS STATUS: ICD-10-CM

## 2025-02-17 PROCEDURE — 72100 X-RAY EXAM L-S SPINE 2/3 VWS: CPT

## (undated) DEVICE — MEDICINE CUP, GRADUATED, STER: Brand: MEDLINE

## (undated) DEVICE — POSITIONER HD SFT TCH BERRY FOAM DEVON

## (undated) DEVICE — GAUZE,SPONGE,4"X4",8PLY,STRL,LF,10/TRAY: Brand: MEDLINE

## (undated) DEVICE — TRAY PREP DRY W/ PREM GLV 2 APPL 6 SPNG 2 UNDPD 1 OVERWRAP

## (undated) DEVICE — LOTION PREP REMV 5OZ IODO CLR TINC OF BENZ DURAPREP

## (undated) DEVICE — C-ARM: Brand: UNBRANDED

## (undated) DEVICE — SUTURE MCRYL + SZ 4-0 L27IN ABSRB UD L19MM PS-2 3/8 CIR MCP426H

## (undated) DEVICE — HAND: Brand: MEDLINE INDUSTRIES, INC.

## (undated) DEVICE — PROTECTOR EYE PT SELF ADH NS OPT GRD LF

## (undated) DEVICE — ARM CRADLE: Brand: DEVON

## (undated) DEVICE — APPLICATOR PREP 26ML 0.7% IOD POVACRYLEX 74% ISO ALC ST

## (undated) DEVICE — 3.0MM NEURO (MATCH HEAD)

## (undated) DEVICE — SURE SET SINGLE BASIN-LF: Brand: MEDLINE INDUSTRIES, INC.

## (undated) DEVICE — GAUZE,SPONGE,4"X4",16PLY,XRAY,STRL,LF: Brand: MEDLINE

## (undated) DEVICE — CHLORAPREP 26ML ORANGE

## (undated) DEVICE — PACK PROCEDURE SURG EXTREMITY MFFOP CUST

## (undated) DEVICE — BANDAGE,GAUZE,CONFORMING,3"X75",STRL,LF: Brand: MEDLINE

## (undated) DEVICE — 3M™ TEGADERM™ TRANSPARENT FILM DRESSING FRAME STYLE, 1628, 6 IN X 8 IN (15 CM X 20 CM), 10/CT 8CT/CASE: Brand: 3M™ TEGADERM™

## (undated) DEVICE — PADDING CAST W4INXL4YD HIGHLY ABSRB THAN COT EZ APPL

## (undated) DEVICE — TOWEL,OR,DSP,ST,BLUE,STD,4/PK,20PK/CS: Brand: MEDLINE

## (undated) DEVICE — HYPODERMIC SAFETY NEEDLE: Brand: MAGELLAN

## (undated) DEVICE — MERCY FAIRFIELD TURNOVER KIT: Brand: MEDLINE INDUSTRIES, INC.

## (undated) DEVICE — SOLUTION IV IRRIG WATER 1000ML POUR BRL 2F7114

## (undated) DEVICE — SOLUTION IRRIG 1000ML 0.9% SOD CHL USP POUR PLAS BTL

## (undated) DEVICE — GLOVE ORTHO 7 1/2   MSG9475

## (undated) DEVICE — DRAPE HND W114XL142IN BLU POLYPR W O PCH FEN CRD AND TB HLDR

## (undated) DEVICE — DRAPE MICSCP W132XL406CM LENS DIA68MM W VARI LENS2 FOR LEICA

## (undated) DEVICE — SURGICAL PROCEDURE PACK NEURO DRP CUST

## (undated) DEVICE — DRAPE,LAP,CHOLE,W/TROUGHS,STERILE: Brand: MEDLINE

## (undated) DEVICE — SOLUTION IV IRRIG 500ML 0.9% SODIUM CHL 2F7123

## (undated) DEVICE — TOWEL,STOP FLAG GOLD N-W: Brand: MEDLINE

## (undated) DEVICE — SYRINGE MED 10ML LUERLOCK TIP W/O SFTY DISP

## (undated) DEVICE — Z DISCONTINUED USE 2275686 GLOVE SURG SZ 8 L12IN FNGR THK13MIL WHT ISOLEX POLYISOPRENE

## (undated) DEVICE — TRAY,IRRIGATION,BULBSYRINGE,60ML,CSR,PVP: Brand: MEDLINE

## (undated) DEVICE — 3M™ STERI-STRIP™ REINFORCED ADHESIVE SKIN CLOSURES, R1547, 1/2 IN X 4 IN (12 MM X 100 MM), 6 STRIPS/ENVELOPE: Brand: 3M™ STERI-STRIP™

## (undated) DEVICE — TOURNIQUET PHLEB L EXSANGUINATING FOR AD DGT TOURNI-COT

## (undated) DEVICE — BANDAGE GZ W2XL75IN ST RAYON POLY CNFRM STRTCH LTWT

## (undated) DEVICE — GARMENT,MEDLINE,DVT,INT,CALF,MED, GEN2: Brand: MEDLINE

## (undated) DEVICE — ELECTRODE PT RET AD L9FT HI MOIST COND ADH HYDRGEL CORDED

## (undated) DEVICE — SUTURE VCRL + SZ 0 L18IN ABSRB UD L36MM CT-1 1/2 CIR VCP840D

## (undated) DEVICE — MASTISOL ADHESIVE LIQ 2/3ML

## (undated) DEVICE — BLADE OPHTH 180DEG CUT SURF BLU STR SHRP DBL BVL GRINDLESS

## (undated) DEVICE — DRESSING,GAUZE,XEROFORM,CURAD,1"X8",ST: Brand: CURAD

## (undated) DEVICE — COVER LT HNDL BLU PLAS

## (undated) DEVICE — STERILE LATEX POWDER-FREE SURGICAL GLOVESWITH NITRILE AND EMOLLIENT COATINGS: Brand: PROTEXIS

## (undated) DEVICE — GOWN,SIRUS,POLYRNF,BRTHSLV,XLN/XL,20/CS: Brand: MEDLINE

## (undated) DEVICE — GLOVE ORANGE PI 8   MSG9080

## (undated) DEVICE — UNDERGLOVE SURG SZ 8 BLU LTX FREE SYN POLYISOPRENE POLYMER

## (undated) DEVICE — SUTURE VCRL + SZ 2-0 L18IN ABSRB UD CT1 L36MM 1/2 CIR VCP839D